# Patient Record
Sex: FEMALE | Race: BLACK OR AFRICAN AMERICAN | NOT HISPANIC OR LATINO | URBAN - METROPOLITAN AREA
[De-identification: names, ages, dates, MRNs, and addresses within clinical notes are randomized per-mention and may not be internally consistent; named-entity substitution may affect disease eponyms.]

---

## 2019-08-05 ENCOUNTER — OUTPATIENT (OUTPATIENT)
Dept: OUTPATIENT SERVICES | Facility: HOSPITAL | Age: 74
LOS: 1 days | End: 2019-08-05

## 2019-08-05 DIAGNOSIS — Z52.3 BONE MARROW DONOR: ICD-10-CM

## 2019-08-08 ENCOUNTER — APPOINTMENT (OUTPATIENT)
Dept: HEMATOLOGY ONCOLOGY | Facility: CLINIC | Age: 74
End: 2019-08-08
Payer: MEDICAID

## 2019-08-08 ENCOUNTER — LABORATORY RESULT (OUTPATIENT)
Age: 74
End: 2019-08-08

## 2019-08-08 ENCOUNTER — RESULT REVIEW (OUTPATIENT)
Age: 74
End: 2019-08-08

## 2019-08-08 ENCOUNTER — OUTPATIENT (OUTPATIENT)
Dept: OUTPATIENT SERVICES | Facility: HOSPITAL | Age: 74
LOS: 1 days | Discharge: HOME | End: 2019-08-08

## 2019-08-08 ENCOUNTER — APPOINTMENT (OUTPATIENT)
Dept: HEMATOLOGY ONCOLOGY | Facility: CLINIC | Age: 74
End: 2019-08-08

## 2019-08-08 DIAGNOSIS — Z52.9 DONOR OF UNSPECIFIED ORGAN OR TISSUE: ICD-10-CM

## 2019-08-08 DIAGNOSIS — Z52.001 UNSPECIFIED DONOR, STEM CELLS: ICD-10-CM

## 2019-08-08 LAB
BASOPHILS # BLD AUTO: 0.1 K/UL — SIGNIFICANT CHANGE UP (ref 0–0.2)
EOSINOPHIL # BLD AUTO: 0.2 K/UL — SIGNIFICANT CHANGE UP (ref 0–0.5)
EOSINOPHIL NFR BLD AUTO: 2 % — SIGNIFICANT CHANGE UP (ref 0–6)
HCT VFR BLD CALC: 43.9 % — SIGNIFICANT CHANGE UP (ref 34.5–45)
HGB BLD-MCNC: 14.7 G/DL — SIGNIFICANT CHANGE UP (ref 11.5–15.5)
LYMPHOCYTES # BLD AUTO: 2.6 K/UL — SIGNIFICANT CHANGE UP (ref 1–3.3)
LYMPHOCYTES # BLD AUTO: 52 % — HIGH (ref 13–44)
MCHC RBC-ENTMCNC: 31.8 PG — SIGNIFICANT CHANGE UP (ref 27–34)
MCHC RBC-ENTMCNC: 33.4 G/DL — SIGNIFICANT CHANGE UP (ref 32–36)
MCV RBC AUTO: 95.2 FL — SIGNIFICANT CHANGE UP (ref 80–100)
MONOCYTES # BLD AUTO: 0.5 K/UL — SIGNIFICANT CHANGE UP (ref 0–0.9)
MONOCYTES NFR BLD AUTO: 8 % — SIGNIFICANT CHANGE UP (ref 2–14)
NEUTROPHILS # BLD AUTO: 2.4 K/UL — SIGNIFICANT CHANGE UP (ref 1.8–7.4)
NEUTROPHILS NFR BLD AUTO: 38 % — LOW (ref 43–77)
PLAT MORPH BLD: NORMAL — SIGNIFICANT CHANGE UP
PLATELET # BLD AUTO: 256 K/UL — SIGNIFICANT CHANGE UP (ref 150–400)
RBC # BLD: 4.61 M/UL — SIGNIFICANT CHANGE UP (ref 3.8–5.2)
RBC # FLD: 12.9 % — SIGNIFICANT CHANGE UP (ref 10.3–14.5)
RBC BLD AUTO: NORMAL — SIGNIFICANT CHANGE UP
WBC # BLD: 5.8 K/UL — SIGNIFICANT CHANGE UP (ref 3.8–10.5)
WBC # FLD AUTO: 5.8 K/UL — SIGNIFICANT CHANGE UP (ref 3.8–10.5)

## 2019-08-08 PROCEDURE — 99499A: CUSTOM | Mod: NC

## 2019-08-15 ENCOUNTER — RESULT REVIEW (OUTPATIENT)
Age: 74
End: 2019-08-15

## 2019-08-15 ENCOUNTER — APPOINTMENT (OUTPATIENT)
Dept: HEMATOLOGY ONCOLOGY | Facility: CLINIC | Age: 74
End: 2019-08-15
Payer: MEDICAID

## 2019-08-15 VITALS
RESPIRATION RATE: 16 BRPM | HEART RATE: 81 BPM | SYSTOLIC BLOOD PRESSURE: 148 MMHG | TEMPERATURE: 98.3 F | OXYGEN SATURATION: 100 % | BODY MASS INDEX: 31.84 KG/M2 | WEIGHT: 168.65 LBS | DIASTOLIC BLOOD PRESSURE: 77 MMHG | HEIGHT: 61.22 IN

## 2019-08-15 DIAGNOSIS — E11.9 TYPE 2 DIABETES MELLITUS W/OUT COMPLICATIONS: ICD-10-CM

## 2019-08-15 DIAGNOSIS — Z78.9 OTHER SPECIFIED HEALTH STATUS: ICD-10-CM

## 2019-08-15 DIAGNOSIS — Z82.49 FAMILY HISTORY OF ISCHEMIC HEART DISEASE AND OTHER DISEASES OF THE CIRCULATORY SYSTEM: ICD-10-CM

## 2019-08-15 DIAGNOSIS — I10 ESSENTIAL (PRIMARY) HYPERTENSION: ICD-10-CM

## 2019-08-15 LAB
BASOPHILS # BLD AUTO: 0.1 K/UL — SIGNIFICANT CHANGE UP (ref 0–0.2)
BASOPHILS NFR BLD AUTO: 0.9 % — SIGNIFICANT CHANGE UP (ref 0–2)
EOSINOPHIL # BLD AUTO: 0.1 K/UL — SIGNIFICANT CHANGE UP (ref 0–0.5)
EOSINOPHIL NFR BLD AUTO: 2.2 % — SIGNIFICANT CHANGE UP (ref 0–6)
HCT VFR BLD CALC: 41.4 % — SIGNIFICANT CHANGE UP (ref 34.5–45)
HGB BLD-MCNC: 13.6 G/DL — SIGNIFICANT CHANGE UP (ref 11.5–15.5)
LYMPHOCYTES # BLD AUTO: 2.6 K/UL — SIGNIFICANT CHANGE UP (ref 1–3.3)
LYMPHOCYTES # BLD AUTO: 40.5 % — SIGNIFICANT CHANGE UP (ref 13–44)
MCHC RBC-ENTMCNC: 31.3 PG — SIGNIFICANT CHANGE UP (ref 27–34)
MCHC RBC-ENTMCNC: 32.9 G/DL — SIGNIFICANT CHANGE UP (ref 32–36)
MCV RBC AUTO: 95.2 FL — SIGNIFICANT CHANGE UP (ref 80–100)
MONOCYTES # BLD AUTO: 0.6 K/UL — SIGNIFICANT CHANGE UP (ref 0–0.9)
MONOCYTES NFR BLD AUTO: 9 % — SIGNIFICANT CHANGE UP (ref 2–14)
NEUTROPHILS # BLD AUTO: 3.1 K/UL — SIGNIFICANT CHANGE UP (ref 1.8–7.4)
NEUTROPHILS NFR BLD AUTO: 47.4 % — SIGNIFICANT CHANGE UP (ref 43–77)
PLATELET # BLD AUTO: 277 K/UL — SIGNIFICANT CHANGE UP (ref 150–400)
RBC # BLD: 4.35 M/UL — SIGNIFICANT CHANGE UP (ref 3.8–5.2)
RBC # FLD: 14 % — SIGNIFICANT CHANGE UP (ref 10.3–14.5)
WBC # BLD: 6.5 K/UL — SIGNIFICANT CHANGE UP (ref 3.8–10.5)
WBC # FLD AUTO: 6.5 K/UL — SIGNIFICANT CHANGE UP (ref 3.8–10.5)

## 2019-08-15 PROCEDURE — 99215 OFFICE O/P EST HI 40 MIN: CPT

## 2019-08-16 LAB — APTT BLD: 35.2 SEC

## 2019-08-20 PROBLEM — Z52.001 DONOR OF STEM CELL: Status: ACTIVE | Noted: 2019-08-14

## 2019-08-22 PROBLEM — Z78.9 SOCIAL ALCOHOL USE: Status: ACTIVE | Noted: 2019-08-22

## 2019-08-22 PROBLEM — E11.9 DIABETES MELLITUS: Status: ACTIVE | Noted: 2019-08-22

## 2019-08-22 PROBLEM — Z82.49 FAMILY HISTORY OF MYOCARDIAL INFARCTION: Status: ACTIVE | Noted: 2019-08-22

## 2019-08-22 LAB — RPR SER-TITR: NORMAL

## 2019-08-22 RX ORDER — LEVOTHYROXINE SODIUM 137 UG/1
TABLET ORAL
Refills: 0 | Status: ACTIVE | COMMUNITY

## 2019-08-22 RX ORDER — METFORMIN HYDROCHLORIDE 625 MG/1
TABLET ORAL
Refills: 0 | Status: ACTIVE | COMMUNITY

## 2019-08-22 RX ORDER — AMLODIPINE BESYLATE 5 MG/1
TABLET ORAL
Refills: 0 | Status: ACTIVE | COMMUNITY

## 2019-08-22 RX ORDER — MULTIVITAMIN
TABLET ORAL
Refills: 0 | Status: ACTIVE | COMMUNITY

## 2019-08-23 NOTE — RESULTS/DATA
[FreeTextEntry1] : WBC- 6.5, diff- 47%N, 40%L, 9%mono, 2%eos; Hgb- 13.6, Hct- 41.4, Platelets- 277K\par PT- 11.4 sec; APTT- 35.2 sec\par CMP- within normal range; LDH- 186 U/L\par Sickle Cell screen- Negative\par Urinalysis- Negative\par CMV IgG Antibody- Positive\par EBV VCA IgM- Negative; EBV VCA IgG- Positive\par Varicella IgG Antibody- Positive\par Herpes Simplex (1,2) IgM, serum- Negative\par \par Infectious Disease Marker Panel- CMV positive, STS reactive; otherwise remainder of panel is negative. \par RPR(8/15/19)- Negative.

## 2019-08-23 NOTE — PHYSICAL EXAM
[Normal] : affect appropriate [de-identified] : anicteric [de-identified] : RRR, normal S1S2 [de-identified] : warm, no edema [de-identified] : A & O x 4

## 2019-08-23 NOTE — ASSESSMENT
[FreeTextEntry1] : 73 year old female in overall good health with hypertension and diabetes controlled on medication. Transfusion history- Negative. Communicable disease assessment- Negative; Zika virus assessment- Negative. Review of systems- negative. Physical exam- normal. Lab studies- within acceptable range. Infectious Disease Marker Panel- CMV positive, STS reactive; otherwise remainder of panel is negative. RPR(8/15/19)- Negative.\par \par Therefore, the donor is suitable and medically cleared as  a haploidentical peripheral blood stem cell donor for her daughter.\par \par I had a lengthy discussion with Nell regarding information on Consent for the Acquisition of Hematopoietic Stem Cells for Related Allogeneic Transplantation by Apheresis of Leukocyte Growth Factor Stimulated Donors which includes purpose of program, background, procedures, risks and discomforts of leukocyte growth factor, risks associated with the Apheresis(Collection)procedure, alternative choices, confidentiality. She had adequate time to ask all questions that she wished and these were answered to her satisfaction prior to completion of consultation visit.
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
## 2019-08-23 NOTE — REVIEW OF SYSTEMS
[Fever] : no fever [Mucosal Pain] : no mucosal pain [Shortness Of Breath] : no shortness of breath [Lower Ext Edema] : no lower extremity edema [Cough] : no cough [Abdominal Pain] : no abdominal pain [Vomiting] : no vomiting [Diarrhea] : no diarrhea [Dysuria] : no dysuria [Skin Rash] : no skin rash [Fainting] : no fainting [Dizziness] : no dizziness [Easy Bleeding] : no tendency for easy bleeding [Easy Bruising] : no tendency for easy bruising [Swollen Glands] : no swollen glands [FreeTextEntry9] : no bone pain

## 2019-08-23 NOTE — REASON FOR VISIT
[Initial Consultation] : an initial consultation for [FreeTextEntry2] : determination of suitability as a haploidentical peripheral blood stem cell donor for her daughter

## 2019-08-26 ENCOUNTER — FORM ENCOUNTER (OUTPATIENT)
Age: 74
End: 2019-08-26

## 2019-08-27 ENCOUNTER — OUTPATIENT (OUTPATIENT)
Dept: OUTPATIENT SERVICES | Facility: HOSPITAL | Age: 74
LOS: 1 days | End: 2019-08-27
Payer: COMMERCIAL

## 2019-08-27 DIAGNOSIS — Z52.3 BONE MARROW DONOR: ICD-10-CM

## 2019-08-27 LAB
BASOPHILS # BLD AUTO: 0.1 K/UL — SIGNIFICANT CHANGE UP (ref 0–0.2)
BLASTS # FLD: 1 % — HIGH (ref 0–0)
CA-I BLD-SCNC: 1.31 MMOL/L — HIGH (ref 1.12–1.3)
CD3 CELLS # SPEC: 2887 /UL — SIGNIFICANT CHANGE UP
CD34 CELLS # FLD: 89 /UL — SIGNIFICANT CHANGE UP
EOSINOPHIL # BLD AUTO: 0.4 K/UL — SIGNIFICANT CHANGE UP (ref 0–0.5)
EOSINOPHIL NFR BLD AUTO: 2 — SIGNIFICANT CHANGE UP
HCT VFR BLD CALC: 44 % — SIGNIFICANT CHANGE UP (ref 34.5–45)
HGB BLD-MCNC: 13.8 G/DL — SIGNIFICANT CHANGE UP (ref 11.5–15.5)
LYMPHOCYTES # BLD AUTO: 5.7 K/UL — HIGH (ref 1–3.3)
LYMPHOCYTES # BLD AUTO: 6 % — LOW (ref 13–44)
MCHC RBC-ENTMCNC: 29.7 PG — SIGNIFICANT CHANGE UP (ref 27–34)
MCHC RBC-ENTMCNC: 31.5 GM/DL — LOW (ref 32–36)
MCV RBC AUTO: 94.5 FL — SIGNIFICANT CHANGE UP (ref 80–100)
METAMYELOCYTES # FLD: 2 % — HIGH (ref 0–0)
MONOCYTES # BLD AUTO: 3.5 K/UL — HIGH (ref 0–0.9)
MONOCYTES NFR BLD AUTO: 12 % — SIGNIFICANT CHANGE UP (ref 2–14)
MYELOCYTES NFR BLD: 2 % — HIGH (ref 0–0)
NEUTROPHILS # BLD AUTO: 49.3 K/UL — HIGH (ref 1.8–7.4)
NEUTROPHILS NFR BLD AUTO: 64 % — SIGNIFICANT CHANGE UP (ref 43–77)
NEUTS BAND # BLD: 11 % — HIGH (ref 0–8)
NRBC # BLD: 1 /100 — HIGH (ref 0–0)
PLAT MORPH BLD: NORMAL — SIGNIFICANT CHANGE UP
PLATELET # BLD AUTO: 224 K/UL — SIGNIFICANT CHANGE UP (ref 150–400)
RBC # BLD: 4.65 M/UL — SIGNIFICANT CHANGE UP (ref 3.8–5.2)
RBC # FLD: 13.7 % — SIGNIFICANT CHANGE UP (ref 10.3–14.5)
RBC BLD AUTO: SIGNIFICANT CHANGE UP
WBC # BLD: 59 K/UL — CRITICAL HIGH (ref 3.8–10.5)
WBC # FLD AUTO: 59 K/UL — CRITICAL HIGH (ref 3.8–10.5)

## 2019-08-27 PROCEDURE — 87205 SMEAR GRAM STAIN: CPT

## 2019-08-27 PROCEDURE — C1894: CPT

## 2019-08-27 PROCEDURE — 86367 STEM CELLS TOTAL COUNT: CPT

## 2019-08-27 PROCEDURE — 38205 HARVEST ALLOGENEIC STEM CELL: CPT

## 2019-08-27 PROCEDURE — 36556 INSERT NON-TUNNEL CV CATH: CPT

## 2019-08-27 PROCEDURE — 77001 FLUOROGUIDE FOR VEIN DEVICE: CPT | Mod: 26

## 2019-08-27 PROCEDURE — 99205 OFFICE O/P NEW HI 60 MIN: CPT | Mod: 25

## 2019-08-27 PROCEDURE — 82330 ASSAY OF CALCIUM: CPT

## 2019-08-27 PROCEDURE — 85027 COMPLETE CBC AUTOMATED: CPT

## 2019-08-27 PROCEDURE — 77001 FLUOROGUIDE FOR VEIN DEVICE: CPT

## 2019-08-27 PROCEDURE — C1769: CPT

## 2019-08-27 PROCEDURE — C1752: CPT

## 2019-08-28 LAB
GRAM STN FLD: SIGNIFICANT CHANGE UP
SPECIMEN SOURCE: SIGNIFICANT CHANGE UP

## 2019-09-04 DIAGNOSIS — Z52.001 UNSPECIFIED DONOR, STEM CELLS: ICD-10-CM

## 2019-09-10 DIAGNOSIS — Z49.01 ENCOUNTER FOR FITTING AND ADJUSTMENT OF EXTRACORPOREAL DIALYSIS CATHETER: ICD-10-CM

## 2019-09-10 LAB
CULTURE RESULTS: SIGNIFICANT CHANGE UP
SPECIMEN SOURCE: SIGNIFICANT CHANGE UP

## 2021-10-06 PROBLEM — I10 ESSENTIAL HYPERTENSION: Status: ACTIVE | Noted: 2019-08-22

## 2022-04-22 ENCOUNTER — APPOINTMENT (OUTPATIENT)
Dept: NEUROLOGY | Facility: CLINIC | Age: 77
End: 2022-04-22

## 2022-08-18 ENCOUNTER — APPOINTMENT (OUTPATIENT)
Dept: NEUROLOGY | Facility: CLINIC | Age: 77
End: 2022-08-18

## 2022-10-25 ENCOUNTER — APPOINTMENT (OUTPATIENT)
Dept: OTOLARYNGOLOGY | Facility: CLINIC | Age: 77
End: 2022-10-25

## 2022-10-25 VITALS
BODY MASS INDEX: 30.4 KG/M2 | SYSTOLIC BLOOD PRESSURE: 124 MMHG | DIASTOLIC BLOOD PRESSURE: 66 MMHG | WEIGHT: 161 LBS | HEART RATE: 80 BPM | HEIGHT: 61.22 IN

## 2022-10-25 PROCEDURE — 99204 OFFICE O/P NEW MOD 45 MIN: CPT | Mod: 25

## 2022-10-25 PROCEDURE — 31575 DIAGNOSTIC LARYNGOSCOPY: CPT

## 2022-10-25 NOTE — CONSULT LETTER
[Dear  ___] : Dear  [unfilled], [Consult Letter:] : I had the pleasure of evaluating your patient, [unfilled]. [Please see my note below.] : Please see my note below. [Consult Closing:] : Thank you very much for allowing me to participate in the care of this patient.  If you have any questions, please do not hesitate to contact me. [Sincerely,] : Sincerely, [FreeTextEntry2] : Dr Jaylen Joshua [FreeTextEntry3] : \par Colby Baltazar MD, FACS\par \par Otolaryngology-Head and Neck Surgery\par [employer] [facility]\par

## 2022-10-25 NOTE — HISTORY OF PRESENT ILLNESS
[de-identified] : This is a patient referred by Dr Joshua for thyroid nodule. This was found          months ago during physical exam/US.\par No dysphagia, dysphonia or dyspnea.\par Patient denies h/o radiation and has no family h/o thyroid cancer.\par Recent CT scan from [facility] showed bilateral thyroid nodules with mediastinal extension and narrowing of the larynx and the trachea to 1.3 cm.\par Needle biopsy was beniign according to the patient. \par

## 2022-10-31 DIAGNOSIS — Z01.818 ENCOUNTER FOR OTHER PREPROCEDURAL EXAMINATION: ICD-10-CM

## 2022-11-28 ENCOUNTER — OUTPATIENT (OUTPATIENT)
Dept: OUTPATIENT SERVICES | Facility: HOSPITAL | Age: 77
LOS: 1 days | End: 2022-11-28

## 2022-11-28 VITALS
TEMPERATURE: 97 F | SYSTOLIC BLOOD PRESSURE: 140 MMHG | OXYGEN SATURATION: 96 % | RESPIRATION RATE: 16 BRPM | DIASTOLIC BLOOD PRESSURE: 80 MMHG | WEIGHT: 162.04 LBS | HEART RATE: 55 BPM | HEIGHT: 61 IN

## 2022-11-28 DIAGNOSIS — E04.1 NONTOXIC SINGLE THYROID NODULE: ICD-10-CM

## 2022-11-28 DIAGNOSIS — E04.9 NONTOXIC GOITER, UNSPECIFIED: ICD-10-CM

## 2022-11-28 DIAGNOSIS — Z87.59 PERSONAL HISTORY OF OTHER COMPLICATIONS OF PREGNANCY, CHILDBIRTH AND THE PUERPERIUM: Chronic | ICD-10-CM

## 2022-11-28 DIAGNOSIS — I10 ESSENTIAL (PRIMARY) HYPERTENSION: ICD-10-CM

## 2022-11-28 DIAGNOSIS — Z90.710 ACQUIRED ABSENCE OF BOTH CERVIX AND UTERUS: Chronic | ICD-10-CM

## 2022-11-28 DIAGNOSIS — E11.9 TYPE 2 DIABETES MELLITUS WITHOUT COMPLICATIONS: ICD-10-CM

## 2022-11-28 LAB
ANION GAP SERPL CALC-SCNC: 12 MMOL/L — SIGNIFICANT CHANGE UP (ref 7–14)
BLD GP AB SCN SERPL QL: NEGATIVE — SIGNIFICANT CHANGE UP
BUN SERPL-MCNC: 17 MG/DL — SIGNIFICANT CHANGE UP (ref 7–23)
CALCIUM SERPL-MCNC: 10.4 MG/DL — SIGNIFICANT CHANGE UP (ref 8.4–10.5)
CHLORIDE SERPL-SCNC: 102 MMOL/L — SIGNIFICANT CHANGE UP (ref 98–107)
CO2 SERPL-SCNC: 28 MMOL/L — SIGNIFICANT CHANGE UP (ref 22–31)
CREAT SERPL-MCNC: 0.74 MG/DL — SIGNIFICANT CHANGE UP (ref 0.5–1.3)
EGFR: 82 ML/MIN/1.73M2 — SIGNIFICANT CHANGE UP
GLUCOSE SERPL-MCNC: 96 MG/DL — SIGNIFICANT CHANGE UP (ref 70–99)
HCT VFR BLD CALC: 44 % — SIGNIFICANT CHANGE UP (ref 34.5–45)
HGB BLD-MCNC: 14.3 G/DL — SIGNIFICANT CHANGE UP (ref 11.5–15.5)
MCHC RBC-ENTMCNC: 29.9 PG — SIGNIFICANT CHANGE UP (ref 27–34)
MCHC RBC-ENTMCNC: 32.5 GM/DL — SIGNIFICANT CHANGE UP (ref 32–36)
MCV RBC AUTO: 91.9 FL — SIGNIFICANT CHANGE UP (ref 80–100)
NRBC # BLD: 0 /100 WBCS — SIGNIFICANT CHANGE UP (ref 0–0)
NRBC # FLD: 0 K/UL — SIGNIFICANT CHANGE UP (ref 0–0)
PLATELET # BLD AUTO: 254 K/UL — SIGNIFICANT CHANGE UP (ref 150–400)
POTASSIUM SERPL-MCNC: 3.6 MMOL/L — SIGNIFICANT CHANGE UP (ref 3.5–5.3)
POTASSIUM SERPL-SCNC: 3.6 MMOL/L — SIGNIFICANT CHANGE UP (ref 3.5–5.3)
RBC # BLD: 4.79 M/UL — SIGNIFICANT CHANGE UP (ref 3.8–5.2)
RBC # FLD: 14.3 % — SIGNIFICANT CHANGE UP (ref 10.3–14.5)
RH IG SCN BLD-IMP: POSITIVE — SIGNIFICANT CHANGE UP
SODIUM SERPL-SCNC: 142 MMOL/L — SIGNIFICANT CHANGE UP (ref 135–145)
T3 SERPL-MCNC: 97 NG/DL — SIGNIFICANT CHANGE UP (ref 80–200)
TSH SERPL-MCNC: 1.38 UIU/ML — SIGNIFICANT CHANGE UP (ref 0.27–4.2)
WBC # BLD: 4.5 K/UL — SIGNIFICANT CHANGE UP (ref 3.8–10.5)
WBC # FLD AUTO: 4.5 K/UL — SIGNIFICANT CHANGE UP (ref 3.8–10.5)

## 2022-11-28 PROCEDURE — 93010 ELECTROCARDIOGRAM REPORT: CPT

## 2022-11-28 NOTE — H&P PST ADULT - PROBLEM SELECTOR PLAN 1
Pt. is scheduled for a thyroidectomy substernal thyroid cervical approach...12/5/22.  Pt. verbalized understanding of instructions and that Chlorhexidine is for external use.

## 2022-11-28 NOTE — H&P PST ADULT - NSANTHOSAYNRD_GEN_A_CORE
No. JAMAR screening performed.  STOP BANG Legend: 0-2 = LOW Risk; 3-4 = INTERMEDIATE Risk; 5-8 = HIGH Risk

## 2022-11-28 NOTE — H&P PST ADULT - ACTIVITY
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

## 2022-11-28 NOTE — H&P PST ADULT - NSICDXPASTMEDICALHX_GEN_ALL_CORE_FT
PAST MEDICAL HISTORY:  Glaucoma     HTN (hypertension)     Hyperthyroidism     Multiple thyroid nodules     Type II diabetes mellitus      PAST MEDICAL HISTORY:  Glaucoma     HTN (hypertension)     Hyperthyroidism     Multiple thyroid nodules     Obese     Type II diabetes mellitus

## 2022-12-02 NOTE — ASU PATIENT PROFILE, ADULT - FALL HARM RISK - UNIVERSAL INTERVENTIONS
Bed in lowest position, wheels locked, appropriate side rails in place/Call bell, personal items and telephone in reach/Instruct patient to call for assistance before getting out of bed or chair/Non-slip footwear when patient is out of bed/[place] to call system/Physically safe environment - no spills, clutter or unnecessary equipment/Purposeful Proactive Rounding/Room/bathroom lighting operational, light cord in reach

## 2022-12-02 NOTE — ASU PATIENT PROFILE, ADULT - NSICDXPASTMEDICALHX_GEN_ALL_CORE_FT
PAST MEDICAL HISTORY:  Glaucoma     HTN (hypertension)     Hyperthyroidism     Multiple thyroid nodules     Obese     Type II diabetes mellitus

## 2022-12-02 NOTE — ASU PATIENT PROFILE, ADULT - NSCAFFEAMTFREQ_GEN_ALL_CORE_SD
Moshe Pearl is a 30 y o  female who presents to the clinic 1 weeks status post primary  for maternal exhaustion  Eating a regular diet without difficulty  Denies any urinary complaints  Bowel movements are normal  Pain is controlled with current analgesics  Medications being used: acetaminophen and ibuprofen (OTC)  Denies any fevers, chills, malaise  She is breast feeding and supplementing formula due to milk supply issues  The following portions of the patient's history were reviewed and updated as appropriate: allergies, current medications, past family history, past medical history, past social history, past surgical history and problem list     Review of Systems  Pertinent items are noted in HPI  Objective     Ht 5' 6" (1 676 m)   Wt (!) 152 kg (334 lb)   LMP 10/23/2021   BMI 53 91 kg/m²   General:  alert and oriented, in no acute distress   Abdomen: soft, bowel sounds active, non-tender   Incision:   healing well, no drainage, no erythema, no hernia, no seroma, no swelling, no dehiscence, incision well approximated         Assessment      Doing well postoperatively  Mepilex dressing removed  Incision C/D/I, well approximated, no drainage present    Plan     1  Continue any current medications  2  Wound care discussed  Keep open to air  Do not place any creams, lotions, etc  Call office with any s/sx of infection  3  Activity restrictions: Continue pelvic rest  May drive when able to slam on brake without hesitation  4  Anticipated return to work: not applicable  5  Had long discussion with Jada Gomez about breast feeding  Highly encouraged Camelia to follow up with outpatient lactation consultant at [facility] to help with breast feeding and supply issues  6  Follow up: 2 weeks for postpartum appointment 
occasional use

## 2022-12-04 ENCOUNTER — TRANSCRIPTION ENCOUNTER (OUTPATIENT)
Age: 77
End: 2022-12-04

## 2022-12-05 ENCOUNTER — TRANSCRIPTION ENCOUNTER (OUTPATIENT)
Age: 77
End: 2022-12-05

## 2022-12-05 ENCOUNTER — RESULT REVIEW (OUTPATIENT)
Age: 77
End: 2022-12-05

## 2022-12-05 ENCOUNTER — APPOINTMENT (OUTPATIENT)
Dept: OTOLARYNGOLOGY | Facility: HOSPITAL | Age: 77
End: 2022-12-05

## 2022-12-05 ENCOUNTER — INPATIENT (INPATIENT)
Facility: HOSPITAL | Age: 77
LOS: 1 days | Discharge: HOME | End: 2022-12-07
Attending: OTOLARYNGOLOGY | Admitting: OTOLARYNGOLOGY
Payer: MEDICARE

## 2022-12-05 VITALS
HEIGHT: 61 IN | WEIGHT: 162.04 LBS | OXYGEN SATURATION: 98 % | RESPIRATION RATE: 16 BRPM | DIASTOLIC BLOOD PRESSURE: 71 MMHG | SYSTOLIC BLOOD PRESSURE: 143 MMHG | TEMPERATURE: 97 F | HEART RATE: 67 BPM

## 2022-12-05 DIAGNOSIS — Z87.59 PERSONAL HISTORY OF OTHER COMPLICATIONS OF PREGNANCY, CHILDBIRTH AND THE PUERPERIUM: Chronic | ICD-10-CM

## 2022-12-05 DIAGNOSIS — E04.9 NONTOXIC GOITER, UNSPECIFIED: ICD-10-CM

## 2022-12-05 DIAGNOSIS — Z90.710 ACQUIRED ABSENCE OF BOTH CERVIX AND UTERUS: Chronic | ICD-10-CM

## 2022-12-05 LAB
CALCIUM SERPL-MCNC: 9.2 MG/DL — SIGNIFICANT CHANGE UP (ref 8.4–10.5)
CALCIUM SERPL-MCNC: 9.2 MG/DL — SIGNIFICANT CHANGE UP (ref 8.4–10.5)
CALCIUM SERPL-MCNC: 9.6 MG/DL — SIGNIFICANT CHANGE UP (ref 8.4–10.5)
GLUCOSE BLDC GLUCOMTR-MCNC: 109 MG/DL — HIGH (ref 70–99)
GLUCOSE BLDC GLUCOMTR-MCNC: 134 MG/DL — HIGH (ref 70–99)
GLUCOSE BLDC GLUCOMTR-MCNC: 138 MG/DL — HIGH (ref 70–99)
PTH-INTACT FLD-MCNC: 21 PG/ML — SIGNIFICANT CHANGE UP (ref 15–65)
SARS-COV-2 N GENE NPH QL NAA+PROBE: NOT DETECTED

## 2022-12-05 PROCEDURE — 60271 REMOVAL OF THYROID: CPT

## 2022-12-05 PROCEDURE — 88307 TISSUE EXAM BY PATHOLOGIST: CPT | Mod: 26

## 2022-12-05 DEVICE — LIGATING CLIPS WECK HORIZON SMALL-WIDE (RED) 24: Type: IMPLANTABLE DEVICE | Status: FUNCTIONAL

## 2022-12-05 DEVICE — SURGICEL 2 X 14": Type: IMPLANTABLE DEVICE | Status: FUNCTIONAL

## 2022-12-05 DEVICE — LIGATING CLIPS WECK HORIZON MEDIUM (BLUE) 24: Type: IMPLANTABLE DEVICE | Status: FUNCTIONAL

## 2022-12-05 RX ORDER — AMLODIPINE BESYLATE 2.5 MG/1
1 TABLET ORAL
Qty: 0 | Refills: 0 | DISCHARGE

## 2022-12-05 RX ORDER — FENTANYL CITRATE 50 UG/ML
25 INJECTION INTRAVENOUS
Refills: 0 | Status: DISCONTINUED | OUTPATIENT
Start: 2022-12-05 | End: 2022-12-05

## 2022-12-05 RX ORDER — POLYETHYLENE GLYCOL 3350 17 G/17G
17 POWDER, FOR SOLUTION ORAL DAILY
Refills: 0 | Status: DISCONTINUED | OUTPATIENT
Start: 2022-12-05 | End: 2022-12-07

## 2022-12-05 RX ORDER — DORZOLAMIDE HYDROCHLORIDE TIMOLOL MALEATE 20; 5 MG/ML; MG/ML
1 SOLUTION/ DROPS OPHTHALMIC
Qty: 0 | Refills: 0 | DISCHARGE

## 2022-12-05 RX ORDER — LEVOTHYROXINE SODIUM 125 MCG
125 TABLET ORAL DAILY
Refills: 0 | Status: DISCONTINUED | OUTPATIENT
Start: 2022-12-05 | End: 2022-12-07

## 2022-12-05 RX ORDER — SODIUM CHLORIDE 9 MG/ML
1000 INJECTION, SOLUTION INTRAVENOUS
Refills: 0 | Status: DISCONTINUED | OUTPATIENT
Start: 2022-12-05 | End: 2022-12-05

## 2022-12-05 RX ORDER — INSULIN LISPRO 100/ML
VIAL (ML) SUBCUTANEOUS
Refills: 0 | Status: DISCONTINUED | OUTPATIENT
Start: 2022-12-05 | End: 2022-12-07

## 2022-12-05 RX ORDER — DEXTROSE 50 % IN WATER 50 %
15 SYRINGE (ML) INTRAVENOUS ONCE
Refills: 0 | Status: DISCONTINUED | OUTPATIENT
Start: 2022-12-05 | End: 2022-12-07

## 2022-12-05 RX ORDER — BRIMONIDINE TARTRATE 2 MG/MG
1 SOLUTION/ DROPS OPHTHALMIC
Qty: 0 | Refills: 0 | DISCHARGE

## 2022-12-05 RX ORDER — METFORMIN HYDROCHLORIDE 850 MG/1
1 TABLET ORAL
Qty: 0 | Refills: 0 | DISCHARGE

## 2022-12-05 RX ORDER — AMLODIPINE BESYLATE 2.5 MG/1
10 TABLET ORAL DAILY
Refills: 0 | Status: DISCONTINUED | OUTPATIENT
Start: 2022-12-05 | End: 2022-12-07

## 2022-12-05 RX ORDER — ONDANSETRON 8 MG/1
4 TABLET, FILM COATED ORAL ONCE
Refills: 0 | Status: DISCONTINUED | OUTPATIENT
Start: 2022-12-05 | End: 2022-12-05

## 2022-12-05 RX ORDER — OXYCODONE HYDROCHLORIDE 5 MG/1
5 TABLET ORAL EVERY 4 HOURS
Refills: 0 | Status: DISCONTINUED | OUTPATIENT
Start: 2022-12-05 | End: 2022-12-07

## 2022-12-05 RX ORDER — OXYCODONE HYDROCHLORIDE 5 MG/1
10 TABLET ORAL EVERY 4 HOURS
Refills: 0 | Status: DISCONTINUED | OUTPATIENT
Start: 2022-12-05 | End: 2022-12-07

## 2022-12-05 RX ORDER — ASCORBIC ACID 60 MG
1 TABLET,CHEWABLE ORAL
Qty: 0 | Refills: 0 | DISCHARGE

## 2022-12-05 RX ORDER — SODIUM CHLORIDE 9 MG/ML
1000 INJECTION, SOLUTION INTRAVENOUS
Refills: 0 | Status: DISCONTINUED | OUTPATIENT
Start: 2022-12-05 | End: 2022-12-07

## 2022-12-05 RX ORDER — SENNA PLUS 8.6 MG/1
1 TABLET ORAL AT BEDTIME
Refills: 0 | Status: DISCONTINUED | OUTPATIENT
Start: 2022-12-05 | End: 2022-12-07

## 2022-12-05 RX ORDER — LATANOPROST 0.05 MG/ML
1 SOLUTION/ DROPS OPHTHALMIC; TOPICAL AT BEDTIME
Refills: 0 | Status: DISCONTINUED | OUTPATIENT
Start: 2022-12-05 | End: 2022-12-07

## 2022-12-05 RX ORDER — DEXTROSE 50 % IN WATER 50 %
25 SYRINGE (ML) INTRAVENOUS ONCE
Refills: 0 | Status: DISCONTINUED | OUTPATIENT
Start: 2022-12-05 | End: 2022-12-07

## 2022-12-05 RX ORDER — HYDROMORPHONE HYDROCHLORIDE 2 MG/ML
0.5 INJECTION INTRAMUSCULAR; INTRAVENOUS; SUBCUTANEOUS
Refills: 0 | Status: DISCONTINUED | OUTPATIENT
Start: 2022-12-05 | End: 2022-12-05

## 2022-12-05 RX ORDER — DEXTROSE 50 % IN WATER 50 %
12.5 SYRINGE (ML) INTRAVENOUS ONCE
Refills: 0 | Status: DISCONTINUED | OUTPATIENT
Start: 2022-12-05 | End: 2022-12-07

## 2022-12-05 RX ORDER — BRIMONIDINE TARTRATE 2 MG/MG
1 SOLUTION/ DROPS OPHTHALMIC
Refills: 0 | Status: DISCONTINUED | OUTPATIENT
Start: 2022-12-05 | End: 2022-12-07

## 2022-12-05 RX ORDER — DORZOLAMIDE HYDROCHLORIDE TIMOLOL MALEATE 20; 5 MG/ML; MG/ML
1 SOLUTION/ DROPS OPHTHALMIC
Refills: 0 | Status: DISCONTINUED | OUTPATIENT
Start: 2022-12-05 | End: 2022-12-07

## 2022-12-05 RX ORDER — CHOLECALCIFEROL (VITAMIN D3) 125 MCG
1 CAPSULE ORAL
Qty: 0 | Refills: 0 | DISCHARGE

## 2022-12-05 RX ORDER — LATANOPROST 0.05 MG/ML
1 SOLUTION/ DROPS OPHTHALMIC; TOPICAL
Qty: 0 | Refills: 0 | DISCHARGE

## 2022-12-05 RX ORDER — GLUCAGON INJECTION, SOLUTION 0.5 MG/.1ML
1 INJECTION, SOLUTION SUBCUTANEOUS ONCE
Refills: 0 | Status: DISCONTINUED | OUTPATIENT
Start: 2022-12-05 | End: 2022-12-07

## 2022-12-05 RX ORDER — LANOLIN ALCOHOL/MO/W.PET/CERES
3 CREAM (GRAM) TOPICAL AT BEDTIME
Refills: 0 | Status: DISCONTINUED | OUTPATIENT
Start: 2022-12-05 | End: 2022-12-07

## 2022-12-05 RX ORDER — AMLODIPINE BESYLATE 2.5 MG/1
10 TABLET ORAL DAILY
Refills: 0 | Status: DISCONTINUED | OUTPATIENT
Start: 2022-12-05 | End: 2022-12-05

## 2022-12-05 RX ORDER — ACETAMINOPHEN 500 MG
650 TABLET ORAL EVERY 6 HOURS
Refills: 0 | Status: DISCONTINUED | OUTPATIENT
Start: 2022-12-05 | End: 2022-12-07

## 2022-12-05 RX ADMIN — POLYETHYLENE GLYCOL 3350 17 GRAM(S): 17 POWDER, FOR SOLUTION ORAL at 16:52

## 2022-12-05 RX ADMIN — Medication 650 MILLIGRAM(S): at 21:08

## 2022-12-05 RX ADMIN — BRIMONIDINE TARTRATE 1 DROP(S): 2 SOLUTION/ DROPS OPHTHALMIC at 18:34

## 2022-12-05 RX ADMIN — SODIUM CHLORIDE 75 MILLILITER(S): 9 INJECTION, SOLUTION INTRAVENOUS at 11:04

## 2022-12-05 RX ADMIN — SODIUM CHLORIDE 75 MILLILITER(S): 9 INJECTION, SOLUTION INTRAVENOUS at 16:33

## 2022-12-05 RX ADMIN — OXYCODONE HYDROCHLORIDE 5 MILLIGRAM(S): 5 TABLET ORAL at 14:30

## 2022-12-05 RX ADMIN — OXYCODONE HYDROCHLORIDE 5 MILLIGRAM(S): 5 TABLET ORAL at 13:51

## 2022-12-05 RX ADMIN — HYDROMORPHONE HYDROCHLORIDE 0.5 MILLIGRAM(S): 2 INJECTION INTRAMUSCULAR; INTRAVENOUS; SUBCUTANEOUS at 11:45

## 2022-12-05 RX ADMIN — Medication 650 MILLIGRAM(S): at 22:00

## 2022-12-05 RX ADMIN — LATANOPROST 1 DROP(S): 0.05 SOLUTION/ DROPS OPHTHALMIC; TOPICAL at 21:08

## 2022-12-05 RX ADMIN — FENTANYL CITRATE 25 MICROGRAM(S): 50 INJECTION INTRAVENOUS at 12:05

## 2022-12-05 RX ADMIN — HYDROMORPHONE HYDROCHLORIDE 0.5 MILLIGRAM(S): 2 INJECTION INTRAMUSCULAR; INTRAVENOUS; SUBCUTANEOUS at 11:04

## 2022-12-05 RX ADMIN — SENNA PLUS 1 TABLET(S): 8.6 TABLET ORAL at 21:08

## 2022-12-05 RX ADMIN — FENTANYL CITRATE 25 MICROGRAM(S): 50 INJECTION INTRAVENOUS at 11:48

## 2022-12-05 RX ADMIN — DORZOLAMIDE HYDROCHLORIDE TIMOLOL MALEATE 1 DROP(S): 20; 5 SOLUTION/ DROPS OPHTHALMIC at 18:34

## 2022-12-05 RX ADMIN — Medication 125 MICROGRAM(S): at 21:09

## 2022-12-05 RX ADMIN — AMLODIPINE BESYLATE 10 MILLIGRAM(S): 2.5 TABLET ORAL at 21:09

## 2022-12-05 NOTE — PATIENT PROFILE ADULT - LIVING ENVIRONMENT
Stopped CPR and Bagging DR. Artemio Valle pronounced patient at this time.   Time of death is actually 0044 am Self no

## 2022-12-05 NOTE — PATIENT PROFILE ADULT - FALL HARM RISK - UNIVERSAL INTERVENTIONS
Bed in lowest position, wheels locked, appropriate side rails in place/Call bell, personal items and telephone in reach/Instruct patient to call for assistance before getting out of bed or chair/Non-slip footwear when patient is out of bed/[place] to call system/Physically safe environment - no spills, clutter or unnecessary equipment/Purposeful Proactive Rounding/Room/bathroom lighting operational, light cord in reach

## 2022-12-05 NOTE — ASU PREOP CHECKLIST - IDENTIFICATION BAND VERIFIED
done Detail Level: Zone Plan: Increase Tretinoin use to 4 nights a week Render In Strict Bullet Format?: No Continue Regimen: Sodium sulfacetamide cleanser, Tretinoin

## 2022-12-05 NOTE — ASU PREOP CHECKLIST - LATEX ALLERGY
Patient: Henry Kennedy    Procedure: Procedure(s):  Excise back lipoma times 2       Diagnosis: Lipoma of skin and subcutaneous tissue [D17.30]  Diagnosis Additional Information: No value filed.    Anesthesia Type:   MAC     Note:    Oropharynx: oropharynx clear of all foreign objects and spontaneously breathing  Level of Consciousness: awake  Oxygen Supplementation: room air    Independent Airway: airway patency satisfactory and stable  Dentition: dentition unchanged  Vital Signs Stable: post-procedure vital signs reviewed and stable  Report to RN Given: handoff report given  Patient transferred to: PACU  Comments: At end of procedure, spontaneous respirations, patient alert to voice, able to follow commands. Patient breathing room air at room air to PACU. SpO2, NiBP, and EKG monitors and alarms on and functioning, Fareed Hugger warmer connected to patient gown, report on patient's clinical status given to PACU RN, RN questions answered.  Handoff Report: Identifed the Patient, Identified the Reponsible Provider, Reviewed the pertinent medical history, Discussed the surgical course, Reviewed Intra-OP anesthesia mangement and issues during anesthesia, Set expectations for post-procedure period and Allowed opportunity for questions and acknowledgement of understanding      Vitals:  Vitals Value Taken Time   /69 09/09/22 1135   Temp     Pulse 63 09/09/22 1140   Resp 9 09/09/22 1140   SpO2 95 % 09/09/22 1140   Vitals shown include unvalidated device data.    Electronically Signed By: Emily Bird  September 9, 2022  11:41 AM   no

## 2022-12-06 LAB
A1C WITH ESTIMATED AVERAGE GLUCOSE RESULT: 5.7 % — HIGH (ref 4–5.6)
ANION GAP SERPL CALC-SCNC: 14 MMOL/L — SIGNIFICANT CHANGE UP (ref 7–14)
BUN SERPL-MCNC: 10 MG/DL — SIGNIFICANT CHANGE UP (ref 7–23)
CALCIUM SERPL-MCNC: 9.3 MG/DL — SIGNIFICANT CHANGE UP (ref 8.4–10.5)
CHLORIDE SERPL-SCNC: 103 MMOL/L — SIGNIFICANT CHANGE UP (ref 98–107)
CO2 SERPL-SCNC: 23 MMOL/L — SIGNIFICANT CHANGE UP (ref 22–31)
CREAT SERPL-MCNC: 0.71 MG/DL — SIGNIFICANT CHANGE UP (ref 0.5–1.3)
EGFR: 86 ML/MIN/1.73M2 — SIGNIFICANT CHANGE UP
ESTIMATED AVERAGE GLUCOSE: 117 — SIGNIFICANT CHANGE UP
GLUCOSE BLDC GLUCOMTR-MCNC: 115 MG/DL — HIGH (ref 70–99)
GLUCOSE BLDC GLUCOMTR-MCNC: 121 MG/DL — HIGH (ref 70–99)
GLUCOSE BLDC GLUCOMTR-MCNC: 140 MG/DL — HIGH (ref 70–99)
GLUCOSE BLDC GLUCOMTR-MCNC: 162 MG/DL — HIGH (ref 70–99)
GLUCOSE SERPL-MCNC: 96 MG/DL — SIGNIFICANT CHANGE UP (ref 70–99)
POTASSIUM SERPL-MCNC: 4.1 MMOL/L — SIGNIFICANT CHANGE UP (ref 3.5–5.3)
POTASSIUM SERPL-SCNC: 4.1 MMOL/L — SIGNIFICANT CHANGE UP (ref 3.5–5.3)
SODIUM SERPL-SCNC: 140 MMOL/L — SIGNIFICANT CHANGE UP (ref 135–145)

## 2022-12-06 RX ORDER — OXYCODONE HYDROCHLORIDE 5 MG/1
1 TABLET ORAL
Qty: 8 | Refills: 0
Start: 2022-12-06 | End: 2022-12-07

## 2022-12-06 RX ORDER — LEVOTHYROXINE SODIUM 125 MCG
1 TABLET ORAL
Qty: 30 | Refills: 3
Start: 2022-12-06 | End: 2023-04-04

## 2022-12-06 RX ADMIN — BRIMONIDINE TARTRATE 1 DROP(S): 2 SOLUTION/ DROPS OPHTHALMIC at 05:20

## 2022-12-06 RX ADMIN — BRIMONIDINE TARTRATE 1 DROP(S): 2 SOLUTION/ DROPS OPHTHALMIC at 17:58

## 2022-12-06 RX ADMIN — OXYCODONE HYDROCHLORIDE 5 MILLIGRAM(S): 5 TABLET ORAL at 09:15

## 2022-12-06 RX ADMIN — Medication 125 MICROGRAM(S): at 21:17

## 2022-12-06 RX ADMIN — SENNA PLUS 1 TABLET(S): 8.6 TABLET ORAL at 21:17

## 2022-12-06 RX ADMIN — DORZOLAMIDE HYDROCHLORIDE TIMOLOL MALEATE 1 DROP(S): 20; 5 SOLUTION/ DROPS OPHTHALMIC at 17:59

## 2022-12-06 RX ADMIN — DORZOLAMIDE HYDROCHLORIDE TIMOLOL MALEATE 1 DROP(S): 20; 5 SOLUTION/ DROPS OPHTHALMIC at 05:20

## 2022-12-06 RX ADMIN — Medication 3 MILLIGRAM(S): at 21:17

## 2022-12-06 RX ADMIN — LATANOPROST 1 DROP(S): 0.05 SOLUTION/ DROPS OPHTHALMIC; TOPICAL at 21:17

## 2022-12-06 RX ADMIN — Medication 1: at 17:57

## 2022-12-06 RX ADMIN — AMLODIPINE BESYLATE 10 MILLIGRAM(S): 2.5 TABLET ORAL at 21:17

## 2022-12-06 RX ADMIN — OXYCODONE HYDROCHLORIDE 5 MILLIGRAM(S): 5 TABLET ORAL at 09:45

## 2022-12-06 NOTE — DISCHARGE NOTE PROVIDER - NSDCFUADDAPPT_GEN_ALL_CORE_FT
ENT   -Outpatient ENT appointment scheduled for 12/27/2022- please call [phone] to reschedule/change

## 2022-12-06 NOTE — DISCHARGE NOTE PROVIDER - NSDCFUADDINST_GEN_ALL_CORE_FT
Instructions:    - Please take synthroid (thyroid hormone replacement) every single day in the AM before taking any other food or medications (for at least 1 hour)    - Please monitor for signs of low calcium - numbness/tingling (around lips, finger tips/toes) muscle spasms. - if this occurs please report to the [facility] ED  and call the ENT team [phone]       Incision site:    -Steri strips (white band aids) - please keep these dry for 48 hours after surgery    - after 48 hours you may shower    - the steri strips may come off on their own - do not scrub let the water wash over incision site     General Discharge Instructions:   Please get plenty of rest, continue to ambulate several times per day, and drink adequate amounts of fluids. Avoid lifting weights greater than 5-10 lbs until you follow-up with your surgeon, who will instruct you further regarding activity restrictions.   Avoid driving or operating heavy machinery while taking pain medications.   Please follow-up with your surgeon and Primary Care Provider (PCP) as advised.

## 2022-12-06 NOTE — DISCHARGE NOTE PROVIDER - NSDCFUSCHEDAPPT_GEN_ALL_CORE_FT
"[facility] GROUP OUTPATIENT FOLLOW UP VISIT    REASON FOR FOLLOW-UP:    1.  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  Gleevec 400 mg daily initiated around 10/12/2017, stopped on 11/15/2017 due to intolerance (noah-orbital edema, nausea/vomiting, fatigue).  Resumed at 200 mg daily but, again, not tolerated.    3.  Nilotinib 150 mg twice daily started in late March, 2018.  Held due to intolerance and QTc prolongation.   4.  Hydroxyurea initiated in July 2018.  Subsequently discontinued.     5.  Bosutinib 400 mg daily started Feb 2019    HISTORY OF PRESENT ILLNESS:  Nicole Lofton is a 76 y.o. female with the above-mentioned history. She returns the office today for 3-month follow-up.  Overall, she is doing well.  She remains off bosutinib.  We will recheck her BCR-ABL PCR today.  She does complain of some urinary symptoms today including urinary frequency and incomplete emptying of her bladder.  She denies burning with urination.  We will obtain a UA today to evaluate.  Otherwise, she has no new concerns today.    ROS:  Otherwise per the HPI.    Vitals:    05/20/22 1235   BP: 116/71   Pulse: 65   Resp: 16   Temp: 96.6 °F (35.9 °C)   TempSrc: Temporal   SpO2: 97%   Weight: 48.5 kg (106 lb 14.4 oz)   Height: 165.1 cm (65\")   PainSc: 0-No pain       General:  No acute distress, awake, alert and oriented.  Chronically ill-appearing.  Sitting in a wheelchair today.  Skin:  Warm and dry, no visible rash  HEENT:  Normocephalic/atraumatic.  Wearing a facemask.  Chest:  Normal respiratory effort.  Lungs clear to auscultation bilaterally.  Heart: Regular rate.  Regular rhythm   Extremities:  No visible clubbing, cyanosis, or edema  Neuro/psych:  Grossly non-focal.  Normal mood and affect.    DIAGNOSTIC DATA:  CBC & Differential (05/20/2022 12:23)  Comprehensive Metabolic Panel (05/20/2022 12:23)    Physical Exam    IMAGING:  None reviewed    ASSESSMENT:  This is a 76 y.o. " female with:    *History of bilateral breast cancer in 1988 status post bilateral mastectomy.  She apparently completed 6 months of adjuvant therapy.  We have no details regarding this.    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    · Started on Hydrea 1000 mg daily.    · This was discontinued and she started Gleevec on approximately 10/12/2017.    · Gleevec discontinued due to intolerance on 11/15/2017.    · We started Gleevec at a lower dose of 200 mg bu  · t she was also intolerant of this dose.  Her side effects were as severe with a lower dose and she therefore stopped taking the drug.  Symptoms improved significantly.  Platelet count subsequently elevated again.  · She started nilotinib at 150mg twice daily at the end of March 2018.  This was held in mid-May due to QTc prolongation.  The QTc interval did subsequently normalized.  However, due to this and other adverse effects we are not able to resume the nilotinib nor does she desire to.  · In July we resumed hydroxyurea 1000 mg daily.  Her platelet count increased.  Her dose was increased to 1000 mg twice daily.  Blood counts improved nicely.  Platelet count normalized but her white blood cell count dropped to below normal.  Decreased hydroxyurea to 1000 mg daily.  · 11/20/18 platelets were up again.  We increased the hydroxyurea to 1000 mg in the mornings and 500 mg in the evenings.  · As of 1/15/2019 her platelet count was again elevated to 1.2 million.  Increased hydroxyurea to 1000 mg twice daily.    · Platelets improved but remained far from normal.  · Due to her history we referred her down to the [facility] to see Dr. Cifuentes for assistance and recommendations on further therapy.  · In mid-February 2019 she started bosutinib 400 mg daily which she takes along with olanzapine at night (she takes this as needed now).     · PCR on 8/25/2021 -.  · 2/22/2022: PCR negative  · 5/20/2022:  PCR pending today.  Obtain urinalysis for patient concerns of increased frequency.     *Hypothyroidism: She continues levothyroxine    *Tobacco use: She is unwilling to quit smoking.      *Deconditioning  · This was an issue for her before her right hip fracture.  She is now making a good recovery after her hip fracture but remains very weak.    *Hypertension: Blood pressure is adequate today 135/81    *Bradycardia: She was referred back to Dr. Duque for this previously.  Heart rate 65 today.    *Anxiety: She is already on a couple of medications for this.  Follow-up with primary care.    *Right leg pain following her right hip fracture: She will follow-up with primary care and orthopedics regarding this.  She is on tramadol without much improvement.  She takes acetaminophen as well without much improvement.  I suggested the possibility of NSAIDs but, again, she will follow-up with primary care and orthopedics.    *Hyponatremia  Sodium 127 today.  This appears to be an ongoing chronic issue since June 2020.  Patient being followed by nephrology.  Patient is to be following a 1 L fluid restriction and 2 g sodium diet.    PLAN:  1. Follow-up on BCR-ABL PCR from today  2. Urinalysis with culture today  3. If negative, she prefers to stay off the bosutinib at this time which is okay with me.  If the PCR is not at goal, I suggested that she go back on the bosutinib.  We will notify her and her daughter of the result.  4. Otherwise follow-up in 3 months with a CBC, CMP, BCR-ABL PCR, and FERNANDO [place]     Colby Baltazar  [facility]  OTOLARYNG [address] [facility]  Scheduled Appointment: 12/27/2022    Marisol Gr  [facility]  NEUROLOGY [address] [facility]  Scheduled Appointment: 01/05/2023

## 2022-12-06 NOTE — DISCHARGE NOTE PROVIDER - HOSPITAL COURSE
77y Female underwent total thyroidectomy on 12/5/2022 Procedure was without complication and patient was admitted for nontoxic goiter.  Pt was transferred from PACU to 8s. Pt calcium and PTH levels were checked post opt. PTH level was 21 and calcium was 9.2. Calcium levels were checked every  hours. Three stable calciums were checked (9.2,9.2 and 9.6) Pt has one BOAZ drain placed, the output was monitored and trended. Pt pain was controlled. Pt started on synthroid 12/6/2022 morning.        Patient is currently ambulating appropriately, voiding freely, tolerating diet and pain is well controlled. On day of discharge, patient deemed stable and ready to return home.  Prescriptions sent to pts pharmacy. 77y Female underwent total thyroidectomy on 12/5/2022 Procedure was without complication and patient was admitted for nontoxic goiter.  Pt was transferred from PACU to 8s. Pt calcium and PTH levels were checked post opt. PTH level was 21 and calcium was 9.2. Calcium levels were checked every  hours. Three stable calciums were checked (9.2,9.2 and 9.6) Pt has one BOAZ drain placed, the output was monitored and trended. Pt pain was controlled. Pt started on synthroid 12/6/2022 morning.   Pt remained in the hospital for post operative monitoring.       Patient is currently ambulating appropriately, voiding freely, tolerating diet and pain is well controlled. On day of discharge, patient deemed stable and ready to return home.  Prescriptions sent to pts pharmacy. 77y Female underwent total thyroidectomy on 12/5/2022 procedure was without complication and patient was admitted for nontoxic goiter.  Pt was transferred from PACU to 8s. Pt calcium and PTH levels were checked post opt. PTH level was 21 and calcium was 9.2. Calcium levels were checked every  hours. Three stable calciums were checked (9.2,9.2 and 9.6) Pt has one BOAZ drain placed, the output was monitored and trended. Pt pain was controlled. Pt started on synthroid 12/6/2022 morning.   Pt remained in the hospital for post operative monitoring.      Patient is currently ambulating appropriately, voiding freely, tolerating diet and pain is well controlled. BOAZ removed prior to discharge. On day of discharge, patient deemed stable and ready to return home.  Prescriptions sent to pts pharmacy.

## 2022-12-06 NOTE — DISCHARGE NOTE PROVIDER - NSDCMRMEDTOKEN_GEN_ALL_CORE_FT
Aleve 220 mg oral tablet: 1 tab(s) orally every 12 hours, As Needed  11/28/2022  amLODIPine 10 mg oral tablet: 1 tab(s) orally once a day AM  brimonidine 0.2% ophthalmic solution: 1 drop(s) to each affected eye 2 times a day  Cosopt 2.23%-0.68% ophthalmic solution: 1 drop(s) to each affected eye 2 times a day  latanoprost 0.005% ophthalmic solution: 1 drop(s) to each affected eye once a day (in the evening)  metFORMIN 500 mg oral tablet: 1 tab(s) orally once a day AM  methIMAzole 5 mg oral tablet: 2 tab(s) orally once a day AM  Multiple Vitamins oral tablet: 1 tab(s) orally once a day  11/28/2022  Vitamin C: 1 tab(s) orally once a day  Vitamin D3: 1 cap(s) orally once a day   Aleve 220 mg oral tablet: 1 tab(s) orally every 12 hours, As Needed  11/28/2022  amLODIPine 10 mg oral tablet: 1 tab(s) orally once a day AM  brimonidine 0.2% ophthalmic solution: 1 drop(s) to each affected eye 2 times a day  Cosopt 2.23%-0.68% ophthalmic solution: 1 drop(s) to each affected eye 2 times a day  latanoprost 0.005% ophthalmic solution: 1 drop(s) to each affected eye once a day (in the evening)  metFORMIN 500 mg oral tablet: 1 tab(s) orally once a day AM  methIMAzole 5 mg oral tablet: 2 tab(s) orally once a day AM  Multiple Vitamins oral tablet: 1 tab(s) orally once a day  11/28/2022  oxyCODONE 5 mg oral tablet: 1 tab(s) orally every 6 hours MDD:4  Synthroid 125 mcg (0.125 mg) oral tablet: 1 tab(s) orally once a day   Vitamin C: 1 tab(s) orally once a day  Vitamin D3: 1 cap(s) orally once a day

## 2022-12-06 NOTE — DISCHARGE NOTE PROVIDER - NSDCCPCAREPLAN_GEN_ALL_CORE_FT
PRINCIPAL DISCHARGE DIAGNOSIS  Diagnosis: Nontoxic goiter  Assessment and Plan of Treatment: follow up outpatient

## 2022-12-06 NOTE — DISCHARGE NOTE PROVIDER - CARE PROVIDER_API CALL
Colby Baltazar)  [facility]; Otolaryngology  [address] [place] [id number]  Phone: [phone]  Fax: [phone]  Follow Up Time: 1 week

## 2022-12-07 ENCOUNTER — TRANSCRIPTION ENCOUNTER (OUTPATIENT)
Age: 77
End: 2022-12-07

## 2022-12-07 VITALS
DIASTOLIC BLOOD PRESSURE: 73 MMHG | OXYGEN SATURATION: 100 % | HEART RATE: 84 BPM | RESPIRATION RATE: 18 BRPM | SYSTOLIC BLOOD PRESSURE: 114 MMHG | TEMPERATURE: 98 F

## 2022-12-07 LAB — GLUCOSE BLDC GLUCOMTR-MCNC: 114 MG/DL — HIGH (ref 70–99)

## 2022-12-07 PROCEDURE — ZZZZZ: CPT

## 2022-12-07 RX ORDER — METHIMAZOLE 10 MG/1
2 TABLET ORAL
Qty: 0 | Refills: 0 | DISCHARGE

## 2022-12-07 RX ADMIN — BRIMONIDINE TARTRATE 1 DROP(S): 2 SOLUTION/ DROPS OPHTHALMIC at 05:02

## 2022-12-07 RX ADMIN — DORZOLAMIDE HYDROCHLORIDE TIMOLOL MALEATE 1 DROP(S): 20; 5 SOLUTION/ DROPS OPHTHALMIC at 05:02

## 2022-12-07 NOTE — DISCHARGE NOTE NURSING/CASE MANAGEMENT/SOCIAL WORK - NSDCFUADDAPPT_GEN_ALL_CORE_FT
ENT   -Outpatient ENT appointment scheduled for 12/27/2022- please call [phone] to reschedule/change

## 2022-12-07 NOTE — CHART NOTE - NSCHARTNOTEFT_GEN_A_CORE
Patient's neck zelalem-cordon drain removed, no complications, patient tolerated well.  Zelalem-cordon drain stoma covered with Steri-strip.

## 2022-12-07 NOTE — DISCHARGE NOTE NURSING/CASE MANAGEMENT/SOCIAL WORK - NSDCPNINST_GEN_ALL_CORE
Watch for signs of infection; redness, swelling, fever, chills or heat, report such symptoms to the MD. No driving while taking pain medication, it causes drowsiness & constipation. Drink 6-8 glasses of fluids daily to promote hydration. No heavy lifting, pulling or pushing heavy objects. Follow up with the MD as per discharge orders. Empty the BOAZ twice a day or as necessary. Open the top, gently squeeze all the contents into a measured cup, and record how much came out. Make sure the BOAZ is squeezed shut before closing the top. Notify the MD if there is a sudden change in the amount or quality of the drainage from the BOAZ. Keep it pinned to your clothes so it does not pull or tug on anything.

## 2022-12-07 NOTE — DISCHARGE NOTE NURSING/CASE MANAGEMENT/SOCIAL WORK - PATIENT PORTAL LINK FT
You can access the FollowMyHealth Patient Portal offered by [facility] by registering at the following website: [email or web address]. By joining [employer]’s FollowMyHealth portal, you will also be able to view your health information using other applications (apps) compatible with our system.

## 2022-12-07 NOTE — PROGRESS NOTE ADULT - SUBJECTIVE AND OBJECTIVE BOX
ENT Progress Note    s/p TT on , recovering well no perioral, or extermity numbness or tingling     PAST MEDICAL & SURGICAL HISTORY:  Glaucoma  HTN (hypertension)  Type II diabetes mellitus  Multiple thyroid nodules  Hyperthyroidism  Obese  H/O: hysterectomy  &quot;a long time ago when I was in my 30&#x27;s&quot;  H/O     Allergies    No Known Allergies    Intolerances    MEDICATIONS  (STANDING):  amLODIPine   Tablet 10 milliGRAM(s) Oral daily  brimonidine 0.2% Ophthalmic Solution 1 Drop(s) Both EYES two times a day  dextrose 5%. 1000 milliLiter(s) (100 mL/Hr) IV Continuous <Continuous>  dextrose 5%. 1000 milliLiter(s) (50 mL/Hr) IV Continuous <Continuous>  dextrose 50% Injectable 25 Gram(s) IV Push once  dextrose 50% Injectable 12.5 Gram(s) IV Push once  dextrose 50% Injectable 25 Gram(s) IV Push once  dorzolamide 2%/timolol 0.5% Ophthalmic Solution 1 Drop(s) Both EYES two times a day  glucagon  Injectable 1 milliGRAM(s) IntraMuscular once  insulin lispro (ADMELOG) corrective regimen sliding scale   SubCutaneous three times a day before meals  latanoprost 0.005% Ophthalmic Solution 1 Drop(s) Both EYES at bedtime  levothyroxine 125 MICROGram(s) Oral daily  polyethylene glycol 3350 17 Gram(s) Oral daily  senna 1 Tablet(s) Oral at bedtime    MEDICATIONS  (PRN):  acetaminophen     Tablet .. 650 milliGRAM(s) Oral every 6 hours PRN Mild Pain (1 - 3)  dextrose Oral Gel 15 Gram(s) Oral once PRN Blood Glucose LESS THAN 70 milliGRAM(s)/deciliter  melatonin 3 milliGRAM(s) Oral at bedtime PRN Sleep  oxyCODONE    IR 5 milliGRAM(s) Oral every 4 hours PRN Moderate Pain (4 - 6)  oxyCODONE    IR 10 milliGRAM(s) Oral every 4 hours PRN Severe Pain (7 - 10)      Vital Signs Last 24 Hrs  T(C): 36.6 (06 Dec 2022 06:10), Max: 36.8 (05 Dec 2022 15:00)  T(F): 97.9 (06 Dec 2022 06:10), Max: 98.2 (05 Dec 2022 15:00)  HR: 82 (06 Dec 2022 06:10) (71 - 94)  BP: 135/82 (06 Dec 2022 06:10) (135/82 - 173/90)  BP(mean): 100 (05 Dec 2022 15:44) (97 - 112)  RR: 18 (06 Dec 2022 06:10) (11 - 18)  SpO2: 98% (06 Dec 2022 06:10) (95% - 100%)    Parameters below as of 06 Dec 2022 06:10  Patient On (Oxygen Delivery Method): room air      Physical Exam:  Alert, NAD  Neck: soft, flat steristrips over incision, BOAZ with SS output (~100)  Voice: strong   Nonlabored Respirations CARLOS URBINA      22 @ 07:01  -  22 @ 06:55  --------------------------------------------------------  IN: 1510 mL / OUT: 1003 mL / NET: 507 mL      Calcium: 9.2 9.2 9.6     A/P: 77yFemale s/p TT, recovering well.   - calcium wnl, f/u am Calcium  - monitor drain output  - regular diet  - d/w attending  
ENT Progress Note    s/p TT on , recovering well no perioral, or extremity numbness or tingling \    : BOAZ STILL output decreased to 12    PAST MEDICAL & SURGICAL HISTORY:  Glaucoma  HTN (hypertension)  Type II diabetes mellitus  Multiple thyroid nodules  Hyperthyroidism  Obese  H/O: hysterectomy  &quot;a long time ago when I was in my 30&#x27;s&quot;  H/O     Allergies    No Known Allergies    Intolerances    MEDICATIONS  (STANDING):  amLODIPine   Tablet 10 milliGRAM(s) Oral daily  brimonidine 0.2% Ophthalmic Solution 1 Drop(s) Both EYES two times a day  dextrose 5%. 1000 milliLiter(s) (100 mL/Hr) IV Continuous <Continuous>  dextrose 5%. 1000 milliLiter(s) (50 mL/Hr) IV Continuous <Continuous>  dextrose 50% Injectable 25 Gram(s) IV Push once  dextrose 50% Injectable 12.5 Gram(s) IV Push once  dextrose 50% Injectable 25 Gram(s) IV Push once  dorzolamide 2%/timolol 0.5% Ophthalmic Solution 1 Drop(s) Both EYES two times a day  glucagon  Injectable 1 milliGRAM(s) IntraMuscular once  insulin lispro (ADMELOG) corrective regimen sliding scale   SubCutaneous three times a day before meals  latanoprost 0.005% Ophthalmic Solution 1 Drop(s) Both EYES at bedtime  levothyroxine 125 MICROGram(s) Oral daily  polyethylene glycol 3350 17 Gram(s) Oral daily  senna 1 Tablet(s) Oral at bedtime    MEDICATIONS  (PRN):  acetaminophen     Tablet .. 650 milliGRAM(s) Oral every 6 hours PRN Mild Pain (1 - 3)  dextrose Oral Gel 15 Gram(s) Oral once PRN Blood Glucose LESS THAN 70 milliGRAM(s)/deciliter  melatonin 3 milliGRAM(s) Oral at bedtime PRN Sleep  oxyCODONE    IR 5 milliGRAM(s) Oral every 4 hours PRN Moderate Pain (4 - 6)  oxyCODONE    IR 10 milliGRAM(s) Oral every 4 hours PRN Severe Pain (7 - 10)    ICU Vital Signs Last 24 Hrs  T(C): 37.3 (07 Dec 2022 02:11), Max: 37.3 (07 Dec 2022 02:11)  T(F): 99.1 (07 Dec 2022 02:11), Max: 99.1 (07 Dec 2022 02:11)  HR: 71 (07 Dec 2022 02:11) (70 - 82)  BP: 132/67 (07 Dec 2022 02:11) (130/75 - 150/79)  BP(mean): --  ABP: --  ABP(mean): --  RR: 18 (07 Dec 2022 02:11) (17 - 18)  SpO2: 95% (07 Dec 2022 02:11) (95% - 100%)    O2 Parameters below as of 07 Dec 2022 02:11  Patient On (Oxygen Delivery Method): room air      Physical Exam:  Alert, NAD  Neck: soft, flat steristrips over incision, BOAZ with SS output (~100)  Voice: strong   Nonlabored Respirations CARLOS URBINA      22 @ 07:01  -  22 @ 06:55  --------------------------------------------------------  IN: 1510 mL / OUT: 1003 mL / NET: 507 mL      Calcium: 9.2 9.2 9.6     A/P: 77yFemale s/p TT, recovering well.   - calcium levels nl  - likely dc this am   - regular diet  - d/w attending

## 2022-12-08 LAB — SURGICAL PATHOLOGY STUDY: SIGNIFICANT CHANGE UP

## 2023-01-05 ENCOUNTER — APPOINTMENT (OUTPATIENT)
Dept: NEUROLOGY | Facility: CLINIC | Age: 78
End: 2023-01-05

## 2023-01-10 ENCOUNTER — APPOINTMENT (OUTPATIENT)
Dept: OTOLARYNGOLOGY | Facility: CLINIC | Age: 78
End: 2023-01-10
Payer: MEDICARE

## 2023-01-10 VITALS
SYSTOLIC BLOOD PRESSURE: 162 MMHG | WEIGHT: 162 LBS | HEIGHT: 61.22 IN | DIASTOLIC BLOOD PRESSURE: 91 MMHG | HEART RATE: 84 BPM | BODY MASS INDEX: 30.58 KG/M2 | TEMPERATURE: 98 F

## 2023-01-10 PROCEDURE — 99024 POSTOP FOLLOW-UP VISIT: CPT

## 2023-01-10 NOTE — CONSULT LETTER
[Dear  ___] : Dear  [unfilled], [Courtesy Letter:] : I had the pleasure of seeing your patient, [unfilled], in my office today. [Please see my note below.] : Please see my note below. [Consult Closing:] : Thank you very much for allowing me to participate in the care of this patient.  If you have any questions, please do not hesitate to contact me. [Sincerely,] : Sincerely, [FreeTextEntry2] : Dr Jaylen Joshua  [FreeTextEntry3] : \par Colby Baltazar MD, FACS\par \par Otolaryngology-Head and Neck Surgery\par [employer] [facility]\par

## 2023-01-10 NOTE — HISTORY OF PRESENT ILLNESS
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

## 2023-01-10 NOTE — PHYSICAL EXAM
[de-identified] : Incision C/D/I [Midline] : trachea located in midline position [Normal] : no rashes

## 2023-01-11 PROBLEM — E11.9 TYPE 2 DIABETES MELLITUS WITHOUT COMPLICATIONS: Chronic | Status: ACTIVE | Noted: 2022-11-28

## 2023-01-11 PROBLEM — E66.9 OBESITY, UNSPECIFIED: Chronic | Status: ACTIVE | Noted: 2022-11-28

## 2023-01-11 PROBLEM — I10 ESSENTIAL (PRIMARY) HYPERTENSION: Chronic | Status: ACTIVE | Noted: 2022-11-28

## 2023-01-11 PROBLEM — E05.90 THYROTOXICOSIS, UNSPECIFIED WITHOUT THYROTOXIC CRISIS OR STORM: Chronic | Status: ACTIVE | Noted: 2022-11-28

## 2023-01-11 PROBLEM — E04.2 NONTOXIC MULTINODULAR GOITER: Chronic | Status: ACTIVE | Noted: 2022-11-28

## 2023-01-11 PROBLEM — H40.9 UNSPECIFIED GLAUCOMA: Chronic | Status: ACTIVE | Noted: 2022-11-28

## 2023-01-19 ENCOUNTER — LABORATORY RESULT (OUTPATIENT)
Age: 78
End: 2023-01-19

## 2023-01-20 LAB
CALCIUM SERPL-MCNC: 10.4 MG/DL
CALCIUM SERPL-MCNC: 10.4 MG/DL
PARATHYROID HORMONE INTACT: 26 PG/ML
T3FREE SERPL-MCNC: 2.52 PG/ML
T4 FREE SERPL-MCNC: 1.8 NG/DL
TSH SERPL-ACNC: 0.16 UIU/ML

## 2023-01-25 NOTE — H&P PST ADULT - VENOUS THROMBOEMBOLISM CURRENT STATUS
(0) indicator not present Sarecycline Counseling: Patient advised regarding possible photosensitivity and discoloration of the teeth, skin, lips, tongue and gums.  Patient instructed to avoid sunlight, if possible.  When exposed to sunlight, patients should wear protective clothing, sunglasses, and sunscreen.  The patient was instructed to call the office immediately if the following severe adverse effects occur:  hearing changes, easy bruising/bleeding, severe headache, or vision changes.  The patient verbalized understanding of the proper use and possible adverse effects of sarecycline.  All of the patient's questions and concerns were addressed.

## 2023-07-11 ENCOUNTER — APPOINTMENT (OUTPATIENT)
Dept: OTOLARYNGOLOGY | Facility: CLINIC | Age: 78
End: 2023-07-11

## 2023-10-31 ENCOUNTER — APPOINTMENT (OUTPATIENT)
Dept: OTOLARYNGOLOGY | Facility: CLINIC | Age: 78
End: 2023-10-31
Payer: MEDICARE

## 2023-10-31 VITALS
WEIGHT: 153 LBS | HEART RATE: 68 BPM | BODY MASS INDEX: 28.89 KG/M2 | HEIGHT: 61 IN | SYSTOLIC BLOOD PRESSURE: 154 MMHG | DIASTOLIC BLOOD PRESSURE: 83 MMHG

## 2023-10-31 DIAGNOSIS — E04.9 NONTOXIC GOITER, UNSPECIFIED: ICD-10-CM

## 2023-10-31 PROCEDURE — 99213 OFFICE O/P EST LOW 20 MIN: CPT

## 2024-10-31 ENCOUNTER — INPATIENT (INPATIENT)
Facility: HOSPITAL | Age: 79
LOS: 4 days | Discharge: HOME | End: 2024-11-05
Attending: HOSPITALIST | Admitting: HOSPITALIST
Payer: COMMERCIAL

## 2024-10-31 VITALS
HEIGHT: 62 IN | HEART RATE: 92 BPM | SYSTOLIC BLOOD PRESSURE: 137 MMHG | OXYGEN SATURATION: 99 % | TEMPERATURE: 99 F | DIASTOLIC BLOOD PRESSURE: 74 MMHG | RESPIRATION RATE: 16 BRPM | WEIGHT: 151.9 LBS

## 2024-10-31 DIAGNOSIS — Z87.59 PERSONAL HISTORY OF OTHER COMPLICATIONS OF PREGNANCY, CHILDBIRTH AND THE PUERPERIUM: Chronic | ICD-10-CM

## 2024-10-31 DIAGNOSIS — Z90.710 ACQUIRED ABSENCE OF BOTH CERVIX AND UTERUS: Chronic | ICD-10-CM

## 2024-10-31 LAB
ALBUMIN SERPL ELPH-MCNC: 3.6 G/DL — SIGNIFICANT CHANGE UP (ref 3.3–5)
ALP SERPL-CCNC: 57 U/L — SIGNIFICANT CHANGE UP (ref 40–120)
ALT FLD-CCNC: 18 U/L — SIGNIFICANT CHANGE UP (ref 12–78)
ANION GAP SERPL CALC-SCNC: 7 MMOL/L — SIGNIFICANT CHANGE UP (ref 5–17)
APPEARANCE UR: CLEAR — SIGNIFICANT CHANGE UP
APTT BLD: 28.7 SEC — SIGNIFICANT CHANGE UP (ref 24.5–35.6)
AST SERPL-CCNC: 23 U/L — SIGNIFICANT CHANGE UP (ref 15–37)
BASOPHILS # BLD AUTO: 0.02 K/UL — SIGNIFICANT CHANGE UP (ref 0–0.2)
BASOPHILS NFR BLD AUTO: 0.3 % — SIGNIFICANT CHANGE UP (ref 0–2)
BILIRUB SERPL-MCNC: 0.1 MG/DL — LOW (ref 0.2–1.2)
BILIRUB UR-MCNC: NEGATIVE — SIGNIFICANT CHANGE UP
BUN SERPL-MCNC: 19 MG/DL — SIGNIFICANT CHANGE UP (ref 7–23)
CALCIUM SERPL-MCNC: 9.6 MG/DL — SIGNIFICANT CHANGE UP (ref 8.5–10.1)
CHLORIDE SERPL-SCNC: 109 MMOL/L — HIGH (ref 96–108)
CO2 SERPL-SCNC: 24 MMOL/L — SIGNIFICANT CHANGE UP (ref 22–31)
COLOR SPEC: YELLOW — SIGNIFICANT CHANGE UP
CREAT SERPL-MCNC: 0.77 MG/DL — SIGNIFICANT CHANGE UP (ref 0.5–1.3)
DIFF PNL FLD: NEGATIVE — SIGNIFICANT CHANGE UP
EGFR: 77 ML/MIN/1.73M2 — SIGNIFICANT CHANGE UP
EOSINOPHIL # BLD AUTO: 0.14 K/UL — SIGNIFICANT CHANGE UP (ref 0–0.5)
EOSINOPHIL NFR BLD AUTO: 2.1 % — SIGNIFICANT CHANGE UP (ref 0–6)
GLUCOSE SERPL-MCNC: 107 MG/DL — HIGH (ref 70–99)
GLUCOSE UR QL: NEGATIVE MG/DL — SIGNIFICANT CHANGE UP
HCT VFR BLD CALC: 28 % — LOW (ref 34.5–45)
HGB BLD-MCNC: 9 G/DL — LOW (ref 11.5–15.5)
IMM GRANULOCYTES NFR BLD AUTO: 0.4 % — SIGNIFICANT CHANGE UP (ref 0–0.9)
INR BLD: 0.97 RATIO — SIGNIFICANT CHANGE UP (ref 0.85–1.16)
KETONES UR-MCNC: NEGATIVE MG/DL — SIGNIFICANT CHANGE UP
LEUKOCYTE ESTERASE UR-ACNC: ABNORMAL
LIDOCAIN IGE QN: 41 U/L — SIGNIFICANT CHANGE UP (ref 13–75)
LYMPHOCYTES # BLD AUTO: 2.52 K/UL — SIGNIFICANT CHANGE UP (ref 1–3.3)
LYMPHOCYTES # BLD AUTO: 37.2 % — SIGNIFICANT CHANGE UP (ref 13–44)
MCHC RBC-ENTMCNC: 28 PG — SIGNIFICANT CHANGE UP (ref 27–34)
MCHC RBC-ENTMCNC: 32.1 G/DL — SIGNIFICANT CHANGE UP (ref 32–36)
MCV RBC AUTO: 87.2 FL — SIGNIFICANT CHANGE UP (ref 80–100)
MONOCYTES # BLD AUTO: 1.07 K/UL — HIGH (ref 0–0.9)
MONOCYTES NFR BLD AUTO: 15.8 % — HIGH (ref 2–14)
NEUTROPHILS # BLD AUTO: 3 K/UL — SIGNIFICANT CHANGE UP (ref 1.8–7.4)
NEUTROPHILS NFR BLD AUTO: 44.2 % — SIGNIFICANT CHANGE UP (ref 43–77)
NITRITE UR-MCNC: NEGATIVE — SIGNIFICANT CHANGE UP
NRBC # BLD: 0 /100 WBCS — SIGNIFICANT CHANGE UP (ref 0–0)
OB PNL STL: POSITIVE
PH UR: 6.5 — SIGNIFICANT CHANGE UP (ref 5–8)
PLATELET # BLD AUTO: 315 K/UL — SIGNIFICANT CHANGE UP (ref 150–400)
POTASSIUM SERPL-MCNC: 3.9 MMOL/L — SIGNIFICANT CHANGE UP (ref 3.5–5.3)
POTASSIUM SERPL-SCNC: 3.9 MMOL/L — SIGNIFICANT CHANGE UP (ref 3.5–5.3)
PROT SERPL-MCNC: 7.6 GM/DL — SIGNIFICANT CHANGE UP (ref 6–8.3)
PROT UR-MCNC: NEGATIVE MG/DL — SIGNIFICANT CHANGE UP
PROTHROM AB SERPL-ACNC: 11.3 SEC — SIGNIFICANT CHANGE UP (ref 9.9–13.4)
RBC # BLD: 3.21 M/UL — LOW (ref 3.8–5.2)
RBC # FLD: 17.1 % — HIGH (ref 10.3–14.5)
SODIUM SERPL-SCNC: 140 MMOL/L — SIGNIFICANT CHANGE UP (ref 135–145)
SP GR SPEC: 1.02 — SIGNIFICANT CHANGE UP (ref 1–1.03)
TROPONIN I, HIGH SENSITIVITY RESULT: 32.4 NG/L — SIGNIFICANT CHANGE UP
UROBILINOGEN FLD QL: 0.2 MG/DL — SIGNIFICANT CHANGE UP (ref 0.2–1)
WBC # BLD: 6.78 K/UL — SIGNIFICANT CHANGE UP (ref 3.8–10.5)
WBC # FLD AUTO: 6.78 K/UL — SIGNIFICANT CHANGE UP (ref 3.8–10.5)

## 2024-10-31 PROCEDURE — 99223 1ST HOSP IP/OBS HIGH 75: CPT

## 2024-10-31 PROCEDURE — 71046 X-RAY EXAM CHEST 2 VIEWS: CPT | Mod: 26

## 2024-10-31 PROCEDURE — 93010 ELECTROCARDIOGRAM REPORT: CPT

## 2024-10-31 PROCEDURE — 99285 EMERGENCY DEPT VISIT HI MDM: CPT

## 2024-10-31 RX ORDER — AMLODIPINE BESYLATE 10 MG
10 TABLET ORAL DAILY
Refills: 0 | Status: DISCONTINUED | OUTPATIENT
Start: 2024-10-31 | End: 2024-11-05

## 2024-10-31 RX ORDER — DORZOLAMIDE HYDROCHLORIDE AND TIMOLOL MALEATE PRESERVATIVE FREE 20; 5 MG/ML; MG/ML
1 SOLUTION/ DROPS OPHTHALMIC
Refills: 0 | Status: DISCONTINUED | OUTPATIENT
Start: 2024-10-31 | End: 2024-11-05

## 2024-10-31 RX ORDER — PANTOPRAZOLE SODIUM 40 MG/1
40 TABLET, DELAYED RELEASE ORAL DAILY
Refills: 0 | Status: DISCONTINUED | OUTPATIENT
Start: 2024-10-31 | End: 2024-11-01

## 2024-10-31 RX ORDER — ASCORBIC ACID 500 MG
500 TABLET ORAL DAILY
Refills: 0 | Status: DISCONTINUED | OUTPATIENT
Start: 2024-10-31 | End: 2024-11-05

## 2024-10-31 RX ORDER — SODIUM CHLORIDE 9 MG/ML
1000 INJECTION, SOLUTION INTRAMUSCULAR; INTRAVENOUS; SUBCUTANEOUS
Refills: 0 | Status: DISCONTINUED | OUTPATIENT
Start: 2024-10-31 | End: 2024-11-01

## 2024-10-31 RX ORDER — LATANOPROST 0.005 %
1 DROPS OPHTHALMIC (EYE) AT BEDTIME
Refills: 0 | Status: DISCONTINUED | OUTPATIENT
Start: 2024-10-31 | End: 2024-11-05

## 2024-10-31 RX ORDER — LEVOTHYROXINE SODIUM 88 MCG
125 TABLET ORAL DAILY
Refills: 0 | Status: DISCONTINUED | OUTPATIENT
Start: 2024-10-31 | End: 2024-11-05

## 2024-10-31 RX ORDER — B-COMPLEX WITH VITAMIN C
1 VIAL (ML) INJECTION DAILY
Refills: 0 | Status: DISCONTINUED | OUTPATIENT
Start: 2024-10-31 | End: 2024-11-05

## 2024-10-31 RX ORDER — BRIMONIDINE TARTRATE 0.2 %
1 DROPS OPHTHALMIC (EYE)
Refills: 0 | Status: DISCONTINUED | OUTPATIENT
Start: 2024-10-31 | End: 2024-11-05

## 2024-10-31 RX ADMIN — Medication 125 MICROGRAM(S): at 23:22

## 2024-10-31 RX ADMIN — Medication 1 TABLET(S): at 23:22

## 2024-10-31 RX ADMIN — Medication 500 MILLIGRAM(S): at 23:22

## 2024-10-31 NOTE — ED PROVIDER NOTE - PROGRESS NOTE DETAILS
GREER Rebolledo NP: Hemoglobin 9.0, per chart review baseline 13-14.  Occult feces positive, lab results otherwise unremarkable.  Vital signs stable, patient currently asymptomatic.  Will admit patient to medicine for GI bleed.  Patient updated on results and is agreeable to plan, questions answered and understanding verbalized.  Patient admitted to medicine.

## 2024-10-31 NOTE — H&P ADULT - NSHPLABSRESULTS_GEN_ALL_CORE
LABS:                        9.0    6.78  )-----------( 315      ( 31 Oct 2024 19:47 )             28.0     10    140  |  109[H]  |  19  ----------------------------<  107[H]  3.9   |  24  |  0.77    Ca    9.6      31 Oct 2024 19:47    TPro  7.6  /  Alb  3.6  /  TBili  0.1[L]  /  DBili  x   /  AST  23  /  ALT  18  /  AlkPhos  57  10    PT/INR - ( 31 Oct 2024 19:47 )   PT: 11.3 sec;   INR: 0.97 ratio         PTT - ( 31 Oct 2024 19:47 )  PTT:28.7 sec  Urinalysis Basic - ( 31 Oct 2024 20:30 )    Color: Yellow / Appearance: Clear / S.018 / pH: x  Gluc: x / Ketone: Negative mg/dL  / Bili: Negative / Urobili: 0.2 mg/dL   Blood: x / Protein: Negative mg/dL / Nitrite: Negative   Leuk Esterase: Trace / RBC: 2 /HPF / WBC 8 /HPF   Sq Epi: x / Non Sq Epi: x / Bacteria: Moderate /HPF          RADIOLOGY & ADDITIONAL TESTS:

## 2024-10-31 NOTE — H&P ADULT - NSHPPHYSICALEXAM_GEN_ALL_CORE
PHYSICAL EXAMINATION:  Vital Signs Last 24 Hrs  T(C): 37.1 (31 Oct 2024 14:38), Max: 37.1 (31 Oct 2024 14:38)  T(F): 98.8 (31 Oct 2024 14:38), Max: 98.8 (31 Oct 2024 14:38)  HR: 92 (31 Oct 2024 14:38) (92 - 92)  BP: 137/74 (31 Oct 2024 14:38) (137/74 - 137/74)  BP(mean): --  RR: 16 (31 Oct 2024 14:38) (16 - 16)  SpO2: 99% (31 Oct 2024 14:38) (99% - 99%)    Parameters below as of 31 Oct 2024 14:38  Patient On (Oxygen Delivery Method): room air      CAPILLARY BLOOD GLUCOSE      POCT Blood Glucose.: 139 mg/dL (31 Oct 2024 14:44)      GENERAL: NAD,   ENMT: mucous membranes moist  NECK: supple, No JVD  CHEST/LUNG: clear to auscultation bilaterally; no rales, rhonchi, or wheezing b/l  HEART: normal S1, S2  ABDOMEN: BS+, soft, ND, NT   EXTREMITIES:  pulses palpable; no clubbing, cyanosis, or edema b/l LEs  NEURO: awake, alert, interactive; moves all extremities PHYSICAL EXAMINATION:  Vital Signs Last 24 Hrs  T(C): 37.1 (31 Oct 2024 14:38), Max: 37.1 (31 Oct 2024 14:38)  T(F): 98.8 (31 Oct 2024 14:38), Max: 98.8 (31 Oct 2024 14:38)  HR: 92 (31 Oct 2024 14:38) (92 - 92)  BP: 137/74 (31 Oct 2024 14:38) (137/74 - 137/74)  BP(mean): --  RR: 16 (31 Oct 2024 14:38) (16 - 16)  SpO2: 99% (31 Oct 2024 14:38) (99% - 99%)    Parameters below as of 31 Oct 2024 14:38  Patient On (Oxygen Delivery Method): room air      CAPILLARY BLOOD GLUCOSE      POCT Blood Glucose.: 139 mg/dL (31 Oct 2024 14:44)      GENERAL: NAD, seen in ER, comfortable, alert, answers all questions well  ENMT: mucous membranes moist  NECK: supple, No JVD  CHEST/LUNG: clear to auscultation bilaterally; no rales, rhonchi, or wheezing b/l  HEART: normal S1, S2, no YAMILE  ABDOMEN: BS+, soft, ND, NT   EXTREMITIES:  pulses palpable; no clubbing, cyanosis, or edema b/l LEs  NEURO: awake, alert, interactive; moves all extremities

## 2024-10-31 NOTE — ED PROVIDER NOTE - PHYSICAL EXAMINATION
CONSTITUTIONAL: Appears well, in no apparent distress  HEAD: Normocephalic, no obvious signs of trauma  EENT: PERRL, EOMI w/o pain, no nystagmus, nares patent no drainage, no pharyngeal erythema, swelling, or exudates  NECK: Trachea midline, no goiter  RESP: L/S equal clr, bilat, apices and bases, no accessory muscle use, speaking full sentences  CARDIC: RRR, +S1/S2, no peripheral edema  GI: ABD soft, nondistended, nontender on palpation, no palpable masses. BRAYAN; Chaperone RN Lucero, External hemorrhoids, brown stool   : No CVA Tenderness  MSK: 5/5 strength extremities x 4, full ROM without pain, no midline spinal tenderness on palpation  SKIN: No rashes, normal color/condition   NEURO: A&OX4, CN intact, No focal motor deficits/weakness, no sensory deficits, no dysmetria, no slurred speech, no facial droop, normal gait

## 2024-10-31 NOTE — H&P ADULT - IN ACCORDANCE WITH [place] STATE LAW, WE OFFER EVERY PATIENT A HEPATITIS C TEST. WOULD YOU LIKE TO BE TESTED TODAY?
Attached patient from [facility] will be going to [facility] PICU 337. Accepting is Dr Coker.  will be 2300 with SLETS. Report is [id number]      Eunice Bingham RN  10/20/24 [id number]     Opt out

## 2024-10-31 NOTE — PATIENT PROFILE ADULT - FALL HARM RISK - HARM RISK INTERVENTIONS

## 2024-10-31 NOTE — ED PROVIDER NOTE - NS ED ATTENDING STATEMENT MOD
I have seen and examined this patient and fully participated in the care of this patient as the teaching attending.  The service was shared with the WISAM.  I reviewed and verified the documentation.

## 2024-10-31 NOTE — ED PROVIDER NOTE - ATTENDING CONTRIBUTION TO CARE
Patient evaluated and seen with NP Amaury agree with above history and physical - pt examined and seen by me personally - findings as seen: Pt with near syncope then noted to have GI bleeding with guaiac positive -will admit for further care and evaluation.

## 2024-10-31 NOTE — ED PROVIDER NOTE - CLINICAL SUMMARY MEDICAL DECISION MAKING FREE TEXT BOX
- 79-year-old female past medical history hypertension, DM2 presenting to ED complaining of syncopal episode 3 days ago, 1 episode of vomiting, 3-4 episodes of black,.  Patient states on [place] on the train she had a syncopal episode witnessed by bystanders, denies falling or hitting head with symptoms in chair.  Per bystanders only lasted a short amount of time, patient states woke up and vomited.  States since then has experienced episodes of loose black stool.  At time of interview patient denies lightheadedness/dizziness, headache, vision changes, weakness, shortness of breath, dyspnea on exertion, abdominal pain, nausea, urinary symptoms, fever/chills.   - - 79-year-old female past medical history hypertension, DM2 presenting to ED complaining of syncopal episode 3 days ago, 1 episode of vomiting, 3-4 episodes of black,.  Patient states on Monday was on the train she had a syncopal episode witnessed by bystanders, denies falling or hitting head with symptoms in chair.  Per bystanders only lasted a short amount of time, patient states woke up and vomited.  States since then has experienced episodes of loose black stool.  At time of interview patient denies lightheadedness/dizziness, headache, vision changes, weakness, shortness of breath, dyspnea on exertion, abdominal pain, nausea, urinary symptoms, fever/chills.   - Most likely upper GI bleed given reports of melena.  Will obtain CBC to assess anemia, will perform BRAYAN and send occult cc.  Will obtain coags and type and screen.  Given syncope, will obtain EKG/troponin to assess ACS/arrhythmia.  Abdominal exam benign, patient denies abdominal pain.  Low suspicion for appendicitis, pancreatitis, diverticulitis, cholecystitis.  Will obtain CMP to assess electrolyte abnormalities, UA to assess UTI.  Dispo pending results and reassessment.

## 2024-10-31 NOTE — ED ADULT NURSE NOTE - NSFALLUNIVINTERV_ED_ALL_ED
Bed/Stretcher in lowest position, wheels locked, appropriate side rails in place/Call bell, personal items and telephone in reach/Instruct patient to call for assistance before getting out of bed/chair/stretcher/Non-slip footwear applied when patient is off stretcher/[place] to call system/Physically safe environment - no spills, clutter or unnecessary equipment/Purposeful proactive rounding/Room/bathroom lighting operational, light cord in reach

## 2024-10-31 NOTE — ED PROVIDER NOTE - OBJECTIVE STATEMENT
79-year-old female past medical history hypertension, DM2 presenting to ED complaining of syncopal episode 3 days ago, 1 episode of vomiting, 3-4 episodes of black,.  Patient states on Monday was on the train she had a syncopal episode witnessed by bystanders, denies falling or hitting head with symptoms in chair.  Per bystanders only lasted a short amount of time, patient states woke up and vomited.  States since then has experienced episodes of loose black stool.  At time of interview patient denies lightheadedness/dizziness, headache, vision changes, weakness, shortness of breath, dyspnea on exertion, abdominal pain, nausea, urinary symptoms, fever/chills.

## 2024-10-31 NOTE — ED ADULT NURSE NOTE - OBJECTIVE STATEMENT
Pt AAOx4. 79 year old female presents to ED with complaint of syncopal episode 3 days ago, 1 episode of vomiting, 3-4 episodes of black,.  Patient states on Monday was on the train she had a syncopal episode witnessed by bystanders, denies falling or hitting head as she was sitting down at the time.  Per bystanders only lasted a short amount of time, patient states woke up and vomited.  States since then has experienced episodes of loose black stool.  At time of interview patient denies lightheadedness/dizziness, headache, vision changes, weakness, shortness of breath, dyspnea on exertion, abdominal pain, nausea, urinary symptoms, fever/chills. Respirations equal and unlabored. No acute distress noted at this time.

## 2024-10-31 NOTE — ED ADULT TRIAGE NOTE - CHIEF COMPLAINT QUOTE
patient has been having bloody stools (dark red blood) since monday and states she passed out on monday as well while she was inside the train, sitting. as per patient, a person next to her noticed her sweating and blacked out for about a minute, patient states she vomited after and felt better

## 2024-10-31 NOTE — H&P ADULT - HISTORY OF PRESENT ILLNESS
79-year-old female PMH of hypertension, DM(II), hypothyroidism presenting to ED complaining of syncopal episode 3 days ago, and 1 episode of vomiting, 3-4 episodes of black BM. Patient states on Monday was on the train she had a syncopal episode witnessed by bystanders, denies falling or hitting head with symptoms in chair.  Per bystanders only lasted a short amount of time, patient states woke up and vomited.  States since then has experienced episodes of loose black stool.  At time of interview patient denies lightheadedness/dizziness, headache, vision changes, weakness, shortness of breath, dyspnea on exertion, abdominal pain, nausea, urinary symptoms, fever/chills. 79-year-old female PMH of hypertension, DM(II), hypothyroidism presenting to ED complaining of syncopal episode 3 days ago, and 1 episode of vomiting, and 2-3 episodes of dark stool, no BRBPR. Patient states on Monday was on the train she had a syncopal episode witnessed by bystanders, denies falling or hitting head with symptoms in chair.  Per bystanders only lasted a short amount of time, patient states woke up and vomited.  States since then has experienced episodes of loose black stool.  At time of interview patient denies lightheadedness/dizziness, headache, vision changes, weakness, shortness of breath, dyspnea on exertion, abdominal pain, nausea, urinary symptoms, fever/chills. Not on ASA or blood thinners as outpatient.

## 2024-11-01 DIAGNOSIS — R55 SYNCOPE AND COLLAPSE: ICD-10-CM

## 2024-11-01 DIAGNOSIS — D62 ACUTE POSTHEMORRHAGIC ANEMIA: ICD-10-CM

## 2024-11-01 DIAGNOSIS — E05.90 THYROTOXICOSIS, UNSPECIFIED WITHOUT THYROTOXIC CRISIS OR STORM: ICD-10-CM

## 2024-11-01 DIAGNOSIS — Z29.9 ENCOUNTER FOR PROPHYLACTIC MEASURES, UNSPECIFIED: ICD-10-CM

## 2024-11-01 DIAGNOSIS — E87.6 HYPOKALEMIA: ICD-10-CM

## 2024-11-01 DIAGNOSIS — E11.9 TYPE 2 DIABETES MELLITUS WITHOUT COMPLICATIONS: ICD-10-CM

## 2024-11-01 DIAGNOSIS — I10 ESSENTIAL (PRIMARY) HYPERTENSION: ICD-10-CM

## 2024-11-01 DIAGNOSIS — E03.9 HYPOTHYROIDISM, UNSPECIFIED: ICD-10-CM

## 2024-11-01 LAB
A1C WITH ESTIMATED AVERAGE GLUCOSE RESULT: 5.6 % — SIGNIFICANT CHANGE UP (ref 4–5.6)
ALBUMIN SERPL ELPH-MCNC: 3.1 G/DL — LOW (ref 3.3–5)
ALP SERPL-CCNC: 47 U/L — SIGNIFICANT CHANGE UP (ref 40–120)
ALT FLD-CCNC: 13 U/L — SIGNIFICANT CHANGE UP (ref 12–78)
ANION GAP SERPL CALC-SCNC: 4 MMOL/L — LOW (ref 5–17)
AST SERPL-CCNC: 14 U/L — LOW (ref 15–37)
BILIRUB SERPL-MCNC: 0.3 MG/DL — SIGNIFICANT CHANGE UP (ref 0.2–1.2)
BUN SERPL-MCNC: 12 MG/DL — SIGNIFICANT CHANGE UP (ref 7–23)
CALCIUM SERPL-MCNC: 8.7 MG/DL — SIGNIFICANT CHANGE UP (ref 8.5–10.1)
CHLORIDE SERPL-SCNC: 112 MMOL/L — HIGH (ref 96–108)
CO2 SERPL-SCNC: 27 MMOL/L — SIGNIFICANT CHANGE UP (ref 22–31)
CREAT SERPL-MCNC: 0.69 MG/DL — SIGNIFICANT CHANGE UP (ref 0.5–1.3)
EGFR: 87 ML/MIN/1.73M2 — SIGNIFICANT CHANGE UP
ESTIMATED AVERAGE GLUCOSE: 114 MG/DL — SIGNIFICANT CHANGE UP (ref 68–114)
GLUCOSE BLDC GLUCOMTR-MCNC: 135 MG/DL — HIGH (ref 70–99)
GLUCOSE BLDC GLUCOMTR-MCNC: 140 MG/DL — HIGH (ref 70–99)
GLUCOSE BLDC GLUCOMTR-MCNC: 99 MG/DL — SIGNIFICANT CHANGE UP (ref 70–99)
GLUCOSE SERPL-MCNC: 99 MG/DL — SIGNIFICANT CHANGE UP (ref 70–99)
HCT VFR BLD CALC: 25.2 % — LOW (ref 34.5–45)
HGB BLD-MCNC: 8 G/DL — LOW (ref 11.5–15.5)
MCHC RBC-ENTMCNC: 28 PG — SIGNIFICANT CHANGE UP (ref 27–34)
MCHC RBC-ENTMCNC: 31.7 G/DL — LOW (ref 32–36)
MCV RBC AUTO: 88.1 FL — SIGNIFICANT CHANGE UP (ref 80–100)
NRBC # BLD: 0 /100 WBCS — SIGNIFICANT CHANGE UP (ref 0–0)
PLATELET # BLD AUTO: 303 K/UL — SIGNIFICANT CHANGE UP (ref 150–400)
POTASSIUM SERPL-MCNC: 3.4 MMOL/L — LOW (ref 3.5–5.3)
POTASSIUM SERPL-SCNC: 3.4 MMOL/L — LOW (ref 3.5–5.3)
PROT SERPL-MCNC: 6.3 GM/DL — SIGNIFICANT CHANGE UP (ref 6–8.3)
RBC # BLD: 2.86 M/UL — LOW (ref 3.8–5.2)
RBC # FLD: 17 % — HIGH (ref 10.3–14.5)
SODIUM SERPL-SCNC: 143 MMOL/L — SIGNIFICANT CHANGE UP (ref 135–145)
TSH SERPL-MCNC: 2.4 UIU/ML — SIGNIFICANT CHANGE UP (ref 0.36–3.74)
WBC # BLD: 4.26 K/UL — SIGNIFICANT CHANGE UP (ref 3.8–10.5)
WBC # FLD AUTO: 4.26 K/UL — SIGNIFICANT CHANGE UP (ref 3.8–10.5)

## 2024-11-01 PROCEDURE — 99233 SBSQ HOSP IP/OBS HIGH 50: CPT

## 2024-11-01 RX ORDER — POTASSIUM CHLORIDE 10 MEQ
40 TABLET, EXTENDED RELEASE ORAL ONCE
Refills: 0 | Status: COMPLETED | OUTPATIENT
Start: 2024-11-01 | End: 2024-11-01

## 2024-11-01 RX ORDER — PANTOPRAZOLE SODIUM 40 MG/1
40 TABLET, DELAYED RELEASE ORAL EVERY 12 HOURS
Refills: 0 | Status: DISCONTINUED | OUTPATIENT
Start: 2024-11-01 | End: 2024-11-05

## 2024-11-01 RX ADMIN — DORZOLAMIDE HYDROCHLORIDE AND TIMOLOL MALEATE PRESERVATIVE FREE 1 DROP(S): 20; 5 SOLUTION/ DROPS OPHTHALMIC at 05:32

## 2024-11-01 RX ADMIN — Medication 1 TABLET(S): at 14:46

## 2024-11-01 RX ADMIN — Medication 40 MILLIEQUIVALENT(S): at 21:48

## 2024-11-01 RX ADMIN — Medication 500 MILLIGRAM(S): at 14:46

## 2024-11-01 RX ADMIN — Medication 1 DROP(S): at 05:31

## 2024-11-01 RX ADMIN — DORZOLAMIDE HYDROCHLORIDE AND TIMOLOL MALEATE PRESERVATIVE FREE 1 DROP(S): 20; 5 SOLUTION/ DROPS OPHTHALMIC at 17:30

## 2024-11-01 RX ADMIN — Medication 10 MILLIGRAM(S): at 05:31

## 2024-11-01 RX ADMIN — Medication 1 DROP(S): at 17:29

## 2024-11-01 RX ADMIN — Medication 1 DROP(S): at 21:49

## 2024-11-01 RX ADMIN — PANTOPRAZOLE SODIUM 40 MILLIGRAM(S): 40 TABLET, DELAYED RELEASE ORAL at 17:29

## 2024-11-01 RX ADMIN — SODIUM CHLORIDE 100 MILLILITER(S): 9 INJECTION, SOLUTION INTRAMUSCULAR; INTRAVENOUS; SUBCUTANEOUS at 00:25

## 2024-11-01 NOTE — CONSULT NOTE ADULT - SUBJECTIVE AND OBJECTIVE BOX
Full consult dictated    Plan:  78 yo F with h/o HTN, DM(II), hypothyroidism admitted with several episodes of dark stool (melena);, no BRBPR. Pt reportedly had a syncopal episode 4 days ago - witnessed by bystanders, denies falling or hitting head. Pt with black stools since then. No n/v, no fever/chills.  Denies ASA or blood thinners. Pt with likely UGI bleed --> r/o ulcer vs gastritis.  Will start IV protonix; CBC in AM; clear liquids. For tentative EGD on Monday. If H/H stable would consider d/c prior to Monday and arrange procedure as outpatient next week.

## 2024-11-02 LAB
ANION GAP SERPL CALC-SCNC: 6 MMOL/L — SIGNIFICANT CHANGE UP (ref 5–17)
BUN SERPL-MCNC: 8 MG/DL — SIGNIFICANT CHANGE UP (ref 7–23)
CALCIUM SERPL-MCNC: 8.7 MG/DL — SIGNIFICANT CHANGE UP (ref 8.5–10.1)
CHLORIDE SERPL-SCNC: 110 MMOL/L — HIGH (ref 96–108)
CO2 SERPL-SCNC: 25 MMOL/L — SIGNIFICANT CHANGE UP (ref 22–31)
CREAT SERPL-MCNC: 0.8 MG/DL — SIGNIFICANT CHANGE UP (ref 0.5–1.3)
EGFR: 74 ML/MIN/1.73M2 — SIGNIFICANT CHANGE UP
GLUCOSE BLDC GLUCOMTR-MCNC: 113 MG/DL — HIGH (ref 70–99)
GLUCOSE BLDC GLUCOMTR-MCNC: 129 MG/DL — HIGH (ref 70–99)
GLUCOSE SERPL-MCNC: 103 MG/DL — HIGH (ref 70–99)
HCT VFR BLD CALC: 27.1 % — LOW (ref 34.5–45)
HGB BLD-MCNC: 8.6 G/DL — LOW (ref 11.5–15.5)
MAGNESIUM SERPL-MCNC: 1.7 MG/DL — SIGNIFICANT CHANGE UP (ref 1.6–2.6)
MCHC RBC-ENTMCNC: 27.7 PG — SIGNIFICANT CHANGE UP (ref 27–34)
MCHC RBC-ENTMCNC: 31.7 G/DL — LOW (ref 32–36)
MCV RBC AUTO: 87.4 FL — SIGNIFICANT CHANGE UP (ref 80–100)
NRBC # BLD: 0 /100 WBCS — SIGNIFICANT CHANGE UP (ref 0–0)
PHOSPHATE SERPL-MCNC: 3.3 MG/DL — SIGNIFICANT CHANGE UP (ref 2.5–4.5)
PLATELET # BLD AUTO: 350 K/UL — SIGNIFICANT CHANGE UP (ref 150–400)
POTASSIUM SERPL-MCNC: 3.9 MMOL/L — SIGNIFICANT CHANGE UP (ref 3.5–5.3)
POTASSIUM SERPL-SCNC: 3.9 MMOL/L — SIGNIFICANT CHANGE UP (ref 3.5–5.3)
RBC # BLD: 3.1 M/UL — LOW (ref 3.8–5.2)
RBC # FLD: 16.9 % — HIGH (ref 10.3–14.5)
SODIUM SERPL-SCNC: 141 MMOL/L — SIGNIFICANT CHANGE UP (ref 135–145)
WBC # BLD: 4.07 K/UL — SIGNIFICANT CHANGE UP (ref 3.8–10.5)
WBC # FLD AUTO: 4.07 K/UL — SIGNIFICANT CHANGE UP (ref 3.8–10.5)

## 2024-11-02 PROCEDURE — 99232 SBSQ HOSP IP/OBS MODERATE 35: CPT

## 2024-11-02 RX ADMIN — Medication 1 TABLET(S): at 16:38

## 2024-11-02 RX ADMIN — Medication 1 DROP(S): at 22:19

## 2024-11-02 RX ADMIN — PANTOPRAZOLE SODIUM 40 MILLIGRAM(S): 40 TABLET, DELAYED RELEASE ORAL at 19:51

## 2024-11-02 RX ADMIN — Medication 1 DROP(S): at 05:41

## 2024-11-02 RX ADMIN — DORZOLAMIDE HYDROCHLORIDE AND TIMOLOL MALEATE PRESERVATIVE FREE 1 DROP(S): 20; 5 SOLUTION/ DROPS OPHTHALMIC at 05:41

## 2024-11-02 RX ADMIN — PANTOPRAZOLE SODIUM 40 MILLIGRAM(S): 40 TABLET, DELAYED RELEASE ORAL at 05:41

## 2024-11-02 RX ADMIN — Medication 125 MICROGRAM(S): at 05:41

## 2024-11-02 RX ADMIN — Medication 10 MILLIGRAM(S): at 05:40

## 2024-11-02 RX ADMIN — Medication 500 MILLIGRAM(S): at 16:39

## 2024-11-02 RX ADMIN — DORZOLAMIDE HYDROCHLORIDE AND TIMOLOL MALEATE PRESERVATIVE FREE 1 DROP(S): 20; 5 SOLUTION/ DROPS OPHTHALMIC at 22:19

## 2024-11-02 RX ADMIN — Medication 1 DROP(S): at 22:18

## 2024-11-03 LAB
ANION GAP SERPL CALC-SCNC: 5 MMOL/L — SIGNIFICANT CHANGE UP (ref 5–17)
BUN SERPL-MCNC: 6 MG/DL — LOW (ref 7–23)
CALCIUM SERPL-MCNC: 8.7 MG/DL — SIGNIFICANT CHANGE UP (ref 8.5–10.1)
CHLORIDE SERPL-SCNC: 110 MMOL/L — HIGH (ref 96–108)
CO2 SERPL-SCNC: 26 MMOL/L — SIGNIFICANT CHANGE UP (ref 22–31)
CREAT SERPL-MCNC: 0.76 MG/DL — SIGNIFICANT CHANGE UP (ref 0.5–1.3)
EGFR: 79 ML/MIN/1.73M2 — SIGNIFICANT CHANGE UP
FERRITIN SERPL-MCNC: 18 NG/ML — SIGNIFICANT CHANGE UP (ref 13–330)
FOLATE SERPL-MCNC: >20 NG/ML — SIGNIFICANT CHANGE UP
GLUCOSE BLDC GLUCOMTR-MCNC: 105 MG/DL — HIGH (ref 70–99)
GLUCOSE BLDC GLUCOMTR-MCNC: 119 MG/DL — HIGH (ref 70–99)
GLUCOSE BLDC GLUCOMTR-MCNC: 121 MG/DL — HIGH (ref 70–99)
GLUCOSE BLDC GLUCOMTR-MCNC: 153 MG/DL — HIGH (ref 70–99)
GLUCOSE SERPL-MCNC: 100 MG/DL — HIGH (ref 70–99)
HCT VFR BLD CALC: 27.9 % — LOW (ref 34.5–45)
HGB BLD-MCNC: 8.8 G/DL — LOW (ref 11.5–15.5)
IRON SATN MFR SERPL: 16 UG/DL — LOW (ref 30–160)
IRON SATN MFR SERPL: 4 % — LOW (ref 14–50)
MCHC RBC-ENTMCNC: 27.6 PG — SIGNIFICANT CHANGE UP (ref 27–34)
MCHC RBC-ENTMCNC: 31.5 G/DL — LOW (ref 32–36)
MCV RBC AUTO: 87.5 FL — SIGNIFICANT CHANGE UP (ref 80–100)
NRBC # BLD: 0 /100 WBCS — SIGNIFICANT CHANGE UP (ref 0–0)
PLATELET # BLD AUTO: 346 K/UL — SIGNIFICANT CHANGE UP (ref 150–400)
POTASSIUM SERPL-MCNC: 4 MMOL/L — SIGNIFICANT CHANGE UP (ref 3.5–5.3)
POTASSIUM SERPL-SCNC: 4 MMOL/L — SIGNIFICANT CHANGE UP (ref 3.5–5.3)
RBC # BLD: 3.19 M/UL — LOW (ref 3.8–5.2)
RBC # FLD: 16.6 % — HIGH (ref 10.3–14.5)
SODIUM SERPL-SCNC: 141 MMOL/L — SIGNIFICANT CHANGE UP (ref 135–145)
TIBC SERPL-MCNC: 413 UG/DL — SIGNIFICANT CHANGE UP (ref 220–430)
UIBC SERPL-MCNC: 397 UG/DL — HIGH (ref 110–370)
VIT B12 SERPL-MCNC: >2000 PG/ML — HIGH (ref 232–1245)
WBC # BLD: 4.48 K/UL — SIGNIFICANT CHANGE UP (ref 3.8–10.5)
WBC # FLD AUTO: 4.48 K/UL — SIGNIFICANT CHANGE UP (ref 3.8–10.5)

## 2024-11-03 PROCEDURE — 99232 SBSQ HOSP IP/OBS MODERATE 35: CPT

## 2024-11-03 RX ORDER — FERROUS SULFATE 325(65) MG
325 TABLET ORAL
Refills: 0 | Status: DISCONTINUED | OUTPATIENT
Start: 2024-11-03 | End: 2024-11-04

## 2024-11-03 RX ADMIN — PANTOPRAZOLE SODIUM 40 MILLIGRAM(S): 40 TABLET, DELAYED RELEASE ORAL at 06:13

## 2024-11-03 RX ADMIN — Medication 500 MILLIGRAM(S): at 11:29

## 2024-11-03 RX ADMIN — PANTOPRAZOLE SODIUM 40 MILLIGRAM(S): 40 TABLET, DELAYED RELEASE ORAL at 17:21

## 2024-11-03 RX ADMIN — Medication 325 MILLIGRAM(S): at 14:01

## 2024-11-03 RX ADMIN — DORZOLAMIDE HYDROCHLORIDE AND TIMOLOL MALEATE PRESERVATIVE FREE 1 DROP(S): 20; 5 SOLUTION/ DROPS OPHTHALMIC at 06:13

## 2024-11-03 RX ADMIN — Medication 1 DROP(S): at 17:20

## 2024-11-03 RX ADMIN — Medication 1 DROP(S): at 06:13

## 2024-11-03 RX ADMIN — Medication 10 MILLIGRAM(S): at 06:13

## 2024-11-03 RX ADMIN — Medication 1 DROP(S): at 22:08

## 2024-11-03 RX ADMIN — DORZOLAMIDE HYDROCHLORIDE AND TIMOLOL MALEATE PRESERVATIVE FREE 1 DROP(S): 20; 5 SOLUTION/ DROPS OPHTHALMIC at 17:20

## 2024-11-03 RX ADMIN — Medication 125 MICROGRAM(S): at 06:13

## 2024-11-03 RX ADMIN — Medication 1 TABLET(S): at 11:28

## 2024-11-03 NOTE — PROGRESS NOTE ADULT - PROBLEM SELECTOR PROBLEM 6
Problem: Restraint Use - Nonviolent/Non-Self-Destructive Behavior:  Goal: Absence of restraint-related injury  Description: Absence of restraint-related injury  3/9/2020 1352 by Kristy German RN  Outcome: Met This Shift  3/9/2020 0014 by Jermain Olivo RN  Outcome: Met This Shift     Problem: Risk for Impaired Skin Integrity  Goal: Tissue integrity - skin and mucous membranes  Description: Structural intactness and normal physiological function of skin and  mucous membranes.   3/9/2020 1352 by Kristy German RN  Outcome: Met This Shift  3/9/2020 0014 by Jermain Olivo RN  Outcome: Ongoing     Problem: Falls - Risk of:  Goal: Will remain free from falls  Description: Will remain free from falls  3/9/2020 1352 by Kristy German RN  Outcome: Met This Shift  3/9/2020 0014 by Jermain Olivo RN  Outcome: Ongoing  Goal: Absence of physical injury  Description: Absence of physical injury  3/9/2020 1352 by Kristy German RN  Outcome: Met This Shift  3/9/2020 0014 by Jermain Olivo RN  Outcome: Ongoing Hypokalemia

## 2024-11-04 ENCOUNTER — RESULT REVIEW (OUTPATIENT)
Age: 79
End: 2024-11-04

## 2024-11-04 LAB
ANION GAP SERPL CALC-SCNC: 6 MMOL/L — SIGNIFICANT CHANGE UP (ref 5–17)
BLD GP AB SCN SERPL QL: SIGNIFICANT CHANGE UP
BUN SERPL-MCNC: 6 MG/DL — LOW (ref 7–23)
CALCIUM SERPL-MCNC: 8.4 MG/DL — LOW (ref 8.5–10.1)
CHLORIDE SERPL-SCNC: 111 MMOL/L — HIGH (ref 96–108)
CO2 SERPL-SCNC: 24 MMOL/L — SIGNIFICANT CHANGE UP (ref 22–31)
CREAT SERPL-MCNC: 0.95 MG/DL — SIGNIFICANT CHANGE UP (ref 0.5–1.3)
EGFR: 60 ML/MIN/1.73M2 — SIGNIFICANT CHANGE UP
GLUCOSE BLDC GLUCOMTR-MCNC: 109 MG/DL — HIGH (ref 70–99)
GLUCOSE BLDC GLUCOMTR-MCNC: 135 MG/DL — HIGH (ref 70–99)
GLUCOSE BLDC GLUCOMTR-MCNC: 150 MG/DL — HIGH (ref 70–99)
GLUCOSE BLDC GLUCOMTR-MCNC: 92 MG/DL — SIGNIFICANT CHANGE UP (ref 70–99)
GLUCOSE SERPL-MCNC: 103 MG/DL — HIGH (ref 70–99)
HCT VFR BLD CALC: 26.4 % — LOW (ref 34.5–45)
HGB BLD-MCNC: 8.3 G/DL — LOW (ref 11.5–15.5)
MAGNESIUM SERPL-MCNC: 2 MG/DL — SIGNIFICANT CHANGE UP (ref 1.6–2.6)
MCHC RBC-ENTMCNC: 27.8 PG — SIGNIFICANT CHANGE UP (ref 27–34)
MCHC RBC-ENTMCNC: 31.4 G/DL — LOW (ref 32–36)
MCV RBC AUTO: 88.3 FL — SIGNIFICANT CHANGE UP (ref 80–100)
NRBC # BLD: 0 /100 WBCS — SIGNIFICANT CHANGE UP (ref 0–0)
PLATELET # BLD AUTO: 344 K/UL — SIGNIFICANT CHANGE UP (ref 150–400)
POTASSIUM SERPL-MCNC: 3.8 MMOL/L — SIGNIFICANT CHANGE UP (ref 3.5–5.3)
POTASSIUM SERPL-SCNC: 3.8 MMOL/L — SIGNIFICANT CHANGE UP (ref 3.5–5.3)
RBC # BLD: 2.99 M/UL — LOW (ref 3.8–5.2)
RBC # FLD: 16.2 % — HIGH (ref 10.3–14.5)
SODIUM SERPL-SCNC: 141 MMOL/L — SIGNIFICANT CHANGE UP (ref 135–145)
WBC # BLD: 4 K/UL — SIGNIFICANT CHANGE UP (ref 3.8–10.5)
WBC # FLD AUTO: 4 K/UL — SIGNIFICANT CHANGE UP (ref 3.8–10.5)

## 2024-11-04 PROCEDURE — 99233 SBSQ HOSP IP/OBS HIGH 50: CPT

## 2024-11-04 PROCEDURE — 88312 SPECIAL STAINS GROUP 1: CPT | Mod: 26

## 2024-11-04 PROCEDURE — 88305 TISSUE EXAM BY PATHOLOGIST: CPT | Mod: 26

## 2024-11-04 RX ADMIN — Medication 1 DROP(S): at 06:31

## 2024-11-04 RX ADMIN — Medication 125 MICROGRAM(S): at 06:30

## 2024-11-04 RX ADMIN — PANTOPRAZOLE SODIUM 40 MILLIGRAM(S): 40 TABLET, DELAYED RELEASE ORAL at 06:33

## 2024-11-04 RX ADMIN — Medication 1 DROP(S): at 18:19

## 2024-11-04 RX ADMIN — PANTOPRAZOLE SODIUM 40 MILLIGRAM(S): 40 TABLET, DELAYED RELEASE ORAL at 18:18

## 2024-11-04 RX ADMIN — Medication 1 TABLET(S): at 12:16

## 2024-11-04 RX ADMIN — DORZOLAMIDE HYDROCHLORIDE AND TIMOLOL MALEATE PRESERVATIVE FREE 1 DROP(S): 20; 5 SOLUTION/ DROPS OPHTHALMIC at 18:19

## 2024-11-04 RX ADMIN — Medication 10 MILLIGRAM(S): at 06:31

## 2024-11-04 RX ADMIN — DORZOLAMIDE HYDROCHLORIDE AND TIMOLOL MALEATE PRESERVATIVE FREE 1 DROP(S): 20; 5 SOLUTION/ DROPS OPHTHALMIC at 06:31

## 2024-11-04 RX ADMIN — Medication 1 DROP(S): at 21:29

## 2024-11-04 RX ADMIN — Medication 500 MILLIGRAM(S): at 12:16

## 2024-11-04 NOTE — PROGRESS NOTE ADULT - PROBLEM/PLAN-5
DISPLAY PLAN FREE TEXT
Tazorac Counseling:  Patient advised that medication is irritating and drying.  Patient may need to apply sparingly and wash off after an hour before eventually leaving it on overnight.  The patient verbalized understanding of the proper use and possible adverse effects of tazorac.  All of the patient's questions and concerns were addressed.

## 2024-11-04 NOTE — PROGRESS NOTE ADULT - PROBLEM SELECTOR PLAN 1
Acute blood loss anemia likely d/t upper GI bleed   Hgb 9.0-->8.0-->8.6->8.8 Hgb was 13.5 in 2023   Continue Protonix 40mg IV q12h   Plan for EGD today   Monitor CBC, keep hgb above 7   Will start PO Iron on discharge   Appreciate GI input
Acute blood loss anemia likely d/t upper GI bleed   Hgb 9.0-->8.0, Hgb was 13.5 in 2023   Continue Protonix 40mg IV q12h   Monitor CBC, keep hgb above 7   CLD  Appreciate GI input, plan for EGD Monday
Acute blood loss anemia likely d/t upper GI bleed   Hgb 9.0-->8.0-->8.6, Hgb was 13.5 in 2023   Continue Protonix 40mg IV q12h   Monitor CBC, keep hgb above 7   CLD  Appreciate GI input, plan for EGD Monday vs home and outpatient endoscopy. Patient would like to think about it, leaning towards waiting for it to be done Monday inpatient
Acute blood loss anemia likely d/t upper GI bleed   CARMEN, start PO iron  Hgb 9.0-->8.0-->8.6->8.8 Hgb was 13.5 in 2023   Continue Protonix 40mg IV q12h   Monitor CBC, keep hgb above 7   CLD  Appreciate GI input, plan for EGD Monday vs home.  Patient would like to stay for EGD.

## 2024-11-04 NOTE — PROGRESS NOTE ADULT - PROBLEM SELECTOR PLAN 3
Continue Norvasc   Monitor BP

## 2024-11-04 NOTE — PROGRESS NOTE ADULT - PROBLEM SELECTOR PROBLEM 7
Addended by: Sathish Naqvi on: 10/15/2018 10:20 AM     Modules accepted: Orders
Need for prophylactic measure

## 2024-11-04 NOTE — PROGRESS NOTE ADULT - ASSESSMENT
79-year-old female PMH of hypertension, DM(II), hypothyroidism presenting to ED complaining of syncopal episode 3 days ago, and 1 episode of vomiting, and 2-3 episodes of dark stool, no BRBPR.                     

## 2024-11-04 NOTE — PROGRESS NOTE ADULT - PROBLEM SELECTOR PLAN 2
Syncope 3 days ago likely d/t hypovolemia in setting of GI bleed   s/p IV fluids   Trop 32  EKG w/ SR  Monitor CBC, transfuse as needed   Check orthostatics
Syncope 3 days ago likely d/t vasovagal (in setting of nausea and abd pain).   s/p IV fluids   EKG w/ SR  Monitor CBC, transfuse as needed
Syncope 3 days ago likely d/t hypovolemia in setting of GI bleed   s/p IV fluids   Trop 32  EKG w/ SR  Monitor CBC, transfuse as needed   Check orthostatics
Syncope 3 days PTA likely d/t vasovagal (in setting of nausea and abd pain).   s/p IV fluids   EKG w/ SR  Monitor CBC, transfuse as needed

## 2024-11-04 NOTE — PROGRESS NOTE ADULT - PROBLEM SELECTOR PLAN 5
TSH 2.4   Continue Synthroid

## 2024-11-04 NOTE — PROGRESS NOTE ADULT - PROBLEM SELECTOR PLAN 7
No pharmacologic ppx d/t GI bleed

## 2024-11-05 ENCOUNTER — TRANSCRIPTION ENCOUNTER (OUTPATIENT)
Age: 79
End: 2024-11-05

## 2024-11-05 VITALS
DIASTOLIC BLOOD PRESSURE: 76 MMHG | TEMPERATURE: 98 F | SYSTOLIC BLOOD PRESSURE: 113 MMHG | RESPIRATION RATE: 17 BRPM | OXYGEN SATURATION: 100 % | HEART RATE: 80 BPM

## 2024-11-05 LAB
ANION GAP SERPL CALC-SCNC: 7 MMOL/L — SIGNIFICANT CHANGE UP (ref 5–17)
BUN SERPL-MCNC: 12 MG/DL — SIGNIFICANT CHANGE UP (ref 7–23)
CALCIUM SERPL-MCNC: 8.8 MG/DL — SIGNIFICANT CHANGE UP (ref 8.5–10.1)
CHLORIDE SERPL-SCNC: 111 MMOL/L — HIGH (ref 96–108)
CO2 SERPL-SCNC: 24 MMOL/L — SIGNIFICANT CHANGE UP (ref 22–31)
CREAT SERPL-MCNC: 1.09 MG/DL — SIGNIFICANT CHANGE UP (ref 0.5–1.3)
EGFR: 51 ML/MIN/1.73M2 — LOW
GLUCOSE SERPL-MCNC: 94 MG/DL — SIGNIFICANT CHANGE UP (ref 70–99)
HCT VFR BLD CALC: 26.8 % — LOW (ref 34.5–45)
HGB BLD-MCNC: 8.4 G/DL — LOW (ref 11.5–15.5)
MCHC RBC-ENTMCNC: 27.5 PG — SIGNIFICANT CHANGE UP (ref 27–34)
MCHC RBC-ENTMCNC: 31.3 G/DL — LOW (ref 32–36)
MCV RBC AUTO: 87.6 FL — SIGNIFICANT CHANGE UP (ref 80–100)
NRBC # BLD: 0 /100 WBCS — SIGNIFICANT CHANGE UP (ref 0–0)
PLATELET # BLD AUTO: 332 K/UL — SIGNIFICANT CHANGE UP (ref 150–400)
POTASSIUM SERPL-MCNC: 3.8 MMOL/L — SIGNIFICANT CHANGE UP (ref 3.5–5.3)
POTASSIUM SERPL-SCNC: 3.8 MMOL/L — SIGNIFICANT CHANGE UP (ref 3.5–5.3)
RBC # BLD: 3.06 M/UL — LOW (ref 3.8–5.2)
RBC # FLD: 16.4 % — HIGH (ref 10.3–14.5)
SODIUM SERPL-SCNC: 142 MMOL/L — SIGNIFICANT CHANGE UP (ref 135–145)
WBC # BLD: 6.61 K/UL — SIGNIFICANT CHANGE UP (ref 3.8–10.5)
WBC # FLD AUTO: 6.61 K/UL — SIGNIFICANT CHANGE UP (ref 3.8–10.5)

## 2024-11-05 PROCEDURE — 99239 HOSP IP/OBS DSCHRG MGMT >30: CPT

## 2024-11-05 RX ORDER — PANTOPRAZOLE SODIUM 40 MG/1
1 TABLET, DELAYED RELEASE ORAL
Qty: 120 | Refills: 0
Start: 2024-11-05 | End: 2025-01-03

## 2024-11-05 RX ADMIN — Medication 1 DROP(S): at 06:50

## 2024-11-05 RX ADMIN — Medication 125 MICROGRAM(S): at 06:49

## 2024-11-05 RX ADMIN — PANTOPRAZOLE SODIUM 40 MILLIGRAM(S): 40 TABLET, DELAYED RELEASE ORAL at 06:49

## 2024-11-05 RX ADMIN — Medication 10 MILLIGRAM(S): at 06:49

## 2024-11-05 RX ADMIN — DORZOLAMIDE HYDROCHLORIDE AND TIMOLOL MALEATE PRESERVATIVE FREE 1 DROP(S): 20; 5 SOLUTION/ DROPS OPHTHALMIC at 06:50

## 2024-11-05 NOTE — DISCHARGE NOTE PROVIDER - NSDCCPCAREPLAN_GEN_ALL_CORE_FT
PRINCIPAL DISCHARGE DIAGNOSIS  Diagnosis: GI bleed  Assessment and Plan of Treatment: Your blodd counts stabilized. You underwent endoscopy sowing gastritis. Follow up results of biopsy as outpatient. Continue Protonix twice a day.      SECONDARY DISCHARGE DIAGNOSES  Diagnosis: Hypothyroid  Assessment and Plan of Treatment: Continue Synthroid.    Diagnosis: HTN (hypertension)  Assessment and Plan of Treatment: Continue Norvasc.    Diagnosis: Type II diabetes mellitus  Assessment and Plan of Treatment: Hgb A1c 5.6, you may stop taking Metformin at this time.

## 2024-11-05 NOTE — DISCHARGE NOTE NURSING/CASE MANAGEMENT/SOCIAL WORK - PATIENT PORTAL LINK FT
You can access the FollowMyHealth Patient Portal offered by [facility] by registering at the following website: [email or web address]. By joining [employer]’s FollowMyHealth portal, you will also be able to view your health information using other applications (apps) compatible with our system.

## 2024-11-05 NOTE — PROGRESS NOTE ADULT - PROVIDER SPECIALTY LIST ADULT
Gastroenterology
Gastroenterology
Hospitalist
Internal Medicine
Gastroenterology
Gastroenterology
Internal Medicine
Hospitalist

## 2024-11-05 NOTE — DISCHARGE NOTE NURSING/CASE MANAGEMENT/SOCIAL WORK - NSDCPEPTCAREGIVEDUMATLIST _GEN_ALL_CORE
Bed: RT1  Expected date:   Expected time:   Means of arrival:   Comments:  Shingles term clean   Diabetes

## 2024-11-05 NOTE — PROGRESS NOTE ADULT - REASON FOR ADMISSION
Syncope with melena.

## 2024-11-05 NOTE — DISCHARGE NOTE PROVIDER - ATTENDING DISCHARGE PHYSICAL EXAMINATION:
CONSTITUTIONAL: NAD, well-developed  RESPIRATORY: Normal respiratory effort; lungs are clear to auscultation bilaterally  CARDIOVASCULAR: Regular rate and rhythm, normal S1 and S2, no murmur/rub/gallop; No lower extremity edema; Peripheral pulses are 2+ bilaterally  ABDOMEN: Nontender to palpation, normoactive bowel sounds, no rebound/guarding; No hepatosplenomegaly  MUSCLOSKELETAL: no clubbing or cyanosis of digits; no joint swelling or tenderness to palpation  PSYCH: A+O to person, place, and time; affect appropriate

## 2024-11-05 NOTE — DISCHARGE NOTE PROVIDER - HOSPITAL COURSE
79-year-old female PMH of hypertension, DM(II), hypothyroidism presenting to ED complaining of syncopal episode 3 days PTA, and 1 episode of vomiting, and 2-3 episodes of dark stool, no BRBPR.     ·  Problem: Anemia due to acute blood loss.   ·  Plan: Acute blood loss anemia likely d/t upper GI bleed   Hgb 9.0-->8.0-->8.6->8.8 Hgb was 13.5 in 2023   Continue Protonix 40mg q12h   s/p EGD 11/4 showing gastritis, no obvious bleeding site   F/up path   Monitor CBC  Appreciate GI input.    ·  Problem: Syncope.   ·  Plan: Syncope 3 days PTA likely d/t vasovagal (in setting of nausea and abd pain).   s/p IV fluids   EKG w/ SR  Monitor CBC    ·  Problem: HTN (hypertension).   ·  Plan: Continue Norvasc   Monitor BP.    ·  Problem: Type II diabetes mellitus.   ·  Plan: Hgb A1c 5.6   Discontinue Metformin   No need for sliding scale at this time.    ·  Problem: Hypothyroid.   ·  Plan: TSH 2.4   Continue Synthroid.    ·  Problem: Hypokalemia.   ·  Plan: Supplement K prn.

## 2024-11-05 NOTE — DISCHARGE NOTE PROVIDER - NSDCMRMEDTOKEN_GEN_ALL_CORE_FT
amLODIPine 10 mg oral tablet: 1 tab(s) orally once a day AM  brimonidine 0.2% ophthalmic solution: 1 drop(s) to each affected eye 2 times a day  Cosopt 2.23%-0.68% ophthalmic solution: 1 drop(s) to each affected eye 2 times a day  latanoprost 0.005% ophthalmic solution: 1 drop(s) to each affected eye once a day (in the evening)  metFORMIN 500 mg oral tablet: 1 tab(s) orally once a day AM  Multiple Vitamins oral tablet: 1 tab(s) orally once a day LD 11/28/2022  oxyCODONE 5 mg oral tablet: 1 tab(s) orally every 6 hours MDD:4  Synthroid 125 mcg (0.125 mg) oral tablet: 1 tab(s) orally once a day   Vitamin C: 1 tab(s) orally once a day  Vitamin D3: 1 cap(s) orally once a day

## 2024-11-05 NOTE — PROGRESS NOTE ADULT - SUBJECTIVE AND OBJECTIVE BOX
Pt admitted with melena  Hgb improved to 8.6 today  on IV protonix      MEDICATIONS  (STANDING):  amLODIPine   Tablet 10 milliGRAM(s) Oral daily  ascorbic acid 500 milliGRAM(s) Oral daily  brimonidine 0.2% Ophthalmic Solution 1 Drop(s) Both EYES two times a day  dorzolamide 2%/timolol 0.5% Ophthalmic Solution 1 Drop(s) Both EYES two times a day  latanoprost 0.005% Ophthalmic Solution 1 Drop(s) Both EYES at bedtime  levothyroxine 125 MICROGram(s) Oral daily  multivitamin 1 Tablet(s) Oral daily  pantoprazole  Injectable 40 milliGRAM(s) IV Push every 12 hours    MEDICATIONS  (PRN):      Allergies    No Known Allergies    Intolerances        Vital Signs Last 24 Hrs  T(C): 36.4 (02 Nov 2024 10:58), Max: 36.8 (02 Nov 2024 05:04)  T(F): 97.5 (02 Nov 2024 10:58), Max: 98.2 (02 Nov 2024 05:04)  HR: 71 (02 Nov 2024 10:58) (63 - 74)  BP: 105/65 (02 Nov 2024 10:58) (105/65 - 145/82)  BP(mean): --  RR: 18 (02 Nov 2024 10:58) (14 - 20)  SpO2: 100% (02 Nov 2024 10:58) (98% - 100%)    Parameters below as of 02 Nov 2024 10:58  Patient On (Oxygen Delivery Method): room air        PHYSICAL EXAM:  General: NAD.  CVS: S1, S2  Chest: air entry bilaterally present  Abd: BS present, soft, non-tender      LABS:                        8.6    4.07  )-----------( 350      ( 02 Nov 2024 06:00 )             27.1     11-02    141  |  110[H]  |  8   ----------------------------<  103[H]  3.9   |  25  |  0.80    Ca    8.7      02 Nov 2024 06:00  Phos  3.3     11-02  Mg     1.7     11-02    TPro  6.3  /  Alb  3.1[L]  /  TBili  0.3  /  DBili  x   /  AST  14[L]  /  ALT  13  /  AlkPhos  47  11-01    PT/INR - ( 31 Oct 2024 19:47 )   PT: 11.3 sec;   INR: 0.97 ratio         PTT - ( 31 Oct 2024 19:47 )  PTT:28.7 sec    advance diet  continue protonix  CBC in AM  for tentative EGD on Monday --> If Hgb much improved tomorrow can consider d/c home with outpatient EGD this week.      
Pt stable   Hgb 8.8  Discussed with pt --> she wants to proceed with EGD tomorrow      MEDICATIONS  (STANDING):  amLODIPine   Tablet 10 milliGRAM(s) Oral daily  ascorbic acid 500 milliGRAM(s) Oral daily  brimonidine 0.2% Ophthalmic Solution 1 Drop(s) Both EYES two times a day  dorzolamide 2%/timolol 0.5% Ophthalmic Solution 1 Drop(s) Both EYES two times a day  latanoprost 0.005% Ophthalmic Solution 1 Drop(s) Both EYES at bedtime  levothyroxine 125 MICROGram(s) Oral daily  multivitamin 1 Tablet(s) Oral daily  pantoprazole  Injectable 40 milliGRAM(s) IV Push every 12 hours    MEDICATIONS  (PRN):      Allergies    No Known Allergies    Intolerances        Vital Signs Last 24 Hrs  T(C): 36.7 (03 Nov 2024 11:07), Max: 36.7 (02 Nov 2024 23:22)  T(F): 98 (03 Nov 2024 11:07), Max: 98.1 (02 Nov 2024 23:22)  HR: 71 (03 Nov 2024 11:07) (67 - 72)  BP: 111/68 (03 Nov 2024 11:07) (111/68 - 144/77)  BP(mean): --  RR: 17 (03 Nov 2024 11:07) (17 - 20)  SpO2: 100% (03 Nov 2024 11:07) (99% - 100%)    Parameters below as of 03 Nov 2024 11:07  Patient On (Oxygen Delivery Method): room air        PHYSICAL EXAM:  General: NAD.  CVS: S1, S2  Chest: air entry bilaterally present  Abd: BS present, soft, non-tender      LABS:                        8.8    4.48  )-----------( 346      ( 03 Nov 2024 06:17 )             27.9     11-03    141  |  110[H]  |  6[L]  ----------------------------<  100[H]  4.0   |  26  |  0.76    Ca    8.7      03 Nov 2024 06:17  Phos  3.3     11-02  Mg     1.7     11-02        continue protonix  NPO after 7AM for EGD tomorrow  CBC in AM          
Pt seen earlier today  Hemoglobin: 8.4 g/dL (11.05.24 @ 06:50)  Tolerated EGD well --> +gastritis    MEDICATIONS  (STANDING):  amLODIPine   Tablet 10 milliGRAM(s) Oral daily  ascorbic acid 500 milliGRAM(s) Oral daily  brimonidine 0.2% Ophthalmic Solution 1 Drop(s) Both EYES two times a day  dorzolamide 2%/timolol 0.5% Ophthalmic Solution 1 Drop(s) Both EYES two times a day  latanoprost 0.005% Ophthalmic Solution 1 Drop(s) Both EYES at bedtime  levothyroxine 125 MICROGram(s) Oral daily  multivitamin 1 Tablet(s) Oral daily  pantoprazole  Injectable 40 milliGRAM(s) IV Push every 12 hours    MEDICATIONS  (PRN):      Allergies    No Known Allergies    Intolerances        Vital Signs Last 24 Hrs  T(C): 36.8 (05 Nov 2024 11:32), Max: 37.1 (05 Nov 2024 06:00)  T(F): 98.2 (05 Nov 2024 11:32), Max: 98.7 (05 Nov 2024 06:00)  HR: 80 (05 Nov 2024 11:32) (73 - 85)  BP: 113/76 (05 Nov 2024 11:32) (113/76 - 116/74)  BP(mean): --  RR: 17 (05 Nov 2024 11:32) (17 - 17)  SpO2: 100% (05 Nov 2024 11:32) (97% - 100%)    Parameters below as of 05 Nov 2024 11:32  Patient On (Oxygen Delivery Method): room air        PHYSICAL EXAM:  General: NAD.  CVS: S1, S2  Chest: air entry bilaterally present  Abd: BS present, soft, non-tender      LABS:                        8.4    6.61  )-----------( 332      ( 05 Nov 2024 06:50 )             26.8     11-05    142  |  111[H]  |  12  ----------------------------<  94  3.8   |  24  |  1.09    Ca    8.8      05 Nov 2024 06:50  Mg     2.0     11-04        agree with d/c home on protonix  f/u CBC as outpatient      
PROGRESS NOTE:     Patient is a 79y old  Female who presents with a chief complaint of Syncope with melena. (03 Nov 2024 13:08)      SUBJECTIVE / OVERNIGHT EVENTS:No acute overnight events. still with dark stools. She denies chest pain , shortness of breath, light headedness, or dizziness        MEDICATIONS  (STANDING):  amLODIPine   Tablet 10 milliGRAM(s) Oral daily  ascorbic acid 500 milliGRAM(s) Oral daily  brimonidine 0.2% Ophthalmic Solution 1 Drop(s) Both EYES two times a day  dorzolamide 2%/timolol 0.5% Ophthalmic Solution 1 Drop(s) Both EYES two times a day  latanoprost 0.005% Ophthalmic Solution 1 Drop(s) Both EYES at bedtime  levothyroxine 125 MICROGram(s) Oral daily  multivitamin 1 Tablet(s) Oral daily  pantoprazole  Injectable 40 milliGRAM(s) IV Push every 12 hours    MEDICATIONS  (PRN):      CAPILLARY BLOOD GLUCOSE      POCT Blood Glucose.: 119 mg/dL (03 Nov 2024 11:33)  POCT Blood Glucose.: 121 mg/dL (03 Nov 2024 09:00)    I&O's Summary    02 Nov 2024 08:01  -  03 Nov 2024 07:00  --------------------------------------------------------  IN: 250 mL / OUT: 0 mL / NET: 250 mL        PHYSICAL EXAM:  Vital Signs Last 24 Hrs  T(C): 36.7 (03 Nov 2024 11:07), Max: 36.7 (02 Nov 2024 23:22)  T(F): 98 (03 Nov 2024 11:07), Max: 98.1 (02 Nov 2024 23:22)  HR: 71 (03 Nov 2024 11:07) (67 - 72)  BP: 111/68 (03 Nov 2024 11:07) (111/68 - 144/77)  BP(mean): --  RR: 17 (03 Nov 2024 11:07) (17 - 20)  SpO2: 100% (03 Nov 2024 11:07) (99% - 100%)    Parameters below as of 03 Nov 2024 11:07  Patient On (Oxygen Delivery Method): room air        CONSTITUTIONAL: NAD, well-developed  RESPIRATORY: Normal respiratory effort; lungs are clear to auscultation bilaterally  CARDIOVASCULAR: Regular rate and rhythm, normal S1 and S2, no murmur/rub/gallop; No lower extremity edema; Peripheral pulses are 2+ bilaterally  ABDOMEN: Nontender to palpation, normoactive bowel sounds, no rebound/guarding; No hepatosplenomegaly  MUSCLOSKELETAL: no clubbing or cyanosis of digits; no joint swelling or tenderness to palpation  PSYCH: A+O to person, place, and time; affect appropriate    LABS:                        8.8    4.48  )-----------( 346      ( 03 Nov 2024 06:17 )             27.9     11-03    141  |  110[H]  |  6[L]  ----------------------------<  100[H]  4.0   |  26  |  0.76    Ca    8.7      03 Nov 2024 06:17  Phos  3.3     11-02  Mg     1.7     11-02            Urinalysis Basic - ( 03 Nov 2024 06:17 )    Color: x / Appearance: x / SG: x / pH: x  Gluc: 100 mg/dL / Ketone: x  / Bili: x / Urobili: x   Blood: x / Protein: x / Nitrite: x   Leuk Esterase: x / RBC: x / WBC x   Sq Epi: x / Non Sq Epi: x / Bacteria: x        Urinalysis with Rflx Culture (collected 31 Oct 2024 20:30)        RADIOLOGY & ADDITIONAL TESTS:  Results Reviewed:   Imaging Personally Reviewed:  Electrocardiogram Personally Reviewed:    COORDINATION OF CARE:  Care Discussed with Consultants/Other Providers [Y/N]:  Prior or Outpatient Records Reviewed [Y/N]:  
PROGRESS NOTE:     Patient is a 79y old  Female who presents with a chief complaint of Syncope with melena. (03 Nov 2024 13:35)      SUBJECTIVE / OVERNIGHT EVENTS: No acute events.     ADDITIONAL REVIEW OF SYSTEMS:    MEDICATIONS  (STANDING):  amLODIPine   Tablet 10 milliGRAM(s) Oral daily  ascorbic acid 500 milliGRAM(s) Oral daily  brimonidine 0.2% Ophthalmic Solution 1 Drop(s) Both EYES two times a day  dorzolamide 2%/timolol 0.5% Ophthalmic Solution 1 Drop(s) Both EYES two times a day  ferrous    sulfate 325 milliGRAM(s) Oral <User Schedule>  latanoprost 0.005% Ophthalmic Solution 1 Drop(s) Both EYES at bedtime  levothyroxine 125 MICROGram(s) Oral daily  multivitamin 1 Tablet(s) Oral daily  pantoprazole  Injectable 40 milliGRAM(s) IV Push every 12 hours    MEDICATIONS  (PRN):      CAPILLARY BLOOD GLUCOSE      POCT Blood Glucose.: 92 mg/dL (04 Nov 2024 11:23)  POCT Blood Glucose.: 150 mg/dL (04 Nov 2024 07:45)  POCT Blood Glucose.: 153 mg/dL (03 Nov 2024 21:11)  POCT Blood Glucose.: 105 mg/dL (03 Nov 2024 16:22)    I&O's Summary    03 Nov 2024 07:01  -  04 Nov 2024 07:00  --------------------------------------------------------  IN: 450 mL / OUT: 0 mL / NET: 450 mL        PHYSICAL EXAM:  Vital Signs Last 24 Hrs  T(C): 36.7 (04 Nov 2024 11:28), Max: 37 (04 Nov 2024 04:57)  T(F): 98.1 (04 Nov 2024 11:28), Max: 98.6 (04 Nov 2024 04:57)  HR: 63 (04 Nov 2024 11:28) (63 - 76)  BP: 108/64 (04 Nov 2024 11:28) (108/64 - 125/71)  BP(mean): --  RR: 18 (04 Nov 2024 11:28) (17 - 18)  SpO2: 100% (04 Nov 2024 11:28) (99% - 100%)    Parameters below as of 04 Nov 2024 04:57  Patient On (Oxygen Delivery Method): room air        CONSTITUTIONAL: NAD, well-developed  RESPIRATORY: Normal respiratory effort; lungs are clear to auscultation bilaterally  CARDIOVASCULAR: Regular rate and rhythm, normal S1 and S2, no murmur/rub/gallop; No lower extremity edema; Peripheral pulses are 2+ bilaterally  ABDOMEN: Nontender to palpation, normoactive bowel sounds, no rebound/guarding; No hepatosplenomegaly  MUSCLOSKELETAL: no clubbing or cyanosis of digits; no joint swelling or tenderness to palpation  PSYCH: A+O to person, place, and time; affect appropriate    LABS:                        8.3    4.00  )-----------( 344      ( 04 Nov 2024 05:45 )             26.4     11-04    141  |  111[H]  |  6[L]  ----------------------------<  103[H]  3.8   |  24  |  0.95    Ca    8.4[L]      04 Nov 2024 05:45  Mg     2.0     11-04            Urinalysis Basic - ( 04 Nov 2024 05:45 )    Color: x / Appearance: x / SG: x / pH: x  Gluc: 103 mg/dL / Ketone: x  / Bili: x / Urobili: x   Blood: x / Protein: x / Nitrite: x   Leuk Esterase: x / RBC: x / WBC x   Sq Epi: x / Non Sq Epi: x / Bacteria: x          RADIOLOGY & ADDITIONAL TESTS:  Results Reviewed:   Imaging Personally Reviewed:  Electrocardiogram Personally Reviewed:    COORDINATION OF CARE:  Care Discussed with Consultants/Other Providers [Y/N]:  Prior or Outpatient Records Reviewed [Y/N]:  
see gastroscopy report    Imp: GI Bleed; Gastritis; Hiatal hERNIA    Plan: check path; no obvious bleeding site seen; continue protonix; CBC in AM; advance diet
PROGRESS NOTE:     Patient is a 79y old  Female who presents with a chief complaint of Syncope with melena. (01 Nov 2024 11:37)      SUBJECTIVE / OVERNIGHT EVENTS: no acute events.     ADDITIONAL REVIEW OF SYSTEMS:    MEDICATIONS  (STANDING):  amLODIPine   Tablet 10 milliGRAM(s) Oral daily  ascorbic acid 500 milliGRAM(s) Oral daily  brimonidine 0.2% Ophthalmic Solution 1 Drop(s) Both EYES two times a day  dorzolamide 2%/timolol 0.5% Ophthalmic Solution 1 Drop(s) Both EYES two times a day  latanoprost 0.005% Ophthalmic Solution 1 Drop(s) Both EYES at bedtime  levothyroxine 125 MICROGram(s) Oral daily  multivitamin 1 Tablet(s) Oral daily  pantoprazole  Injectable 40 milliGRAM(s) IV Push every 12 hours  sodium chloride 0.9%. 1000 milliLiter(s) (100 mL/Hr) IV Continuous <Continuous>    MEDICATIONS  (PRN):      CAPILLARY BLOOD GLUCOSE      POCT Blood Glucose.: 99 mg/dL (01 Nov 2024 11:21)  POCT Blood Glucose.: 139 mg/dL (31 Oct 2024 14:44)    I&O's Summary    31 Oct 2024 07:01  -  01 Nov 2024 07:00  --------------------------------------------------------  IN: 1200 mL / OUT: 0 mL / NET: 1200 mL        PHYSICAL EXAM:  Vital Signs Last 24 Hrs  T(C): 36.8 (01 Nov 2024 10:58), Max: 37.1 (31 Oct 2024 14:38)  T(F): 98.3 (01 Nov 2024 10:58), Max: 98.8 (31 Oct 2024 14:38)  HR: 71 (01 Nov 2024 10:58) (71 - 92)  BP: 135/70 (01 Nov 2024 10:58) (135/70 - 164/84)  BP(mean): --  RR: 16 (01 Nov 2024 10:58) (15 - 17)  SpO2: 99% (01 Nov 2024 10:58) (98% - 100%)    Parameters below as of 01 Nov 2024 10:58  Patient On (Oxygen Delivery Method): room air        CONSTITUTIONAL: NAD, well-developed  RESPIRATORY: Normal respiratory effort; lungs are clear to auscultation bilaterally  CARDIOVASCULAR: Regular rate and rhythm, normal S1 and S2, no murmur/rub/gallop; No lower extremity edema; Peripheral pulses are 2+ bilaterally  ABDOMEN: Nontender to palpation, normoactive bowel sounds, no rebound/guarding; No hepatosplenomegaly  MUSCLOSKELETAL: no clubbing or cyanosis of digits; no joint swelling or tenderness to palpation  PSYCH: A+O to person, place, and time; affect appropriate    LABS:                        8.0    4.26  )-----------( 303      ( 01 Nov 2024 07:00 )             25.2     11-01    143  |  112[H]  |  12  ----------------------------<  99  3.4[L]   |  27  |  0.69    Ca    8.7      01 Nov 2024 07:00    TPro  6.3  /  Alb  3.1[L]  /  TBili  0.3  /  DBili  x   /  AST  14[L]  /  ALT  13  /  AlkPhos  47  11-01    PT/INR - ( 31 Oct 2024 19:47 )   PT: 11.3 sec;   INR: 0.97 ratio         PTT - ( 31 Oct 2024 19:47 )  PTT:28.7 sec      Urinalysis Basic - ( 01 Nov 2024 07:00 )    Color: x / Appearance: x / SG: x / pH: x  Gluc: 99 mg/dL / Ketone: x  / Bili: x / Urobili: x   Blood: x / Protein: x / Nitrite: x   Leuk Esterase: x / RBC: x / WBC x   Sq Epi: x / Non Sq Epi: x / Bacteria: x        Urinalysis with Rflx Culture (collected 31 Oct 2024 20:30)        RADIOLOGY & ADDITIONAL TESTS:  Results Reviewed:   Imaging Personally Reviewed:  Electrocardiogram Personally Reviewed:    COORDINATION OF CARE:  Care Discussed with Consultants/Other Providers [Y/N]:  Prior or Outpatient Records Reviewed [Y/N]:  
PROGRESS NOTE:     Patient is a 79y old  Female who presents with a chief complaint of Syncope with melena. (01 Nov 2024 13:40)      SUBJECTIVE / OVERNIGHT EVENTS: No acute overnight events. patient denies any further bleeding. Denies chest pain, shortness of breath, light headedness, or dizziness.         MEDICATIONS  (STANDING):  amLODIPine   Tablet 10 milliGRAM(s) Oral daily  ascorbic acid 500 milliGRAM(s) Oral daily  brimonidine 0.2% Ophthalmic Solution 1 Drop(s) Both EYES two times a day  dorzolamide 2%/timolol 0.5% Ophthalmic Solution 1 Drop(s) Both EYES two times a day  latanoprost 0.005% Ophthalmic Solution 1 Drop(s) Both EYES at bedtime  levothyroxine 125 MICROGram(s) Oral daily  multivitamin 1 Tablet(s) Oral daily  pantoprazole  Injectable 40 milliGRAM(s) IV Push every 12 hours    MEDICATIONS  (PRN):      CAPILLARY BLOOD GLUCOSE      POCT Blood Glucose.: 129 mg/dL (02 Nov 2024 11:14)  POCT Blood Glucose.: 113 mg/dL (02 Nov 2024 07:50)  POCT Blood Glucose.: 140 mg/dL (01 Nov 2024 21:52)  POCT Blood Glucose.: 135 mg/dL (01 Nov 2024 17:04)    I&O's Summary      PHYSICAL EXAM:  Vital Signs Last 24 Hrs  T(C): 36.4 (02 Nov 2024 10:58), Max: 36.8 (02 Nov 2024 05:04)  T(F): 97.5 (02 Nov 2024 10:58), Max: 98.2 (02 Nov 2024 05:04)  HR: 71 (02 Nov 2024 10:58) (63 - 77)  BP: 105/65 (02 Nov 2024 10:58) (105/65 - 145/82)  BP(mean): --  RR: 18 (02 Nov 2024 10:58) (14 - 20)  SpO2: 100% (02 Nov 2024 10:58) (98% - 100%)    Parameters below as of 02 Nov 2024 10:58  Patient On (Oxygen Delivery Method): room air        CONSTITUTIONAL: NAD, well-developed  RESPIRATORY: Normal respiratory effort; lungs are clear to auscultation bilaterally  CARDIOVASCULAR: Regular rate and rhythm, normal S1 and S2, no murmur/rub/gallop; No lower extremity edema; Peripheral pulses are 2+ bilaterally  ABDOMEN: Nontender to palpation, normoactive bowel sounds, no rebound/guarding; No hepatosplenomegaly  MUSCLOSKELETAL: no clubbing or cyanosis of digits; no joint swelling or tenderness to palpation  PSYCH: A+O to person, place, and time; affect appropriate    LABS:                        8.6    4.07  )-----------( 350      ( 02 Nov 2024 06:00 )             27.1     11-02    141  |  110[H]  |  8   ----------------------------<  103[H]  3.9   |  25  |  0.80    Ca    8.7      02 Nov 2024 06:00  Phos  3.3     11-02  Mg     1.7     11-02    TPro  6.3  /  Alb  3.1[L]  /  TBili  0.3  /  DBili  x   /  AST  14[L]  /  ALT  13  /  AlkPhos  47  11-01    PT/INR - ( 31 Oct 2024 19:47 )   PT: 11.3 sec;   INR: 0.97 ratio         PTT - ( 31 Oct 2024 19:47 )  PTT:28.7 sec      Urinalysis Basic - ( 02 Nov 2024 06:00 )    Color: x / Appearance: x / SG: x / pH: x  Gluc: 103 mg/dL / Ketone: x  / Bili: x / Urobili: x   Blood: x / Protein: x / Nitrite: x   Leuk Esterase: x / RBC: x / WBC x   Sq Epi: x / Non Sq Epi: x / Bacteria: x        Urinalysis with Rflx Culture (collected 31 Oct 2024 20:30)        RADIOLOGY & ADDITIONAL TESTS:  Results Reviewed:   Imaging Personally Reviewed:  Electrocardiogram Personally Reviewed:    COORDINATION OF CARE:  Care Discussed with Consultants/Other Providers [Y/N]:  Prior or Outpatient Records Reviewed [Y/N]:  
Abdomen soft,non-distended, no rebound, no guarding and no masses. no hepatosplenomegaly. +Periumbilical and RLQ tenderness to deep palpation

## 2024-11-05 NOTE — DISCHARGE NOTE NURSING/CASE MANAGEMENT/SOCIAL WORK - FINANCIAL ASSISTANCE
[facility] provides services at a reduced cost to those who are determined to be eligible through [facility]’s financial assistance program. Information regarding [facility]’s financial assistance program can be found by going to [email or web address] or by calling [phone].

## 2024-11-06 LAB — SURGICAL PATHOLOGY STUDY: SIGNIFICANT CHANGE UP

## 2024-11-13 DIAGNOSIS — E87.6 HYPOKALEMIA: ICD-10-CM

## 2024-11-13 DIAGNOSIS — K92.1 MELENA: ICD-10-CM

## 2024-11-13 DIAGNOSIS — Z90.710 ACQUIRED ABSENCE OF BOTH CERVIX AND UTERUS: ICD-10-CM

## 2024-11-13 DIAGNOSIS — Z79.890 HORMONE REPLACEMENT THERAPY: ICD-10-CM

## 2024-11-13 DIAGNOSIS — Z79.84 LONG TERM (CURRENT) USE OF ORAL HYPOGLYCEMIC DRUGS: ICD-10-CM

## 2024-11-13 DIAGNOSIS — R55 SYNCOPE AND COLLAPSE: ICD-10-CM

## 2024-11-13 DIAGNOSIS — E03.9 HYPOTHYROIDISM, UNSPECIFIED: ICD-10-CM

## 2024-11-13 DIAGNOSIS — I10 ESSENTIAL (PRIMARY) HYPERTENSION: ICD-10-CM

## 2024-11-13 DIAGNOSIS — E86.1 HYPOVOLEMIA: ICD-10-CM

## 2024-11-13 DIAGNOSIS — H40.9 UNSPECIFIED GLAUCOMA: ICD-10-CM

## 2024-11-13 DIAGNOSIS — D62 ACUTE POSTHEMORRHAGIC ANEMIA: ICD-10-CM

## 2024-11-13 DIAGNOSIS — E11.9 TYPE 2 DIABETES MELLITUS WITHOUT COMPLICATIONS: ICD-10-CM

## 2024-11-13 DIAGNOSIS — K44.9 DIAPHRAGMATIC HERNIA WITHOUT OBSTRUCTION OR GANGRENE: ICD-10-CM

## 2024-11-13 DIAGNOSIS — K29.71 GASTRITIS, UNSPECIFIED, WITH BLEEDING: ICD-10-CM

## 2024-11-15 ENCOUNTER — TRANSCRIPTION ENCOUNTER (OUTPATIENT)
Age: 79
End: 2024-11-15

## 2025-02-02 ENCOUNTER — EMERGENCY (EMERGENCY)
Facility: HOSPITAL | Age: 80
LOS: 1 days | Discharge: HOME | End: 2025-02-02
Attending: EMERGENCY MEDICINE
Payer: COMMERCIAL

## 2025-02-02 VITALS
OXYGEN SATURATION: 100 % | WEIGHT: 147.05 LBS | SYSTOLIC BLOOD PRESSURE: 130 MMHG | TEMPERATURE: 98 F | HEIGHT: 61 IN | HEART RATE: 84 BPM | DIASTOLIC BLOOD PRESSURE: 72 MMHG | RESPIRATION RATE: 18 BRPM

## 2025-02-02 VITALS
DIASTOLIC BLOOD PRESSURE: 75 MMHG | RESPIRATION RATE: 18 BRPM | SYSTOLIC BLOOD PRESSURE: 125 MMHG | OXYGEN SATURATION: 100 % | HEART RATE: 82 BPM | TEMPERATURE: 98 F

## 2025-02-02 DIAGNOSIS — Z90.710 ACQUIRED ABSENCE OF BOTH CERVIX AND UTERUS: Chronic | ICD-10-CM

## 2025-02-02 DIAGNOSIS — Z87.59 PERSONAL HISTORY OF OTHER COMPLICATIONS OF PREGNANCY, CHILDBIRTH AND THE PUERPERIUM: Chronic | ICD-10-CM

## 2025-02-02 LAB
ALBUMIN SERPL ELPH-MCNC: 4.3 G/DL — SIGNIFICANT CHANGE UP (ref 3.3–5)
ALP SERPL-CCNC: 59 U/L — SIGNIFICANT CHANGE UP (ref 40–120)
ALT FLD-CCNC: 11 U/L — SIGNIFICANT CHANGE UP (ref 10–45)
ANION GAP SERPL CALC-SCNC: 14 MMOL/L — SIGNIFICANT CHANGE UP (ref 5–17)
APPEARANCE UR: CLEAR — SIGNIFICANT CHANGE UP
AST SERPL-CCNC: 24 U/L — SIGNIFICANT CHANGE UP (ref 10–40)
BACTERIA # UR AUTO: NEGATIVE /HPF — SIGNIFICANT CHANGE UP
BASOPHILS # BLD AUTO: 0.02 K/UL — SIGNIFICANT CHANGE UP (ref 0–0.2)
BASOPHILS NFR BLD AUTO: 0.3 % — SIGNIFICANT CHANGE UP (ref 0–2)
BILIRUB SERPL-MCNC: 0.3 MG/DL — SIGNIFICANT CHANGE UP (ref 0.2–1.2)
BILIRUB UR-MCNC: NEGATIVE — SIGNIFICANT CHANGE UP
BUN SERPL-MCNC: 13 MG/DL — SIGNIFICANT CHANGE UP (ref 7–23)
CALCIUM SERPL-MCNC: 9.4 MG/DL — SIGNIFICANT CHANGE UP (ref 8.4–10.5)
CAST: 4 /LPF — SIGNIFICANT CHANGE UP (ref 0–4)
CHLORIDE SERPL-SCNC: 103 MMOL/L — SIGNIFICANT CHANGE UP (ref 96–108)
CO2 SERPL-SCNC: 22 MMOL/L — SIGNIFICANT CHANGE UP (ref 22–31)
COLOR SPEC: YELLOW — SIGNIFICANT CHANGE UP
CREAT SERPL-MCNC: 0.92 MG/DL — SIGNIFICANT CHANGE UP (ref 0.5–1.3)
DIFF PNL FLD: NEGATIVE — SIGNIFICANT CHANGE UP
EGFR: 63 ML/MIN/1.73M2 — SIGNIFICANT CHANGE UP
EOSINOPHIL # BLD AUTO: 0.01 K/UL — SIGNIFICANT CHANGE UP (ref 0–0.5)
EOSINOPHIL NFR BLD AUTO: 0.1 % — SIGNIFICANT CHANGE UP (ref 0–6)
FLUAV AG NPH QL: SIGNIFICANT CHANGE UP
FLUBV AG NPH QL: SIGNIFICANT CHANGE UP
GAS PNL BLDV: SIGNIFICANT CHANGE UP
GLUCOSE SERPL-MCNC: 120 MG/DL — HIGH (ref 70–99)
GLUCOSE UR QL: NEGATIVE MG/DL — SIGNIFICANT CHANGE UP
HCT VFR BLD CALC: 29.9 % — LOW (ref 34.5–45)
HGB BLD-MCNC: 8.9 G/DL — LOW (ref 11.5–15.5)
IMM GRANULOCYTES NFR BLD AUTO: 0.3 % — SIGNIFICANT CHANGE UP (ref 0–0.9)
KETONES UR-MCNC: NEGATIVE MG/DL — SIGNIFICANT CHANGE UP
LEUKOCYTE ESTERASE UR-ACNC: NEGATIVE — SIGNIFICANT CHANGE UP
LIDOCAIN IGE QN: 23 U/L — SIGNIFICANT CHANGE UP (ref 7–60)
LYMPHOCYTES # BLD AUTO: 1.85 K/UL — SIGNIFICANT CHANGE UP (ref 1–3.3)
LYMPHOCYTES # BLD AUTO: 25.6 % — SIGNIFICANT CHANGE UP (ref 13–44)
MCHC RBC-ENTMCNC: 22.8 PG — LOW (ref 27–34)
MCHC RBC-ENTMCNC: 29.8 G/DL — LOW (ref 32–36)
MCV RBC AUTO: 76.5 FL — LOW (ref 80–100)
MONOCYTES # BLD AUTO: 0.75 K/UL — SIGNIFICANT CHANGE UP (ref 0–0.9)
MONOCYTES NFR BLD AUTO: 10.4 % — SIGNIFICANT CHANGE UP (ref 2–14)
NEUTROPHILS # BLD AUTO: 4.58 K/UL — SIGNIFICANT CHANGE UP (ref 1.8–7.4)
NEUTROPHILS NFR BLD AUTO: 63.3 % — SIGNIFICANT CHANGE UP (ref 43–77)
NITRITE UR-MCNC: NEGATIVE — SIGNIFICANT CHANGE UP
NRBC # BLD: 0 /100 WBCS — SIGNIFICANT CHANGE UP (ref 0–0)
NRBC BLD-RTO: 0 /100 WBCS — SIGNIFICANT CHANGE UP (ref 0–0)
PH UR: 7 — SIGNIFICANT CHANGE UP (ref 5–8)
PLATELET # BLD AUTO: 402 K/UL — HIGH (ref 150–400)
POTASSIUM SERPL-MCNC: 3.8 MMOL/L — SIGNIFICANT CHANGE UP (ref 3.5–5.3)
POTASSIUM SERPL-SCNC: 3.8 MMOL/L — SIGNIFICANT CHANGE UP (ref 3.5–5.3)
PROT SERPL-MCNC: 7.8 G/DL — SIGNIFICANT CHANGE UP (ref 6–8.3)
PROT UR-MCNC: NEGATIVE MG/DL — SIGNIFICANT CHANGE UP
RBC # BLD: 3.91 M/UL — SIGNIFICANT CHANGE UP (ref 3.8–5.2)
RBC # FLD: 18.7 % — HIGH (ref 10.3–14.5)
RBC CASTS # UR COMP ASSIST: 0 /HPF — SIGNIFICANT CHANGE UP (ref 0–4)
RSV RNA NPH QL NAA+NON-PROBE: SIGNIFICANT CHANGE UP
SARS-COV-2 RNA SPEC QL NAA+PROBE: SIGNIFICANT CHANGE UP
SODIUM SERPL-SCNC: 139 MMOL/L — SIGNIFICANT CHANGE UP (ref 135–145)
SP GR SPEC: 1.03 — SIGNIFICANT CHANGE UP (ref 1–1.03)
SQUAMOUS # UR AUTO: 2 /HPF — SIGNIFICANT CHANGE UP (ref 0–5)
UROBILINOGEN FLD QL: 0.2 MG/DL — SIGNIFICANT CHANGE UP (ref 0.2–1)
WBC # BLD: 7.23 K/UL — SIGNIFICANT CHANGE UP (ref 3.8–10.5)
WBC # FLD AUTO: 7.23 K/UL — SIGNIFICANT CHANGE UP (ref 3.8–10.5)
WBC UR QL: 2 /HPF — SIGNIFICANT CHANGE UP (ref 0–5)

## 2025-02-02 PROCEDURE — 82947 ASSAY GLUCOSE BLOOD QUANT: CPT

## 2025-02-02 PROCEDURE — 96374 THER/PROPH/DIAG INJ IV PUSH: CPT | Mod: XU

## 2025-02-02 PROCEDURE — 76830 TRANSVAGINAL US NON-OB: CPT

## 2025-02-02 PROCEDURE — 74177 CT ABD & PELVIS W/CONTRAST: CPT | Mod: 26

## 2025-02-02 PROCEDURE — 82330 ASSAY OF CALCIUM: CPT

## 2025-02-02 PROCEDURE — 84295 ASSAY OF SERUM SODIUM: CPT

## 2025-02-02 PROCEDURE — 74177 CT ABD & PELVIS W/CONTRAST: CPT | Mod: MC

## 2025-02-02 PROCEDURE — 99285 EMERGENCY DEPT VISIT HI MDM: CPT | Mod: 25

## 2025-02-02 PROCEDURE — 85014 HEMATOCRIT: CPT

## 2025-02-02 PROCEDURE — 83690 ASSAY OF LIPASE: CPT

## 2025-02-02 PROCEDURE — 86304 IMMUNOASSAY TUMOR CA 125: CPT

## 2025-02-02 PROCEDURE — 81001 URINALYSIS AUTO W/SCOPE: CPT

## 2025-02-02 PROCEDURE — 82435 ASSAY OF BLOOD CHLORIDE: CPT

## 2025-02-02 PROCEDURE — 86301 IMMUNOASSAY TUMOR CA 19-9: CPT

## 2025-02-02 PROCEDURE — 82378 CARCINOEMBRYONIC ANTIGEN: CPT

## 2025-02-02 PROCEDURE — 82803 BLOOD GASES ANY COMBINATION: CPT

## 2025-02-02 PROCEDURE — 99285 EMERGENCY DEPT VISIT HI MDM: CPT

## 2025-02-02 PROCEDURE — 76830 TRANSVAGINAL US NON-OB: CPT | Mod: 26

## 2025-02-02 PROCEDURE — 93005 ELECTROCARDIOGRAM TRACING: CPT

## 2025-02-02 PROCEDURE — 80053 COMPREHEN METABOLIC PANEL: CPT

## 2025-02-02 PROCEDURE — 96375 TX/PRO/DX INJ NEW DRUG ADDON: CPT

## 2025-02-02 PROCEDURE — 85025 COMPLETE CBC W/AUTO DIFF WBC: CPT

## 2025-02-02 PROCEDURE — 85018 HEMOGLOBIN: CPT

## 2025-02-02 PROCEDURE — 84132 ASSAY OF SERUM POTASSIUM: CPT

## 2025-02-02 PROCEDURE — 87086 URINE CULTURE/COLONY COUNT: CPT

## 2025-02-02 PROCEDURE — 87637 SARSCOV2&INF A&B&RSV AMP PRB: CPT

## 2025-02-02 PROCEDURE — 83605 ASSAY OF LACTIC ACID: CPT

## 2025-02-02 RX ORDER — BACTERIOSTATIC SODIUM CHLORIDE 0.9 %
1000 VIAL (ML) INJECTION ONCE
Refills: 0 | Status: COMPLETED | OUTPATIENT
Start: 2025-02-02 | End: 2025-02-02

## 2025-02-02 RX ORDER — FAMOTIDINE 10 MG/ML
20 INJECTION INTRAVENOUS ONCE
Refills: 0 | Status: COMPLETED | OUTPATIENT
Start: 2025-02-02 | End: 2025-02-02

## 2025-02-02 RX ORDER — ONDANSETRON 4 MG/1
4 TABLET, ORALLY DISINTEGRATING ORAL ONCE
Refills: 0 | Status: COMPLETED | OUTPATIENT
Start: 2025-02-02 | End: 2025-02-02

## 2025-02-02 RX ADMIN — Medication 1000 MILLILITER(S): at 10:42

## 2025-02-02 RX ADMIN — FAMOTIDINE 20 MILLIGRAM(S): 10 INJECTION INTRAVENOUS at 10:44

## 2025-02-02 RX ADMIN — ONDANSETRON 4 MILLIGRAM(S): 4 TABLET, ORALLY DISINTEGRATING ORAL at 10:43

## 2025-02-02 NOTE — ED PROVIDER NOTE - PHYSICAL EXAMINATION
CONSTITUTIONAL: Patient is awake, alert and oriented x 3. Patient is well appearing and in no acute distress.  HEAD: NCAT  EYES: PERRL bilaterally,   ENT: Airway patent, Nasal mucosa clear.  NECK: Supple,   LUNGS: CTA B/L,  HEART: RRR.+S1S2   ABDOMEN: Soft, mild ttp throughout;   MSK: FROM upper and lower ext b/l,   SKIN: No rash or lesions  NEURO: No focal deficits,

## 2025-02-02 NOTE — ED PROVIDER NOTE - CARE PROVIDER_API CALL
Mell Rodriguez  Obstetrics and Gynecology  [address] [place] [address], [facility] [address]  [place] [address]  Phone: [phone]  Fax: [phone]  Follow Up Time:

## 2025-02-02 NOTE — ED PROVIDER NOTE - NSFOLLOWUPINSTRUCTIONS_ED_ALL_ED_FT
1. Follow up with your PCP within 2-3 days.   2. Follow up with GYN within 2-3 days. Please discuss these findings: : " 6.0 cm left ovarian cystic lesion. Advise correlation with pelvic   ultrasound when clinically appropriate."    3. Take tylenol as needed for pain.   4. Rest. Hydrate.   5. Return to the emergency department if you have worsening pain, vomiting, fevers, inability to eat or drink or all other concerns.

## 2025-02-02 NOTE — ED PROVIDER NOTE - CARE PROVIDERS DIRECT ADDRESSES
[email or web address]

## 2025-02-02 NOTE — ED PROVIDER NOTE - CLINICAL SUMMARY MEDICAL DECISION MAKING FREE TEXT BOX
79 y old f with abdominal pain for 3 days no BM  for 3 days y old 79 y old f with abdominal pain for 3 days no BM  for 3 days y old has poor appetition  ,broad dif ds   r/o SBO ,antonia ,diverticulitis UTI ,will obtain blood work ,iv hydration CT SCAN reassess ZR

## 2025-02-02 NOTE — ED ADULT NURSE NOTE - OBJECTIVE STATEMENT
80 y/o female with PMH of Glaucoma, Hypertension, Hyperthyroidism, Multiple thyroid nodules, Type II diabetes mellitus  arrives to the ER complaining of vomiting. Pt reports having nausea and vomiting since Friday   Pt denies SOB, chest pain, dizziness, urinary symptoms, fevers, chills.  On assessment pt is well appearing, A&Ox4, speaking coherently, airway is patent, breathing spontaneously and unlabored. Skin is dry, warm. Abdomen is soft, no distended, no tender. Full ROM in all extremities.

## 2025-02-02 NOTE — ED PROVIDER NOTE - NPI NUMBER (FOR SYSADMIN USE ONLY) :
“Patient's name, , procedure and correct site were confirmed during the [place] Timeout.”
[[id number]]

## 2025-02-02 NOTE — ED PROVIDER NOTE - PROGRESS NOTE DETAILS
CT revealed: " 6.0 cm left ovarian cystic lesion. Advise correlation with pelvic   ultrasound when clinically appropriate." I discussed these results with pt. Answered all questions/concerns. Will obtain US to further evaluate. Pain has improved does not want any further medication at this time. Radha Gonzalez PA-C TVUS revealed benign left ovarian cyst. GYN consulted will see pt. Patient informed of results and is agreeable with plan. Radha Gonzalez PA-C GYN evaluated at bedside. Stated pt is cleared for dc home. Recc sending tumor markers prior to dc. Recc outpt f/u. Discussed all results/recomendations with pt. Stable for dc home. Radha Gonzalez PA-C

## 2025-02-02 NOTE — ED PROVIDER NOTE - OBJECTIVE STATEMENT
80 y/o female with pmhx of htn, dm, hypothyroidism presents to the ED with abdominal pain x 3 days. States that she has generalized abdominal discomfort as well as decreased appetite. She has had nausea and few episodes of vomiting. Denies any fevers or chills. Has not had a bowel movement in 3 days. Denies any chest pain, sob, cough, n/v/d. No hx of abdominal surgeries. Had GI bleeding in the fall and had a colonoscopy at that time. Did not require any intervention for the bleeding. She does not take any AC. She has no blood in stool or vomit today.

## 2025-02-02 NOTE — CONSULT NOTE ADULT - SUBJECTIVE AND OBJECTIVE BOX
BALDEMAR CAMEJO  79y  Female [id number]    HPI:  78yo postmenopausal female presents with abdominal pain and n/v, GYN consulted for incidental L ovarian cyst. Patient reports acute onset abdominal pain and inability to tolerate PO since Wednesday, denies abdominal pain/vomiting today. Reports tolerating fluids. Denies vaginal bleeding since hysterectomy. Denies abnormal vaginal discharge.       Name of GYN Physician: None    POB: NSVDx2  Pgyn: s/p SCARLETT for heavy menstrual bleeding. Denies fibroids, cysts, STI's, Abnormal pap smears   PMH: T2DM, HTN, hyperthyroid, obesity   PSH: SCARLETT   Home meds:  Amlodipine, protonix, lantoprost, synthroid, brimonidine eye drops, cosopt eyedrops   FH: Mother breast cancer (no genetic testing), denies family h/o GYN cancers       Vital Signs Last 24 Hrs  T(C): 36.6 (2025 16:47), Max: 36.7 (2025 09:58)  T(F): 97.9 (2025 16:47), Max: 98 (2025 09:58)  HR: 82 (2025 16:47) (76 - 84)  BP: 125/75 (2025 16:47) (125/75 - 131/71)  BP(mean): --  RR: 18 (2025 16:47) (16 - 18)  SpO2: 100% (2025 16:47) (100% - 100%)    Parameters below as of 2025 16:47  Patient On (Oxygen Delivery Method): room air        Physical Exam:   General: sitting comfortably in bed, NAD   Lungs: Breathing comfortably on room air   Abd: Soft, non-tender, non-distended.   :  No bleeding on pad. External labia wnl.  Bimanual exam with no cervical motion tenderness, uterus wnl, adnexa non palpable b/l.    Speculum Exam: No lesions noted, vaginal cuff wnl   Ext: non-tender b/l, no edema     LABS:                            8.9    7.23  )-----------( 402      ( 2025 11:04 )             29.9     02-    139  |  103  |  13  ----------------------------<  120[H]  3.8   |  22  |  0.92    Ca    9.4      2025 11:04    TPro  7.8  /  Alb  4.3  /  TBili  0.3  /  DBili  x   /  AST  24  /  ALT  11  /  AlkPhos  59      I&O's Detail      Urinalysis Basic - ( 2025 13:21 )    Color: Yellow / Appearance: Clear / S.028 / pH: x  Gluc: x / Ketone: Negative mg/dL  / Bili: Negative / Urobili: 0.2 mg/dL   Blood: x / Protein: Negative mg/dL / Nitrite: Negative   Leuk Esterase: Negative / RBC: 0 /HPF / WBC 2 /HPF   Sq Epi: x / Non Sq Epi: 2 /HPF / Bacteria: Negative /HPF        RADIOLOGY & ADDITIONAL STUDIES:      PROCEDURE DATE:  2025          INTERPRETATION:  CLINICAL INFORMATION: Rule out infectious process.   Generalized abdominal discomfort and decreased appetite. Episodes of   nausea and vomiting. No bowel movement x3 days.    COMPARISON: None.    CONTRAST/COMPLICATIONS:  IV Contrast: Omnipaque 350  90 cc administered   10 cc discarded  Oral Contrast: NONE  .    PROCEDURE:  CT of the Abdomen and Pelvis was performed.  Sagittal and coronal reformats were performed.    FINDINGS:  LOWER CHEST: Small hiatal hernia.    LIVER: Within normal limits.  BILE DUCTS: Normal caliber.  GALLBLADDER: Within normal limits.  SPLEEN: Within normal limits.  PANCREAS: Within normal limits.  ADRENALS: Within normal limits.  KIDNEYS/URETERS: Left renal cysts and few right-sided renal too small to   characterize low attenuating foci.    BLADDER: Within normal limits.  REPRODUCTIVE ORGANS: Hysterectomy. 6.0 cm unilocular left ovarian cyst   with possible mural thickening. Multiple coarse adnexal calcifications   appear    BOWEL: No bowel obstruction. Colonic diverticulosis. Appendix is normal.  PERITONEUM/RETROPERITONEUM: Within normal limits.  VESSELS: Atherosclerotic changes.  LYMPH NODES: No lymphadenopathy.  ABDOMINAL WALL: Tiny fat-containing umbilical hernia.  BONES: Within normal limits.    IMPRESSION:  1.  No acute findings or source of infection within the abdomen or pelvis.  2.  6.0 cm left ovarian cystic lesion. Advise correlation with pelvic   ultrasound when clinically appropriate.        --- End of Report ---      TVUS:     PROCEDURE DATE:  2025          INTERPRETATION:  CLINICAL INFORMATION: 79-year-old female with abdominal   discomfort and anorexia. Status post hysterectomy. Recent CT abdomen   pelvis showed a left ovarian cyst. Ultrasound ordered for further   characterization.    LMP: Not applicable    COMPARISON: CT abdomen pelvis 2025    TECHNIQUE:  Endovaginal pelvic sonogram as per order. Transabdominal pelvic sonogram   was also performed as part of our standard protocol.    FINDINGS:  Patient is status post hysterectomy.    Right ovary: 2.7 x 1.4 x 1.0 cm. Within normal limits.    Left ovary: Not visualized. In the left adnexa there is a cyst measuring   6.1 x 4.7 x 5.6cm with a single thin septation and echogenic mural   focus, likely calcification with overall benign appearance    Fluid: None.    IMPRESSION:  Benign-appearing left ovarian cyst. Recommend gynecologic consultation    --- End of Report ---

## 2025-02-02 NOTE — CONSULT NOTE ADULT - ASSESSMENT
A/P: 80yo postmenopausal female presents with abdominal pain and n/v. VSS, GYN consulted for incidental L ovarian cyst. No adnexal masses palpated, no vaginal bleeding / lesions seen on speculum exam. CTAP showed no acute abdominal findings, appendix wnl, colonic diverticulosis, and 6cm L ovarian cystic lesion. TVUS showed benign-appearing 6cm L ovarian cyst w/ single septation and echogenic mural focus. No acute GYN intervention. Patient needs outpatient f/u for adnexal mass.   - F/u tumor markers  - Information to f/u w/ Dr. Mell Rodriguez given to patient     D/w Dr. Gilliland,   Raza Yates, PGY2

## 2025-02-02 NOTE — ED PROVIDER NOTE - PATIENT PORTAL LINK FT
You can access the FollowMyHealth Patient Portal offered by [facility] by registering at the following website: [email or web address]. By joining [facility]’s FollowMyHealth portal, you will also be able to view your health information using other applications (apps) compatible with our system.

## 2025-02-03 LAB
CANCER AG125 SERPL-ACNC: 12 U/ML — SIGNIFICANT CHANGE UP
CANCER AG19-9 SERPL-ACNC: <2 U/ML — SIGNIFICANT CHANGE UP
CEA SERPL-MCNC: 1.2 NG/ML — SIGNIFICANT CHANGE UP (ref 0–3.8)
CULTURE RESULTS: SIGNIFICANT CHANGE UP
SPECIMEN SOURCE: SIGNIFICANT CHANGE UP

## 2025-02-13 ENCOUNTER — INPATIENT (INPATIENT)
Facility: HOSPITAL | Age: 80
LOS: 12 days | Discharge: HOME | DRG: 951 | End: 2025-02-26
Attending: INTERNAL MEDICINE | Admitting: INTERNAL MEDICINE
Payer: COMMERCIAL

## 2025-02-13 VITALS
OXYGEN SATURATION: 100 % | HEART RATE: 75 BPM | DIASTOLIC BLOOD PRESSURE: 74 MMHG | TEMPERATURE: 99 F | HEIGHT: 61 IN | SYSTOLIC BLOOD PRESSURE: 127 MMHG | WEIGHT: 132.06 LBS | RESPIRATION RATE: 20 BRPM

## 2025-02-13 DIAGNOSIS — Z87.59 PERSONAL HISTORY OF OTHER COMPLICATIONS OF PREGNANCY, CHILDBIRTH AND THE PUERPERIUM: Chronic | ICD-10-CM

## 2025-02-13 DIAGNOSIS — Z81.8 FAMILY HISTORY OF OTHER MENTAL AND BEHAVIORAL DISORDERS: ICD-10-CM

## 2025-02-13 DIAGNOSIS — Z90.710 ACQUIRED ABSENCE OF BOTH CERVIX AND UTERUS: Chronic | ICD-10-CM

## 2025-02-13 LAB
ALBUMIN SERPL ELPH-MCNC: 4.5 G/DL — SIGNIFICANT CHANGE UP (ref 3.3–5)
ALP SERPL-CCNC: 60 U/L — SIGNIFICANT CHANGE UP (ref 40–120)
ALT FLD-CCNC: 15 U/L — SIGNIFICANT CHANGE UP (ref 10–45)
ANION GAP SERPL CALC-SCNC: 15 MMOL/L — SIGNIFICANT CHANGE UP (ref 5–17)
APPEARANCE UR: CLEAR — SIGNIFICANT CHANGE UP
AST SERPL-CCNC: 20 U/L — SIGNIFICANT CHANGE UP (ref 10–40)
BACTERIA # UR AUTO: NEGATIVE /HPF — SIGNIFICANT CHANGE UP
BASOPHILS # BLD AUTO: 0.03 K/UL — SIGNIFICANT CHANGE UP (ref 0–0.2)
BASOPHILS NFR BLD AUTO: 0.6 % — SIGNIFICANT CHANGE UP (ref 0–2)
BILIRUB SERPL-MCNC: 0.3 MG/DL — SIGNIFICANT CHANGE UP (ref 0.2–1.2)
BILIRUB UR-MCNC: NEGATIVE — SIGNIFICANT CHANGE UP
BUN SERPL-MCNC: 18 MG/DL — SIGNIFICANT CHANGE UP (ref 7–23)
CALCIUM SERPL-MCNC: 10.3 MG/DL — SIGNIFICANT CHANGE UP (ref 8.4–10.5)
CAST: 3 /LPF — SIGNIFICANT CHANGE UP (ref 0–4)
CHLORIDE SERPL-SCNC: 99 MMOL/L — SIGNIFICANT CHANGE UP (ref 96–108)
CO2 SERPL-SCNC: 24 MMOL/L — SIGNIFICANT CHANGE UP (ref 22–31)
COLOR SPEC: YELLOW — SIGNIFICANT CHANGE UP
CREAT SERPL-MCNC: 1.34 MG/DL — HIGH (ref 0.5–1.3)
DIFF PNL FLD: NEGATIVE — SIGNIFICANT CHANGE UP
EGFR: 40 ML/MIN/1.73M2 — LOW
EOSINOPHIL # BLD AUTO: 0.08 K/UL — SIGNIFICANT CHANGE UP (ref 0–0.5)
EOSINOPHIL NFR BLD AUTO: 1.5 % — SIGNIFICANT CHANGE UP (ref 0–6)
GAS PNL BLDV: SIGNIFICANT CHANGE UP
GLUCOSE SERPL-MCNC: 123 MG/DL — HIGH (ref 70–99)
GLUCOSE UR QL: NEGATIVE MG/DL — SIGNIFICANT CHANGE UP
HCT VFR BLD CALC: 29.6 % — LOW (ref 34.5–45)
HGB BLD-MCNC: 9.1 G/DL — LOW (ref 11.5–15.5)
IMM GRANULOCYTES NFR BLD AUTO: 0.4 % — SIGNIFICANT CHANGE UP (ref 0–0.9)
KETONES UR-MCNC: NEGATIVE MG/DL — SIGNIFICANT CHANGE UP
LEUKOCYTE ESTERASE UR-ACNC: NEGATIVE — SIGNIFICANT CHANGE UP
LYMPHOCYTES # BLD AUTO: 1.98 K/UL — SIGNIFICANT CHANGE UP (ref 1–3.3)
LYMPHOCYTES # BLD AUTO: 37.4 % — SIGNIFICANT CHANGE UP (ref 13–44)
MAGNESIUM SERPL-MCNC: 2 MG/DL — SIGNIFICANT CHANGE UP (ref 1.6–2.6)
MCHC RBC-ENTMCNC: 23.5 PG — LOW (ref 27–34)
MCHC RBC-ENTMCNC: 30.7 G/DL — LOW (ref 32–36)
MCV RBC AUTO: 76.5 FL — LOW (ref 80–100)
MONOCYTES # BLD AUTO: 0.69 K/UL — SIGNIFICANT CHANGE UP (ref 0–0.9)
MONOCYTES NFR BLD AUTO: 13 % — SIGNIFICANT CHANGE UP (ref 2–14)
NEUTROPHILS # BLD AUTO: 2.49 K/UL — SIGNIFICANT CHANGE UP (ref 1.8–7.4)
NEUTROPHILS NFR BLD AUTO: 47.1 % — SIGNIFICANT CHANGE UP (ref 43–77)
NITRITE UR-MCNC: NEGATIVE — SIGNIFICANT CHANGE UP
NRBC BLD AUTO-RTO: 0 /100 WBCS — SIGNIFICANT CHANGE UP (ref 0–0)
PH UR: 5.5 — SIGNIFICANT CHANGE UP (ref 5–8)
PHOSPHATE SERPL-MCNC: 4.1 MG/DL — SIGNIFICANT CHANGE UP (ref 2.5–4.5)
PLATELET # BLD AUTO: 436 K/UL — HIGH (ref 150–400)
POTASSIUM SERPL-MCNC: 4.1 MMOL/L — SIGNIFICANT CHANGE UP (ref 3.5–5.3)
POTASSIUM SERPL-SCNC: 4.1 MMOL/L — SIGNIFICANT CHANGE UP (ref 3.5–5.3)
PROT SERPL-MCNC: 8.1 G/DL — SIGNIFICANT CHANGE UP (ref 6–8.3)
PROT UR-MCNC: NEGATIVE MG/DL — SIGNIFICANT CHANGE UP
RBC # BLD: 3.87 M/UL — SIGNIFICANT CHANGE UP (ref 3.8–5.2)
RBC # FLD: 18.8 % — HIGH (ref 10.3–14.5)
RBC CASTS # UR COMP ASSIST: 0 /HPF — SIGNIFICANT CHANGE UP (ref 0–4)
SODIUM SERPL-SCNC: 138 MMOL/L — SIGNIFICANT CHANGE UP (ref 135–145)
SP GR SPEC: 1.01 — SIGNIFICANT CHANGE UP (ref 1–1.03)
SQUAMOUS # UR AUTO: 4 /HPF — SIGNIFICANT CHANGE UP (ref 0–5)
TSH SERPL-MCNC: 2.42 UIU/ML — SIGNIFICANT CHANGE UP (ref 0.27–4.2)
UROBILINOGEN FLD QL: 0.2 MG/DL — SIGNIFICANT CHANGE UP (ref 0.2–1)
WBC # BLD: 5.29 K/UL — SIGNIFICANT CHANGE UP (ref 3.8–10.5)
WBC # FLD AUTO: 5.29 K/UL — SIGNIFICANT CHANGE UP (ref 3.8–10.5)
WBC UR QL: 3 /HPF — SIGNIFICANT CHANGE UP (ref 0–5)

## 2025-02-13 PROCEDURE — 99285 EMERGENCY DEPT VISIT HI MDM: CPT

## 2025-02-13 RX ORDER — LATANOPROST PF 0.05 MG/ML
1 SOLUTION/ DROPS OPHTHALMIC
Refills: 0 | DISCHARGE

## 2025-02-13 RX ORDER — LEVOTHYROXINE SODIUM 300 MCG
1 TABLET ORAL
Refills: 0 | DISCHARGE

## 2025-02-13 RX ORDER — ONDANSETRON HCL/PF 4 MG/2 ML
4 VIAL (ML) INJECTION EVERY 6 HOURS
Refills: 0 | Status: DISCONTINUED | OUTPATIENT
Start: 2025-02-13 | End: 2025-02-26

## 2025-02-13 RX ORDER — AMLODIPINE BESYLATE 10 MG/1
10 TABLET ORAL DAILY
Refills: 0 | Status: DISCONTINUED | OUTPATIENT
Start: 2025-02-13 | End: 2025-02-26

## 2025-02-13 RX ORDER — INFLUENZA A VIRUS A/IDAHO/07/2018 (H1N1) ANTIGEN (MDCK CELL DERIVED, PROPIOLACTONE INACTIVATED, INFLUENZA A VIRUS A/INDIANA/08/2018 (H3N2) ANTIGEN (MDCK CELL DERIVED, PROPIOLACTONE INACTIVATED), INFLUENZA B VIRUS B/SINGAPORE/INFTT-16-0610/2016 ANTIGEN (MDCK CELL DERIVED, PROPIOLACTONE INACTIVATED), INFLUENZA B VIRUS B/IOWA/06/2017 ANTIGEN (MDCK CELL DERIVED, PROPIOLACTONE INACTIVATED) 15; 15; 15; 15 UG/.5ML; UG/.5ML; UG/.5ML; UG/.5ML
0.5 INJECTION, SUSPENSION INTRAMUSCULAR ONCE
Refills: 0 | Status: DISCONTINUED | OUTPATIENT
Start: 2025-02-13 | End: 2025-02-26

## 2025-02-13 RX ORDER — HEPARIN SODIUM 1000 [USP'U]/ML
5000 INJECTION INTRAVENOUS; SUBCUTANEOUS EVERY 12 HOURS
Refills: 0 | Status: DISCONTINUED | OUTPATIENT
Start: 2025-02-13 | End: 2025-02-26

## 2025-02-13 RX ORDER — AMLODIPINE BESYLATE 10 MG/1
1 TABLET ORAL
Refills: 0 | DISCHARGE

## 2025-02-13 RX ORDER — DORZOLAMIDE HYDROCHLORIDE AND TIMOLOL MALEATE 20; 5 MG/ML; MG/ML
1 SOLUTION/ DROPS OPHTHALMIC
Refills: 0 | DISCHARGE

## 2025-02-13 RX ORDER — LATANOPROST PF 0.05 MG/ML
1 SOLUTION/ DROPS OPHTHALMIC AT BEDTIME
Refills: 0 | Status: DISCONTINUED | OUTPATIENT
Start: 2025-02-13 | End: 2025-02-26

## 2025-02-13 RX ORDER — METFORMIN HYDROCHLORIDE 850 MG/1
1 TABLET ORAL
Refills: 0 | DISCHARGE

## 2025-02-13 RX ORDER — ONDANSETRON HCL/PF 4 MG/2 ML
4 VIAL (ML) INJECTION ONCE
Refills: 0 | Status: COMPLETED | OUTPATIENT
Start: 2025-02-13 | End: 2025-02-13

## 2025-02-13 RX ORDER — BRIMONIDINE TARTRATE 1.5 MG/ML
1 SOLUTION/ DROPS OPHTHALMIC THREE TIMES A DAY
Refills: 0 | Status: DISCONTINUED | OUTPATIENT
Start: 2025-02-13 | End: 2025-02-26

## 2025-02-13 RX ORDER — LEVOTHYROXINE SODIUM 300 MCG
100 TABLET ORAL DAILY
Refills: 0 | Status: DISCONTINUED | OUTPATIENT
Start: 2025-02-13 | End: 2025-02-26

## 2025-02-13 RX ORDER — BRIMONIDINE TARTRATE 1.5 MG/ML
1 SOLUTION/ DROPS OPHTHALMIC
Refills: 0 | DISCHARGE

## 2025-02-13 RX ORDER — DORZOLAMIDE HYDROCHLORIDE AND TIMOLOL MALEATE 20; 5 MG/ML; MG/ML
1 SOLUTION/ DROPS OPHTHALMIC
Refills: 0 | Status: DISCONTINUED | OUTPATIENT
Start: 2025-02-13 | End: 2025-02-26

## 2025-02-13 RX ADMIN — DORZOLAMIDE HYDROCHLORIDE AND TIMOLOL MALEATE 1 DROP(S): 20; 5 SOLUTION/ DROPS OPHTHALMIC at 17:52

## 2025-02-13 RX ADMIN — BRIMONIDINE TARTRATE 1 DROP(S): 1.5 SOLUTION/ DROPS OPHTHALMIC at 21:09

## 2025-02-13 RX ADMIN — LATANOPROST PF 1 DROP(S): 0.05 SOLUTION/ DROPS OPHTHALMIC at 21:09

## 2025-02-13 RX ADMIN — Medication 1000 MILLILITER(S): at 10:30

## 2025-02-13 RX ADMIN — Medication 40 MILLIGRAM(S): at 17:52

## 2025-02-13 RX ADMIN — AMLODIPINE BESYLATE 10 MILLIGRAM(S): 10 TABLET ORAL at 17:52

## 2025-02-13 RX ADMIN — Medication 4 MILLIGRAM(S): at 10:28

## 2025-02-13 RX ADMIN — Medication 4 MILLIGRAM(S): at 21:15

## 2025-02-13 RX ADMIN — HEPARIN SODIUM 5000 UNIT(S): 1000 INJECTION INTRAVENOUS; SUBCUTANEOUS at 17:52

## 2025-02-13 NOTE — H&P ADULT - PSYCHIATRIC
negative Complex Repair Preamble Text (Leave Blank If You Do Not Want): Extensive wide undermining was performed. normal/normal affect/alert and oriented x3/normal behavior

## 2025-02-13 NOTE — H&P ADULT - HISTORY OF PRESENT ILLNESS
79-year-old female medical history of pretension, diabetes, hypothyroidism, presenting today for nausea nonbloody vomiting for about 3 weeks now with significant weight loss of 10 pounds that she was able to measure for the past week.  Patient had a recent ED visit about 11 days prior.  Patient was worked up with CT abdomen pelvis with IV contrast that showed no acute intra-abdominal pathology and there was a 6.0 cm left ovarian cystic lesion that was worked up with transvaginal ultrasound and was evaluated by GYN here.  Patient was then sent home after obtaining tumor markers which are reviewed in HIE that was within normal limits.  Patient states that she has not been able to take anything by mouth for the last 3 weeks with significant weight loss.  Patient can drink a little bit of water but this is the extent of how much she can have.  Patient after eating usually feels nauseous and then vomits.
Facial laceration, initial encounter

## 2025-02-13 NOTE — ED PROVIDER NOTE - NSICDXPASTMEDICALHX_GEN_ALL_CORE_FT
Electrophysiology PAST MEDICAL HISTORY:  Glaucoma     HTN (hypertension)     Hyperthyroidism     Multiple thyroid nodules     Obese     Type II diabetes mellitus

## 2025-02-13 NOTE — ED PROVIDER NOTE - CLINICAL SUMMARY MEDICAL DECISION MAKING FREE TEXT BOX
Enzo Song, PGY3 - This is a 79-year-old female medical history of pretension, diabetes, hypothyroidism, presenting today for nausea nonbloody vomiting for about 3 weeks now with significant weight loss of 10 pounds that she was able to measure for the past week.  Patient had a recent ED visit about 11 days prior.  Patient was worked up with CT abdomen pelvis with IV contrast that showed no acute intra-abdominal pathology and there was a 6.0 cm left ovarian cystic lesion that was worked up with transvaginal ultrasound and was evaluated by GYN here.  Patient was then sent home after obtaining tumor markers which are reviewed in HIE that was within normal limits.  Patient states that she has not been able to take anything by mouth for the last 3 weeks with significant weight loss.  Patient can drink a little bit of water but this is the extent of how much she can have.  Patient after eating usually feels nauseous and then vomits.  Patient's vital signs here are within normal limits.  Physical exam shows well-appearing female not in acute distress.  Alert and oriented x 4 and moving all extremities.  Patient following commands.  Not confused.  Not in acute respiratory distress.  No crackles or wheezing.  No murmurs.  Abdomen soft and nontender all quadrants.  There is skin tenting noted.  Slightly dry oral mucosa.  Concern at this time for dehydration and electrolyte abnormalities and failure to thrive with weight loss.  Patient was seen by GI yesterday and was referred outpatient for endoscopy and colonoscopy however patient states that the symptoms are significant enough to come to the ER.  Will obtain basic labs electrolytes urine test.  I do not feel strongly to obtain another CT abdomen pelvis imaging as patient has no tenderness to palpation in there is no abdominal pain.  This is just nausea and vomiting.  Patient may benefit from inpatient endoscopy and colonoscopy.  Disposition most likely admission for failure to thrive and nausea vomiting of unknown etiology.

## 2025-02-13 NOTE — H&P ADULT - NSHPLABSRESULTS_GEN_ALL_CORE
LABS:                        9.1    5.29  )-----------( 436      ( 2025 10:21 )             29.6     02-13    138  |  99  |  18  ----------------------------<  123[H]  4.1   |  24  |  1.34[H]    Ca    10.3      2025 10:21  Phos  4.1     -  Mg     2.0         TPro  8.1  /  Alb  4.5  /  TBili  0.3  /  DBili  x   /  AST  20  /  ALT  15  /  AlkPhos  60  02-      Urinalysis Basic - ( 2025 13:53 )    Color: Yellow / Appearance: Clear / S.010 / pH: x  Gluc: x / Ketone: Negative mg/dL  / Bili: Negative / Urobili: 0.2 mg/dL   Blood: x / Protein: Negative mg/dL / Nitrite: Negative   Leuk Esterase: Negative / RBC: 0 /HPF / WBC 3 /HPF   Sq Epi: x / Non Sq Epi: 4 /HPF / Bacteria: Negative /HPF            RADIOLOGY & ADDITIONAL TESTS:

## 2025-02-13 NOTE — H&P ADULT - ASSESSMENT
79-year-old female medical history of pretension, diabetes, hypothyroidism, presenting today for nausea nonbloody vomiting for about 3 weeks now with significant weight loss of 10 pounds that she was able to measure for the past week.  Patient had a recent ED visit about 11 days prior.  Patient was worked up with CT abdomen pelvis with IV contrast that showed no acute intra-abdominal pathology and there was a 6.0 cm left ovarian cystic lesion that was worked up with transvaginal ultrasound and was evaluated by GYN here.  Patient was then sent home after obtaining tumor markers which are reviewed in HIE that was within normal limits.  Patient states that she has not been able to take anything by mouth for the last 3 weeks with significant weight loss.  Patient can drink a little bit of water but this is the extent of how much she can have.  Patient after eating usually feels nauseous and then vomits.    nausea and vomiting  - PPI iv bid  - zofran prn  - GI consult called    HTN  - c/w amlodipine    hypothyroid  - c/w synthroid  - check TSH    glaucoma  - c/w eye drops    dvt px  - sq heparin

## 2025-02-13 NOTE — ED ADULT NURSE NOTE - OBJECTIVE STATEMENT
Pt is a 79y F A&O3 PMHx DM, HTN, hyperthyroidism presenting with nausea and vomiting for one month. Pt was seen in the ED 2/2/25 for similar symptoms and dc home. Pt reports worsening nausea and vomiting since dc and endorses 10 lb weight loss over 2 weeks. Pt reports decreased appetite due to vomiting. Pt saw GI yesterday and was prescribed pantaprazole. Pt reports "GI told me to go for some tests but I am still vomiting". Pt denies shortness of breath, chest pain, headache. Pt ambulates independently. No abdominal distention on assessment.

## 2025-02-13 NOTE — PATIENT PROFILE ADULT - FALL HARM RISK - RISK INTERVENTIONS

## 2025-02-13 NOTE — ED PROVIDER NOTE - CARE PLAN
1 Principal Discharge DX:	Adult failure to thrive  Secondary Diagnosis:	Dehydration  Secondary Diagnosis:	NAKUL (acute kidney injury)

## 2025-02-13 NOTE — ED PROVIDER NOTE - ATTENDING CONTRIBUTION TO CARE
attending Ava: 79yF h/o HTN, DM, hypothyroidism, p/w nausea, nonbloody vomiting x 3 weeks with weight loss of approx 10 pounds. Seen in ED 11 days ago for similar issue, had CT imaging at that time which was reviewed. Exam as above. Will obtain labs, will likely require admission as unable to tolerate PO

## 2025-02-14 LAB
ANION GAP SERPL CALC-SCNC: 15 MMOL/L — SIGNIFICANT CHANGE UP (ref 5–17)
BUN SERPL-MCNC: 12 MG/DL — SIGNIFICANT CHANGE UP (ref 7–23)
CALCIUM SERPL-MCNC: 9.6 MG/DL — SIGNIFICANT CHANGE UP (ref 8.4–10.5)
CHLORIDE SERPL-SCNC: 107 MMOL/L — SIGNIFICANT CHANGE UP (ref 96–108)
CO2 SERPL-SCNC: 21 MMOL/L — LOW (ref 22–31)
CREAT SERPL-MCNC: 1.28 MG/DL — SIGNIFICANT CHANGE UP (ref 0.5–1.3)
CULTURE RESULTS: SIGNIFICANT CHANGE UP
EGFR: 42 ML/MIN/1.73M2 — LOW
FERRITIN SERPL-MCNC: 11 NG/ML — LOW (ref 13–330)
FOLATE SERPL-MCNC: >20 NG/ML — SIGNIFICANT CHANGE UP
GLUCOSE BLDC GLUCOMTR-MCNC: 105 MG/DL — HIGH (ref 70–99)
GLUCOSE BLDC GLUCOMTR-MCNC: 118 MG/DL — HIGH (ref 70–99)
GLUCOSE BLDC GLUCOMTR-MCNC: 136 MG/DL — HIGH (ref 70–99)
GLUCOSE SERPL-MCNC: 100 MG/DL — HIGH (ref 70–99)
HCT VFR BLD CALC: 26.5 % — LOW (ref 34.5–45)
HGB BLD-MCNC: 8 G/DL — LOW (ref 11.5–15.5)
IRON SATN MFR SERPL: 21 UG/DL — LOW (ref 30–160)
IRON SATN MFR SERPL: 6 % — LOW (ref 14–50)
MAGNESIUM SERPL-MCNC: 2 MG/DL — SIGNIFICANT CHANGE UP (ref 1.6–2.6)
MCHC RBC-ENTMCNC: 22.9 PG — LOW (ref 27–34)
MCHC RBC-ENTMCNC: 30.2 G/DL — LOW (ref 32–36)
MCV RBC AUTO: 75.9 FL — LOW (ref 80–100)
NRBC BLD AUTO-RTO: 0 /100 WBCS — SIGNIFICANT CHANGE UP (ref 0–0)
PHOSPHATE SERPL-MCNC: 4.3 MG/DL — SIGNIFICANT CHANGE UP (ref 2.5–4.5)
PLATELET # BLD AUTO: 416 K/UL — HIGH (ref 150–400)
POTASSIUM SERPL-MCNC: 3.9 MMOL/L — SIGNIFICANT CHANGE UP (ref 3.5–5.3)
POTASSIUM SERPL-SCNC: 3.9 MMOL/L — SIGNIFICANT CHANGE UP (ref 3.5–5.3)
RBC # BLD: 3.49 M/UL — LOW (ref 3.8–5.2)
RBC # FLD: 18.7 % — HIGH (ref 10.3–14.5)
SODIUM SERPL-SCNC: 143 MMOL/L — SIGNIFICANT CHANGE UP (ref 135–145)
SPECIMEN SOURCE: SIGNIFICANT CHANGE UP
TIBC SERPL-MCNC: 386 UG/DL — SIGNIFICANT CHANGE UP (ref 220–430)
TSH SERPL-MCNC: 2.52 UIU/ML — SIGNIFICANT CHANGE UP (ref 0.27–4.2)
UIBC SERPL-MCNC: 364 UG/DL — SIGNIFICANT CHANGE UP (ref 110–370)
VIT B12 SERPL-MCNC: >2000 PG/ML — HIGH (ref 232–1245)
WBC # BLD: 4.38 K/UL — SIGNIFICANT CHANGE UP (ref 3.8–10.5)
WBC # FLD AUTO: 4.38 K/UL — SIGNIFICANT CHANGE UP (ref 3.8–10.5)

## 2025-02-14 RX ORDER — IRON SUCROSE 20 MG/ML
200 INJECTION, SOLUTION INTRAVENOUS EVERY 24 HOURS
Refills: 0 | Status: COMPLETED | OUTPATIENT
Start: 2025-02-14 | End: 2025-02-18

## 2025-02-14 RX ADMIN — HEPARIN SODIUM 5000 UNIT(S): 1000 INJECTION INTRAVENOUS; SUBCUTANEOUS at 05:52

## 2025-02-14 RX ADMIN — Medication 100 MICROGRAM(S): at 05:52

## 2025-02-14 RX ADMIN — DORZOLAMIDE HYDROCHLORIDE AND TIMOLOL MALEATE 1 DROP(S): 20; 5 SOLUTION/ DROPS OPHTHALMIC at 17:45

## 2025-02-14 RX ADMIN — BRIMONIDINE TARTRATE 1 DROP(S): 1.5 SOLUTION/ DROPS OPHTHALMIC at 05:53

## 2025-02-14 RX ADMIN — Medication 40 MILLIGRAM(S): at 05:52

## 2025-02-14 RX ADMIN — AMLODIPINE BESYLATE 10 MILLIGRAM(S): 10 TABLET ORAL at 05:52

## 2025-02-14 RX ADMIN — BRIMONIDINE TARTRATE 1 DROP(S): 1.5 SOLUTION/ DROPS OPHTHALMIC at 12:19

## 2025-02-14 RX ADMIN — LATANOPROST PF 1 DROP(S): 0.05 SOLUTION/ DROPS OPHTHALMIC at 21:24

## 2025-02-14 RX ADMIN — HEPARIN SODIUM 5000 UNIT(S): 1000 INJECTION INTRAVENOUS; SUBCUTANEOUS at 17:41

## 2025-02-14 RX ADMIN — BRIMONIDINE TARTRATE 1 DROP(S): 1.5 SOLUTION/ DROPS OPHTHALMIC at 21:24

## 2025-02-14 RX ADMIN — IRON SUCROSE 110 MILLIGRAM(S): 20 INJECTION, SOLUTION INTRAVENOUS at 21:23

## 2025-02-14 RX ADMIN — Medication 4 MILLIGRAM(S): at 12:21

## 2025-02-14 RX ADMIN — Medication 40 MILLIGRAM(S): at 17:41

## 2025-02-14 RX ADMIN — DORZOLAMIDE HYDROCHLORIDE AND TIMOLOL MALEATE 1 DROP(S): 20; 5 SOLUTION/ DROPS OPHTHALMIC at 05:53

## 2025-02-14 NOTE — PROGRESS NOTE ADULT - SUBJECTIVE AND OBJECTIVE BOX
DATE OF SERVICE: 02-14-25 @ 14:16    Patient is a 79y old  Female who presents with a chief complaint of     SUBJECTIVE / OVERNIGHT EVENTS:  No chest pain. No shortness of breath. No complaints. No events overnight. vomiting has improved    MEDICATIONS  (STANDING):  amLODIPine   Tablet 10 milliGRAM(s) Oral daily  brimonidine 0.2% Ophthalmic Solution 1 Drop(s) Both EYES three times a day  dorzolamide 2%/timolol 0.5% Ophthalmic Solution 1 Drop(s) Both EYES two times a day  heparin   Injectable 5000 Unit(s) SubCutaneous every 12 hours  influenza  Vaccine (HIGH DOSE) 0.5 milliLiter(s) IntraMuscular once  latanoprost 0.005% Ophthalmic Solution 1 Drop(s) Both EYES at bedtime  levothyroxine 100 MICROGram(s) Oral daily  pantoprazole  Injectable 40 milliGRAM(s) IV Push two times a day    MEDICATIONS  (PRN):  ondansetron Injectable 4 milliGRAM(s) IV Push every 6 hours PRN Nausea and/or Vomiting      Vital Signs Last 24 Hrs  T(C): 36.8 (14 Feb 2025 13:50), Max: 36.9 (13 Feb 2025 20:31)  T(F): 98.3 (14 Feb 2025 13:50), Max: 98.5 (13 Feb 2025 20:31)  HR: 68 (14 Feb 2025 13:50) (60 - 69)  BP: 115/71 (14 Feb 2025 13:50) (115/71 - 153/81)  BP(mean): --  RR: 18 (14 Feb 2025 13:50) (18 - 18)  SpO2: 100% (14 Feb 2025 13:50) (93% - 100%)    Parameters below as of 14 Feb 2025 13:50  Patient On (Oxygen Delivery Method): room air      CAPILLARY BLOOD GLUCOSE      POCT Blood Glucose.: 118 mg/dL (14 Feb 2025 12:06)  POCT Blood Glucose.: 105 mg/dL (14 Feb 2025 08:06)    I&O's Summary    14 Feb 2025 07:01  -  14 Feb 2025 14:16  --------------------------------------------------------  IN: 360 mL / OUT: 0 mL / NET: 360 mL        PHYSICAL EXAM:  GENERAL: NAD, well-developed  HEAD:  Atraumatic, Normocephalic  EYES: EOMI, PERRLA, conjunctiva and sclera clear  NECK: Supple, No JVD  CHEST/LUNG: Clear to auscultation bilaterally; No wheeze  HEART: Regular rate and rhythm; No murmurs, rubs, or gallops  ABDOMEN: Soft, Nontender, Nondistended; Bowel sounds present  EXTREMITIES:  2+ Peripheral Pulses, No clubbing, cyanosis, or edema  PSYCH: AAOx3  NEUROLOGY: non-focal  SKIN: No rashes or lesions    LABS:                        8.0    4.38  )-----------( 416      ( 14 Feb 2025 06:55 )             26.5     02-14    143  |  107  |  12  ----------------------------<  100[H]  3.9   |  21[L]  |  1.28    Ca    9.6      14 Feb 2025 06:51  Phos  4.3     02-14  Mg     2.0     02-14    TPro  8.1  /  Alb  4.5  /  TBili  0.3  /  DBili  x   /  AST  20  /  ALT  15  /  AlkPhos  60  02-13          Urinalysis Basic - ( 14 Feb 2025 06:51 )    Color: x / Appearance: x / SG: x / pH: x  Gluc: 100 mg/dL / Ketone: x  / Bili: x / Urobili: x   Blood: x / Protein: x / Nitrite: x   Leuk Esterase: x / RBC: x / WBC x   Sq Epi: x / Non Sq Epi: x / Bacteria: x        RADIOLOGY & ADDITIONAL TESTS:    Imaging Personally Reviewed:    Consultant(s) Notes Reviewed:      Care Discussed with Consultants/Other Providers:

## 2025-02-14 NOTE — DIETITIAN INITIAL EVALUATION ADULT - PERTINENT MEDS FT
MEDICATIONS  (STANDING):  amLODIPine   Tablet 10 milliGRAM(s) Oral daily  brimonidine 0.2% Ophthalmic Solution 1 Drop(s) Both EYES three times a day  dorzolamide 2%/timolol 0.5% Ophthalmic Solution 1 Drop(s) Both EYES two times a day  heparin   Injectable 5000 Unit(s) SubCutaneous every 12 hours  influenza  Vaccine (HIGH DOSE) 0.5 milliLiter(s) IntraMuscular once  iron sucrose IVPB 200 milliGRAM(s) IV Intermittent every 24 hours  latanoprost 0.005% Ophthalmic Solution 1 Drop(s) Both EYES at bedtime  levothyroxine 100 MICROGram(s) Oral daily  pantoprazole  Injectable 40 milliGRAM(s) IV Push two times a day    MEDICATIONS  (PRN):  ondansetron Injectable 4 milliGRAM(s) IV Push every 6 hours PRN Nausea and/or Vomiting

## 2025-02-14 NOTE — CONSULT NOTE ADULT - ASSESSMENT
vomiting  anemia, iron def    IV iron  reg diet  avoid opiods or reglan  check gastric emptying study  will need egd/colon for iron def anemia  if GES positive can consider botox to pylorus

## 2025-02-14 NOTE — DIETITIAN INITIAL EVALUATION ADULT - NSFNSGIIOFT_GEN_A_CORE
I&O's Detail    14 Feb 2025 07:01  -  14 Feb 2025 15:08  --------------------------------------------------------  IN:    Oral Fluid: 360 mL  Total IN: 360 mL    OUT:  Total OUT: 0 mL    Total NET: 360 mL           Xenograft Text: The defect edges were debeveled with a #15 scalpel blade.  Given the location of the defect, shape of the defect and the proximity to free margins a xenograft was deemed most appropriate.  The graft was then trimmed to fit the size of the defect.  The graft was then placed in the primary defect and oriented appropriately.

## 2025-02-14 NOTE — DIETITIAN INITIAL EVALUATION ADULT - FUNCTIONAL SCREEN CURRENT LEVEL: SWALLOWING (IF SCORE 2 OR MORE FOR ANY ITEM, CONSULT REHAB SERVICES), MLM)
Preparing for Your Surgery  Getting started  A nurse will call you to review your health history and instructions. They will give you an arrival time based on your scheduled surgery time. Please be ready to share:  Your doctor's clinic name and phone number  Your medical, surgical, and anesthesia history  A list of allergies and sensitivities  A list of medicines, including herbal treatments and over-the-counter drugs  Whether the patient has a legal guardian (ask how to send us the papers in advance)  Please tell us if you're pregnant--or if there's any chance you might be pregnant. Some surgeries may injure a fetus (unborn baby), so they require a pregnancy test. Surgeries that are safe for a fetus don't always need a test, and you can choose whether to have one.   If you have a child who's having surgery, please ask for a copy of Preparing for Your Child's Surgery.    Preparing for surgery  Within 10 to 30 days of surgery: Have a pre-op exam (sometimes called an H&P, or History and Physical). This can be done at a clinic or pre-operative center.  If you're having a , you may not need this exam. Talk to your care team.  At your pre-op exam, talk to your care team about all medicines you take. If you need to stop any medicines before surgery, ask when to start taking them again.  We do this for your safety. Many medicines can make you bleed too much during surgery. Some change how well surgery (anesthesia) drugs work.  Call your insurance company to let them know you're having surgery. (If you don't have insurance, call [phone].)  Call your clinic if there's any change in your health. This includes signs of a cold or flu (sore throat, runny nose, cough, rash, fever). It also includes a scrape or scratch near the surgery site.  If you have questions on the day of surgery, call your hospital or surgery center.  Eating and drinking guidelines  For your safety: Unless your surgeon tells you otherwise,  follow the guidelines below.  Eat and drink as usual until 8 hours before you arrive for surgery. After that, no food or milk.  Drink clear liquids until 2 hours before you arrive. These are liquids you can see through, like water, Gatorade, and Propel Water. They also include plain black coffee and tea (no cream or milk), candy, and breath mints. You can spit out gum when you arrive.  If you drink alcohol: Stop drinking it the night before surgery.  If your care team tells you to take medicine on the morning of surgery, it's okay to take it with a sip of water.  Preventing infection  Shower or bathe the night before and morning of your surgery. Follow the instructions your clinic gave you. (If no instructions, use regular soap.)  Don't shave or clip hair near your surgery site. We'll remove the hair if needed.  Don't smoke or vape the morning of surgery. You may chew nicotine gum up to 2 hours before surgery. A nicotine patch is okay.  Note: Some surgeries require you to completely quit smoking and nicotine. Check with your surgeon.  Your care team will make every effort to keep you safe from infection. We will:  Clean our hands often with soap and water (or an alcohol-based hand rub).  Clean the skin at your surgery site with a special soap that kills germs.  Give you a special gown to keep you warm. (Cold raises the risk of infection.)  Wear special hair covers, masks, gowns and gloves during surgery.  Give antibiotic medicine, if prescribed. Not all surgeries need antibiotics.  What to bring on the day of surgery  Photo ID and insurance card  Copy of your health care directive, if you have one  Glasses and hearing aids (bring cases)  You can't wear contacts during surgery  Inhaler and eye drops, if you use them (tell us about these when you arrive)  CPAP machine or breathing device, if you use them  A few personal items, if spending the night  If you have . . .  A pacemaker, ICD (cardiac defibrillator) or other  implant: Bring the ID card.  An implanted stimulator: Bring the remote control.  A legal guardian: Bring a copy of the certified (court-stamped) guardianship papers.  Please remove any jewelry, including body piercings. Leave jewelry and other valuables at home.  If you're going home the day of surgery  You must have a responsible adult drive you home. They should stay with you overnight as well.  If you don't have someone to stay with you, and you aren't safe to go home alone, we may keep you overnight. Insurance often won't pay for this.  After surgery  If it's hard to control your pain or you need more pain medicine, please call your surgeon's office.  Questions?   If you have any questions for your care team, list them here: _________________________________________________________________________________________________________________________________________________________________________ ____________________________________ ____________________________________ ____________________________________  For informational purposes only. Not to replace the advice of your health care provider. Copyright   2003, 2019 [facility]. All rights reserved. Clinically reviewed by Valeria Abad MD. [employer] [id number] - REV 12/22.    How to Take Your Medication Before Surgery  - Take all of your medications before surgery as usual  - HOLD (do not take) Aspirin for 7-10 days  March 11-13   0 = swallows foods/liquids without difficulty

## 2025-02-14 NOTE — DIETITIAN INITIAL EVALUATION ADULT - PERTINENT LABORATORY DATA
02-14    143  |  107  |  12  ----------------------------<  100[H]  3.9   |  21[L]  |  1.28    Ca    9.6      14 Feb 2025 06:51  Phos  4.3     02-14  Mg     2.0     02-14    TPro  8.1  /  Alb  4.5  /  TBili  0.3  /  DBili  x   /  AST  20  /  ALT  15  /  AlkPhos  60  02-13  POCT Blood Glucose.: 118 mg/dL (02-14-25 @ 12:06)  A1C with Estimated Average Glucose Result: 5.6 % (11-01-24 @ 07:00)

## 2025-02-14 NOTE — DIETITIAN INITIAL EVALUATION ADULT - REASON INDICATOR FOR ASSESSMENT
RD consult Indicated for MST Score 2 or >  Source: Pt, Team, Electronic Medical Record  Chart reviewed, events noted.

## 2025-02-14 NOTE — DIETITIAN INITIAL EVALUATION ADULT - OTHER INFO
GI:  - P/w nausea and emesis; ordered for Zofran. GI following - gastric emptying study  - Endorses constipation, having only 2 BMs over the past 2 weeks.   - Iron deficiency anemia    Endo:  - DM; POCTs x 24 hrs WNL  - PO synthroid ordered    Wt Hx:  - UBW of 68.2 kg; endorses unintentional wt loss of >/= 10 lbs/4.5 kg x 2 weeks due to poor PO and GI intolerances  - Per chart, dosing wt of 59.9 kg (2/13).   - Wt hx in kg ([facility]) as follows:  67.9 (10/31/24). Suspected wt loss of 12.2% x >/= 2 weeks (clinically significant, based on wts from stated UBW and 2/13 wt). RD will continue to monitor weight trends as available/able.   - IBW: 47.7 kg (based on ht of 61 in)

## 2025-02-14 NOTE — DIETITIAN INITIAL EVALUATION ADULT - ENERGY INTAKE
- Endorses improving appetite and PO tolerance today due to improving; was able to consume 50-75% of meals today.   - Amenable to trialing Ensure Max Protein Shake (Provides 30 g PRO, 150 kcal per serving) and Liquid Protein Supplementation (per serving provides 15 g PRO, 100 kcal) to aid in PO intake/tolerance

## 2025-02-14 NOTE — DIETITIAN INITIAL EVALUATION ADULT - ORAL INTAKE PTA/DIET HISTORY
Reports poor PO intake/appetite x last 2 weeks secondary to lack of tolerance w/ nausea and vomiting following meals; minimally able to tolerate liquids like water. At baseline, does not follow any therapeutic diets PTA. No micronutrient/protein supplementation noted. Confirms no known food allergies/intolerances. No prior chewing/swallowing difficulties reported.

## 2025-02-14 NOTE — DIETITIAN INITIAL EVALUATION ADULT - ORAL NUTRITION SUPPLEMENTS
Add Ensure Max Protein Shake (Provides 30 g PRO, 150 kcal per serving) 1x/day and Liquid Protein Supplementation (per serving provides 15 g PRO, 100 kcal) 1x/day to aid in PO intake

## 2025-02-14 NOTE — DIETITIAN INITIAL EVALUATION ADULT - ADD RECOMMEND
[X] Continue with Regular Diet; Defer texture/consistency to Speech Language Pathologist prn.   [X] Monitor GI tolerance; continue with anti-emetics as medically feasible  [X] Malnutrition sticker placed in chart   [X] RD will continue to monitor PO intake, GI tolerance, weight trends, skin integrity, BMs, labs/electrolytes prn.   RD remains available upon request.

## 2025-02-15 LAB
ANION GAP SERPL CALC-SCNC: 12 MMOL/L — SIGNIFICANT CHANGE UP (ref 5–17)
BUN SERPL-MCNC: 13 MG/DL — SIGNIFICANT CHANGE UP (ref 7–23)
CALCIUM SERPL-MCNC: 9.3 MG/DL — SIGNIFICANT CHANGE UP (ref 8.4–10.5)
CHLORIDE SERPL-SCNC: 106 MMOL/L — SIGNIFICANT CHANGE UP (ref 96–108)
CO2 SERPL-SCNC: 23 MMOL/L — SIGNIFICANT CHANGE UP (ref 22–31)
CREAT SERPL-MCNC: 1.35 MG/DL — HIGH (ref 0.5–1.3)
EGFR: 39 ML/MIN/1.73M2 — LOW
GLUCOSE SERPL-MCNC: 100 MG/DL — HIGH (ref 70–99)
HCT VFR BLD CALC: 25.1 % — LOW (ref 34.5–45)
HGB BLD-MCNC: 7.6 G/DL — LOW (ref 11.5–15.5)
MCHC RBC-ENTMCNC: 23.4 PG — LOW (ref 27–34)
MCHC RBC-ENTMCNC: 30.3 G/DL — LOW (ref 32–36)
MCV RBC AUTO: 77.2 FL — LOW (ref 80–100)
NRBC BLD AUTO-RTO: 0 /100 WBCS — SIGNIFICANT CHANGE UP (ref 0–0)
PLATELET # BLD AUTO: 398 K/UL — SIGNIFICANT CHANGE UP (ref 150–400)
POTASSIUM SERPL-MCNC: 4 MMOL/L — SIGNIFICANT CHANGE UP (ref 3.5–5.3)
POTASSIUM SERPL-SCNC: 4 MMOL/L — SIGNIFICANT CHANGE UP (ref 3.5–5.3)
RBC # BLD: 3.25 M/UL — LOW (ref 3.8–5.2)
RBC # FLD: 18.6 % — HIGH (ref 10.3–14.5)
SODIUM SERPL-SCNC: 141 MMOL/L — SIGNIFICANT CHANGE UP (ref 135–145)
WBC # BLD: 3.83 K/UL — SIGNIFICANT CHANGE UP (ref 3.8–10.5)
WBC # FLD AUTO: 3.83 K/UL — SIGNIFICANT CHANGE UP (ref 3.8–10.5)

## 2025-02-15 RX ORDER — POLYETHYLENE GLYCOL 3350 17 G/17G
17 POWDER, FOR SOLUTION ORAL ONCE
Refills: 0 | Status: COMPLETED | OUTPATIENT
Start: 2025-02-15 | End: 2025-02-15

## 2025-02-15 RX ORDER — SENNA 187 MG
2 TABLET ORAL AT BEDTIME
Refills: 0 | Status: COMPLETED | OUTPATIENT
Start: 2025-02-15 | End: 2025-02-15

## 2025-02-15 RX ADMIN — BRIMONIDINE TARTRATE 1 DROP(S): 1.5 SOLUTION/ DROPS OPHTHALMIC at 13:31

## 2025-02-15 RX ADMIN — DORZOLAMIDE HYDROCHLORIDE AND TIMOLOL MALEATE 1 DROP(S): 20; 5 SOLUTION/ DROPS OPHTHALMIC at 16:10

## 2025-02-15 RX ADMIN — AMLODIPINE BESYLATE 10 MILLIGRAM(S): 10 TABLET ORAL at 05:51

## 2025-02-15 RX ADMIN — BRIMONIDINE TARTRATE 1 DROP(S): 1.5 SOLUTION/ DROPS OPHTHALMIC at 05:51

## 2025-02-15 RX ADMIN — POLYETHYLENE GLYCOL 3350 17 GRAM(S): 17 POWDER, FOR SOLUTION ORAL at 22:52

## 2025-02-15 RX ADMIN — IRON SUCROSE 110 MILLIGRAM(S): 20 INJECTION, SOLUTION INTRAVENOUS at 22:51

## 2025-02-15 RX ADMIN — LATANOPROST PF 1 DROP(S): 0.05 SOLUTION/ DROPS OPHTHALMIC at 22:52

## 2025-02-15 RX ADMIN — Medication 4 MILLIGRAM(S): at 11:12

## 2025-02-15 RX ADMIN — HEPARIN SODIUM 5000 UNIT(S): 1000 INJECTION INTRAVENOUS; SUBCUTANEOUS at 05:51

## 2025-02-15 RX ADMIN — DORZOLAMIDE HYDROCHLORIDE AND TIMOLOL MALEATE 1 DROP(S): 20; 5 SOLUTION/ DROPS OPHTHALMIC at 05:52

## 2025-02-15 RX ADMIN — Medication 40 MILLIGRAM(S): at 05:51

## 2025-02-15 RX ADMIN — Medication 2 TABLET(S): at 22:52

## 2025-02-15 RX ADMIN — Medication 4 MILLIGRAM(S): at 22:56

## 2025-02-15 RX ADMIN — Medication 40 MILLIGRAM(S): at 16:09

## 2025-02-15 RX ADMIN — BRIMONIDINE TARTRATE 1 DROP(S): 1.5 SOLUTION/ DROPS OPHTHALMIC at 22:52

## 2025-02-15 RX ADMIN — Medication 100 MICROGRAM(S): at 05:50

## 2025-02-15 RX ADMIN — HEPARIN SODIUM 5000 UNIT(S): 1000 INJECTION INTRAVENOUS; SUBCUTANEOUS at 16:10

## 2025-02-15 NOTE — PROGRESS NOTE ADULT - SUBJECTIVE AND OBJECTIVE BOX
DATE OF SERVICE: 02-15-25 @ 10:45    Patient is a 79y old  Female who presents with a chief complaint of Family history of other mental and behavioural disorders     (14 Feb 2025 14:59)      SUBJECTIVE / OVERNIGHT EVENTS:  No chest pain. No shortness of breath. No complaints. No events overnight.     MEDICATIONS  (STANDING):  amLODIPine   Tablet 10 milliGRAM(s) Oral daily  brimonidine 0.2% Ophthalmic Solution 1 Drop(s) Both EYES three times a day  dorzolamide 2%/timolol 0.5% Ophthalmic Solution 1 Drop(s) Both EYES two times a day  heparin   Injectable 5000 Unit(s) SubCutaneous every 12 hours  influenza  Vaccine (HIGH DOSE) 0.5 milliLiter(s) IntraMuscular once  iron sucrose IVPB 200 milliGRAM(s) IV Intermittent every 24 hours  latanoprost 0.005% Ophthalmic Solution 1 Drop(s) Both EYES at bedtime  levothyroxine 100 MICROGram(s) Oral daily  pantoprazole  Injectable 40 milliGRAM(s) IV Push two times a day    MEDICATIONS  (PRN):  ondansetron Injectable 4 milliGRAM(s) IV Push every 6 hours PRN Nausea and/or Vomiting      Vital Signs Last 24 Hrs  T(C): 36.9 (15 Feb 2025 05:23), Max: 36.9 (15 Feb 2025 05:23)  T(F): 98.4 (15 Feb 2025 05:23), Max: 98.4 (15 Feb 2025 05:23)  HR: 62 (15 Feb 2025 05:23) (62 - 70)  BP: 110/58 (15 Feb 2025 05:23) (107/55 - 115/71)  BP(mean): --  RR: 18 (15 Feb 2025 05:23) (18 - 18)  SpO2: 99% (15 Feb 2025 05:23) (99% - 100%)    Parameters below as of 15 Feb 2025 05:23  Patient On (Oxygen Delivery Method): room air      CAPILLARY BLOOD GLUCOSE      POCT Blood Glucose.: 136 mg/dL (14 Feb 2025 21:29)  POCT Blood Glucose.: 118 mg/dL (14 Feb 2025 12:06)    I&O's Summary    14 Feb 2025 07:01  -  15 Feb 2025 07:00  --------------------------------------------------------  IN: 360 mL / OUT: 0 mL / NET: 360 mL        PHYSICAL EXAM:  GENERAL: NAD, well-developed  HEAD:  Atraumatic, Normocephalic  EYES: EOMI, PERRLA, conjunctiva and sclera clear  NECK: Supple, No JVD  CHEST/LUNG: Clear to auscultation bilaterally; No wheeze  HEART: Regular rate and rhythm; No murmurs, rubs, or gallops  ABDOMEN: Soft, Nontender, Nondistended; Bowel sounds present  EXTREMITIES:  2+ Peripheral Pulses, No clubbing, cyanosis, or edema  PSYCH: AAOx3  NEUROLOGY: non-focal  SKIN: No rashes or lesions    LABS:                        7.6    3.83  )-----------( 398      ( 15 Feb 2025 07:08 )             25.1     02-15    141  |  106  |  13  ----------------------------<  100[H]  4.0   |  23  |  1.35[H]    Ca    9.3      15 Feb 2025 07:06  Phos  4.3     02-14  Mg     2.0     02-14            Urinalysis Basic - ( 15 Feb 2025 07:06 )    Color: x / Appearance: x / SG: x / pH: x  Gluc: 100 mg/dL / Ketone: x  / Bili: x / Urobili: x   Blood: x / Protein: x / Nitrite: x   Leuk Esterase: x / RBC: x / WBC x   Sq Epi: x / Non Sq Epi: x / Bacteria: x        RADIOLOGY & ADDITIONAL TESTS:    Imaging Personally Reviewed:    Consultant(s) Notes Reviewed:      Care Discussed with Consultants/Other Providers:

## 2025-02-15 NOTE — PROGRESS NOTE ADULT - SUBJECTIVE AND OBJECTIVE BOX
INTERVAL HPI/OVERNIGHT EVENTS:  No new overnight event.  No N/V/D.  Tolerating diet.  feels good    Allergies    No Known Allergies    Intolerances      General:  No wt loss, fevers, chills, night sweats, fatigue,   Eyes:  Good vision, no reported pain  ENT:  No sore throat, pain, runny nose, dysphagia  CV:  No pain, palpitations, hypo/hypertension  Resp:  No dyspnea, cough, tachypnea, wheezing  GI:  No pain, No nausea, No vomiting, No diarrhea, No constipation, No weight loss, No fever, No pruritis, No rectal bleeding, No tarry stools, No dysphagia,  :  No pain, bleeding, incontinence, nocturia  Muscle:  No pain, weakness  Neuro:  No weakness, tingling, memory problems  Psych:  No fatigue, insomnia, mood problems, depression  Endocrine:  No polyuria, polydipsia, cold/heat intolerance  Heme:  No petechiae, ecchymosis, easy bruisability  Skin:  No rash, tattoos, scars, edema      PHYSICAL EXAM:   Vital Signs:  Vital Signs Last 24 Hrs  T(C): 36.9 (15 Feb 2025 05:23), Max: 36.9 (15 Feb 2025 05:23)  T(F): 98.4 (15 Feb 2025 05:23), Max: 98.4 (15 Feb 2025 05:23)  HR: 62 (15 Feb 2025 05:23) (62 - 70)  BP: 110/58 (15 Feb 2025 05:23) (107/55 - 115/71)  BP(mean): --  RR: 18 (15 Feb 2025 05:23) (18 - 18)  SpO2: 99% (15 Feb 2025 05:23) (99% - 100%)    Parameters below as of 15 Feb 2025 05:23  Patient On (Oxygen Delivery Method): room air      Daily     Daily I&O's Summary    14 Feb 2025 07:01  -  15 Feb 2025 07:00  --------------------------------------------------------  IN: 360 mL / OUT: 0 mL / NET: 360 mL        GENERAL:  Appears stated age, well-groomed, well-nourished, no distress  HEENT:  NC/AT,  conjunctivae clear and pink, no thyromegaly, nodules, adenopathy, no JVD, sclera -anicteric  CHEST:  Full & symmetric excursion, no increased effort, breath sounds clear  HEART:  Regular rhythm, S1, S2, no murmur/rub/S3/S4, no abdominal bruit, no edema  ABDOMEN:  Soft, non-tender, non-distended, normoactive bowel sounds,  no masses ,no hepato-splenomegaly, no signs of chronic liver disease  EXTEREMITIES:  no cyanosis,clubbing or edema  SKIN:  No rash/erythema/ecchymoses/petechiae/wounds/abscess/warm/dry  NEURO:  Alert, oriented, no asterixis, no tremor, no encephalopathy      LABS:                        7.6    3.83  )-----------( 398      ( 15 Feb 2025 07:08 )             25.1     02-15    141  |  106  |  13  ----------------------------<  100[H]  4.0   |  23  |  1.35[H]    Ca    9.3      15 Feb 2025 07:06  Phos  4.3     02-14  Mg     2.0     02-14        Urinalysis Basic - ( 15 Feb 2025 07:06 )    Color: x / Appearance: x / SG: x / pH: x  Gluc: 100 mg/dL / Ketone: x  / Bili: x / Urobili: x   Blood: x / Protein: x / Nitrite: x   Leuk Esterase: x / RBC: x / WBC x   Sq Epi: x / Non Sq Epi: x / Bacteria: x      amylase   lipase  RADIOLOGY & ADDITIONAL TESTS:

## 2025-02-16 LAB
ANION GAP SERPL CALC-SCNC: 15 MMOL/L — SIGNIFICANT CHANGE UP (ref 5–17)
BLD GP AB SCN SERPL QL: NEGATIVE — SIGNIFICANT CHANGE UP
BUN SERPL-MCNC: 12 MG/DL — SIGNIFICANT CHANGE UP (ref 7–23)
CALCIUM SERPL-MCNC: 9.7 MG/DL — SIGNIFICANT CHANGE UP (ref 8.4–10.5)
CHLORIDE SERPL-SCNC: 105 MMOL/L — SIGNIFICANT CHANGE UP (ref 96–108)
CO2 SERPL-SCNC: 21 MMOL/L — LOW (ref 22–31)
CREAT SERPL-MCNC: 1.25 MG/DL — SIGNIFICANT CHANGE UP (ref 0.5–1.3)
EGFR: 43 ML/MIN/1.73M2 — LOW
GLUCOSE BLDC GLUCOMTR-MCNC: 108 MG/DL — HIGH (ref 70–99)
GLUCOSE BLDC GLUCOMTR-MCNC: 95 MG/DL — SIGNIFICANT CHANGE UP (ref 70–99)
GLUCOSE SERPL-MCNC: 98 MG/DL — SIGNIFICANT CHANGE UP (ref 70–99)
HCT VFR BLD CALC: 28.4 % — LOW (ref 34.5–45)
HGB BLD-MCNC: 8.5 G/DL — LOW (ref 11.5–15.5)
MCHC RBC-ENTMCNC: 23 PG — LOW (ref 27–34)
MCHC RBC-ENTMCNC: 29.9 G/DL — LOW (ref 32–36)
MCV RBC AUTO: 77 FL — LOW (ref 80–100)
NRBC BLD AUTO-RTO: 0 /100 WBCS — SIGNIFICANT CHANGE UP (ref 0–0)
PLATELET # BLD AUTO: 420 K/UL — HIGH (ref 150–400)
POTASSIUM SERPL-MCNC: 4 MMOL/L — SIGNIFICANT CHANGE UP (ref 3.5–5.3)
POTASSIUM SERPL-SCNC: 4 MMOL/L — SIGNIFICANT CHANGE UP (ref 3.5–5.3)
RBC # BLD: 3.69 M/UL — LOW (ref 3.8–5.2)
RBC # FLD: 19 % — HIGH (ref 10.3–14.5)
RH IG SCN BLD-IMP: POSITIVE — SIGNIFICANT CHANGE UP
SODIUM SERPL-SCNC: 141 MMOL/L — SIGNIFICANT CHANGE UP (ref 135–145)
WBC # BLD: 5.32 K/UL — SIGNIFICANT CHANGE UP (ref 3.8–10.5)
WBC # FLD AUTO: 5.32 K/UL — SIGNIFICANT CHANGE UP (ref 3.8–10.5)

## 2025-02-16 RX ADMIN — Medication 4 MILLIGRAM(S): at 15:58

## 2025-02-16 RX ADMIN — LATANOPROST PF 1 DROP(S): 0.05 SOLUTION/ DROPS OPHTHALMIC at 22:20

## 2025-02-16 RX ADMIN — DORZOLAMIDE HYDROCHLORIDE AND TIMOLOL MALEATE 1 DROP(S): 20; 5 SOLUTION/ DROPS OPHTHALMIC at 05:20

## 2025-02-16 RX ADMIN — DORZOLAMIDE HYDROCHLORIDE AND TIMOLOL MALEATE 1 DROP(S): 20; 5 SOLUTION/ DROPS OPHTHALMIC at 17:52

## 2025-02-16 RX ADMIN — Medication 100 MICROGRAM(S): at 05:17

## 2025-02-16 RX ADMIN — HEPARIN SODIUM 5000 UNIT(S): 1000 INJECTION INTRAVENOUS; SUBCUTANEOUS at 17:52

## 2025-02-16 RX ADMIN — IRON SUCROSE 110 MILLIGRAM(S): 20 INJECTION, SOLUTION INTRAVENOUS at 22:19

## 2025-02-16 RX ADMIN — BRIMONIDINE TARTRATE 1 DROP(S): 1.5 SOLUTION/ DROPS OPHTHALMIC at 15:58

## 2025-02-16 RX ADMIN — Medication 40 MILLIGRAM(S): at 05:11

## 2025-02-16 RX ADMIN — AMLODIPINE BESYLATE 10 MILLIGRAM(S): 10 TABLET ORAL at 05:17

## 2025-02-16 RX ADMIN — BRIMONIDINE TARTRATE 1 DROP(S): 1.5 SOLUTION/ DROPS OPHTHALMIC at 22:20

## 2025-02-16 RX ADMIN — HEPARIN SODIUM 5000 UNIT(S): 1000 INJECTION INTRAVENOUS; SUBCUTANEOUS at 05:11

## 2025-02-16 RX ADMIN — Medication 4 MILLIGRAM(S): at 05:10

## 2025-02-16 RX ADMIN — Medication 40 MILLIGRAM(S): at 17:51

## 2025-02-16 RX ADMIN — BRIMONIDINE TARTRATE 1 DROP(S): 1.5 SOLUTION/ DROPS OPHTHALMIC at 05:20

## 2025-02-16 NOTE — PROGRESS NOTE ADULT - SUBJECTIVE AND OBJECTIVE BOX
DATE OF SERVICE: 02-16-25 @ 11:12    Patient is a 79y old  Female who presents with a chief complaint of Family history of other mental and behavioural disorders     (14 Feb 2025 14:59)      SUBJECTIVE / OVERNIGHT EVENTS:  vomited today    MEDICATIONS  (STANDING):  amLODIPine   Tablet 10 milliGRAM(s) Oral daily  brimonidine 0.2% Ophthalmic Solution 1 Drop(s) Both EYES three times a day  dorzolamide 2%/timolol 0.5% Ophthalmic Solution 1 Drop(s) Both EYES two times a day  heparin   Injectable 5000 Unit(s) SubCutaneous every 12 hours  influenza  Vaccine (HIGH DOSE) 0.5 milliLiter(s) IntraMuscular once  iron sucrose IVPB 200 milliGRAM(s) IV Intermittent every 24 hours  latanoprost 0.005% Ophthalmic Solution 1 Drop(s) Both EYES at bedtime  levothyroxine 100 MICROGram(s) Oral daily  pantoprazole  Injectable 40 milliGRAM(s) IV Push two times a day    MEDICATIONS  (PRN):  ondansetron Injectable 4 milliGRAM(s) IV Push every 6 hours PRN Nausea and/or Vomiting      Vital Signs Last 24 Hrs  T(C): 36.8 (16 Feb 2025 04:45), Max: 36.9 (15 Feb 2025 21:17)  T(F): 98.3 (16 Feb 2025 04:45), Max: 98.5 (15 Feb 2025 21:17)  HR: 71 (16 Feb 2025 04:45) (71 - 73)  BP: 164/75 (16 Feb 2025 04:45) (144/79 - 164/75)  BP(mean): --  RR: 18 (16 Feb 2025 04:45) (18 - 18)  SpO2: 99% (16 Feb 2025 04:45) (99% - 100%)    Parameters below as of 16 Feb 2025 04:45  Patient On (Oxygen Delivery Method): room air      CAPILLARY BLOOD GLUCOSE      POCT Blood Glucose.: 95 mg/dL (16 Feb 2025 08:32)    I&O's Summary    16 Feb 2025 07:01  -  16 Feb 2025 11:12  --------------------------------------------------------  IN: 240 mL / OUT: 0 mL / NET: 240 mL        PHYSICAL EXAM:  GENERAL: NAD, well-developed  HEAD:  Atraumatic, Normocephalic  EYES: EOMI, PERRLA, conjunctiva and sclera clear  NECK: Supple, No JVD  CHEST/LUNG: Clear to auscultation bilaterally; No wheeze  HEART: Regular rate and rhythm; No murmurs, rubs, or gallops  ABDOMEN: Soft, Nontender, Nondistended; Bowel sounds present  EXTREMITIES:  2+ Peripheral Pulses, No clubbing, cyanosis, or edema  PSYCH: AAOx3  NEUROLOGY: non-focal  SKIN: No rashes or lesions    LABS:                        8.5    5.32  )-----------( 420      ( 16 Feb 2025 07:10 )             28.4     02-16    141  |  105  |  12  ----------------------------<  98  4.0   |  21[L]  |  1.25    Ca    9.7      16 Feb 2025 07:09            Urinalysis Basic - ( 16 Feb 2025 07:09 )    Color: x / Appearance: x / SG: x / pH: x  Gluc: 98 mg/dL / Ketone: x  / Bili: x / Urobili: x   Blood: x / Protein: x / Nitrite: x   Leuk Esterase: x / RBC: x / WBC x   Sq Epi: x / Non Sq Epi: x / Bacteria: x        RADIOLOGY & ADDITIONAL TESTS:    Imaging Personally Reviewed:    Consultant(s) Notes Reviewed:      Care Discussed with Consultants/Other Providers:

## 2025-02-16 NOTE — PROGRESS NOTE ADULT - SUBJECTIVE AND OBJECTIVE BOX
INTERVAL HPI/OVERNIGHT EVENTS:  No new overnight event.  No Diarrhea.  Tolerating diet yesterday threw up this am old food    Allergies    No Known Allergies    Intolerances          General:  No wt loss, fevers, chills, night sweats, fatigue,   Eyes:  Good vision, no reported pain  ENT:  No sore throat, pain, runny nose, dysphagia  CV:  No pain, palpitations, hypo/hypertension  Resp:  No dyspnea, cough, tachypnea, wheezing  GI:  No pain, No nausea, No vomiting, No diarrhea, No constipation, No weight loss, No fever, No pruritis, No rectal bleeding, No tarry stools, No dysphagia,  :  No pain, bleeding, incontinence, nocturia  Muscle:  No pain, weakness  Neuro:  No weakness, tingling, memory problems  Psych:  No fatigue, insomnia, mood problems, depression  Endocrine:  No polyuria, polydipsia, cold/heat intolerance  Heme:  No petechiae, ecchymosis, easy bruisability  Skin:  No rash, tattoos, scars, edema      PHYSICAL EXAM:   Vital Signs:  Vital Signs Last 24 Hrs  T(C): 36.8 (16 Feb 2025 11:50), Max: 36.9 (15 Feb 2025 21:17)  T(F): 98.3 (16 Feb 2025 11:50), Max: 98.5 (15 Feb 2025 21:17)  HR: 77 (16 Feb 2025 11:50) (71 - 77)  BP: 134/77 (16 Feb 2025 11:50) (134/77 - 164/75)  BP(mean): --  RR: 18 (16 Feb 2025 11:50) (18 - 18)  SpO2: 97% (16 Feb 2025 11:50) (97% - 100%)    Parameters below as of 16 Feb 2025 11:50  Patient On (Oxygen Delivery Method): room air      Daily     Daily I&O's Summary    16 Feb 2025 07:01  -  16 Feb 2025 12:42  --------------------------------------------------------  IN: 240 mL / OUT: 0 mL / NET: 240 mL        GENERAL:  Appears stated age, well-groomed, well-nourished, no distress  HEENT:  NC/AT,  conjunctivae clear and pink, no thyromegaly, nodules, adenopathy, no JVD, sclera -anicteric  CHEST:  Full & symmetric excursion, no increased effort, breath sounds clear  HEART:  Regular rhythm, S1, S2, no murmur/rub/S3/S4, no abdominal bruit, no edema  ABDOMEN:  Soft, non-tender, non-distended, normoactive bowel sounds,  no masses ,no hepato-splenomegaly, no signs of chronic liver disease  EXTEREMITIES:  no cyanosis,clubbing or edema  SKIN:  No rash/erythema/ecchymoses/petechiae/wounds/abscess/warm/dry  NEURO:  Alert, oriented, no asterixis, no tremor, no encephalopathy      LABS:                        8.5    5.32  )-----------( 420      ( 16 Feb 2025 07:10 )             28.4     02-16    141  |  105  |  12  ----------------------------<  98  4.0   |  21[L]  |  1.25    Ca    9.7      16 Feb 2025 07:09        Urinalysis Basic - ( 16 Feb 2025 07:09 )    Color: x / Appearance: x / SG: x / pH: x  Gluc: 98 mg/dL / Ketone: x  / Bili: x / Urobili: x   Blood: x / Protein: x / Nitrite: x   Leuk Esterase: x / RBC: x / WBC x   Sq Epi: x / Non Sq Epi: x / Bacteria: x      amylase   lipase  RADIOLOGY & ADDITIONAL TESTS:

## 2025-02-17 LAB
ANION GAP SERPL CALC-SCNC: 14 MMOL/L — SIGNIFICANT CHANGE UP (ref 5–17)
BUN SERPL-MCNC: 18 MG/DL — SIGNIFICANT CHANGE UP (ref 7–23)
CALCIUM SERPL-MCNC: 9.5 MG/DL — SIGNIFICANT CHANGE UP (ref 8.4–10.5)
CHLORIDE SERPL-SCNC: 106 MMOL/L — SIGNIFICANT CHANGE UP (ref 96–108)
CO2 SERPL-SCNC: 18 MMOL/L — LOW (ref 22–31)
CREAT SERPL-MCNC: 1.26 MG/DL — SIGNIFICANT CHANGE UP (ref 0.5–1.3)
EGFR: 43 ML/MIN/1.73M2 — LOW
GLUCOSE BLDC GLUCOMTR-MCNC: 100 MG/DL — HIGH (ref 70–99)
GLUCOSE BLDC GLUCOMTR-MCNC: 94 MG/DL — SIGNIFICANT CHANGE UP (ref 70–99)
GLUCOSE SERPL-MCNC: 113 MG/DL — HIGH (ref 70–99)
HCT VFR BLD CALC: 29.6 % — LOW (ref 34.5–45)
HGB BLD-MCNC: 9 G/DL — LOW (ref 11.5–15.5)
MCHC RBC-ENTMCNC: 23.4 PG — LOW (ref 27–34)
MCHC RBC-ENTMCNC: 30.4 G/DL — LOW (ref 32–36)
MCV RBC AUTO: 76.9 FL — LOW (ref 80–100)
NRBC BLD AUTO-RTO: 0 /100 WBCS — SIGNIFICANT CHANGE UP (ref 0–0)
PLATELET # BLD AUTO: 415 K/UL — HIGH (ref 150–400)
POTASSIUM SERPL-MCNC: 4 MMOL/L — SIGNIFICANT CHANGE UP (ref 3.5–5.3)
POTASSIUM SERPL-SCNC: 4 MMOL/L — SIGNIFICANT CHANGE UP (ref 3.5–5.3)
RBC # BLD: 3.85 M/UL — SIGNIFICANT CHANGE UP (ref 3.8–5.2)
RBC # FLD: 19.3 % — HIGH (ref 10.3–14.5)
SODIUM SERPL-SCNC: 138 MMOL/L — SIGNIFICANT CHANGE UP (ref 135–145)
WBC # BLD: 10.12 K/UL — SIGNIFICANT CHANGE UP (ref 3.8–10.5)
WBC # FLD AUTO: 10.12 K/UL — SIGNIFICANT CHANGE UP (ref 3.8–10.5)

## 2025-02-17 RX ADMIN — HEPARIN SODIUM 5000 UNIT(S): 1000 INJECTION INTRAVENOUS; SUBCUTANEOUS at 05:29

## 2025-02-17 RX ADMIN — IRON SUCROSE 110 MILLIGRAM(S): 20 INJECTION, SOLUTION INTRAVENOUS at 21:18

## 2025-02-17 RX ADMIN — AMLODIPINE BESYLATE 10 MILLIGRAM(S): 10 TABLET ORAL at 05:29

## 2025-02-17 RX ADMIN — Medication 40 MILLIGRAM(S): at 17:01

## 2025-02-17 RX ADMIN — BRIMONIDINE TARTRATE 1 DROP(S): 1.5 SOLUTION/ DROPS OPHTHALMIC at 21:17

## 2025-02-17 RX ADMIN — LATANOPROST PF 1 DROP(S): 0.05 SOLUTION/ DROPS OPHTHALMIC at 21:18

## 2025-02-17 RX ADMIN — Medication 100 MICROGRAM(S): at 05:29

## 2025-02-17 RX ADMIN — HEPARIN SODIUM 5000 UNIT(S): 1000 INJECTION INTRAVENOUS; SUBCUTANEOUS at 17:01

## 2025-02-17 RX ADMIN — DORZOLAMIDE HYDROCHLORIDE AND TIMOLOL MALEATE 1 DROP(S): 20; 5 SOLUTION/ DROPS OPHTHALMIC at 05:34

## 2025-02-17 RX ADMIN — Medication 4 MILLIGRAM(S): at 16:01

## 2025-02-17 RX ADMIN — Medication 40 MILLIGRAM(S): at 05:30

## 2025-02-17 RX ADMIN — BRIMONIDINE TARTRATE 1 DROP(S): 1.5 SOLUTION/ DROPS OPHTHALMIC at 12:58

## 2025-02-17 RX ADMIN — Medication 125 MILLILITER(S): at 01:28

## 2025-02-17 RX ADMIN — DORZOLAMIDE HYDROCHLORIDE AND TIMOLOL MALEATE 1 DROP(S): 20; 5 SOLUTION/ DROPS OPHTHALMIC at 17:01

## 2025-02-17 RX ADMIN — BRIMONIDINE TARTRATE 1 DROP(S): 1.5 SOLUTION/ DROPS OPHTHALMIC at 05:30

## 2025-02-17 RX ADMIN — Medication 4 MILLIGRAM(S): at 00:35

## 2025-02-17 NOTE — PROGRESS NOTE ADULT - SUBJECTIVE AND OBJECTIVE BOX
INTERVAL HPI/OVERNIGHT EVENTS:  No new overnight event.      Allergies    No Known Allergies    Intolerances          General:  No wt loss, fevers, chills, night sweats, fatigue,   Eyes:  Good vision, no reported pain  ENT:  No sore throat, pain, runny nose, dysphagia  CV:  No pain, palpitations, hypo/hypertension  Resp:  No dyspnea, cough, tachypnea, wheezing  GI:  No pain, No nausea, No vomiting, No diarrhea, No constipation, No weight loss, No fever, No pruritis, No rectal bleeding, No tarry stools, No dysphagia,  :  No pain, bleeding, incontinence, nocturia  Muscle:  No pain, weakness  Neuro:  No weakness, tingling, memory problems  Psych:  No fatigue, insomnia, mood problems, depression  Endocrine:  No polyuria, polydipsia, cold/heat intolerance  Heme:  No petechiae, ecchymosis, easy bruisability  Skin:  No rash, tattoos, scars, edema      PHYSICAL EXAM:   Vital Signs:  Vital Signs Last 24 Hrs  T(C): 36.8 (16 Feb 2025 11:50), Max: 36.9 (15 Feb 2025 21:17)  T(F): 98.3 (16 Feb 2025 11:50), Max: 98.5 (15 Feb 2025 21:17)  HR: 77 (16 Feb 2025 11:50) (71 - 77)  BP: 134/77 (16 Feb 2025 11:50) (134/77 - 164/75)  BP(mean): --  RR: 18 (16 Feb 2025 11:50) (18 - 18)  SpO2: 97% (16 Feb 2025 11:50) (97% - 100%)    Parameters below as of 16 Feb 2025 11:50  Patient On (Oxygen Delivery Method): room air      Daily     Daily I&O's Summary    16 Feb 2025 07:01  -  16 Feb 2025 12:42  --------------------------------------------------------  IN: 240 mL / OUT: 0 mL / NET: 240 mL        GENERAL:  Appears stated age, well-groomed, well-nourished, no distress  HEENT:  NC/AT,  conjunctivae clear and pink, no thyromegaly, nodules, adenopathy, no JVD, sclera -anicteric  CHEST:  Full & symmetric excursion, no increased effort, breath sounds clear  HEART:  Regular rhythm, S1, S2, no murmur/rub/S3/S4, no abdominal bruit, no edema  ABDOMEN:  Soft, non-tender, non-distended, normoactive bowel sounds,  no masses ,no hepato-splenomegaly, no signs of chronic liver disease  EXTEREMITIES:  no cyanosis,clubbing or edema  SKIN:  No rash/erythema/ecchymoses/petechiae/wounds/abscess/warm/dry  NEURO:  Alert, oriented, no asterixis, no tremor, no encephalopathy      LABS:                        8.5    5.32  )-----------( 420      ( 16 Feb 2025 07:10 )             28.4     02-16    141  |  105  |  12  ----------------------------<  98  4.0   |  21[L]  |  1.25    Ca    9.7      16 Feb 2025 07:09        Urinalysis Basic - ( 16 Feb 2025 07:09 )    Color: x / Appearance: x / SG: x / pH: x  Gluc: 98 mg/dL / Ketone: x  / Bili: x / Urobili: x   Blood: x / Protein: x / Nitrite: x   Leuk Esterase: x / RBC: x / WBC x   Sq Epi: x / Non Sq Epi: x / Bacteria: x      amylase   lipase  RADIOLOGY & ADDITIONAL TESTS:

## 2025-02-17 NOTE — PROGRESS NOTE ADULT - SUBJECTIVE AND OBJECTIVE BOX
Patient is a 79y old  Female who presents with a chief complaint of Family history of other mental and behavioural disorders     (14 Feb 2025 14:59)      SUBJECTIVE / OVERNIGHT EVENTS: ptn is sitting in a chair, tolerating meals.     MEDICATIONS  (STANDING):  amLODIPine   Tablet 10 milliGRAM(s) Oral daily  brimonidine 0.2% Ophthalmic Solution 1 Drop(s) Both EYES three times a day  dorzolamide 2%/timolol 0.5% Ophthalmic Solution 1 Drop(s) Both EYES two times a day  heparin   Injectable 5000 Unit(s) SubCutaneous every 12 hours  influenza  Vaccine (HIGH DOSE) 0.5 milliLiter(s) IntraMuscular once  iron sucrose IVPB 200 milliGRAM(s) IV Intermittent every 24 hours  latanoprost 0.005% Ophthalmic Solution 1 Drop(s) Both EYES at bedtime  levothyroxine 100 MICROGram(s) Oral daily  pantoprazole  Injectable 40 milliGRAM(s) IV Push two times a day  sodium chloride 0.9%. 500 milliLiter(s) (125 mL/Hr) IV Continuous <Continuous>    MEDICATIONS  (PRN):  ondansetron Injectable 4 milliGRAM(s) IV Push every 6 hours PRN Nausea and/or Vomiting      Vital Signs Last 24 Hrs  T(F): 97.7 (02-17-25 @ 11:57), Max: 99.1 (02-16-25 @ 21:20)  HR: 66 (02-17-25 @ 11:57) (66 - 81)  BP: 129/82 (02-17-25 @ 11:57) (118/74 - 143/88)  RR: 18 (02-17-25 @ 11:57) (18 - 18)  SpO2: 100% (02-17-25 @ 11:57) (96% - 100%)  Telemetry:   CAPILLARY BLOOD GLUCOSE      POCT Blood Glucose.: 100 mg/dL (17 Feb 2025 17:34)  POCT Blood Glucose.: 94 mg/dL (17 Feb 2025 12:26)    I&O's Summary    16 Feb 2025 07:01  -  17 Feb 2025 07:00  --------------------------------------------------------  IN: 480 mL / OUT: 0 mL / NET: 480 mL    17 Feb 2025 07:01  -  17 Feb 2025 18:19  --------------------------------------------------------  IN: 480 mL / OUT: 0 mL / NET: 480 mL        PHYSICAL EXAM:  GENERAL: NAD, well-developed  HEAD:  Atraumatic, Normocephalic  EYES: EOMI, PERRLA, conjunctiva and sclera clear  NECK: Supple, No JVD  CHEST/LUNG: Clear to auscultation bilaterally; No wheeze  HEART: Regular rate and rhythm; No murmurs, rubs, or gallops  ABDOMEN: Soft, Nontender, Nondistended; Bowel sounds present  EXTREMITIES:  2+ Peripheral Pulses, No clubbing, cyanosis, or edema  PSYCH: AAOx3  NEUROLOGY: non-focal  SKIN: No rashes or lesions    LABS:                        9.0    10.12 )-----------( 415      ( 17 Feb 2025 07:23 )             29.6     02-17    138  |  106  |  18  ----------------------------<  113[H]  4.0   |  18[L]  |  1.26    Ca    9.5      17 Feb 2025 07:23            Urinalysis Basic - ( 17 Feb 2025 07:23 )    Color: x / Appearance: x / SG: x / pH: x  Gluc: 113 mg/dL / Ketone: x  / Bili: x / Urobili: x   Blood: x / Protein: x / Nitrite: x   Leuk Esterase: x / RBC: x / WBC x   Sq Epi: x / Non Sq Epi: x / Bacteria: x        RADIOLOGY & ADDITIONAL TESTS:    Imaging Personally Reviewed:    Consultant(s) Notes Reviewed:      Care Discussed with Consultants/Other Providers:

## 2025-02-18 LAB
ANION GAP SERPL CALC-SCNC: 11 MMOL/L — SIGNIFICANT CHANGE UP (ref 5–17)
BUN SERPL-MCNC: 17 MG/DL — SIGNIFICANT CHANGE UP (ref 7–23)
CALCIUM SERPL-MCNC: 9.5 MG/DL — SIGNIFICANT CHANGE UP (ref 8.4–10.5)
CHLORIDE SERPL-SCNC: 107 MMOL/L — SIGNIFICANT CHANGE UP (ref 96–108)
CO2 SERPL-SCNC: 19 MMOL/L — LOW (ref 22–31)
CREAT SERPL-MCNC: 1.27 MG/DL — SIGNIFICANT CHANGE UP (ref 0.5–1.3)
EGFR: 42 ML/MIN/1.73M2 — LOW
GLUCOSE BLDC GLUCOMTR-MCNC: 108 MG/DL — HIGH (ref 70–99)
GLUCOSE BLDC GLUCOMTR-MCNC: 114 MG/DL — HIGH (ref 70–99)
GLUCOSE BLDC GLUCOMTR-MCNC: 96 MG/DL — SIGNIFICANT CHANGE UP (ref 70–99)
GLUCOSE BLDC GLUCOMTR-MCNC: 96 MG/DL — SIGNIFICANT CHANGE UP (ref 70–99)
GLUCOSE BLDC GLUCOMTR-MCNC: 98 MG/DL — SIGNIFICANT CHANGE UP (ref 70–99)
GLUCOSE SERPL-MCNC: 87 MG/DL — SIGNIFICANT CHANGE UP (ref 70–99)
HCT VFR BLD CALC: 27.5 % — LOW (ref 34.5–45)
HGB BLD-MCNC: 8.3 G/DL — LOW (ref 11.5–15.5)
MCHC RBC-ENTMCNC: 23.4 PG — LOW (ref 27–34)
MCHC RBC-ENTMCNC: 30.2 G/DL — LOW (ref 32–36)
MCV RBC AUTO: 77.7 FL — LOW (ref 80–100)
NRBC BLD AUTO-RTO: 0 /100 WBCS — SIGNIFICANT CHANGE UP (ref 0–0)
PLATELET # BLD AUTO: 400 K/UL — SIGNIFICANT CHANGE UP (ref 150–400)
POTASSIUM SERPL-MCNC: 3.9 MMOL/L — SIGNIFICANT CHANGE UP (ref 3.5–5.3)
POTASSIUM SERPL-SCNC: 3.9 MMOL/L — SIGNIFICANT CHANGE UP (ref 3.5–5.3)
RBC # BLD: 3.54 M/UL — LOW (ref 3.8–5.2)
RBC # FLD: 19.2 % — HIGH (ref 10.3–14.5)
SODIUM SERPL-SCNC: 137 MMOL/L — SIGNIFICANT CHANGE UP (ref 135–145)
WBC # BLD: 5.76 K/UL — SIGNIFICANT CHANGE UP (ref 3.8–10.5)
WBC # FLD AUTO: 5.76 K/UL — SIGNIFICANT CHANGE UP (ref 3.8–10.5)

## 2025-02-18 PROCEDURE — 78264 GASTRIC EMPTYING IMG STUDY: CPT | Mod: 26

## 2025-02-18 RX ORDER — POLYETHYLENE GLYCOL-3350 AND ELECTROLYTES 236; 6.74; 5.86; 2.97; 22.74 G/274.31G; G/274.31G; G/274.31G; G/274.31G; G/274.31G
1 POWDER, FOR SOLUTION ORAL EVERY 4 HOURS
Refills: 0 | Status: COMPLETED | OUTPATIENT
Start: 2025-02-18 | End: 2025-02-18

## 2025-02-18 RX ORDER — LACTULOSE 10 G/15ML
30 SOLUTION ORAL EVERY 4 HOURS
Refills: 0 | Status: COMPLETED | OUTPATIENT
Start: 2025-02-18 | End: 2025-02-18

## 2025-02-18 RX ADMIN — IRON SUCROSE 110 MILLIGRAM(S): 20 INJECTION, SOLUTION INTRAVENOUS at 21:26

## 2025-02-18 RX ADMIN — DORZOLAMIDE HYDROCHLORIDE AND TIMOLOL MALEATE 1 DROP(S): 20; 5 SOLUTION/ DROPS OPHTHALMIC at 17:00

## 2025-02-18 RX ADMIN — HEPARIN SODIUM 5000 UNIT(S): 1000 INJECTION INTRAVENOUS; SUBCUTANEOUS at 06:28

## 2025-02-18 RX ADMIN — LACTULOSE 30 GRAM(S): 10 SOLUTION ORAL at 17:00

## 2025-02-18 RX ADMIN — POLYETHYLENE GLYCOL-3350 AND ELECTROLYTES 1 LITER(S): 236; 6.74; 5.86; 2.97; 22.74 POWDER, FOR SOLUTION ORAL at 22:52

## 2025-02-18 RX ADMIN — BRIMONIDINE TARTRATE 1 DROP(S): 1.5 SOLUTION/ DROPS OPHTHALMIC at 21:23

## 2025-02-18 RX ADMIN — BRIMONIDINE TARTRATE 1 DROP(S): 1.5 SOLUTION/ DROPS OPHTHALMIC at 12:58

## 2025-02-18 RX ADMIN — AMLODIPINE BESYLATE 10 MILLIGRAM(S): 10 TABLET ORAL at 06:27

## 2025-02-18 RX ADMIN — HEPARIN SODIUM 5000 UNIT(S): 1000 INJECTION INTRAVENOUS; SUBCUTANEOUS at 17:00

## 2025-02-18 RX ADMIN — Medication 100 MICROGRAM(S): at 06:27

## 2025-02-18 RX ADMIN — Medication 40 MILLIGRAM(S): at 17:00

## 2025-02-18 RX ADMIN — BRIMONIDINE TARTRATE 1 DROP(S): 1.5 SOLUTION/ DROPS OPHTHALMIC at 06:27

## 2025-02-18 RX ADMIN — DORZOLAMIDE HYDROCHLORIDE AND TIMOLOL MALEATE 1 DROP(S): 20; 5 SOLUTION/ DROPS OPHTHALMIC at 06:27

## 2025-02-18 RX ADMIN — POLYETHYLENE GLYCOL-3350 AND ELECTROLYTES 1 LITER(S): 236; 6.74; 5.86; 2.97; 22.74 POWDER, FOR SOLUTION ORAL at 17:00

## 2025-02-18 RX ADMIN — LATANOPROST PF 1 DROP(S): 0.05 SOLUTION/ DROPS OPHTHALMIC at 21:23

## 2025-02-18 RX ADMIN — Medication 40 MILLIGRAM(S): at 06:26

## 2025-02-18 RX ADMIN — LACTULOSE 30 GRAM(S): 10 SOLUTION ORAL at 19:18

## 2025-02-18 NOTE — PROGRESS NOTE ADULT - SUBJECTIVE AND OBJECTIVE BOX
INTERVAL HPI/OVERNIGHT EVENTS:    GES noted     Allergies    No Known Allergies    Intolerances          General:  No wt loss, fevers, chills, night sweats, fatigue,   Eyes:  Good vision, no reported pain  ENT:  No sore throat, pain, runny nose, dysphagia  CV:  No pain, palpitations, hypo/hypertension  Resp:  No dyspnea, cough, tachypnea, wheezing  GI:  No pain, No nausea, No vomiting, No diarrhea, No constipation, No weight loss, No fever, No pruritis, No rectal bleeding, No tarry stools, No dysphagia,  :  No pain, bleeding, incontinence, nocturia  Muscle:  No pain, weakness  Neuro:  No weakness, tingling, memory problems  Psych:  No fatigue, insomnia, mood problems, depression  Endocrine:  No polyuria, polydipsia, cold/heat intolerance  Heme:  No petechiae, ecchymosis, easy bruisability  Skin:  No rash, tattoos, scars, edema      PHYSICAL EXAM:   Vital Signs:  Vital Signs Last 24 Hrs  T(C): 36.8 (16 Feb 2025 11:50), Max: 36.9 (15 Feb 2025 21:17)  T(F): 98.3 (16 Feb 2025 11:50), Max: 98.5 (15 Feb 2025 21:17)  HR: 77 (16 Feb 2025 11:50) (71 - 77)  BP: 134/77 (16 Feb 2025 11:50) (134/77 - 164/75)  BP(mean): --  RR: 18 (16 Feb 2025 11:50) (18 - 18)  SpO2: 97% (16 Feb 2025 11:50) (97% - 100%)    Parameters below as of 16 Feb 2025 11:50  Patient On (Oxygen Delivery Method): room air      Daily     Daily I&O's Summary    16 Feb 2025 07:01  -  16 Feb 2025 12:42  --------------------------------------------------------  IN: 240 mL / OUT: 0 mL / NET: 240 mL        GENERAL:  Appears stated age, well-groomed, well-nourished, no distress  HEENT:  NC/AT,  conjunctivae clear and pink, no thyromegaly, nodules, adenopathy, no JVD, sclera -anicteric  CHEST:  Full & symmetric excursion, no increased effort, breath sounds clear  HEART:  Regular rhythm, S1, S2, no murmur/rub/S3/S4, no abdominal bruit, no edema  ABDOMEN:  Soft, non-tender, non-distended, normoactive bowel sounds,  no masses ,no hepato-splenomegaly, no signs of chronic liver disease  EXTEREMITIES:  no cyanosis,clubbing or edema  SKIN:  No rash/erythema/ecchymoses/petechiae/wounds/abscess/warm/dry  NEURO:  Alert, oriented, no asterixis, no tremor, no encephalopathy      LABS:                        8.5    5.32  )-----------( 420      ( 16 Feb 2025 07:10 )             28.4     02-16    141  |  105  |  12  ----------------------------<  98  4.0   |  21[L]  |  1.25    Ca    9.7      16 Feb 2025 07:09        Urinalysis Basic - ( 16 Feb 2025 07:09 )    Color: x / Appearance: x / SG: x / pH: x  Gluc: 98 mg/dL / Ketone: x  / Bili: x / Urobili: x   Blood: x / Protein: x / Nitrite: x   Leuk Esterase: x / RBC: x / WBC x   Sq Epi: x / Non Sq Epi: x / Bacteria: x      amylase   lipase  RADIOLOGY & ADDITIONAL TESTS:

## 2025-02-18 NOTE — PROGRESS NOTE ADULT - SUBJECTIVE AND OBJECTIVE BOX
Patient is a 79y old  Female who presents with a chief complaint of Family history of other mental and behavioural disorders     (14 Feb 2025 14:59)      SUBJECTIVE / OVERNIGHT EVENTS: NM c/w gastroparesis.     MEDICATIONS  (STANDING):  amLODIPine   Tablet 10 milliGRAM(s) Oral daily  brimonidine 0.2% Ophthalmic Solution 1 Drop(s) Both EYES three times a day  dorzolamide 2%/timolol 0.5% Ophthalmic Solution 1 Drop(s) Both EYES two times a day  heparin   Injectable 5000 Unit(s) SubCutaneous every 12 hours  influenza  Vaccine (HIGH DOSE) 0.5 milliLiter(s) IntraMuscular once  iron sucrose IVPB 200 milliGRAM(s) IV Intermittent every 24 hours  lactulose Syrup 30 Gram(s) Oral every 4 hours  latanoprost 0.005% Ophthalmic Solution 1 Drop(s) Both EYES at bedtime  levothyroxine 100 MICROGram(s) Oral daily  pantoprazole  Injectable 40 milliGRAM(s) IV Push two times a day  polyethylene glycol/electrolyte Solution 1 Liter(s) Oral every 4 hours  sodium chloride 0.9%. 500 milliLiter(s) (125 mL/Hr) IV Continuous <Continuous>    MEDICATIONS  (PRN):  ondansetron Injectable 4 milliGRAM(s) IV Push every 6 hours PRN Nausea and/or Vomiting      Vital Signs Last 24 Hrs  T(F): 97.6 (02-18-25 @ 12:34), Max: 98.1 (02-17-25 @ 21:34)  HR: 77 (02-18-25 @ 12:34) (68 - 77)  BP: 113/71 (02-18-25 @ 12:34) (113/71 - 130/75)  RR: 18 (02-18-25 @ 12:34) (18 - 18)  SpO2: 99% (02-18-25 @ 12:34) (98% - 99%)  Telemetry:   CAPILLARY BLOOD GLUCOSE      POCT Blood Glucose.: 98 mg/dL (18 Feb 2025 12:27)  POCT Blood Glucose.: 96 mg/dL (18 Feb 2025 08:58)  POCT Blood Glucose.: 96 mg/dL (18 Feb 2025 06:27)  POCT Blood Glucose.: 100 mg/dL (17 Feb 2025 17:34)    I&O's Summary    17 Feb 2025 07:01  -  18 Feb 2025 07:00  --------------------------------------------------------  IN: 480 mL / OUT: 0 mL / NET: 480 mL        PHYSICAL EXAM:  GENERAL: NAD, well-developed  HEAD:  Atraumatic, Normocephalic  EYES: EOMI, PERRLA, conjunctiva and sclera clear  NECK: Supple, No JVD  CHEST/LUNG: Clear to auscultation bilaterally; No wheeze  HEART: Regular rate and rhythm; No murmurs, rubs, or gallops  ABDOMEN: Soft, Nontender, Nondistended; Bowel sounds present  EXTREMITIES:  2+ Peripheral Pulses, No clubbing, cyanosis, or edema  PSYCH: AAOx3  NEUROLOGY: non-focal  SKIN: No rashes or lesions    LABS:                        8.3    5.76  )-----------( 400      ( 18 Feb 2025 06:55 )             27.5     02-18    137  |  107  |  17  ----------------------------<  87  3.9   |  19[L]  |  1.27    Ca    9.5      18 Feb 2025 06:56            Urinalysis Basic - ( 18 Feb 2025 06:56 )    Color: x / Appearance: x / SG: x / pH: x  Gluc: 87 mg/dL / Ketone: x  / Bili: x / Urobili: x   Blood: x / Protein: x / Nitrite: x   Leuk Esterase: x / RBC: x / WBC x   Sq Epi: x / Non Sq Epi: x / Bacteria: x        RADIOLOGY & ADDITIONAL TESTS:    Imaging Personally Reviewed:    Consultant(s) Notes Reviewed:      Care Discussed with Consultants/Other Providers:

## 2025-02-19 LAB
ACANTHOCYTES BLD QL SMEAR: SLIGHT — SIGNIFICANT CHANGE UP
ALBUMIN SERPL ELPH-MCNC: 2.7 G/DL — LOW (ref 3.3–5)
ALP SERPL-CCNC: 44 U/L — SIGNIFICANT CHANGE UP (ref 40–120)
ALT FLD-CCNC: 10 U/L — SIGNIFICANT CHANGE UP (ref 10–45)
ANION GAP SERPL CALC-SCNC: 17 MMOL/L — SIGNIFICANT CHANGE UP (ref 5–17)
ANION GAP SERPL CALC-SCNC: 19 MMOL/L — HIGH (ref 5–17)
ANION GAP SERPL CALC-SCNC: 24 MMOL/L — HIGH (ref 5–17)
ANISOCYTOSIS BLD QL: SLIGHT — SIGNIFICANT CHANGE UP
APTT BLD: 119.2 SEC — HIGH (ref 24.5–35.6)
APTT BLD: 38 SEC — HIGH (ref 24.5–35.6)
AST SERPL-CCNC: 15 U/L — SIGNIFICANT CHANGE UP (ref 10–40)
BASOPHILS # BLD AUTO: 0.02 K/UL — SIGNIFICANT CHANGE UP (ref 0–0.2)
BASOPHILS NFR BLD AUTO: 0.9 % — SIGNIFICANT CHANGE UP (ref 0–2)
BILIRUB SERPL-MCNC: 0.3 MG/DL — SIGNIFICANT CHANGE UP (ref 0.2–1.2)
BLD GP AB SCN SERPL QL: NEGATIVE — SIGNIFICANT CHANGE UP
BUN SERPL-MCNC: 13 MG/DL — SIGNIFICANT CHANGE UP (ref 7–23)
BUN SERPL-MCNC: 13 MG/DL — SIGNIFICANT CHANGE UP (ref 7–23)
BUN SERPL-MCNC: 17 MG/DL — SIGNIFICANT CHANGE UP (ref 7–23)
CALCIUM SERPL-MCNC: 10.2 MG/DL — SIGNIFICANT CHANGE UP (ref 8.4–10.5)
CALCIUM SERPL-MCNC: 6.9 MG/DL — LOW (ref 8.4–10.5)
CALCIUM SERPL-MCNC: 8.7 MG/DL — SIGNIFICANT CHANGE UP (ref 8.4–10.5)
CHLORIDE SERPL-SCNC: 106 MMOL/L — SIGNIFICANT CHANGE UP (ref 96–108)
CHLORIDE SERPL-SCNC: 110 MMOL/L — HIGH (ref 96–108)
CHLORIDE SERPL-SCNC: 116 MMOL/L — HIGH (ref 96–108)
CO2 SERPL-SCNC: 12 MMOL/L — LOW (ref 22–31)
CO2 SERPL-SCNC: 14 MMOL/L — LOW (ref 22–31)
CO2 SERPL-SCNC: 19 MMOL/L — LOW (ref 22–31)
CREAT SERPL-MCNC: 0.8 MG/DL — SIGNIFICANT CHANGE UP (ref 0.5–1.3)
CREAT SERPL-MCNC: 0.93 MG/DL — SIGNIFICANT CHANGE UP (ref 0.5–1.3)
CREAT SERPL-MCNC: 1.22 MG/DL — SIGNIFICANT CHANGE UP (ref 0.5–1.3)
DACRYOCYTES BLD QL SMEAR: SLIGHT — SIGNIFICANT CHANGE UP
EGFR: 45 ML/MIN/1.73M2 — LOW
EGFR: 62 ML/MIN/1.73M2 — SIGNIFICANT CHANGE UP
EGFR: 74 ML/MIN/1.73M2 — SIGNIFICANT CHANGE UP
ELLIPTOCYTES BLD QL SMEAR: SLIGHT — SIGNIFICANT CHANGE UP
EOSINOPHIL # BLD AUTO: 0 K/UL — SIGNIFICANT CHANGE UP (ref 0–0.5)
EOSINOPHIL NFR BLD AUTO: 0 % — SIGNIFICANT CHANGE UP (ref 0–6)
GAS PNL BLDA: SIGNIFICANT CHANGE UP
GAS PNL BLDA: SIGNIFICANT CHANGE UP
GIANT PLATELETS BLD QL SMEAR: PRESENT — SIGNIFICANT CHANGE UP
GLUCOSE BLDC GLUCOMTR-MCNC: 122 MG/DL — HIGH (ref 70–99)
GLUCOSE BLDC GLUCOMTR-MCNC: 190 MG/DL — HIGH (ref 70–99)
GLUCOSE SERPL-MCNC: 110 MG/DL — HIGH (ref 70–99)
GLUCOSE SERPL-MCNC: 159 MG/DL — HIGH (ref 70–99)
GLUCOSE SERPL-MCNC: 197 MG/DL — HIGH (ref 70–99)
HCT VFR BLD CALC: 28.8 % — LOW (ref 34.5–45)
HCT VFR BLD CALC: 30.6 % — LOW (ref 34.5–45)
HCT VFR BLD CALC: 30.9 % — LOW (ref 34.5–45)
HGB BLD-MCNC: 8.8 G/DL — LOW (ref 11.5–15.5)
HGB BLD-MCNC: 9.5 G/DL — LOW (ref 11.5–15.5)
HGB BLD-MCNC: 9.6 G/DL — LOW (ref 11.5–15.5)
HYPOCHROMIA BLD QL: SLIGHT — SIGNIFICANT CHANGE UP
INR BLD: 1.04 RATIO — SIGNIFICANT CHANGE UP (ref 0.85–1.16)
INR BLD: 1.29 RATIO — HIGH (ref 0.85–1.16)
LACTATE SERPL-SCNC: 4.2 MMOL/L — CRITICAL HIGH (ref 0.5–2)
LYMPHOCYTES # BLD AUTO: 0.4 K/UL — LOW (ref 1–3.3)
LYMPHOCYTES # BLD AUTO: 23.7 % — SIGNIFICANT CHANGE UP (ref 13–44)
MACROCYTES BLD QL: SLIGHT — SIGNIFICANT CHANGE UP
MAGNESIUM SERPL-MCNC: 1.1 MG/DL — LOW (ref 1.6–2.6)
MAGNESIUM SERPL-MCNC: 1.4 MG/DL — LOW (ref 1.6–2.6)
MAGNESIUM SERPL-MCNC: 1.9 MG/DL — SIGNIFICANT CHANGE UP (ref 1.6–2.6)
MANUAL SMEAR VERIFICATION: SIGNIFICANT CHANGE UP
MCHC RBC-ENTMCNC: 23.2 PG — LOW (ref 27–34)
MCHC RBC-ENTMCNC: 23.6 PG — LOW (ref 27–34)
MCHC RBC-ENTMCNC: 24 PG — LOW (ref 27–34)
MCHC RBC-ENTMCNC: 30.6 G/DL — LOW (ref 32–36)
MCHC RBC-ENTMCNC: 31 G/DL — LOW (ref 32–36)
MCHC RBC-ENTMCNC: 31.1 G/DL — LOW (ref 32–36)
MCV RBC AUTO: 75.8 FL — LOW (ref 80–100)
MCV RBC AUTO: 75.9 FL — LOW (ref 80–100)
MCV RBC AUTO: 77.3 FL — LOW (ref 80–100)
METAMYELOCYTES # FLD: 2.6 % — HIGH (ref 0–0)
METAMYELOCYTES NFR BLD: 2.6 % — HIGH (ref 0–0)
MICROCYTES BLD QL: SLIGHT — SIGNIFICANT CHANGE UP
MONOCYTES # BLD AUTO: 0.02 K/UL — SIGNIFICANT CHANGE UP (ref 0–0.9)
MONOCYTES NFR BLD AUTO: 0.9 % — LOW (ref 2–14)
NEUTROPHILS # BLD AUTO: 1.2 K/UL — LOW (ref 1.8–7.4)
NEUTROPHILS NFR BLD AUTO: 48.2 % — SIGNIFICANT CHANGE UP (ref 43–77)
NEUTS BAND # BLD: 23.7 % — HIGH (ref 0–8)
NEUTS BAND NFR BLD: 23.7 % — HIGH (ref 0–8)
NRBC BLD AUTO-RTO: 0 /100 WBCS — SIGNIFICANT CHANGE UP (ref 0–0)
NRBC BLD AUTO-RTO: 0 /100 WBCS — SIGNIFICANT CHANGE UP (ref 0–0)
OVALOCYTES BLD QL SMEAR: SLIGHT — SIGNIFICANT CHANGE UP
PHOSPHATE SERPL-MCNC: 14.1 MG/DL — HIGH (ref 2.5–4.5)
PHOSPHATE SERPL-MCNC: 2 MG/DL — LOW (ref 2.5–4.5)
PHOSPHATE SERPL-MCNC: 2.9 MG/DL — SIGNIFICANT CHANGE UP (ref 2.5–4.5)
PLAT MORPH BLD: ABNORMAL
PLATELET # BLD AUTO: 323 K/UL — SIGNIFICANT CHANGE UP (ref 150–400)
PLATELET # BLD AUTO: 360 K/UL — SIGNIFICANT CHANGE UP (ref 150–400)
PLATELET # BLD AUTO: 408 K/UL — HIGH (ref 150–400)
POIKILOCYTOSIS BLD QL AUTO: SLIGHT — SIGNIFICANT CHANGE UP
POTASSIUM SERPL-MCNC: 3.1 MMOL/L — LOW (ref 3.5–5.3)
POTASSIUM SERPL-MCNC: 3.3 MMOL/L — LOW (ref 3.5–5.3)
POTASSIUM SERPL-MCNC: 3.6 MMOL/L — SIGNIFICANT CHANGE UP (ref 3.5–5.3)
POTASSIUM SERPL-SCNC: 3.1 MMOL/L — LOW (ref 3.5–5.3)
POTASSIUM SERPL-SCNC: 3.3 MMOL/L — LOW (ref 3.5–5.3)
POTASSIUM SERPL-SCNC: 3.6 MMOL/L — SIGNIFICANT CHANGE UP (ref 3.5–5.3)
PROT SERPL-MCNC: 5.4 G/DL — LOW (ref 6–8.3)
PROTHROM AB SERPL-ACNC: 11.9 SEC — SIGNIFICANT CHANGE UP (ref 9.9–13.4)
PROTHROM AB SERPL-ACNC: 14.8 SEC — HIGH (ref 9.9–13.4)
RBC # BLD: 3.8 M/UL — SIGNIFICANT CHANGE UP (ref 3.8–5.2)
RBC # BLD: 3.96 M/UL — SIGNIFICANT CHANGE UP (ref 3.8–5.2)
RBC # BLD: 4.07 M/UL — SIGNIFICANT CHANGE UP (ref 3.8–5.2)
RBC # FLD: 19.5 % — HIGH (ref 10.3–14.5)
RBC # FLD: 19.6 % — HIGH (ref 10.3–14.5)
RBC # FLD: 20 % — HIGH (ref 10.3–14.5)
RBC BLD AUTO: ABNORMAL
RH IG SCN BLD-IMP: POSITIVE — SIGNIFICANT CHANGE UP
SODIUM SERPL-SCNC: 141 MMOL/L — SIGNIFICANT CHANGE UP (ref 135–145)
SODIUM SERPL-SCNC: 144 MMOL/L — SIGNIFICANT CHANGE UP (ref 135–145)
SODIUM SERPL-SCNC: 152 MMOL/L — HIGH (ref 135–145)
TARGETS BLD QL SMEAR: SLIGHT — SIGNIFICANT CHANGE UP
WBC # BLD: 1.67 K/UL — LOW (ref 3.8–10.5)
WBC # BLD: 2.54 K/UL — LOW (ref 3.8–10.5)
WBC # BLD: 6.2 K/UL — SIGNIFICANT CHANGE UP (ref 3.8–10.5)
WBC # FLD AUTO: 1.67 K/UL — LOW (ref 3.8–10.5)
WBC # FLD AUTO: 2.54 K/UL — LOW (ref 3.8–10.5)
WBC # FLD AUTO: 6.2 K/UL — SIGNIFICANT CHANGE UP (ref 3.8–10.5)

## 2025-02-19 PROCEDURE — 71045 X-RAY EXAM CHEST 1 VIEW: CPT | Mod: 26

## 2025-02-19 PROCEDURE — 99223 1ST HOSP IP/OBS HIGH 75: CPT | Mod: GC

## 2025-02-19 DEVICE — NET RETRV ROT ROTH 2.5MMX230CM: Type: IMPLANTABLE DEVICE | Status: FUNCTIONAL

## 2025-02-19 RX ORDER — ACETAMINOPHEN 500 MG/5ML
1000 LIQUID (ML) ORAL ONCE
Refills: 0 | Status: DISCONTINUED | OUTPATIENT
Start: 2025-02-19 | End: 2025-02-26

## 2025-02-19 RX ORDER — IPRATROPIUM BROMIDE AND ALBUTEROL SULFATE .5; 2.5 MG/3ML; MG/3ML
3 SOLUTION RESPIRATORY (INHALATION) ONCE
Refills: 0 | Status: COMPLETED | OUTPATIENT
Start: 2025-02-19 | End: 2025-02-19

## 2025-02-19 RX ORDER — POTASSIUM PHOSPHATE, MONOBASIC POTASSIUM PHOSPHATE, DIBASIC INJECTION, 236; 224 MG/ML; MG/ML
30 SOLUTION, CONCENTRATE INTRAVENOUS ONCE
Refills: 0 | Status: COMPLETED | OUTPATIENT
Start: 2025-02-19 | End: 2025-02-19

## 2025-02-19 RX ORDER — VANCOMYCIN HCL IN 5 % DEXTROSE 1.5G/250ML
1000 PLASTIC BAG, INJECTION (ML) INTRAVENOUS ONCE
Refills: 0 | Status: COMPLETED | OUTPATIENT
Start: 2025-02-19 | End: 2025-02-19

## 2025-02-19 RX ORDER — PIPERACILLIN-TAZO-DEXTROSE,ISO 3.375G/5
3.38 IV SOLUTION, PIGGYBACK PREMIX FROZEN(ML) INTRAVENOUS ONCE
Refills: 0 | Status: COMPLETED | OUTPATIENT
Start: 2025-02-19 | End: 2025-02-19

## 2025-02-19 RX ORDER — VANCOMYCIN HCL IN 5 % DEXTROSE 1.5G/250ML
PLASTIC BAG, INJECTION (ML) INTRAVENOUS
Refills: 0 | Status: DISCONTINUED | OUTPATIENT
Start: 2025-02-19 | End: 2025-02-25

## 2025-02-19 RX ORDER — MAGNESIUM SULFATE 500 MG/ML
1 SYRINGE (ML) INJECTION ONCE
Refills: 0 | Status: COMPLETED | OUTPATIENT
Start: 2025-02-19 | End: 2025-02-19

## 2025-02-19 RX ORDER — VANCOMYCIN HCL IN 5 % DEXTROSE 1.5G/250ML
1000 PLASTIC BAG, INJECTION (ML) INTRAVENOUS EVERY 24 HOURS
Refills: 0 | Status: DISCONTINUED | OUTPATIENT
Start: 2025-02-20 | End: 2025-02-25

## 2025-02-19 RX ORDER — PIPERACILLIN-TAZO-DEXTROSE,ISO 3.375G/5
3.38 IV SOLUTION, PIGGYBACK PREMIX FROZEN(ML) INTRAVENOUS EVERY 8 HOURS
Refills: 0 | Status: COMPLETED | OUTPATIENT
Start: 2025-02-19 | End: 2025-02-26

## 2025-02-19 RX ADMIN — Medication 25 GRAM(S): at 18:22

## 2025-02-19 RX ADMIN — IPRATROPIUM BROMIDE AND ALBUTEROL SULFATE 3 MILLILITER(S): .5; 2.5 SOLUTION RESPIRATORY (INHALATION) at 12:40

## 2025-02-19 RX ADMIN — Medication 250 MILLILITER(S): at 16:18

## 2025-02-19 RX ADMIN — Medication 4 MILLIGRAM(S): at 00:33

## 2025-02-19 RX ADMIN — Medication 40 MILLIGRAM(S): at 05:41

## 2025-02-19 RX ADMIN — Medication 40 MILLIGRAM(S): at 18:22

## 2025-02-19 RX ADMIN — Medication 125 MILLILITER(S): at 23:16

## 2025-02-19 RX ADMIN — BRIMONIDINE TARTRATE 1 DROP(S): 1.5 SOLUTION/ DROPS OPHTHALMIC at 05:41

## 2025-02-19 RX ADMIN — HEPARIN SODIUM 5000 UNIT(S): 1000 INJECTION INTRAVENOUS; SUBCUTANEOUS at 18:22

## 2025-02-19 RX ADMIN — Medication 1000 MILLIGRAM(S): at 14:43

## 2025-02-19 RX ADMIN — BRIMONIDINE TARTRATE 1 DROP(S): 1.5 SOLUTION/ DROPS OPHTHALMIC at 22:12

## 2025-02-19 RX ADMIN — HEPARIN SODIUM 5000 UNIT(S): 1000 INJECTION INTRAVENOUS; SUBCUTANEOUS at 05:42

## 2025-02-19 RX ADMIN — Medication 200 GRAM(S): at 13:00

## 2025-02-19 RX ADMIN — POTASSIUM PHOSPHATE, MONOBASIC POTASSIUM PHOSPHATE, DIBASIC INJECTION, 83.33 MILLIMOLE(S): 236; 224 SOLUTION, CONCENTRATE INTRAVENOUS at 17:19

## 2025-02-19 RX ADMIN — LATANOPROST PF 1 DROP(S): 0.05 SOLUTION/ DROPS OPHTHALMIC at 22:12

## 2025-02-19 RX ADMIN — Medication 4 MILLIGRAM(S): at 06:02

## 2025-02-19 RX ADMIN — DORZOLAMIDE HYDROCHLORIDE AND TIMOLOL MALEATE 1 DROP(S): 20; 5 SOLUTION/ DROPS OPHTHALMIC at 05:41

## 2025-02-19 RX ADMIN — DORZOLAMIDE HYDROCHLORIDE AND TIMOLOL MALEATE 1 DROP(S): 20; 5 SOLUTION/ DROPS OPHTHALMIC at 20:11

## 2025-02-19 RX ADMIN — Medication 100 GRAM(S): at 16:39

## 2025-02-19 NOTE — CHART NOTE - NSCHARTNOTEFT_GEN_A_CORE
Informed by GI Dr. Cifuentes RRT called in endoscopy suite for hypoxia. Please refer to RRT note for further details. Attending Dr. De La Cruz was notified recommending MICU consult (see note for recs.) Patient's daughter was updated with event via telephone, all questions answered to best of my ability. Patient requires  monitoring while on HFNC - awaiting bed on 6T. Sign out given to 6T ACP for continuation of care. Informed by GI Dr. Cifuentes RRT called in endoscopy suite for hypoxia. Please refer to RRT note for further details. Attending Dr. De La Cruz was notified recommending MICU consult (see note for recs.) Patient's daughter was updated with event via telephone, all questions answered to best of my ability. Patient requires  monitoring while on HFNC - thus transferred to 6T. Sign out given to 6T ACP for continuation of care.

## 2025-02-19 NOTE — PRE PROCEDURE NOTE - PRE PROCEDURE EVALUATION
Attending Physician:                            Procedure: egd/colon    Indication for Procedure: anemia/gastroparesis  ________________________________________________________  PAST MEDICAL & SURGICAL HISTORY:  Glaucoma      HTN (hypertension)      Type II diabetes mellitus      Multiple thyroid nodules      Hyperthyroidism      Obese      H/O: hysterectomy  "a long time ago when I was in my 30's"      H/O         ALLERGIES:  No Known Allergies    HOME MEDICATIONS:  amLODIPine 10 mg oral tablet: 1 tab(s) orally once a day (in the morning)  brimonidine 0.2% ophthalmic solution: 1 drop(s) in each affected eye 3 times a day  dorzolamide-timolol 2.23%-0.68% (2%-0.5% base) ophthalmic solution: 1 drop(s) in each eye 2 times a day  latanoprost 0.005% ophthalmic solution: 1 drop(s) in each eye once a day (in the evening)  levothyroxine 100 mcg (0.1 mg) oral tablet: 1 tab(s) orally once a day (in the morning)  metFORMIN 500 mg oral tablet: 1 tab(s) orally once a day with breakfast  pantoprazole 40 mg oral delayed release tablet: 1 tab(s) orally once a day (in the morning)  Vitamin/Supplement: Vitamin B12 tablet, Centrum Silver tablet, Calcium tablet: 1 tablet orally once a day    AICD/PPM: [ ] yes   [ ] no    PERTINENT LAB DATA:                        9.5    6.20  )-----------( 408      ( 2025 07:28 )             30.6     02-19    144  |  106  |  13  ----------------------------<  110[H]  3.6   |  19[L]  |  0.93    Ca    10.2      2025 07:28  Phos  2.9     02-19  Mg     1.9     02-19      PT/INR - ( 2025 07:28 )   PT: 11.9 sec;   INR: 1.04 ratio         PTT - ( 2025 07:28 )  PTT:38.0 sec            PHYSICAL EXAMINATION:    T(C): 36.9  HR: 95  BP: 132/69  RR: 16  SpO2: 99%    Constitutional: NAD  HEENT: PERRLA, EOMI,    Neck:  No JVD  Respiratory: CTAB/L  Cardiovascular: S1 and S2  Gastrointestinal: BS+, soft, NT/ND  Extremities: No peripheral edema  Neurological: A/O x 3, no focal deficits  Psychiatric: Normal mood, normal affect  Skin: No rashes    ASA Class: I [ ]  II [ ]  III [ x]  IV [ ]    COMMENTS:    The patient is a suitable candidate for the planned procedure unless box checked [ ]  No, explain:      Risks/benefits/Alternatives including, but not limited to, Bleeding, infection, perforation requiring surgery, and anesthesia risk (see anesthesia consent) all discussed with individual consenting on patients behalf, all questions answered regard procedure to the best of my ability

## 2025-02-19 NOTE — CONSULT NOTE ADULT - ASSESSMENT
HPI: 79F PMHx of diabetes, hypothyroidism, presenting w/ abdominal pain, getting evaluated for gastroparesis. Patient was brought for endoscopy, found to have significant amount of bowel prep in stomach, witnessed aspiration event with patient becoming hypoxic, tachypneic. MICU consulted for further evaluation.     ASSESSMENT:  79F with witnessed aspiration event, now saturating well on RA, comfortable.    PLAN:  - Patient comfortable, no increased WOB, no SOB, RR 16-18 while being evaluated  - Repeat ABG after 1hr on HFNC 40/40, pH, Arterial: 7.38  pH, Blood: x     /  pCO2: 26    /  pO2: 91    / HCO3: 15    / Base Excess: -8.5  /  SaO2: 99.4    - c/w supplemental O2, wean as tolerated  - Place on   - No MICU needs at this time           HPI: 79F PMHx of diabetes, hypothyroidism, presenting w/ abdominal pain, getting evaluated for gastroparesis. Patient was brought for endoscopy, found to have significant amount of bowel prep in stomach, witnessed aspiration event with patient becoming hypoxic, tachypneic. MICU consulted for further evaluation.     ASSESSMENT:  79F with witnessed aspiration event, now saturating well on HFNC, comfortable.    PLAN:  - Patient comfortable, no increased WOB, no SOB, RR 16-18 while being evaluated  - Repeat ABG after 1hr on HFNC 40/40, pH, Arterial: 7.38  pH, Blood: x     /  pCO2: 26    /  pO2: 91    / HCO3: 15    / Base Excess: -8.5  /  SaO2: 99.4    - c/w supplemental O2, wean as tolerated  - Place on   - No MICU needs at this time

## 2025-02-19 NOTE — PROGRESS NOTE ADULT - SUBJECTIVE AND OBJECTIVE BOX
spoke to Dr augustina Goel with regards to possible aspirations and need for antibiotics. Dr Goel made aware and will start zosyn and vanco and review the chest xray that we ordered

## 2025-02-19 NOTE — PROVIDER CONTACT NOTE (CRITICAL VALUE NOTIFICATION) - ASSESSMENT
Patient resting comfortably on HFNC.  Vitals stable as charted.  Received total 1L intraop and postop.  Rectal temp 101.5 s/p 1 gram IV tylenol, see eMAR. Patient resting comfortably on HFNC.  Vitals stable as charted.  Received total 1L NS intraop and postop.  Remains febrile, rectal temp 101.5 s/p 1 gram IV tylenol, see eMAR.

## 2025-02-19 NOTE — PROGRESS NOTE ADULT - SUBJECTIVE AND OBJECTIVE BOX
DATE OF SERVICE: 02-19-25 @ 14:13    Patient is a 79y old  Female who presents with a chief complaint of possible aspiration (19 Feb 2025 12:28)      SUBJECTIVE / OVERNIGHT EVENTS:  s/p EGD, large amount of fluid is stomach.  pt aspirated     MEDICATIONS  (STANDING):  acetaminophen   IVPB .. 1000 milliGRAM(s) IV Intermittent once  amLODIPine   Tablet 10 milliGRAM(s) Oral daily  brimonidine 0.2% Ophthalmic Solution 1 Drop(s) Both EYES three times a day  dorzolamide 2%/timolol 0.5% Ophthalmic Solution 1 Drop(s) Both EYES two times a day  heparin   Injectable 5000 Unit(s) SubCutaneous every 12 hours  influenza  Vaccine (HIGH DOSE) 0.5 milliLiter(s) IntraMuscular once  latanoprost 0.005% Ophthalmic Solution 1 Drop(s) Both EYES at bedtime  levothyroxine 100 MICROGram(s) Oral daily  pantoprazole  Injectable 40 milliGRAM(s) IV Push two times a day  piperacillin/tazobactam IVPB.- 3.375 Gram(s) IV Intermittent once  piperacillin/tazobactam IVPB.. 3.375 Gram(s) IV Intermittent every 8 hours  sodium chloride 0.9%. 500 milliLiter(s) (125 mL/Hr) IV Continuous <Continuous>  vancomycin  IVPB      vancomycin  IVPB 1000 milliGRAM(s) IV Intermittent once    MEDICATIONS  (PRN):  ondansetron Injectable 4 milliGRAM(s) IV Push every 6 hours PRN Nausea and/or Vomiting      Vital Signs Last 24 Hrs  T(C): 36.9 (19 Feb 2025 10:48), Max: 37.2 (19 Feb 2025 04:44)  T(F): 98.4 (19 Feb 2025 09:10), Max: 98.9 (19 Feb 2025 04:44)  HR: 123 (19 Feb 2025 13:20) (69 - 123)  BP: 140/76 (19 Feb 2025 13:20) (95/59 - 170/63)  BP(mean): --  RR: 26 (19 Feb 2025 13:20) (16 - 30)  SpO2: 92% (19 Feb 2025 13:20) (91% - 100%)    Parameters below as of 19 Feb 2025 13:20  Patient On (Oxygen Delivery Method): mask, nonrebreather  O2 Flow (L/min): 15    CAPILLARY BLOOD GLUCOSE      POCT Blood Glucose.: 190 mg/dL (19 Feb 2025 13:30)  POCT Blood Glucose.: 122 mg/dL (19 Feb 2025 05:41)  POCT Blood Glucose.: 114 mg/dL (18 Feb 2025 21:27)  POCT Blood Glucose.: 108 mg/dL (18 Feb 2025 17:07)    I&O's Summary      PHYSICAL EXAM:  GENERAL: NAD, well-developed  HEAD:  Atraumatic, Normocephalic  EYES: EOMI, PERRLA, conjunctiva and sclera clear  NECK: Supple, No JVD  CHEST/LUNG: Clear to auscultation bilaterally; No wheeze  HEART: Regular rate and rhythm; No murmurs, rubs, or gallops  ABDOMEN: Soft, Nontender, Nondistended; Bowel sounds present  EXTREMITIES:  2+ Peripheral Pulses, No clubbing, cyanosis, or edema  PSYCH: AAOx3  NEUROLOGY: non-focal  SKIN: No rashes or lesions    LABS:                        9.6    1.67  )-----------( 360      ( 19 Feb 2025 13:46 )             30.9     02-19    144  |  106  |  13  ----------------------------<  110[H]  3.6   |  19[L]  |  0.93    Ca    10.2      19 Feb 2025 07:28  Phos  2.9     02-19  Mg     1.9     02-19      PT/INR - ( 19 Feb 2025 07:28 )   PT: 11.9 sec;   INR: 1.04 ratio         PTT - ( 19 Feb 2025 07:28 )  PTT:38.0 sec      Urinalysis Basic - ( 19 Feb 2025 07:28 )    Color: x / Appearance: x / SG: x / pH: x  Gluc: 110 mg/dL / Ketone: x  / Bili: x / Urobili: x   Blood: x / Protein: x / Nitrite: x   Leuk Esterase: x / RBC: x / WBC x   Sq Epi: x / Non Sq Epi: x / Bacteria: x    < from: NM Gastric Emptying Liquid (02.18.25 @ 09:59) >      INTERPRETATION:  RADIOPHARMACEUTICAL : 210 microcuries sulfur colloid in   300 cc water.    CLINICAL INFORMATION: 79 year-old female with vomiting; referred  to  be    evaluated  for  gastroparesis.    MEDICATIONS: Within  24  hours  before  the  test,  the  patient  was    not  taking  any medications,  which  could  affect  the  test  results.    TECHNIQUE: Immediately before the beginning of the study, the patient's   fasting blood sugar was measured and was 96 mg/dL. Dynamic imaging of the   abdomen was performed in the [place] projection for 30 minutes following the   oral ingestion of 300 water mixed with Tc-99m sulfur colloid. There was   no postprandial vomiting.    COMPARISON: No previous gastric emptying studies were available for   comparison.    FINDINGS:    The T-1/2 emptying of gastric activity was 30 minutes (normal: 20 minutes   or less).    Visual inspection of the images adequate.    IMPRESSION:    Abnormal liquid gastric emptying scintigraphy.    Findings suggestive of delayed liquid gastric emptying.    --- End of Report ---    < end of copied text >  < from: Upper Endoscopy (02.19.25 @ 07:38) >  Findings:       The examined esophagus was normal.       Excessive fluid was found in the gastric fundus. Fluid aspiration was performed through the        scope suction channel. The amount of fluid collected was 1650.       Mild inflammation was found in the gastric antrum. Area was successfully injected with 200        units botulinum toxin.       The examined duodenum was normal.                                                                                              Impression:          - Normal esophagus.                       - Excessive gastric fluid. Fluid aspiration performed.                       - Gastritis. Injected with botulinum toxin.                       - Normal examined duodenum.  Recommendation:      - Return patient to hospital tran for ongoing care.                       - Advance diet as tolerated                       - If symptoms do not improve then may need referal for Enterra Device                       - Outpatient colonoscopy once able to tolerate bowel prep for workup of anemia                                                                                                          < end of copied text >      RADIOLOGY & ADDITIONAL TESTS:    Imaging Personally Reviewed:    Consultant(s) Notes Reviewed:      Care Discussed with Consultants/Other Providers:

## 2025-02-19 NOTE — CONSULT NOTE ADULT - ATTENDING COMMENTS
Agree with above. Seen with resident. The patient is comfortable and oxygen saturation is 100% on 40% high flow. All vital signs stable. Not a micu candidate at this time. Please reconsult as needed.

## 2025-02-19 NOTE — PROGRESS NOTE ADULT - SUBJECTIVE AND OBJECTIVE BOX
Post op chest xray appears to suggest a possible aspiration of her colon prep. pt vital signs stable on 2LNC. will contact the primary team to possibly start antibiotics

## 2025-02-19 NOTE — ASU PREOP CHECKLIST - ANTIBIOTIC
"Requested Prescriptions   Pending Prescriptions Disp Refills     escitalopram (LEXAPRO) 5 MG tablet [Pharmacy Med Name: ESCITALOPRAM 5 MG TABLET] 90 tablet 3     Sig: TAKE 1 TABLET (5 MG) BY MOUTH DAILY    SSRIs Protocol Passed    8/22/2018  1:48 AM       Passed - Recent (12 mo) or future (30 days) visit within the authorizing provider's specialty    Patient had office visit in the last 12 months or has a visit in the next 30 days with authorizing provider or within the authorizing provider's specialty.  See \"Patient Info\" tab in inbasket, or \"Choose Columns\" in Meds & Orders section of the refill encounter.           Passed - Patient is age 18 or older       Passed - No active pregnancy on record       Passed - No positive pregnancy test in last 12 months        Last Written Prescription Date:  8/22/18  Last Fill Quantity: 90,  # refills: 3   Last office visit: 8/28/2017 with prescribing provider:  Evan   Future Office Visit:   Next 5 appointments (look out 90 days)     Aug 23, 2018  2:00 PM CDT   [facility] with DARIANA Feliciano CNP   [facility]    [address] [id number]   [phone]                   " n/a

## 2025-02-19 NOTE — RAPID RESPONSE TEAM SUMMARY - NSSITUATIONBACKGROUNDRRT_GEN_ALL_CORE
79-year-old female medical history of pretension, diabetes, hypothyroidism, presenting today for nausea nonbloody vomiting for about 3 weeks now with significant weight loss of 10 pounds that she was able to measure for the past week.  Patient had a recent ED visit about 11 days prior.  Patient was worked up with CT abdomen pelvis with IV contrast that showed no acute intra-abdominal pathology and there was a 6.0 cm left ovarian cystic lesion that was worked up with transvaginal ultrasound and was evaluated by GYN here.  Patient was then sent home after obtaining tumor markers which are reviewed in HIE that was within normal limits.  Patient states that she has not been able to take anything by mouth for the last 3 weeks with significant weight loss.  Patient can drink a little bit of water but this is the extent of how much she can have.  Patient after eating usually feels nauseous and then vomits.  RRT called for hypoxia. Per anesthesia at bedside, pt had 2L fluid in her stomach during upper endoscopy which was suctioned, however pt likely aspirated some. She became hypoxic and nonrebreather placed. ABG drawn confirming hypoxia and A-line placed.

## 2025-02-20 LAB
ALBUMIN SERPL ELPH-MCNC: 3.3 G/DL — SIGNIFICANT CHANGE UP (ref 3.3–5)
ALP SERPL-CCNC: 62 U/L — SIGNIFICANT CHANGE UP (ref 40–120)
ALT FLD-CCNC: 9 U/L — LOW (ref 10–45)
ANION GAP SERPL CALC-SCNC: 11 MMOL/L — SIGNIFICANT CHANGE UP (ref 5–17)
ANION GAP SERPL CALC-SCNC: 14 MMOL/L — SIGNIFICANT CHANGE UP (ref 5–17)
AST SERPL-CCNC: 12 U/L — SIGNIFICANT CHANGE UP (ref 10–40)
BILIRUB SERPL-MCNC: 0.5 MG/DL — SIGNIFICANT CHANGE UP (ref 0.2–1.2)
BUN SERPL-MCNC: 20 MG/DL — SIGNIFICANT CHANGE UP (ref 7–23)
BUN SERPL-MCNC: 21 MG/DL — SIGNIFICANT CHANGE UP (ref 7–23)
CALCIUM SERPL-MCNC: 8.4 MG/DL — SIGNIFICANT CHANGE UP (ref 8.4–10.5)
CALCIUM SERPL-MCNC: 8.7 MG/DL — SIGNIFICANT CHANGE UP (ref 8.4–10.5)
CHLORIDE SERPL-SCNC: 109 MMOL/L — HIGH (ref 96–108)
CHLORIDE SERPL-SCNC: 112 MMOL/L — HIGH (ref 96–108)
CO2 SERPL-SCNC: 17 MMOL/L — LOW (ref 22–31)
CO2 SERPL-SCNC: 20 MMOL/L — LOW (ref 22–31)
CREAT SERPL-MCNC: 1.09 MG/DL — SIGNIFICANT CHANGE UP (ref 0.5–1.3)
CREAT SERPL-MCNC: 1.31 MG/DL — HIGH (ref 0.5–1.3)
EGFR: 41 ML/MIN/1.73M2 — LOW
EGFR: 51 ML/MIN/1.73M2 — LOW
GLUCOSE BLDC GLUCOMTR-MCNC: 126 MG/DL — HIGH (ref 70–99)
GLUCOSE BLDC GLUCOMTR-MCNC: 175 MG/DL — HIGH (ref 70–99)
GLUCOSE SERPL-MCNC: 120 MG/DL — HIGH (ref 70–99)
GLUCOSE SERPL-MCNC: 165 MG/DL — HIGH (ref 70–99)
HCT VFR BLD CALC: 27.9 % — LOW (ref 34.5–45)
HGB BLD-MCNC: 8.2 G/DL — LOW (ref 11.5–15.5)
LACTATE SERPL-SCNC: 2.7 MMOL/L — HIGH (ref 0.5–2)
LACTATE SERPL-SCNC: 3.6 MMOL/L — HIGH (ref 0.5–2)
MAGNESIUM SERPL-MCNC: 1.8 MG/DL — SIGNIFICANT CHANGE UP (ref 1.6–2.6)
MCHC RBC-ENTMCNC: 23.2 PG — LOW (ref 27–34)
MCHC RBC-ENTMCNC: 29.4 G/DL — LOW (ref 32–36)
MCV RBC AUTO: 79 FL — LOW (ref 80–100)
NRBC BLD AUTO-RTO: 0 /100 WBCS — SIGNIFICANT CHANGE UP (ref 0–0)
PHOSPHATE SERPL-MCNC: 4.1 MG/DL — SIGNIFICANT CHANGE UP (ref 2.5–4.5)
PLATELET # BLD AUTO: 262 K/UL — SIGNIFICANT CHANGE UP (ref 150–400)
POTASSIUM SERPL-MCNC: 3.9 MMOL/L — SIGNIFICANT CHANGE UP (ref 3.5–5.3)
POTASSIUM SERPL-MCNC: 4.1 MMOL/L — SIGNIFICANT CHANGE UP (ref 3.5–5.3)
POTASSIUM SERPL-SCNC: 3.9 MMOL/L — SIGNIFICANT CHANGE UP (ref 3.5–5.3)
POTASSIUM SERPL-SCNC: 4.1 MMOL/L — SIGNIFICANT CHANGE UP (ref 3.5–5.3)
PROT SERPL-MCNC: 6.5 G/DL — SIGNIFICANT CHANGE UP (ref 6–8.3)
RBC # BLD: 3.53 M/UL — LOW (ref 3.8–5.2)
RBC # FLD: 20.7 % — HIGH (ref 10.3–14.5)
SODIUM SERPL-SCNC: 140 MMOL/L — SIGNIFICANT CHANGE UP (ref 135–145)
SODIUM SERPL-SCNC: 143 MMOL/L — SIGNIFICANT CHANGE UP (ref 135–145)
WBC # BLD: 13.14 K/UL — HIGH (ref 3.8–10.5)
WBC # FLD AUTO: 13.14 K/UL — HIGH (ref 3.8–10.5)

## 2025-02-20 RX ADMIN — Medication 40 MILLIGRAM(S): at 17:16

## 2025-02-20 RX ADMIN — DORZOLAMIDE HYDROCHLORIDE AND TIMOLOL MALEATE 1 DROP(S): 20; 5 SOLUTION/ DROPS OPHTHALMIC at 06:01

## 2025-02-20 RX ADMIN — Medication 100 MICROGRAM(S): at 06:00

## 2025-02-20 RX ADMIN — LATANOPROST PF 1 DROP(S): 0.05 SOLUTION/ DROPS OPHTHALMIC at 21:23

## 2025-02-20 RX ADMIN — HEPARIN SODIUM 5000 UNIT(S): 1000 INJECTION INTRAVENOUS; SUBCUTANEOUS at 17:15

## 2025-02-20 RX ADMIN — AMLODIPINE BESYLATE 10 MILLIGRAM(S): 10 TABLET ORAL at 06:00

## 2025-02-20 RX ADMIN — BRIMONIDINE TARTRATE 1 DROP(S): 1.5 SOLUTION/ DROPS OPHTHALMIC at 21:23

## 2025-02-20 RX ADMIN — Medication 25 GRAM(S): at 01:47

## 2025-02-20 RX ADMIN — BRIMONIDINE TARTRATE 1 DROP(S): 1.5 SOLUTION/ DROPS OPHTHALMIC at 13:18

## 2025-02-20 RX ADMIN — Medication 40 MILLIGRAM(S): at 06:00

## 2025-02-20 RX ADMIN — DORZOLAMIDE HYDROCHLORIDE AND TIMOLOL MALEATE 1 DROP(S): 20; 5 SOLUTION/ DROPS OPHTHALMIC at 17:16

## 2025-02-20 RX ADMIN — BRIMONIDINE TARTRATE 1 DROP(S): 1.5 SOLUTION/ DROPS OPHTHALMIC at 06:00

## 2025-02-20 RX ADMIN — Medication 25 GRAM(S): at 14:58

## 2025-02-20 RX ADMIN — Medication 25 GRAM(S): at 08:21

## 2025-02-20 RX ADMIN — Medication 25 GRAM(S): at 21:23

## 2025-02-20 RX ADMIN — Medication 75 MILLILITER(S): at 15:04

## 2025-02-20 RX ADMIN — HEPARIN SODIUM 5000 UNIT(S): 1000 INJECTION INTRAVENOUS; SUBCUTANEOUS at 06:00

## 2025-02-20 RX ADMIN — Medication 250 MILLIGRAM(S): at 13:20

## 2025-02-20 NOTE — PROGRESS NOTE ADULT - SUBJECTIVE AND OBJECTIVE BOX
DATE OF SERVICE: 02-20-25 @ 13:02    Patient is a 79y old  Female who presents with a chief complaint of possible aspiration (19 Feb 2025 12:28)      SUBJECTIVE / OVERNIGHT EVENTS:  No chest pain. No shortness of breath. No complaints. No events overnight. on high flow O2    MEDICATIONS  (STANDING):  acetaminophen   IVPB .. 1000 milliGRAM(s) IV Intermittent once  amLODIPine   Tablet 10 milliGRAM(s) Oral daily  brimonidine 0.2% Ophthalmic Solution 1 Drop(s) Both EYES three times a day  dorzolamide 2%/timolol 0.5% Ophthalmic Solution 1 Drop(s) Both EYES two times a day  heparin   Injectable 5000 Unit(s) SubCutaneous every 12 hours  influenza  Vaccine (HIGH DOSE) 0.5 milliLiter(s) IntraMuscular once  latanoprost 0.005% Ophthalmic Solution 1 Drop(s) Both EYES at bedtime  levothyroxine 100 MICROGram(s) Oral daily  pantoprazole  Injectable 40 milliGRAM(s) IV Push two times a day  piperacillin/tazobactam IVPB.. 3.375 Gram(s) IV Intermittent every 8 hours  sodium chloride 0.9%. 1000 milliLiter(s) (75 mL/Hr) IV Continuous <Continuous>  vancomycin  IVPB      vancomycin  IVPB 1000 milliGRAM(s) IV Intermittent every 24 hours    MEDICATIONS  (PRN):  ondansetron Injectable 4 milliGRAM(s) IV Push every 6 hours PRN Nausea and/or Vomiting      Vital Signs Last 24 Hrs  T(C): 36.8 (20 Feb 2025 11:24), Max: 38.6 (19 Feb 2025 15:30)  T(F): 98.2 (20 Feb 2025 11:24), Max: 101.5 (19 Feb 2025 15:30)  HR: 96 (20 Feb 2025 11:24) (89 - 123)  BP: 104/60 (20 Feb 2025 11:24) (97/56 - 170/63)  BP(mean): --  RR: 20 (20 Feb 2025 11:24) (16 - 30)  SpO2: 95% (20 Feb 2025 11:24) (87% - 100%)    Parameters below as of 20 Feb 2025 11:24  Patient On (Oxygen Delivery Method): nasal cannula, high flow  O2 Flow (L/min): 40  O2 Concentration (%): 40  CAPILLARY BLOOD GLUCOSE      POCT Blood Glucose.: 190 mg/dL (19 Feb 2025 13:30)    I&O's Summary      PHYSICAL EXAM:  GENERAL: NAD, well-developed  HEAD:  Atraumatic, Normocephalic  EYES: EOMI, PERRLA, conjunctiva and sclera clear  NECK: Supple, No JVD  CHEST/LUNG: Clear to auscultation bilaterally; No wheeze  HEART: Regular rate and rhythm; No murmurs, rubs, or gallops  ABDOMEN: Soft, Nontender, Nondistended; Bowel sounds present  EXTREMITIES:  2+ Peripheral Pulses, No clubbing, cyanosis, or edema  PSYCH: AAOx3  NEUROLOGY: non-focal  SKIN: No rashes or lesions    LABS:                        8.2    13.14 )-----------( 262      ( 20 Feb 2025 04:31 )             27.9     02-20    143  |  112[H]  |  21  ----------------------------<  120[H]  4.1   |  17[L]  |  1.31[H]    Ca    8.4      20 Feb 2025 04:31  Phos  4.1     02-20  Mg     1.8     02-20    TPro  5.4[L]  /  Alb  2.7[L]  /  TBili  0.3  /  DBili  x   /  AST  15  /  ALT  10  /  AlkPhos  44  02-19    PT/INR - ( 19 Feb 2025 13:46 )   PT: 14.8 sec;   INR: 1.29 ratio         PTT - ( 19 Feb 2025 13:46 )  PTT:119.2 sec      Urinalysis Basic - ( 20 Feb 2025 04:31 )    Color: x / Appearance: x / SG: x / pH: x  Gluc: 120 mg/dL / Ketone: x  / Bili: x / Urobili: x   Blood: x / Protein: x / Nitrite: x   Leuk Esterase: x / RBC: x / WBC x   Sq Epi: x / Non Sq Epi: x / Bacteria: x    cx< from: Xray Chest 1 View- PORTABLE-Urgent (Xray Chest 1 View- PORTABLE-Urgent .) (02.19.25 @ 12:27) >    FINDINGS:  Scattered patchy opacities throughout the left lung.  The heart is normal in size.  No focal consolidations  There is no pneumothorax or pleural effusion.  No acute bony abnormality.    IMPRESSION:  Nonspecific scattered patchy opacities throughout the left lung likely   reflecting pneumonia    < end of copied text >      RADIOLOGY & ADDITIONAL TESTS:    Imaging Personally Reviewed:    Consultant(s) Notes Reviewed:      Care Discussed with Consultants/Other Providers:

## 2025-02-20 NOTE — PROGRESS NOTE ADULT - SUBJECTIVE AND OBJECTIVE BOX
INTERVAL HPI/OVERNIGHT EVENTS:    pt s/e  remains on hiflow    Allergies    No Known Allergies    Intolerances          General:  No wt loss, fevers, chills, night sweats, fatigue,   Eyes:  Good vision, no reported pain  ENT:  No sore throat, pain, runny nose, dysphagia  CV:  No pain, palpitations, hypo/hypertension  Resp:  No dyspnea, cough, tachypnea, wheezing  GI:  No pain, No nausea, No vomiting, No diarrhea, No constipation, No weight loss, No fever, No pruritis, No rectal bleeding, No tarry stools, No dysphagia,  :  No pain, bleeding, incontinence, nocturia  Muscle:  No pain, weakness  Neuro:  No weakness, tingling, memory problems  Psych:  No fatigue, insomnia, mood problems, depression  Endocrine:  No polyuria, polydipsia, cold/heat intolerance  Heme:  No petechiae, ecchymosis, easy bruisability  Skin:  No rash, tattoos, scars, edema      PHYSICAL EXAM:   Vital Signs:  Vital Signs Last 24 Hrs  T(C): 36.8 (16 Feb 2025 11:50), Max: 36.9 (15 Feb 2025 21:17)  T(F): 98.3 (16 Feb 2025 11:50), Max: 98.5 (15 Feb 2025 21:17)  HR: 77 (16 Feb 2025 11:50) (71 - 77)  BP: 134/77 (16 Feb 2025 11:50) (134/77 - 164/75)  BP(mean): --  RR: 18 (16 Feb 2025 11:50) (18 - 18)  SpO2: 97% (16 Feb 2025 11:50) (97% - 100%)    Parameters below as of 16 Feb 2025 11:50  Patient On (Oxygen Delivery Method): room air      Daily     Daily I&O's Summary    16 Feb 2025 07:01  -  16 Feb 2025 12:42  --------------------------------------------------------  IN: 240 mL / OUT: 0 mL / NET: 240 mL        GENERAL:  Appears stated age, well-groomed, well-nourished, no distress  HEENT:  NC/AT,  conjunctivae clear and pink, no thyromegaly, nodules, adenopathy, no JVD, sclera -anicteric  CHEST:  Full & symmetric excursion, no increased effort, breath sounds clear  HEART:  Regular rhythm, S1, S2, no murmur/rub/S3/S4, no abdominal bruit, no edema  ABDOMEN:  Soft, non-tender, non-distended, normoactive bowel sounds,  no masses ,no hepato-splenomegaly, no signs of chronic liver disease  EXTEREMITIES:  no cyanosis,clubbing or edema  SKIN:  No rash/erythema/ecchymoses/petechiae/wounds/abscess/warm/dry  NEURO:  Alert, oriented, no asterixis, no tremor, no encephalopathy      LABS:                        8.5    5.32  )-----------( 420      ( 16 Feb 2025 07:10 )             28.4     02-16    141  |  105  |  12  ----------------------------<  98  4.0   |  21[L]  |  1.25    Ca    9.7      16 Feb 2025 07:09        Urinalysis Basic - ( 16 Feb 2025 07:09 )    Color: x / Appearance: x / SG: x / pH: x  Gluc: 98 mg/dL / Ketone: x  / Bili: x / Urobili: x   Blood: x / Protein: x / Nitrite: x   Leuk Esterase: x / RBC: x / WBC x   Sq Epi: x / Non Sq Epi: x / Bacteria: x      amylase   lipase  RADIOLOGY & ADDITIONAL TESTS:

## 2025-02-21 LAB
ALBUMIN SERPL ELPH-MCNC: 2.8 G/DL — LOW (ref 3.3–5)
ALP SERPL-CCNC: 68 U/L — SIGNIFICANT CHANGE UP (ref 40–120)
ALT FLD-CCNC: 8 U/L — LOW (ref 10–45)
ANION GAP SERPL CALC-SCNC: 13 MMOL/L — SIGNIFICANT CHANGE UP (ref 5–17)
AST SERPL-CCNC: 12 U/L — SIGNIFICANT CHANGE UP (ref 10–40)
BILIRUB SERPL-MCNC: 0.4 MG/DL — SIGNIFICANT CHANGE UP (ref 0.2–1.2)
BUN SERPL-MCNC: 19 MG/DL — SIGNIFICANT CHANGE UP (ref 7–23)
CALCIUM SERPL-MCNC: 8.4 MG/DL — SIGNIFICANT CHANGE UP (ref 8.4–10.5)
CHLORIDE SERPL-SCNC: 109 MMOL/L — HIGH (ref 96–108)
CO2 SERPL-SCNC: 17 MMOL/L — LOW (ref 22–31)
CREAT SERPL-MCNC: 1.23 MG/DL — SIGNIFICANT CHANGE UP (ref 0.5–1.3)
EGFR: 44 ML/MIN/1.73M2 — LOW
GLUCOSE BLDC GLUCOMTR-MCNC: 141 MG/DL — HIGH (ref 70–99)
GLUCOSE BLDC GLUCOMTR-MCNC: 142 MG/DL — HIGH (ref 70–99)
GLUCOSE BLDC GLUCOMTR-MCNC: 152 MG/DL — HIGH (ref 70–99)
GLUCOSE BLDC GLUCOMTR-MCNC: 181 MG/DL — HIGH (ref 70–99)
GLUCOSE SERPL-MCNC: 92 MG/DL — SIGNIFICANT CHANGE UP (ref 70–99)
HCT VFR BLD CALC: 24.3 % — LOW (ref 34.5–45)
HGB BLD-MCNC: 7.5 G/DL — LOW (ref 11.5–15.5)
LACTATE SERPL-SCNC: 1.6 MMOL/L — SIGNIFICANT CHANGE UP (ref 0.5–2)
MAGNESIUM SERPL-MCNC: 1.8 MG/DL — SIGNIFICANT CHANGE UP (ref 1.6–2.6)
MCHC RBC-ENTMCNC: 24 PG — LOW (ref 27–34)
MCHC RBC-ENTMCNC: 30.9 G/DL — LOW (ref 32–36)
MCV RBC AUTO: 77.6 FL — LOW (ref 80–100)
NRBC BLD AUTO-RTO: 0 /100 WBCS — SIGNIFICANT CHANGE UP (ref 0–0)
PLATELET # BLD AUTO: 264 K/UL — SIGNIFICANT CHANGE UP (ref 150–400)
POTASSIUM SERPL-MCNC: 3.7 MMOL/L — SIGNIFICANT CHANGE UP (ref 3.5–5.3)
POTASSIUM SERPL-SCNC: 3.7 MMOL/L — SIGNIFICANT CHANGE UP (ref 3.5–5.3)
PROT SERPL-MCNC: 5.8 G/DL — LOW (ref 6–8.3)
RBC # BLD: 3.13 M/UL — LOW (ref 3.8–5.2)
RBC # FLD: 20.6 % — HIGH (ref 10.3–14.5)
SODIUM SERPL-SCNC: 139 MMOL/L — SIGNIFICANT CHANGE UP (ref 135–145)
VANCOMYCIN TROUGH SERPL-MCNC: 6.3 UG/ML — LOW (ref 10–20)
WBC # BLD: 18.58 K/UL — HIGH (ref 3.8–10.5)
WBC # FLD AUTO: 18.58 K/UL — HIGH (ref 3.8–10.5)

## 2025-02-21 RX ADMIN — Medication 40 MILLIGRAM(S): at 17:32

## 2025-02-21 RX ADMIN — Medication 250 MILLIGRAM(S): at 14:41

## 2025-02-21 RX ADMIN — Medication 25 GRAM(S): at 14:41

## 2025-02-21 RX ADMIN — DORZOLAMIDE HYDROCHLORIDE AND TIMOLOL MALEATE 1 DROP(S): 20; 5 SOLUTION/ DROPS OPHTHALMIC at 17:33

## 2025-02-21 RX ADMIN — BRIMONIDINE TARTRATE 1 DROP(S): 1.5 SOLUTION/ DROPS OPHTHALMIC at 05:05

## 2025-02-21 RX ADMIN — HEPARIN SODIUM 5000 UNIT(S): 1000 INJECTION INTRAVENOUS; SUBCUTANEOUS at 17:32

## 2025-02-21 RX ADMIN — BRIMONIDINE TARTRATE 1 DROP(S): 1.5 SOLUTION/ DROPS OPHTHALMIC at 21:45

## 2025-02-21 RX ADMIN — Medication 100 MICROGRAM(S): at 05:04

## 2025-02-21 RX ADMIN — Medication 25 GRAM(S): at 21:45

## 2025-02-21 RX ADMIN — AMLODIPINE BESYLATE 10 MILLIGRAM(S): 10 TABLET ORAL at 05:04

## 2025-02-21 RX ADMIN — DORZOLAMIDE HYDROCHLORIDE AND TIMOLOL MALEATE 1 DROP(S): 20; 5 SOLUTION/ DROPS OPHTHALMIC at 05:04

## 2025-02-21 RX ADMIN — BRIMONIDINE TARTRATE 1 DROP(S): 1.5 SOLUTION/ DROPS OPHTHALMIC at 14:41

## 2025-02-21 RX ADMIN — HEPARIN SODIUM 5000 UNIT(S): 1000 INJECTION INTRAVENOUS; SUBCUTANEOUS at 05:04

## 2025-02-21 RX ADMIN — Medication 75 MILLILITER(S): at 04:06

## 2025-02-21 RX ADMIN — LATANOPROST PF 1 DROP(S): 0.05 SOLUTION/ DROPS OPHTHALMIC at 21:45

## 2025-02-21 RX ADMIN — Medication 25 GRAM(S): at 05:07

## 2025-02-21 RX ADMIN — Medication 40 MILLIGRAM(S): at 05:05

## 2025-02-21 NOTE — PROGRESS NOTE ADULT - SUBJECTIVE AND OBJECTIVE BOX
INTERVAL HPI/OVERNIGHT EVENTS:    pt s/e  dyspnea improved  on 3L o2  no vomiting  karolyn clears  dtr bedside     Allergies    No Known Allergies    Intolerances          General:  No wt loss, fevers, chills, night sweats, fatigue,   Eyes:  Good vision, no reported pain  ENT:  No sore throat, pain, runny nose, dysphagia  CV:  No pain, palpitations, hypo/hypertension  Resp:  No dyspnea, cough, tachypnea, wheezing  GI:  No pain, No nausea, No vomiting, No diarrhea, No constipation, No weight loss, No fever, No pruritis, No rectal bleeding, No tarry stools, No dysphagia,  :  No pain, bleeding, incontinence, nocturia  Muscle:  No pain, weakness  Neuro:  No weakness, tingling, memory problems  Psych:  No fatigue, insomnia, mood problems, depression  Endocrine:  No polyuria, polydipsia, cold/heat intolerance  Heme:  No petechiae, ecchymosis, easy bruisability  Skin:  No rash, tattoos, scars, edema      PHYSICAL EXAM:   Vital Signs:  Vital Signs Last 24 Hrs  T(C): 36.8 (16 Feb 2025 11:50), Max: 36.9 (15 Feb 2025 21:17)  T(F): 98.3 (16 Feb 2025 11:50), Max: 98.5 (15 Feb 2025 21:17)  HR: 77 (16 Feb 2025 11:50) (71 - 77)  BP: 134/77 (16 Feb 2025 11:50) (134/77 - 164/75)  BP(mean): --  RR: 18 (16 Feb 2025 11:50) (18 - 18)  SpO2: 97% (16 Feb 2025 11:50) (97% - 100%)    Parameters below as of 16 Feb 2025 11:50  Patient On (Oxygen Delivery Method): room air      Daily     Daily I&O's Summary    16 Feb 2025 07:01  -  16 Feb 2025 12:42  --------------------------------------------------------  IN: 240 mL / OUT: 0 mL / NET: 240 mL        GENERAL:  Appears stated age, well-groomed, well-nourished, no distress  HEENT:  NC/AT,  conjunctivae clear and pink, no thyromegaly, nodules, adenopathy, no JVD, sclera -anicteric  CHEST:  Full & symmetric excursion, no increased effort, breath sounds clear  HEART:  Regular rhythm, S1, S2, no murmur/rub/S3/S4, no abdominal bruit, no edema  ABDOMEN:  Soft, non-tender, non-distended, normoactive bowel sounds,  no masses ,no hepato-splenomegaly, no signs of chronic liver disease  EXTEREMITIES:  no cyanosis,clubbing or edema  SKIN:  No rash/erythema/ecchymoses/petechiae/wounds/abscess/warm/dry  NEURO:  Alert, oriented, no asterixis, no tremor, no encephalopathy      LABS:                        8.5    5.32  )-----------( 420      ( 16 Feb 2025 07:10 )             28.4     02-16    141  |  105  |  12  ----------------------------<  98  4.0   |  21[L]  |  1.25    Ca    9.7      16 Feb 2025 07:09        Urinalysis Basic - ( 16 Feb 2025 07:09 )    Color: x / Appearance: x / SG: x / pH: x  Gluc: 98 mg/dL / Ketone: x  / Bili: x / Urobili: x   Blood: x / Protein: x / Nitrite: x   Leuk Esterase: x / RBC: x / WBC x   Sq Epi: x / Non Sq Epi: x / Bacteria: x      amylase   lipase  RADIOLOGY & ADDITIONAL TESTS:

## 2025-02-21 NOTE — PROGRESS NOTE ADULT - SUBJECTIVE AND OBJECTIVE BOX
DATE OF SERVICE: 02-21-25 @ 12:47    Patient is a 79y old  Female who presents with a chief complaint of possible aspiration (19 Feb 2025 12:28)      SUBJECTIVE / OVERNIGHT EVENTS:  No chest pain. No shortness of breath. No complaints. No events overnight. on 3 liters NC    MEDICATIONS  (STANDING):  acetaminophen   IVPB .. 1000 milliGRAM(s) IV Intermittent once  amLODIPine   Tablet 10 milliGRAM(s) Oral daily  brimonidine 0.2% Ophthalmic Solution 1 Drop(s) Both EYES three times a day  dorzolamide 2%/timolol 0.5% Ophthalmic Solution 1 Drop(s) Both EYES two times a day  heparin   Injectable 5000 Unit(s) SubCutaneous every 12 hours  influenza  Vaccine (HIGH DOSE) 0.5 milliLiter(s) IntraMuscular once  latanoprost 0.005% Ophthalmic Solution 1 Drop(s) Both EYES at bedtime  levothyroxine 100 MICROGram(s) Oral daily  pantoprazole  Injectable 40 milliGRAM(s) IV Push two times a day  piperacillin/tazobactam IVPB.. 3.375 Gram(s) IV Intermittent every 8 hours  sodium chloride 0.9%. 1000 milliLiter(s) (75 mL/Hr) IV Continuous <Continuous>  vancomycin  IVPB      vancomycin  IVPB 1000 milliGRAM(s) IV Intermittent every 24 hours    MEDICATIONS  (PRN):  ondansetron Injectable 4 milliGRAM(s) IV Push every 6 hours PRN Nausea and/or Vomiting      Vital Signs Last 24 Hrs  T(C): 37.6 (21 Feb 2025 11:42), Max: 37.6 (21 Feb 2025 11:42)  T(F): 99.6 (21 Feb 2025 11:42), Max: 99.6 (21 Feb 2025 11:42)  HR: 108 (21 Feb 2025 11:42) (86 - 108)  BP: 150/71 (21 Feb 2025 11:42) (111/71 - 150/71)  BP(mean): --  RR: 20 (21 Feb 2025 11:42) (18 - 20)  SpO2: 96% (21 Feb 2025 11:42) (95% - 99%)    Parameters below as of 21 Feb 2025 11:42  Patient On (Oxygen Delivery Method): nasal cannula  O2 Flow (L/min): 3    CAPILLARY BLOOD GLUCOSE      POCT Blood Glucose.: 152 mg/dL (21 Feb 2025 11:47)  POCT Blood Glucose.: 142 mg/dL (21 Feb 2025 08:03)  POCT Blood Glucose.: 175 mg/dL (20 Feb 2025 21:16)  POCT Blood Glucose.: 126 mg/dL (20 Feb 2025 17:48)    I&O's Summary    20 Feb 2025 07:01  -  21 Feb 2025 07:00  --------------------------------------------------------  IN: 525 mL / OUT: 0 mL / NET: 525 mL        PHYSICAL EXAM:  GENERAL: NAD, well-developed  HEAD:  Atraumatic, Normocephalic  EYES: EOMI, PERRLA, conjunctiva and sclera clear  NECK: Supple, No JVD  CHEST/LUNG: Clear to auscultation bilaterally; No wheeze  HEART: Regular rate and rhythm; No murmurs, rubs, or gallops  ABDOMEN: Soft, Nontender, Nondistended; Bowel sounds present  EXTREMITIES:  2+ Peripheral Pulses, No clubbing, cyanosis, or edema  PSYCH: AAOx3  NEUROLOGY: non-focal  SKIN: No rashes or lesions    LABS:                        7.5    18.58 )-----------( 264      ( 21 Feb 2025 06:30 )             24.3     02-21    139  |  109[H]  |  19  ----------------------------<  92  3.7   |  17[L]  |  1.23    Ca    8.4      21 Feb 2025 06:30  Phos  4.1     02-20  Mg     1.8     02-21    TPro  5.8[L]  /  Alb  2.8[L]  /  TBili  0.4  /  DBili  x   /  AST  12  /  ALT  8[L]  /  AlkPhos  68  02-21    PT/INR - ( 19 Feb 2025 13:46 )   PT: 14.8 sec;   INR: 1.29 ratio         PTT - ( 19 Feb 2025 13:46 )  PTT:119.2 sec      Urinalysis Basic - ( 21 Feb 2025 06:30 )    Color: x / Appearance: x / SG: x / pH: x  Gluc: 92 mg/dL / Ketone: x  / Bili: x / Urobili: x   Blood: x / Protein: x / Nitrite: x   Leuk Esterase: x / RBC: x / WBC x   Sq Epi: x / Non Sq Epi: x / Bacteria: x        RADIOLOGY & ADDITIONAL TESTS:    Imaging Personally Reviewed:    Consultant(s) Notes Reviewed:      Care Discussed with Consultants/Other Providers:

## 2025-02-22 LAB
ANION GAP SERPL CALC-SCNC: 11 MMOL/L — SIGNIFICANT CHANGE UP (ref 5–17)
BUN SERPL-MCNC: 13 MG/DL — SIGNIFICANT CHANGE UP (ref 7–23)
CALCIUM SERPL-MCNC: 8.7 MG/DL — SIGNIFICANT CHANGE UP (ref 8.4–10.5)
CHLORIDE SERPL-SCNC: 110 MMOL/L — HIGH (ref 96–108)
CO2 SERPL-SCNC: 19 MMOL/L — LOW (ref 22–31)
CREAT SERPL-MCNC: 1.01 MG/DL — SIGNIFICANT CHANGE UP (ref 0.5–1.3)
EGFR: 56 ML/MIN/1.73M2 — LOW
GLUCOSE BLDC GLUCOMTR-MCNC: 116 MG/DL — HIGH (ref 70–99)
GLUCOSE BLDC GLUCOMTR-MCNC: 123 MG/DL — HIGH (ref 70–99)
GLUCOSE BLDC GLUCOMTR-MCNC: 138 MG/DL — HIGH (ref 70–99)
GLUCOSE BLDC GLUCOMTR-MCNC: 201 MG/DL — HIGH (ref 70–99)
GLUCOSE SERPL-MCNC: 103 MG/DL — HIGH (ref 70–99)
HCT VFR BLD CALC: 28.7 % — LOW (ref 34.5–45)
HGB BLD-MCNC: 8.4 G/DL — LOW (ref 11.5–15.5)
LACTATE SERPL-SCNC: 1.9 MMOL/L — SIGNIFICANT CHANGE UP (ref 0.5–2)
MCHC RBC-ENTMCNC: 23.3 PG — LOW (ref 27–34)
MCHC RBC-ENTMCNC: 29.3 G/DL — LOW (ref 32–36)
MCV RBC AUTO: 79.7 FL — LOW (ref 80–100)
NRBC BLD AUTO-RTO: 0 /100 WBCS — SIGNIFICANT CHANGE UP (ref 0–0)
PLATELET # BLD AUTO: 265 K/UL — SIGNIFICANT CHANGE UP (ref 150–400)
POTASSIUM SERPL-MCNC: 3.7 MMOL/L — SIGNIFICANT CHANGE UP (ref 3.5–5.3)
POTASSIUM SERPL-SCNC: 3.7 MMOL/L — SIGNIFICANT CHANGE UP (ref 3.5–5.3)
RBC # BLD: 3.6 M/UL — LOW (ref 3.8–5.2)
RBC # FLD: 21.3 % — HIGH (ref 10.3–14.5)
SODIUM SERPL-SCNC: 140 MMOL/L — SIGNIFICANT CHANGE UP (ref 135–145)
VANCOMYCIN TROUGH SERPL-MCNC: 8.6 UG/ML — LOW (ref 10–20)
WBC # BLD: 17.3 K/UL — HIGH (ref 3.8–10.5)
WBC # FLD AUTO: 17.3 K/UL — HIGH (ref 3.8–10.5)

## 2025-02-22 RX ORDER — DEXTROMETHORPHAN HBR, GUAIFENESIN 200 MG/10ML
200 LIQUID ORAL EVERY 6 HOURS
Refills: 0 | Status: DISCONTINUED | OUTPATIENT
Start: 2025-02-22 | End: 2025-02-26

## 2025-02-22 RX ADMIN — BRIMONIDINE TARTRATE 1 DROP(S): 1.5 SOLUTION/ DROPS OPHTHALMIC at 05:57

## 2025-02-22 RX ADMIN — DORZOLAMIDE HYDROCHLORIDE AND TIMOLOL MALEATE 1 DROP(S): 20; 5 SOLUTION/ DROPS OPHTHALMIC at 05:56

## 2025-02-22 RX ADMIN — DEXTROMETHORPHAN HBR, GUAIFENESIN 200 MILLIGRAM(S): 200 LIQUID ORAL at 13:03

## 2025-02-22 RX ADMIN — DORZOLAMIDE HYDROCHLORIDE AND TIMOLOL MALEATE 1 DROP(S): 20; 5 SOLUTION/ DROPS OPHTHALMIC at 17:22

## 2025-02-22 RX ADMIN — Medication 250 MILLIGRAM(S): at 13:29

## 2025-02-22 RX ADMIN — Medication 25 GRAM(S): at 05:57

## 2025-02-22 RX ADMIN — HEPARIN SODIUM 5000 UNIT(S): 1000 INJECTION INTRAVENOUS; SUBCUTANEOUS at 05:56

## 2025-02-22 RX ADMIN — LATANOPROST PF 1 DROP(S): 0.05 SOLUTION/ DROPS OPHTHALMIC at 21:10

## 2025-02-22 RX ADMIN — Medication 100 MICROGRAM(S): at 05:56

## 2025-02-22 RX ADMIN — Medication 40 MILLIGRAM(S): at 17:22

## 2025-02-22 RX ADMIN — Medication 25 GRAM(S): at 21:10

## 2025-02-22 RX ADMIN — DEXTROMETHORPHAN HBR, GUAIFENESIN 200 MILLIGRAM(S): 200 LIQUID ORAL at 06:46

## 2025-02-22 RX ADMIN — BRIMONIDINE TARTRATE 1 DROP(S): 1.5 SOLUTION/ DROPS OPHTHALMIC at 13:29

## 2025-02-22 RX ADMIN — HEPARIN SODIUM 5000 UNIT(S): 1000 INJECTION INTRAVENOUS; SUBCUTANEOUS at 17:21

## 2025-02-22 RX ADMIN — BRIMONIDINE TARTRATE 1 DROP(S): 1.5 SOLUTION/ DROPS OPHTHALMIC at 21:10

## 2025-02-22 RX ADMIN — Medication 40 MILLIGRAM(S): at 05:57

## 2025-02-22 RX ADMIN — Medication 25 GRAM(S): at 15:00

## 2025-02-22 RX ADMIN — DEXTROMETHORPHAN HBR, GUAIFENESIN 200 MILLIGRAM(S): 200 LIQUID ORAL at 21:10

## 2025-02-22 RX ADMIN — AMLODIPINE BESYLATE 10 MILLIGRAM(S): 10 TABLET ORAL at 05:56

## 2025-02-22 NOTE — PROGRESS NOTE ADULT - SUBJECTIVE AND OBJECTIVE BOX
DATE OF SERVICE: 02-22-25 @ 13:10    Patient is a 79y old  Female who presents with a chief complaint of possible aspiration (19 Feb 2025 12:28)      SUBJECTIVE / OVERNIGHT EVENTS:  No chest pain. No shortness of breath. No complaints. No events overnight.     MEDICATIONS  (STANDING):  acetaminophen   IVPB .. 1000 milliGRAM(s) IV Intermittent once  amLODIPine   Tablet 10 milliGRAM(s) Oral daily  brimonidine 0.2% Ophthalmic Solution 1 Drop(s) Both EYES three times a day  dorzolamide 2%/timolol 0.5% Ophthalmic Solution 1 Drop(s) Both EYES two times a day  heparin   Injectable 5000 Unit(s) SubCutaneous every 12 hours  influenza  Vaccine (HIGH DOSE) 0.5 milliLiter(s) IntraMuscular once  latanoprost 0.005% Ophthalmic Solution 1 Drop(s) Both EYES at bedtime  levothyroxine 100 MICROGram(s) Oral daily  pantoprazole  Injectable 40 milliGRAM(s) IV Push two times a day  piperacillin/tazobactam IVPB.. 3.375 Gram(s) IV Intermittent every 8 hours  sodium chloride 0.9%. 1000 milliLiter(s) (75 mL/Hr) IV Continuous <Continuous>  vancomycin  IVPB      vancomycin  IVPB 1000 milliGRAM(s) IV Intermittent every 24 hours    MEDICATIONS  (PRN):  guaiFENesin Oral Liquid (Sugar-Free) 200 milliGRAM(s) Oral every 6 hours PRN Cough  ondansetron Injectable 4 milliGRAM(s) IV Push every 6 hours PRN Nausea and/or Vomiting      Vital Signs Last 24 Hrs  T(C): 36.9 (22 Feb 2025 11:38), Max: 37.4 (21 Feb 2025 20:54)  T(F): 98.4 (22 Feb 2025 11:38), Max: 99.4 (21 Feb 2025 20:54)  HR: 95 (22 Feb 2025 11:38) (91 - 95)  BP: 133/72 (22 Feb 2025 11:38) (118/73 - 144/89)  BP(mean): --  RR: 19 (22 Feb 2025 11:38) (19 - 19)  SpO2: 96% (22 Feb 2025 11:38) (96% - 99%)    Parameters below as of 22 Feb 2025 11:38  Patient On (Oxygen Delivery Method): nasal cannula  O2 Flow (L/min): 3    CAPILLARY BLOOD GLUCOSE      POCT Blood Glucose.: 123 mg/dL (22 Feb 2025 11:55)  POCT Blood Glucose.: 116 mg/dL (22 Feb 2025 07:45)  POCT Blood Glucose.: 141 mg/dL (21 Feb 2025 21:25)  POCT Blood Glucose.: 181 mg/dL (21 Feb 2025 16:51)    I&O's Summary    21 Feb 2025 07:01  -  22 Feb 2025 07:00  --------------------------------------------------------  IN: 1460 mL / OUT: 800 mL / NET: 660 mL        PHYSICAL EXAM:  GENERAL: NAD, well-developed  HEAD:  Atraumatic, Normocephalic  EYES: EOMI, PERRLA, conjunctiva and sclera clear  NECK: Supple, No JVD  CHEST/LUNG: Clear to auscultation bilaterally; No wheeze  HEART: Regular rate and rhythm; No murmurs, rubs, or gallops  ABDOMEN: Soft, Nontender, Nondistended; Bowel sounds present  EXTREMITIES:  2+ Peripheral Pulses, No clubbing, cyanosis, or edema  PSYCH: AAOx3  NEUROLOGY: non-focal  SKIN: No rashes or lesions    LABS:                        8.4    17.30 )-----------( 265      ( 22 Feb 2025 11:12 )             28.7     02-22    140  |  110[H]  |  13  ----------------------------<  103[H]  3.7   |  19[L]  |  1.01    Ca    8.7      22 Feb 2025 06:46  Mg     1.8     02-21    TPro  5.8[L]  /  Alb  2.8[L]  /  TBili  0.4  /  DBili  x   /  AST  12  /  ALT  8[L]  /  AlkPhos  68  02-21          Urinalysis Basic - ( 22 Feb 2025 06:46 )    Color: x / Appearance: x / SG: x / pH: x  Gluc: 103 mg/dL / Ketone: x  / Bili: x / Urobili: x   Blood: x / Protein: x / Nitrite: x   Leuk Esterase: x / RBC: x / WBC x   Sq Epi: x / Non Sq Epi: x / Bacteria: x        RADIOLOGY & ADDITIONAL TESTS:    Imaging Personally Reviewed:    Consultant(s) Notes Reviewed:      Care Discussed with Consultants/Other Providers:

## 2025-02-22 NOTE — PROGRESS NOTE ADULT - SUBJECTIVE AND OBJECTIVE BOX
INTERVAL HPI/OVERNIGHT EVENTS:    pt s/e  dyspnea improved  on 3L o2  no vomiting  karolyn clears      Allergies    No Known Allergies    Intolerances          General:  No wt loss, fevers, chills, night sweats, fatigue,   Eyes:  Good vision, no reported pain  ENT:  No sore throat, pain, runny nose, dysphagia  CV:  No pain, palpitations, hypo/hypertension  Resp:  No dyspnea, cough, tachypnea, wheezing  GI:  No pain, No nausea, No vomiting, No diarrhea, No constipation, No weight loss, No fever, No pruritis, No rectal bleeding, No tarry stools, No dysphagia,  :  No pain, bleeding, incontinence, nocturia  Muscle:  No pain, weakness  Neuro:  No weakness, tingling, memory problems  Psych:  No fatigue, insomnia, mood problems, depression  Endocrine:  No polyuria, polydipsia, cold/heat intolerance  Heme:  No petechiae, ecchymosis, easy bruisability  Skin:  No rash, tattoos, scars, edema      PHYSICAL EXAM:   Vital Signs:  Vital Signs Last 24 Hrs  T(C): 36.8 (16 Feb 2025 11:50), Max: 36.9 (15 Feb 2025 21:17)  T(F): 98.3 (16 Feb 2025 11:50), Max: 98.5 (15 Feb 2025 21:17)  HR: 77 (16 Feb 2025 11:50) (71 - 77)  BP: 134/77 (16 Feb 2025 11:50) (134/77 - 164/75)  BP(mean): --  RR: 18 (16 Feb 2025 11:50) (18 - 18)  SpO2: 97% (16 Feb 2025 11:50) (97% - 100%)    Parameters below as of 16 Feb 2025 11:50  Patient On (Oxygen Delivery Method): room air      Daily     Daily I&O's Summary    16 Feb 2025 07:01  -  16 Feb 2025 12:42  --------------------------------------------------------  IN: 240 mL / OUT: 0 mL / NET: 240 mL        GENERAL:  Appears stated age, well-groomed, well-nourished, no distress  HEENT:  NC/AT,  conjunctivae clear and pink, no thyromegaly, nodules, adenopathy, no JVD, sclera -anicteric  CHEST:  Full & symmetric excursion, no increased effort, breath sounds clear  HEART:  Regular rhythm, S1, S2, no murmur/rub/S3/S4, no abdominal bruit, no edema  ABDOMEN:  Soft, non-tender, non-distended, normoactive bowel sounds,  no masses ,no hepato-splenomegaly, no signs of chronic liver disease  EXTEREMITIES:  no cyanosis,clubbing or edema  SKIN:  No rash/erythema/ecchymoses/petechiae/wounds/abscess/warm/dry  NEURO:  Alert, oriented, no asterixis, no tremor, no encephalopathy      LABS:                        8.5    5.32  )-----------( 420      ( 16 Feb 2025 07:10 )             28.4     02-16    141  |  105  |  12  ----------------------------<  98  4.0   |  21[L]  |  1.25    Ca    9.7      16 Feb 2025 07:09        Urinalysis Basic - ( 16 Feb 2025 07:09 )    Color: x / Appearance: x / SG: x / pH: x  Gluc: 98 mg/dL / Ketone: x  / Bili: x / Urobili: x   Blood: x / Protein: x / Nitrite: x   Leuk Esterase: x / RBC: x / WBC x   Sq Epi: x / Non Sq Epi: x / Bacteria: x      amylase   lipase  RADIOLOGY & ADDITIONAL TESTS:

## 2025-02-23 LAB
GLUCOSE BLDC GLUCOMTR-MCNC: 100 MG/DL — HIGH (ref 70–99)
GLUCOSE BLDC GLUCOMTR-MCNC: 135 MG/DL — HIGH (ref 70–99)
GLUCOSE BLDC GLUCOMTR-MCNC: 145 MG/DL — HIGH (ref 70–99)

## 2025-02-23 RX ADMIN — Medication 100 MICROGRAM(S): at 06:36

## 2025-02-23 RX ADMIN — DEXTROMETHORPHAN HBR, GUAIFENESIN 200 MILLIGRAM(S): 200 LIQUID ORAL at 21:58

## 2025-02-23 RX ADMIN — BRIMONIDINE TARTRATE 1 DROP(S): 1.5 SOLUTION/ DROPS OPHTHALMIC at 06:36

## 2025-02-23 RX ADMIN — Medication 25 GRAM(S): at 06:37

## 2025-02-23 RX ADMIN — Medication 250 MILLIGRAM(S): at 14:59

## 2025-02-23 RX ADMIN — AMLODIPINE BESYLATE 10 MILLIGRAM(S): 10 TABLET ORAL at 06:36

## 2025-02-23 RX ADMIN — DORZOLAMIDE HYDROCHLORIDE AND TIMOLOL MALEATE 1 DROP(S): 20; 5 SOLUTION/ DROPS OPHTHALMIC at 06:36

## 2025-02-23 RX ADMIN — Medication 40 MILLIGRAM(S): at 17:30

## 2025-02-23 RX ADMIN — HEPARIN SODIUM 5000 UNIT(S): 1000 INJECTION INTRAVENOUS; SUBCUTANEOUS at 17:30

## 2025-02-23 RX ADMIN — HEPARIN SODIUM 5000 UNIT(S): 1000 INJECTION INTRAVENOUS; SUBCUTANEOUS at 06:36

## 2025-02-23 RX ADMIN — BRIMONIDINE TARTRATE 1 DROP(S): 1.5 SOLUTION/ DROPS OPHTHALMIC at 16:34

## 2025-02-23 RX ADMIN — LATANOPROST PF 1 DROP(S): 0.05 SOLUTION/ DROPS OPHTHALMIC at 21:59

## 2025-02-23 RX ADMIN — BRIMONIDINE TARTRATE 1 DROP(S): 1.5 SOLUTION/ DROPS OPHTHALMIC at 21:59

## 2025-02-23 RX ADMIN — Medication 40 MILLIGRAM(S): at 06:37

## 2025-02-23 RX ADMIN — Medication 25 GRAM(S): at 16:34

## 2025-02-23 RX ADMIN — Medication 25 GRAM(S): at 21:59

## 2025-02-23 RX ADMIN — DEXTROMETHORPHAN HBR, GUAIFENESIN 200 MILLIGRAM(S): 200 LIQUID ORAL at 06:36

## 2025-02-23 RX ADMIN — DORZOLAMIDE HYDROCHLORIDE AND TIMOLOL MALEATE 1 DROP(S): 20; 5 SOLUTION/ DROPS OPHTHALMIC at 17:30

## 2025-02-23 NOTE — PROVIDER CONTACT NOTE (OTHER) - ASSESSMENT
Pt was stuck twice but could not get blood return. pt expressed that she has been stuck multiple times throughout the day with unsuccessful attempts and wishes not to be stuck again.
Pt had PAT 2.3 sec . Pt A/Ox4 and asymptomatic and VSS

## 2025-02-23 NOTE — PROGRESS NOTE ADULT - SUBJECTIVE AND OBJECTIVE BOX
DATE OF SERVICE: 02-23-25 @ 10:28    Patient is a 79y old  Female who presents with a chief complaint of possible aspiration (19 Feb 2025 12:28)      SUBJECTIVE / OVERNIGHT EVENTS:  No chest pain. No shortness of breath. No complaints. No events overnight.     MEDICATIONS  (STANDING):  acetaminophen   IVPB .. 1000 milliGRAM(s) IV Intermittent once  amLODIPine   Tablet 10 milliGRAM(s) Oral daily  brimonidine 0.2% Ophthalmic Solution 1 Drop(s) Both EYES three times a day  dorzolamide 2%/timolol 0.5% Ophthalmic Solution 1 Drop(s) Both EYES two times a day  heparin   Injectable 5000 Unit(s) SubCutaneous every 12 hours  influenza  Vaccine (HIGH DOSE) 0.5 milliLiter(s) IntraMuscular once  latanoprost 0.005% Ophthalmic Solution 1 Drop(s) Both EYES at bedtime  levothyroxine 100 MICROGram(s) Oral daily  pantoprazole  Injectable 40 milliGRAM(s) IV Push two times a day  piperacillin/tazobactam IVPB.. 3.375 Gram(s) IV Intermittent every 8 hours  vancomycin  IVPB      vancomycin  IVPB 1000 milliGRAM(s) IV Intermittent every 24 hours    MEDICATIONS  (PRN):  guaiFENesin Oral Liquid (Sugar-Free) 200 milliGRAM(s) Oral every 6 hours PRN Cough  ondansetron Injectable 4 milliGRAM(s) IV Push every 6 hours PRN Nausea and/or Vomiting      Vital Signs Last 24 Hrs  T(C): 37.7 (23 Feb 2025 04:00), Max: 37.7 (23 Feb 2025 04:00)  T(F): 99.8 (23 Feb 2025 04:00), Max: 99.8 (23 Feb 2025 04:00)  HR: 91 (23 Feb 2025 04:00) (81 - 112)  BP: 122/71 (23 Feb 2025 04:00) (116/62 - 133/72)  BP(mean): --  RR: 18 (23 Feb 2025 04:00) (18 - 19)  SpO2: 97% (23 Feb 2025 04:00) (95% - 97%)    Parameters below as of 23 Feb 2025 04:00  Patient On (Oxygen Delivery Method): nasal cannula  O2 Flow (L/min): 2    CAPILLARY BLOOD GLUCOSE      POCT Blood Glucose.: 145 mg/dL (23 Feb 2025 07:15)  POCT Blood Glucose.: 138 mg/dL (22 Feb 2025 21:07)  POCT Blood Glucose.: 201 mg/dL (22 Feb 2025 17:10)  POCT Blood Glucose.: 123 mg/dL (22 Feb 2025 11:55)    I&O's Summary    22 Feb 2025 07:01  -  23 Feb 2025 07:00  --------------------------------------------------------  IN: 200 mL / OUT: 600 mL / NET: -400 mL        PHYSICAL EXAM:  GENERAL: NAD, well-developed  HEAD:  Atraumatic, Normocephalic  EYES: EOMI, PERRLA, conjunctiva and sclera clear  NECK: Supple, No JVD  CHEST/LUNG: Clear to auscultation bilaterally; No wheeze  HEART: Regular rate and rhythm; No murmurs, rubs, or gallops  ABDOMEN: Soft, Nontender, Nondistended; Bowel sounds present  EXTREMITIES:  2+ Peripheral Pulses, No clubbing, cyanosis, or edema  PSYCH: AAOx3  NEUROLOGY: non-focal  SKIN: No rashes or lesions    LABS:                        8.4    17.30 )-----------( 265      ( 22 Feb 2025 11:12 )             28.7     02-22    140  |  110[H]  |  13  ----------------------------<  103[H]  3.7   |  19[L]  |  1.01    Ca    8.7      22 Feb 2025 06:46            Urinalysis Basic - ( 22 Feb 2025 06:46 )    Color: x / Appearance: x / SG: x / pH: x  Gluc: 103 mg/dL / Ketone: x  / Bili: x / Urobili: x   Blood: x / Protein: x / Nitrite: x   Leuk Esterase: x / RBC: x / WBC x   Sq Epi: x / Non Sq Epi: x / Bacteria: x        RADIOLOGY & ADDITIONAL TESTS:    Imaging Personally Reviewed:    Consultant(s) Notes Reviewed:      Care Discussed with Consultants/Other Providers:

## 2025-02-23 NOTE — PROVIDER CONTACT NOTE (OTHER) - BACKGROUND
Pt dx Intractable vomiting, NAKUL, weight loss
79-year-old female medical history of pretension, diabetes, hypothyroidism, presenting today for nausea nonbloody vomiting for about 3 weeks now with significant weight loss of 10 pounds

## 2025-02-23 NOTE — PROVIDER CONTACT NOTE (OTHER) - ACTION/TREATMENT ORDERED:
IRASEMA Dillon notified and aware, to draw lactate with morning labs.
As per Etta Fall NP, plan of care ongoing

## 2025-02-23 NOTE — PROGRESS NOTE ADULT - SUBJECTIVE AND OBJECTIVE BOX
INTERVAL HPI/OVERNIGHT EVENTS:    pt s/e  off supplemental 02  tolerating fulls  no vomiting     Allergies    No Known Allergies    Intolerances          General:  No wt loss, fevers, chills, night sweats, fatigue,   Eyes:  Good vision, no reported pain  ENT:  No sore throat, pain, runny nose, dysphagia  CV:  No pain, palpitations, hypo/hypertension  Resp:  No dyspnea, cough, tachypnea, wheezing  GI:  No pain, No nausea, No vomiting, No diarrhea, No constipation, No weight loss, No fever, No pruritis, No rectal bleeding, No tarry stools, No dysphagia,  :  No pain, bleeding, incontinence, nocturia  Muscle:  No pain, weakness  Neuro:  No weakness, tingling, memory problems  Psych:  No fatigue, insomnia, mood problems, depression  Endocrine:  No polyuria, polydipsia, cold/heat intolerance  Heme:  No petechiae, ecchymosis, easy bruisability  Skin:  No rash, tattoos, scars, edema      PHYSICAL EXAM:   Vital Signs:  Vital Signs Last 24 Hrs  T(C): 36.8 (16 Feb 2025 11:50), Max: 36.9 (15 Feb 2025 21:17)  T(F): 98.3 (16 Feb 2025 11:50), Max: 98.5 (15 Feb 2025 21:17)  HR: 77 (16 Feb 2025 11:50) (71 - 77)  BP: 134/77 (16 Feb 2025 11:50) (134/77 - 164/75)  BP(mean): --  RR: 18 (16 Feb 2025 11:50) (18 - 18)  SpO2: 97% (16 Feb 2025 11:50) (97% - 100%)    Parameters below as of 16 Feb 2025 11:50  Patient On (Oxygen Delivery Method): room air      Daily     Daily I&O's Summary    16 Feb 2025 07:01  -  16 Feb 2025 12:42  --------------------------------------------------------  IN: 240 mL / OUT: 0 mL / NET: 240 mL        GENERAL:  Appears stated age, well-groomed, well-nourished, no distress  HEENT:  NC/AT,  conjunctivae clear and pink, no thyromegaly, nodules, adenopathy, no JVD, sclera -anicteric  CHEST:  Full & symmetric excursion, no increased effort, breath sounds clear  HEART:  Regular rhythm, S1, S2, no murmur/rub/S3/S4, no abdominal bruit, no edema  ABDOMEN:  Soft, non-tender, non-distended, normoactive bowel sounds,  no masses ,no hepato-splenomegaly, no signs of chronic liver disease  EXTEREMITIES:  no cyanosis,clubbing or edema  SKIN:  No rash/erythema/ecchymoses/petechiae/wounds/abscess/warm/dry  NEURO:  Alert, oriented, no asterixis, no tremor, no encephalopathy      LABS:                        8.5    5.32  )-----------( 420      ( 16 Feb 2025 07:10 )             28.4     02-16    141  |  105  |  12  ----------------------------<  98  4.0   |  21[L]  |  1.25    Ca    9.7      16 Feb 2025 07:09        Urinalysis Basic - ( 16 Feb 2025 07:09 )    Color: x / Appearance: x / SG: x / pH: x  Gluc: 98 mg/dL / Ketone: x  / Bili: x / Urobili: x   Blood: x / Protein: x / Nitrite: x   Leuk Esterase: x / RBC: x / WBC x   Sq Epi: x / Non Sq Epi: x / Bacteria: x      amylase   lipase  RADIOLOGY & ADDITIONAL TESTS:

## 2025-02-24 DIAGNOSIS — R11.0 NAUSEA: ICD-10-CM

## 2025-02-24 DIAGNOSIS — J69.0 PNEUMONITIS DUE TO INHALATION OF FOOD AND VOMIT: ICD-10-CM

## 2025-02-24 LAB
ANION GAP SERPL CALC-SCNC: 12 MMOL/L — SIGNIFICANT CHANGE UP (ref 5–17)
BUN SERPL-MCNC: 6 MG/DL — LOW (ref 7–23)
CALCIUM SERPL-MCNC: 8.9 MG/DL — SIGNIFICANT CHANGE UP (ref 8.4–10.5)
CHLORIDE SERPL-SCNC: 104 MMOL/L — SIGNIFICANT CHANGE UP (ref 96–108)
CO2 SERPL-SCNC: 24 MMOL/L — SIGNIFICANT CHANGE UP (ref 22–31)
CREAT SERPL-MCNC: 1.1 MG/DL — SIGNIFICANT CHANGE UP (ref 0.5–1.3)
CULTURE RESULTS: SIGNIFICANT CHANGE UP
CULTURE RESULTS: SIGNIFICANT CHANGE UP
EGFR: 50 ML/MIN/1.73M2 — LOW
GLUCOSE BLDC GLUCOMTR-MCNC: 109 MG/DL — HIGH (ref 70–99)
GLUCOSE BLDC GLUCOMTR-MCNC: 114 MG/DL — HIGH (ref 70–99)
GLUCOSE BLDC GLUCOMTR-MCNC: 118 MG/DL — HIGH (ref 70–99)
GLUCOSE BLDC GLUCOMTR-MCNC: 98 MG/DL — SIGNIFICANT CHANGE UP (ref 70–99)
GLUCOSE SERPL-MCNC: 104 MG/DL — HIGH (ref 70–99)
HCT VFR BLD CALC: 22.8 % — LOW (ref 34.5–45)
HGB BLD-MCNC: 7.3 G/DL — LOW (ref 11.5–15.5)
MAGNESIUM SERPL-MCNC: 1.8 MG/DL — SIGNIFICANT CHANGE UP (ref 1.6–2.6)
MCHC RBC-ENTMCNC: 24.4 PG — LOW (ref 27–34)
MCHC RBC-ENTMCNC: 32 G/DL — SIGNIFICANT CHANGE UP (ref 32–36)
MCV RBC AUTO: 76.3 FL — LOW (ref 80–100)
MRSA PCR RESULT.: SIGNIFICANT CHANGE UP
NRBC BLD AUTO-RTO: 0 /100 WBCS — SIGNIFICANT CHANGE UP (ref 0–0)
PHOSPHATE SERPL-MCNC: 2.1 MG/DL — LOW (ref 2.5–4.5)
PLATELET # BLD AUTO: 319 K/UL — SIGNIFICANT CHANGE UP (ref 150–400)
POTASSIUM SERPL-MCNC: 3.2 MMOL/L — LOW (ref 3.5–5.3)
POTASSIUM SERPL-SCNC: 3.2 MMOL/L — LOW (ref 3.5–5.3)
RBC # BLD: 2.99 M/UL — LOW (ref 3.8–5.2)
RBC # FLD: 21 % — HIGH (ref 10.3–14.5)
S AUREUS DNA NOSE QL NAA+PROBE: SIGNIFICANT CHANGE UP
SODIUM SERPL-SCNC: 140 MMOL/L — SIGNIFICANT CHANGE UP (ref 135–145)
SPECIMEN SOURCE: SIGNIFICANT CHANGE UP
SPECIMEN SOURCE: SIGNIFICANT CHANGE UP
VANCOMYCIN TROUGH SERPL-MCNC: 7.2 UG/ML — LOW (ref 10–20)
WBC # BLD: 7.91 K/UL — SIGNIFICANT CHANGE UP (ref 3.8–10.5)
WBC # FLD AUTO: 7.91 K/UL — SIGNIFICANT CHANGE UP (ref 3.8–10.5)

## 2025-02-24 PROCEDURE — 71045 X-RAY EXAM CHEST 1 VIEW: CPT | Mod: 26

## 2025-02-24 RX ORDER — POTASSIUM PHOSPHATE, MONOBASIC POTASSIUM PHOSPHATE, DIBASIC INJECTION, 236; 224 MG/ML; MG/ML
15 SOLUTION, CONCENTRATE INTRAVENOUS ONCE
Refills: 0 | Status: COMPLETED | OUTPATIENT
Start: 2025-02-24 | End: 2025-02-24

## 2025-02-24 RX ORDER — IPRATROPIUM BROMIDE AND ALBUTEROL SULFATE .5; 2.5 MG/3ML; MG/3ML
3 SOLUTION RESPIRATORY (INHALATION) EVERY 6 HOURS
Refills: 0 | Status: DISCONTINUED | OUTPATIENT
Start: 2025-02-24 | End: 2025-02-26

## 2025-02-24 RX ADMIN — DORZOLAMIDE HYDROCHLORIDE AND TIMOLOL MALEATE 1 DROP(S): 20; 5 SOLUTION/ DROPS OPHTHALMIC at 06:00

## 2025-02-24 RX ADMIN — Medication 40 MILLIEQUIVALENT(S): at 11:46

## 2025-02-24 RX ADMIN — BRIMONIDINE TARTRATE 1 DROP(S): 1.5 SOLUTION/ DROPS OPHTHALMIC at 21:04

## 2025-02-24 RX ADMIN — POTASSIUM PHOSPHATE, MONOBASIC POTASSIUM PHOSPHATE, DIBASIC INJECTION, 62.5 MILLIMOLE(S): 236; 224 SOLUTION, CONCENTRATE INTRAVENOUS at 09:18

## 2025-02-24 RX ADMIN — DEXTROMETHORPHAN HBR, GUAIFENESIN 200 MILLIGRAM(S): 200 LIQUID ORAL at 17:40

## 2025-02-24 RX ADMIN — DORZOLAMIDE HYDROCHLORIDE AND TIMOLOL MALEATE 1 DROP(S): 20; 5 SOLUTION/ DROPS OPHTHALMIC at 17:40

## 2025-02-24 RX ADMIN — IPRATROPIUM BROMIDE AND ALBUTEROL SULFATE 3 MILLILITER(S): .5; 2.5 SOLUTION RESPIRATORY (INHALATION) at 17:39

## 2025-02-24 RX ADMIN — Medication 40 MILLIGRAM(S): at 17:40

## 2025-02-24 RX ADMIN — Medication 100 MICROGRAM(S): at 05:38

## 2025-02-24 RX ADMIN — Medication 25 GRAM(S): at 15:39

## 2025-02-24 RX ADMIN — HEPARIN SODIUM 5000 UNIT(S): 1000 INJECTION INTRAVENOUS; SUBCUTANEOUS at 05:38

## 2025-02-24 RX ADMIN — HEPARIN SODIUM 5000 UNIT(S): 1000 INJECTION INTRAVENOUS; SUBCUTANEOUS at 17:39

## 2025-02-24 RX ADMIN — BRIMONIDINE TARTRATE 1 DROP(S): 1.5 SOLUTION/ DROPS OPHTHALMIC at 05:38

## 2025-02-24 RX ADMIN — Medication 25 GRAM(S): at 21:03

## 2025-02-24 RX ADMIN — Medication 250 MILLIGRAM(S): at 14:27

## 2025-02-24 RX ADMIN — Medication 20 MILLIEQUIVALENT(S): at 07:55

## 2025-02-24 RX ADMIN — BRIMONIDINE TARTRATE 1 DROP(S): 1.5 SOLUTION/ DROPS OPHTHALMIC at 14:28

## 2025-02-24 RX ADMIN — LATANOPROST PF 1 DROP(S): 0.05 SOLUTION/ DROPS OPHTHALMIC at 21:04

## 2025-02-24 RX ADMIN — Medication 25 GRAM(S): at 05:39

## 2025-02-24 RX ADMIN — Medication 40 MILLIGRAM(S): at 05:38

## 2025-02-24 RX ADMIN — DEXTROMETHORPHAN HBR, GUAIFENESIN 200 MILLIGRAM(S): 200 LIQUID ORAL at 05:47

## 2025-02-24 RX ADMIN — AMLODIPINE BESYLATE 10 MILLIGRAM(S): 10 TABLET ORAL at 05:38

## 2025-02-24 NOTE — CONSULT NOTE ADULT - NS ATTEND AMEND GEN_ALL_CORE FT
Continue Zosyn  Check MRSA nasal PCR, if negative suggest d/c vanco  Repeat CXR   Start Duoneb q6h  Keep sat>90%

## 2025-02-24 NOTE — CONSULT NOTE ADULT - PROBLEM SELECTOR RECOMMENDATION 9
-Witnessed aspiration event in endoscopy   -Still with intermittent o2 use requiring 2LNC, likely recovering from PNA. Keep sats >90% with o2 PRN.   -Continue Zosyn  -Check MRSA nasal PCR, if negative suggest d/c vanco  -Check CXR   -Start Duoneb q6h

## 2025-02-24 NOTE — CONSULT NOTE ADULT - ASSESSMENT
80 y/o F with PMH of DM, hypothyroidism. Present with nausea, weight loss. Patient had a recent ED visit about 11 days prior to admission.  atient was worked up with CT abdomen pelvis with IV contrast that showed no acute intra-abdominal pathology and there was a 6.0 cm left ovarian cystic lesion that was worked up with transvaginal ultrasound and was evaluated by GYN here.  Patient was then sent home after obtaining tumor markers which are reviewed in HIE that was within normal limits. Patient reportedly brought for endoscopy and found to have significant amount of bowel prep in stomach, witnessed aspiration event with patient becoming hypoxic, tachypneic.

## 2025-02-24 NOTE — CONSULT NOTE ADULT - SUBJECTIVE AND OBJECTIVE BOX
PULMONARY CONSULT    HPI: 78 y/o F with PMH of DM, hypothyroidism. Present with nausea, weight loss. Patient had a recent ED visit about 11 days prior to admission.  atient was worked up with CT abdomen pelvis with IV contrast that showed no acute intra-abdominal pathology and there was a 6.0 cm left ovarian cystic lesion that was worked up with transvaginal ultrasound and was evaluated by GYN here.  Patient was then sent home after obtaining tumor markers which are reviewed in HIE that was within normal limits. Patient reportedly brought for endoscopy and found to have significant amount of bowel prep in stomach, witnessed aspiration event with patient becoming hypoxic, tachypneic. Denies SOB, CP.         PAST MEDICAL & SURGICAL HISTORY:  Glaucoma  HTN (hypertension)  Type II diabetes mellitus  Multiple thyroid nodules  Hyperthyroidism  Obese  H/O: hysterectomy  "a long time ago when I was in my 30's"  H/O       Allergies  No Known Allergies        FAMILY HISTORY:  FH: dementia (Mother)      Social history: non smoker     Review of Systems:  CONSTITUTIONAL: No fever, chills, or fatigue  EYES: No eye pain, visual disturbances, or discharge  ENMT:  No difficulty hearing, tinnitus, vertigo; No sinus or throat pain  NECK: No pain or stiffness  RESPIRATORY: Per above  CARDIOVASCULAR: No chest pain, palpitations, dizziness, or leg swelling  GASTROINTESTINAL: Per above   GENITOURINARY: No dysuria, frequency, hematuria, or incontinence  NEUROLOGICAL: No headaches, memory loss, loss of strength, numbness, or tremors  SKIN: No itching, burning, rashes, or lesions   MUSCULOSKELETAL: No joint pain or swelling; No muscle, back, or extremity pain  PSYCHIATRIC: No depression, anxiety, mood swings, or difficulty sleeping      Medications:  MEDICATIONS  (STANDING):  acetaminophen   IVPB .. 1000 milliGRAM(s) IV Intermittent once  amLODIPine   Tablet 10 milliGRAM(s) Oral daily  brimonidine 0.2% Ophthalmic Solution 1 Drop(s) Both EYES three times a day  dorzolamide 2%/timolol 0.5% Ophthalmic Solution 1 Drop(s) Both EYES two times a day  heparin   Injectable 5000 Unit(s) SubCutaneous every 12 hours  influenza  Vaccine (HIGH DOSE) 0.5 milliLiter(s) IntraMuscular once  latanoprost 0.005% Ophthalmic Solution 1 Drop(s) Both EYES at bedtime  levothyroxine 100 MICROGram(s) Oral daily  pantoprazole  Injectable 40 milliGRAM(s) IV Push two times a day  piperacillin/tazobactam IVPB.. 3.375 Gram(s) IV Intermittent every 8 hours  vancomycin  IVPB      vancomycin  IVPB 1000 milliGRAM(s) IV Intermittent every 24 hours    MEDICATIONS  (PRN):  guaiFENesin Oral Liquid (Sugar-Free) 200 milliGRAM(s) Oral every 6 hours PRN Cough  ondansetron Injectable 4 milliGRAM(s) IV Push every 6 hours PRN Nausea and/or Vomiting            Vital Signs Last 24 Hrs  T(C): 36.3 (2025 11:50), Max: 37.7 (2025 20:09)  T(F): 97.4 (2025 11:50), Max: 99.8 (2025 20:09)  HR: 93 (2025 11:50) (75 - 103)  BP: 118/69 (2025 11:50) (118/69 - 137/69)  BP(mean): --  RR: 18 (2025 11:50) (18 - 18)  SpO2: 92% (2025 11:50) (88% - 95%)    Parameters below as of 2025 11:50  Patient On (Oxygen Delivery Method): nasal cannula  O2 Flow (L/min): 1               @ 07:01  -  02-24 @ 07:00  --------------------------------------------------------  IN: 700 mL / OUT: 1 mL / NET: 699 mL          LABS:                        7.3    7.91  )-----------( 319      ( 2025 06:29 )             22.8     02    140  |  104  |  6[L]  ----------------------------<  104[H]  3.2[L]   |  24  |  1.10    Ca    8.9      2025 06:29  Phos  2.1     -  Mg     1.8                 CAPILLARY BLOOD GLUCOSE      POCT Blood Glucose.: 98 mg/dL (2025 11:03)      Urinalysis Basic - ( 2025 06:29 )    Color: x / Appearance: x / SG: x / pH: x  Gluc: 104 mg/dL / Ketone: x  / Bili: x / Urobili: x   Blood: x / Protein: x / Nitrite: x   Leuk Esterase: x / RBC: x / WBC x   Sq Epi: x / Non Sq Epi: x / Bacteria: x                    CULTURES:     (collected 25 @ 14:03)  Source: .Blood Blood-Peripheral  Preliminary Report (25 @ 18:01):    No growth at 4 days     (collected 25 @ 13:48)  Source: .Blood Blood-Peripheral  Preliminary Report (25 @ 18:01):    No growth at 4 days                      Physical Examination:    General: No acute distress.      HEENT: Pupils equal, reactive to light.  Symmetric.    PULM: minimal rhonchi L     CVS: S1, S2    ABD: Soft, nondistended, nontender, normoactive bowel sounds, no masses    EXT: No edema, nontender    SKIN: Warm and well perfused, no rashes noted.    NEURO: Alert, oriented, interactive, nonfocal    RADIOLOGY REVIEWED  CXR : L sided opacities   
[facility]    Audi Jerome Holland NP    [address] [place] [address]  [phone]      Chief Complaint:  Patient is a 79y old  Female who presents with a chief complaint of     HPI:    Allergies:  No Known Allergies      Medications:  amLODIPine   Tablet 10 milliGRAM(s) Oral daily  brimonidine 0.2% Ophthalmic Solution 1 Drop(s) Both EYES three times a day  dorzolamide 2%/timolol 0.5% Ophthalmic Solution 1 Drop(s) Both EYES two times a day  heparin   Injectable 5000 Unit(s) SubCutaneous every 12 hours  influenza  Vaccine (HIGH DOSE) 0.5 milliLiter(s) IntraMuscular once  iron sucrose IVPB 200 milliGRAM(s) IV Intermittent every 24 hours  latanoprost 0.005% Ophthalmic Solution 1 Drop(s) Both EYES at bedtime  levothyroxine 100 MICROGram(s) Oral daily  ondansetron Injectable 4 milliGRAM(s) IV Push every 6 hours PRN  pantoprazole  Injectable 40 milliGRAM(s) IV Push two times a day      PMHX/PSHX:  Glaucoma    HTN (hypertension)    Type II diabetes mellitus    Multiple thyroid nodules    Hyperthyroidism    Obese    H/O: hysterectomy    H/O         Family history:  FH: dementia (Mother)        Social History:     ROS:     General:  No wt loss, fevers, chills, night sweats, fatigue,   Eyes:  Good vision, no reported pain  ENT:  No sore throat, pain, runny nose, dysphagia  CV:  No pain, palpitations, hypo/hypertension  Resp:  No dyspnea, cough, tachypnea, wheezing  GI:  No pain, No nausea, No vomiting, No diarrhea, No constipation, No weight loss, No fever, No pruritis, No rectal bleeding, No tarry stools, No dysphagia,  :  No pain, bleeding, incontinence, nocturia  Muscle:  No pain, weakness  Neuro:  No weakness, tingling, memory problems  Psych:  No fatigue, insomnia, mood problems, depression  Endocrine:  No polyuria, polydipsia, cold/heat intolerance  Heme:  No petechiae, ecchymosis, easy bruisability  Skin:  No rash, tattoos, scars, edema      PHYSICAL EXAM:   Vital Signs:  Vital Signs Last 24 Hrs  T(C): 36.8 (2025 13:50), Max: 36.9 (2025 20:31)  T(F): 98.3 (2025 13:50), Max: 98.5 (2025 20:31)  HR: 68 (2025 13:50) (60 - 69)  BP: 115/71 (2025 13:50) (115/71 - 153/81)  BP(mean): --  RR: 18 (2025 13:50) (18 - 18)  SpO2: 100% (2025 13:50) (93% - 100%)    Parameters below as of 2025 13:50  Patient On (Oxygen Delivery Method): room air      Daily     Daily     GENERAL:  Appears stated age, well-groomed, well-nourished, no distress  HEENT:  NC/AT,  conjunctivae clear and pink, no thyromegaly, nodules, adenopathy, no JVD, sclera -anicteric  CHEST:  Full & symmetric excursion, no increased effort, breath sounds clear  HEART:  Regular rhythm, S1, S2, no murmur/rub/S3/S4, no abdominal bruit, no edema  ABDOMEN:  Soft, non-tender, non-distended, normoactive bowel sounds,  no masses ,no hepato-splenomegaly, no signs of chronic liver disease  EXTEREMITIES:  no cyanosis,clubbing or edema  SKIN:  No rash/erythema/ecchymoses/petechiae/wounds/abscess/warm/dry  NEURO:  Alert, oriented, no asterixis, no tremor, no encephalopathy    LABS:                        8.0    4.38  )-----------( 416      ( 2025 06:55 )             26.5     02-14    143  |  107  |  12  ----------------------------<  100[H]  3.9   |  21[L]  |  1.28    Ca    9.6      2025 06:51  Phos  4.3     02-14  Mg     2.0     02-14    TPro  8.1  /  Alb  4.5  /  TBili  0.3  /  DBili  x   /  AST  20  /  ALT  15  /  AlkPhos  60  02-13    LIVER FUNCTIONS - ( 2025 10:21 )  Alb: 4.5 g/dL / Pro: 8.1 g/dL / ALK PHOS: 60 U/L / ALT: 15 U/L / AST: 20 U/L / GGT: x             Urinalysis Basic - ( 2025 06:51 )    Color: x / Appearance: x / SG: x / pH: x  Gluc: 100 mg/dL / Ketone: x  / Bili: x / Urobili: x   Blood: x / Protein: x / Nitrite: x   Leuk Esterase: x / RBC: x / WBC x   Sq Epi: x / Non Sq Epi: x / Bacteria: x          Imaging:              
Patient is a 79y old  Female who presents with a chief complaint of possible aspiration (19 Feb 2025 12:28)        MEDICATIONS  (STANDING):  acetaminophen   IVPB .. 1000 milliGRAM(s) IV Intermittent once  amLODIPine   Tablet 10 milliGRAM(s) Oral daily  brimonidine 0.2% Ophthalmic Solution 1 Drop(s) Both EYES three times a day  dorzolamide 2%/timolol 0.5% Ophthalmic Solution 1 Drop(s) Both EYES two times a day  heparin   Injectable 5000 Unit(s) SubCutaneous every 12 hours  influenza  Vaccine (HIGH DOSE) 0.5 milliLiter(s) IntraMuscular once  latanoprost 0.005% Ophthalmic Solution 1 Drop(s) Both EYES at bedtime  levothyroxine 100 MICROGram(s) Oral daily  magnesium sulfate  IVPB 1 Gram(s) IV Intermittent once  pantoprazole  Injectable 40 milliGRAM(s) IV Push two times a day  piperacillin/tazobactam IVPB.- 3.375 Gram(s) IV Intermittent once  piperacillin/tazobactam IVPB.. 3.375 Gram(s) IV Intermittent every 8 hours  potassium phosphate IVPB 30 milliMole(s) IV Intermittent once  sodium chloride 0.9%. 500 milliLiter(s) (125 mL/Hr) IV Continuous <Continuous>  vancomycin  IVPB        MEDICATIONS  (PRN):  ondansetron Injectable 4 milliGRAM(s) IV Push every 6 hours PRN Nausea and/or Vomiting      CAPILLARY BLOOD GLUCOSE      POCT Blood Glucose.: 190 mg/dL (19 Feb 2025 13:30)  POCT Blood Glucose.: 122 mg/dL (19 Feb 2025 05:41)  POCT Blood Glucose.: 114 mg/dL (18 Feb 2025 21:27)  POCT Blood Glucose.: 108 mg/dL (18 Feb 2025 17:07)    I&O's Summary      PHYSICAL EXAM:  Vital Signs Last 24 Hrs  T(C): 38.6 (19 Feb 2025 15:30), Max: 38.6 (19 Feb 2025 15:30)  T(F): 101.5 (19 Feb 2025 15:30), Max: 101.5 (19 Feb 2025 15:30)  HR: 94 (19 Feb 2025 16:10) (69 - 123)  BP: 104/57 (19 Feb 2025 16:10) (95/59 - 170/63)  BP(mean): --  RR: 24 (19 Feb 2025 16:10) (16 - 30)  SpO2: 100% (19 Feb 2025 16:10) (91% - 100%)    Parameters below as of 19 Feb 2025 16:10  Patient On (Oxygen Delivery Method): nasal cannula, high flow      LABS:                        8.8    2.54  )-----------( 323      ( 19 Feb 2025 15:25 )             28.8     02-19    141  |  110[H]  |  17  ----------------------------<  159[H]  3.3[L]   |  14[L]  |  1.22    Ca    8.7      19 Feb 2025 15:25  Phos  2.0     02-19  Mg     1.4     02-19    TPro  5.4[L]  /  Alb  2.7[L]  /  TBili  0.3  /  DBili  x   /  AST  15  /  ALT  10  /  AlkPhos  44  02-19    PT/INR - ( 19 Feb 2025 13:46 )   PT: 14.8 sec;   INR: 1.29 ratio         PTT - ( 19 Feb 2025 13:46 )  PTT:119.2 sec      Urinalysis Basic - ( 19 Feb 2025 15:25 )    Color: x / Appearance: x / SG: x / pH: x  Gluc: 159 mg/dL / Ketone: x  / Bili: x / Urobili: x   Blood: x / Protein: x / Nitrite: x   Leuk Esterase: x / RBC: x / WBC x   Sq Epi: x / Non Sq Epi: x / Bacteria: x

## 2025-02-24 NOTE — CHART NOTE - NSCHARTNOTEFT_GEN_A_CORE
NUTRITION FOLLOW UP NOTE    PATIENT SEEN FOR: malnutrition follow-up     SOURCE: [x] Patient  [x] Current Medical Record  [x] RN  [] Family/support person at bedside  [] Patient unavailable/inappropriate  [] Other:    CHART REVIEWED/EVENTS NOTED.  [] No changes to nutrition care plan to note  [x] Nutrition Status:  - Per chart: "gastric emptying study C/W gastroparesis: s/p egd and botox injection"  - DM     DIET ORDER:   Diet, Full Liquid:   Consistent Carbohydrate {No Snacks} (CSTCHO) (02-22-25)      CURRENT DIET ORDER IS:  [] Appropriate:  [] Inadequate:  [x] Other: see below for recommendation     NUTRITION INTAKE/PROVISION:  [x] PO: pt reports appetite not so great, had some oatmeal this morning. Prefers to get protein supplements when offered (previously was ordered for protein supplements). Pt made aware of menu ordering procedure in house with menu provided, encourage pt to order preferred foods upon diet advancement to optimize PO intake in house.   - was on Clear Liquid diet from 2/18-2/22, advanced to full liquid diet since 2/22  [] Enteral Nutrition:  [] Parenteral Nutrition:    ANTHROPOMETRICS:  Drug Dosing Weight  Height (cm): 154.9 (19 Feb 2025 10:48)  Weight (kg): 59.9 (19 Feb 2025 10:48)  BMI (kg/m2): 25 (19 Feb 2025 10:48)  Weights:   Daily: none documented thus far  Current weight: unable to assess as pt out of bed to recliner upon visit     MEDICATIONS:  MEDICATIONS  (STANDING):  acetaminophen   IVPB .. 1000 milliGRAM(s) IV Intermittent once  albuterol/ipratropium for Nebulization 3 milliLiter(s) Nebulizer every 6 hours  amLODIPine   Tablet 10 milliGRAM(s) Oral daily  brimonidine 0.2% Ophthalmic Solution 1 Drop(s) Both EYES three times a day  dorzolamide 2%/timolol 0.5% Ophthalmic Solution 1 Drop(s) Both EYES two times a day  heparin   Injectable 5000 Unit(s) SubCutaneous every 12 hours  influenza  Vaccine (HIGH DOSE) 0.5 milliLiter(s) IntraMuscular once  latanoprost 0.005% Ophthalmic Solution 1 Drop(s) Both EYES at bedtime  levothyroxine 100 MICROGram(s) Oral daily  pantoprazole  Injectable 40 milliGRAM(s) IV Push two times a day  piperacillin/tazobactam IVPB.. 3.375 Gram(s) IV Intermittent every 8 hours  vancomycin  IVPB      vancomycin  IVPB 1000 milliGRAM(s) IV Intermittent every 24 hours    MEDICATIONS  (PRN):  guaiFENesin Oral Liquid (Sugar-Free) 200 milliGRAM(s) Oral every 6 hours PRN Cough  ondansetron Injectable 4 milliGRAM(s) IV Push every 6 hours PRN Nausea and/or Vomiting      NUTRITIONALLY PERTINENT LABS:  02-24 Na140 mmol/L Glu 104 mg/dL[H] K+ 3.2 mmol/L[L] Cr  1.10 mg/dL BUN 6 mg/dL[L] 02-24 Phos 2.1 mg/dL[L] 02-21 Alb 2.8 g/dL[L]02-21 ALT 8 U/L[L] AST 12 U/L Alkaline Phosphatase 68 U/L    A1C with Estimated Average Glucose Result: 5.6 % (11-01-24 @ 07:00)    Finger Sticks:  POCT Blood Glucose.: 98 mg/dL (02-24 @ 11:03)  POCT Blood Glucose.: 114 mg/dL (02-24 @ 07:51)  POCT Blood Glucose.: 135 mg/dL (02-23 @ 17:20)      NUTRITIONALLY PERTINENT MEDICATIONS/LABS:  [x] Reviewed  [x] Relevant notes on medications/labs:  - DM: ordered for ISS to regulate blood glucose in house  - hypokalemia and hypophosphatemia ordered for repletions today  - on Synthroid     EDEMA:  [x] Reviewed  [] Relevant notes:    GI/ I&O:  [x] Reviewed  [x] Relevant notes: no nausea and vomiting for the past few days, last emesis on 2/19 per flowsheet, ordered for Zofran  [x] Other: also on Protonix, last BM on 2/24 per pt, not on any bowel regimen     SKIN:   [x] No pressure injuries documented, per nursing flowsheet  [] Pressure injury previously noted  [] Change in pressure injury documentation:  [] Other:    ESTIMATED NEEDS:  [] No change:  [] Updated:  Energy:  7531-7253 kcal/day (28-33 kcal/kg)  Protein:  72-84 g/day (1.2-1.4 g/kg)  Fluid:  4460-6146 ml/day or [] defer to team  Based on: dosing weight of 59.9kg:     NUTRITION DIAGNOSIS:  [x] Prior Dx: acute severe malnutrition   [x] New Dx: Food and nutrition related knowledge deficit related to lack of exposure to diet education as evidenced by pt asking for diet-related question and receptive to diet education on gastroparesis medical nutrition therapy.   Goal: Pt will be able to provide 1-3 teach-back points of nutrition education provided.     EDUCATION:  [x] Yes: discussed nutrition recommendation/food choices may help to manage gastroparesis. Handout provided: Gastroparesis Nutrition Therapy  -  Recommended small frequent meals by ordering nutrient-dense snacks and leaving non-perishable food away from tray for later consumption during the day or between meals; to start with protein, and sips of supplement throughout the day; reviewed foods with protein and menu order procedures in hospital.   [] Not appropriate/warranted    NUTRITION CARE PLAN:  1. Diet: Ordered for Consistent Carbohydrate full liquid diet, diet advancement per team. RD remains available to adjust diet as needed.   2. Supplements: Recommend Liquid Protein Supplement 1x daily (100 kcal, 15 gm protein) and Glucerna 1x daily (285 calories, 14g protein) to provide additional calories and nutrients to encourage PO intake while in house.  3. Multivitamin/mineral supplementation: Recommend MVI, pending no medical contraindication, for micronutrient support     [] Achieved - Continue current nutrition intervention(s)  [] Current medical condition precludes nutrition intervention at this time.    MONITORING AND EVALUATION:   RD remains available upon request and will follow up per protocol:   Laurie Rowley, MS, RDN, CDN   Available on [facility] TEAMS

## 2025-02-24 NOTE — PROGRESS NOTE ADULT - SUBJECTIVE AND OBJECTIVE BOX
DATE OF SERVICE: 02-24-25 @ 13:54    Patient is a 79y old  Female who presents with a chief complaint of possible aspiration (19 Feb 2025 12:28)      SUBJECTIVE / OVERNIGHT EVENTS:  No chest pain. No shortness of breath. No complaints. No events overnight.     MEDICATIONS  (STANDING):  acetaminophen   IVPB .. 1000 milliGRAM(s) IV Intermittent once  albuterol/ipratropium for Nebulization 3 milliLiter(s) Nebulizer every 6 hours  amLODIPine   Tablet 10 milliGRAM(s) Oral daily  brimonidine 0.2% Ophthalmic Solution 1 Drop(s) Both EYES three times a day  dorzolamide 2%/timolol 0.5% Ophthalmic Solution 1 Drop(s) Both EYES two times a day  heparin   Injectable 5000 Unit(s) SubCutaneous every 12 hours  influenza  Vaccine (HIGH DOSE) 0.5 milliLiter(s) IntraMuscular once  latanoprost 0.005% Ophthalmic Solution 1 Drop(s) Both EYES at bedtime  levothyroxine 100 MICROGram(s) Oral daily  pantoprazole  Injectable 40 milliGRAM(s) IV Push two times a day  piperacillin/tazobactam IVPB.. 3.375 Gram(s) IV Intermittent every 8 hours  vancomycin  IVPB      vancomycin  IVPB 1000 milliGRAM(s) IV Intermittent every 24 hours    MEDICATIONS  (PRN):  guaiFENesin Oral Liquid (Sugar-Free) 200 milliGRAM(s) Oral every 6 hours PRN Cough  ondansetron Injectable 4 milliGRAM(s) IV Push every 6 hours PRN Nausea and/or Vomiting      Vital Signs Last 24 Hrs  T(C): 36.3 (24 Feb 2025 11:50), Max: 37.7 (23 Feb 2025 20:09)  T(F): 97.4 (24 Feb 2025 11:50), Max: 99.8 (23 Feb 2025 20:09)  HR: 93 (24 Feb 2025 11:50) (75 - 103)  BP: 118/69 (24 Feb 2025 11:50) (118/69 - 137/69)  BP(mean): --  RR: 18 (24 Feb 2025 12:16) (18 - 18)  SpO2: 92% (24 Feb 2025 12:16) (88% - 95%)    Parameters below as of 24 Feb 2025 12:16  Patient On (Oxygen Delivery Method): room air      CAPILLARY BLOOD GLUCOSE      POCT Blood Glucose.: 98 mg/dL (24 Feb 2025 11:03)  POCT Blood Glucose.: 114 mg/dL (24 Feb 2025 07:51)  POCT Blood Glucose.: 135 mg/dL (23 Feb 2025 17:20)    I&O's Summary    23 Feb 2025 07:01  -  24 Feb 2025 07:00  --------------------------------------------------------  IN: 700 mL / OUT: 1 mL / NET: 699 mL    24 Feb 2025 07:01  -  24 Feb 2025 13:54  --------------------------------------------------------  IN: 500 mL / OUT: 0 mL / NET: 500 mL        PHYSICAL EXAM:  GENERAL: NAD, well-developed  HEAD:  Atraumatic, Normocephalic  EYES: EOMI, PERRLA, conjunctiva and sclera clear  NECK: Supple, No JVD  CHEST/LUNG: Clear to auscultation bilaterally; No wheeze  HEART: Regular rate and rhythm; No murmurs, rubs, or gallops  ABDOMEN: Soft, Nontender, Nondistended; Bowel sounds present  EXTREMITIES:  2+ Peripheral Pulses, No clubbing, cyanosis, or edema  PSYCH: AAOx3  NEUROLOGY: non-focal  SKIN: No rashes or lesions    LABS:                        7.3    7.91  )-----------( 319      ( 24 Feb 2025 06:29 )             22.8     02-24    140  |  104  |  6[L]  ----------------------------<  104[H]  3.2[L]   |  24  |  1.10    Ca    8.9      24 Feb 2025 06:29  Phos  2.1     02-24  Mg     1.8     02-24            Urinalysis Basic - ( 24 Feb 2025 06:29 )    Color: x / Appearance: x / SG: x / pH: x  Gluc: 104 mg/dL / Ketone: x  / Bili: x / Urobili: x   Blood: x / Protein: x / Nitrite: x   Leuk Esterase: x / RBC: x / WBC x   Sq Epi: x / Non Sq Epi: x / Bacteria: x        RADIOLOGY & ADDITIONAL TESTS:    Imaging Personally Reviewed:    Consultant(s) Notes Reviewed:      Care Discussed with Consultants/Other Providers:

## 2025-02-24 NOTE — PROGRESS NOTE ADULT - SUBJECTIVE AND OBJECTIVE BOX
DATE OF SERVICE: 02-24-25 @ 15:28    Patient is a 79y old  Female who presents with a chief complaint of possible aspiration (19 Feb 2025 12:28)      SUBJECTIVE / OVERNIGHT EVENTS:  No chest pain. No shortness of breath. No complaints. No events overnight.     MEDICATIONS  (STANDING):  acetaminophen   IVPB .. 1000 milliGRAM(s) IV Intermittent once  albuterol/ipratropium for Nebulization 3 milliLiter(s) Nebulizer every 6 hours  amLODIPine   Tablet 10 milliGRAM(s) Oral daily  brimonidine 0.2% Ophthalmic Solution 1 Drop(s) Both EYES three times a day  dorzolamide 2%/timolol 0.5% Ophthalmic Solution 1 Drop(s) Both EYES two times a day  heparin   Injectable 5000 Unit(s) SubCutaneous every 12 hours  influenza  Vaccine (HIGH DOSE) 0.5 milliLiter(s) IntraMuscular once  latanoprost 0.005% Ophthalmic Solution 1 Drop(s) Both EYES at bedtime  levothyroxine 100 MICROGram(s) Oral daily  pantoprazole  Injectable 40 milliGRAM(s) IV Push two times a day  piperacillin/tazobactam IVPB.. 3.375 Gram(s) IV Intermittent every 8 hours  vancomycin  IVPB      vancomycin  IVPB 1000 milliGRAM(s) IV Intermittent every 24 hours    MEDICATIONS  (PRN):  guaiFENesin Oral Liquid (Sugar-Free) 200 milliGRAM(s) Oral every 6 hours PRN Cough  ondansetron Injectable 4 milliGRAM(s) IV Push every 6 hours PRN Nausea and/or Vomiting      Vital Signs Last 24 Hrs  T(C): 36.3 (24 Feb 2025 11:50), Max: 37.7 (23 Feb 2025 20:09)  T(F): 97.4 (24 Feb 2025 11:50), Max: 99.8 (23 Feb 2025 20:09)  HR: 93 (24 Feb 2025 11:50) (75 - 103)  BP: 118/69 (24 Feb 2025 11:50) (118/69 - 137/69)  BP(mean): --  RR: 18 (24 Feb 2025 12:16) (18 - 18)  SpO2: 92% (24 Feb 2025 12:16) (88% - 95%)    Parameters below as of 24 Feb 2025 12:16  Patient On (Oxygen Delivery Method): room air      CAPILLARY BLOOD GLUCOSE      POCT Blood Glucose.: 98 mg/dL (24 Feb 2025 11:03)  POCT Blood Glucose.: 114 mg/dL (24 Feb 2025 07:51)  POCT Blood Glucose.: 135 mg/dL (23 Feb 2025 17:20)    I&O's Summary    23 Feb 2025 07:01  -  24 Feb 2025 07:00  --------------------------------------------------------  IN: 700 mL / OUT: 1 mL / NET: 699 mL    24 Feb 2025 07:01  -  24 Feb 2025 15:28  --------------------------------------------------------  IN: 500 mL / OUT: 0 mL / NET: 500 mL        PHYSICAL EXAM:  GENERAL: NAD, well-developed  HEAD:  Atraumatic, Normocephalic  EYES: EOMI, PERRLA, conjunctiva and sclera clear  NECK: Supple, No JVD  CHEST/LUNG: Clear to auscultation bilaterally; No wheeze  HEART: Regular rate and rhythm; No murmurs, rubs, or gallops  ABDOMEN: Soft, Nontender, Nondistended; Bowel sounds present  EXTREMITIES:  2+ Peripheral Pulses, No clubbing, cyanosis, or edema  PSYCH: AAOx3  NEUROLOGY: non-focal  SKIN: No rashes or lesions    LABS:                        7.3    7.91  )-----------( 319      ( 24 Feb 2025 06:29 )             22.8     02-24    140  |  104  |  6[L]  ----------------------------<  104[H]  3.2[L]   |  24  |  1.10    Ca    8.9      24 Feb 2025 06:29  Phos  2.1     02-24  Mg     1.8     02-24            Urinalysis Basic - ( 24 Feb 2025 06:29 )    Color: x / Appearance: x / SG: x / pH: x  Gluc: 104 mg/dL / Ketone: x  / Bili: x / Urobili: x   Blood: x / Protein: x / Nitrite: x   Leuk Esterase: x / RBC: x / WBC x   Sq Epi: x / Non Sq Epi: x / Bacteria: x        RADIOLOGY & ADDITIONAL TESTS:    Imaging Personally Reviewed:    Consultant(s) Notes Reviewed:      Care Discussed with Consultants/Other Providers:

## 2025-02-25 LAB
ANION GAP SERPL CALC-SCNC: 13 MMOL/L — SIGNIFICANT CHANGE UP (ref 5–17)
BLD GP AB SCN SERPL QL: NEGATIVE — SIGNIFICANT CHANGE UP
BUN SERPL-MCNC: 5 MG/DL — LOW (ref 7–23)
CALCIUM SERPL-MCNC: 8.7 MG/DL — SIGNIFICANT CHANGE UP (ref 8.4–10.5)
CHLORIDE SERPL-SCNC: 104 MMOL/L — SIGNIFICANT CHANGE UP (ref 96–108)
CO2 SERPL-SCNC: 22 MMOL/L — SIGNIFICANT CHANGE UP (ref 22–31)
CREAT SERPL-MCNC: 1.07 MG/DL — SIGNIFICANT CHANGE UP (ref 0.5–1.3)
EGFR: 52 ML/MIN/1.73M2 — LOW
GLUCOSE BLDC GLUCOMTR-MCNC: 104 MG/DL — HIGH (ref 70–99)
GLUCOSE BLDC GLUCOMTR-MCNC: 114 MG/DL — HIGH (ref 70–99)
GLUCOSE BLDC GLUCOMTR-MCNC: 93 MG/DL — SIGNIFICANT CHANGE UP (ref 70–99)
GLUCOSE BLDC GLUCOMTR-MCNC: 99 MG/DL — SIGNIFICANT CHANGE UP (ref 70–99)
GLUCOSE SERPL-MCNC: 110 MG/DL — HIGH (ref 70–99)
HCT VFR BLD CALC: 21.3 % — LOW (ref 34.5–45)
HCT VFR BLD CALC: 23.4 % — LOW (ref 34.5–45)
HCT VFR BLD CALC: 27.2 % — LOW (ref 34.5–45)
HGB BLD-MCNC: 6.7 G/DL — CRITICAL LOW (ref 11.5–15.5)
HGB BLD-MCNC: 7.1 G/DL — LOW (ref 11.5–15.5)
HGB BLD-MCNC: 8.8 G/DL — LOW (ref 11.5–15.5)
MAGNESIUM SERPL-MCNC: 1.7 MG/DL — SIGNIFICANT CHANGE UP (ref 1.6–2.6)
MCHC RBC-ENTMCNC: 23.3 PG — LOW (ref 27–34)
MCHC RBC-ENTMCNC: 23.8 PG — LOW (ref 27–34)
MCHC RBC-ENTMCNC: 25.2 PG — LOW (ref 27–34)
MCHC RBC-ENTMCNC: 30.3 G/DL — LOW (ref 32–36)
MCHC RBC-ENTMCNC: 31.5 G/DL — LOW (ref 32–36)
MCHC RBC-ENTMCNC: 32.4 G/DL — SIGNIFICANT CHANGE UP (ref 32–36)
MCV RBC AUTO: 75.5 FL — LOW (ref 80–100)
MCV RBC AUTO: 76.7 FL — LOW (ref 80–100)
MCV RBC AUTO: 77.9 FL — LOW (ref 80–100)
NRBC BLD AUTO-RTO: 0 /100 WBCS — SIGNIFICANT CHANGE UP (ref 0–0)
PHOSPHATE SERPL-MCNC: 2.5 MG/DL — SIGNIFICANT CHANGE UP (ref 2.5–4.5)
PLATELET # BLD AUTO: 324 K/UL — SIGNIFICANT CHANGE UP (ref 150–400)
PLATELET # BLD AUTO: 331 K/UL — SIGNIFICANT CHANGE UP (ref 150–400)
PLATELET # BLD AUTO: 339 K/UL — SIGNIFICANT CHANGE UP (ref 150–400)
POTASSIUM SERPL-MCNC: 3.3 MMOL/L — LOW (ref 3.5–5.3)
POTASSIUM SERPL-SCNC: 3.3 MMOL/L — LOW (ref 3.5–5.3)
RBC # BLD: 2.82 M/UL — LOW (ref 3.8–5.2)
RBC # BLD: 3.05 M/UL — LOW (ref 3.8–5.2)
RBC # BLD: 3.49 M/UL — LOW (ref 3.8–5.2)
RBC # FLD: 20.2 % — HIGH (ref 10.3–14.5)
RBC # FLD: 21.1 % — HIGH (ref 10.3–14.5)
RBC # FLD: 21.2 % — HIGH (ref 10.3–14.5)
RH IG SCN BLD-IMP: POSITIVE — SIGNIFICANT CHANGE UP
SODIUM SERPL-SCNC: 139 MMOL/L — SIGNIFICANT CHANGE UP (ref 135–145)
WBC # BLD: 6.84 K/UL — SIGNIFICANT CHANGE UP (ref 3.8–10.5)
WBC # BLD: 7.62 K/UL — SIGNIFICANT CHANGE UP (ref 3.8–10.5)
WBC # BLD: 7.67 K/UL — SIGNIFICANT CHANGE UP (ref 3.8–10.5)
WBC # FLD AUTO: 6.84 K/UL — SIGNIFICANT CHANGE UP (ref 3.8–10.5)
WBC # FLD AUTO: 7.62 K/UL — SIGNIFICANT CHANGE UP (ref 3.8–10.5)
WBC # FLD AUTO: 7.67 K/UL — SIGNIFICANT CHANGE UP (ref 3.8–10.5)

## 2025-02-25 RX ORDER — FERROUS SULFATE 137(45) MG
325 TABLET, EXTENDED RELEASE ORAL DAILY
Refills: 0 | Status: DISCONTINUED | OUTPATIENT
Start: 2025-02-25 | End: 2025-02-26

## 2025-02-25 RX ADMIN — Medication 40 MILLIGRAM(S): at 05:38

## 2025-02-25 RX ADMIN — Medication 25 GRAM(S): at 05:36

## 2025-02-25 RX ADMIN — Medication 25 GRAM(S): at 21:19

## 2025-02-25 RX ADMIN — BRIMONIDINE TARTRATE 1 DROP(S): 1.5 SOLUTION/ DROPS OPHTHALMIC at 05:39

## 2025-02-25 RX ADMIN — DORZOLAMIDE HYDROCHLORIDE AND TIMOLOL MALEATE 1 DROP(S): 20; 5 SOLUTION/ DROPS OPHTHALMIC at 17:50

## 2025-02-25 RX ADMIN — IPRATROPIUM BROMIDE AND ALBUTEROL SULFATE 3 MILLILITER(S): .5; 2.5 SOLUTION RESPIRATORY (INHALATION) at 17:49

## 2025-02-25 RX ADMIN — BRIMONIDINE TARTRATE 1 DROP(S): 1.5 SOLUTION/ DROPS OPHTHALMIC at 21:19

## 2025-02-25 RX ADMIN — Medication 40 MILLIGRAM(S): at 17:49

## 2025-02-25 RX ADMIN — DORZOLAMIDE HYDROCHLORIDE AND TIMOLOL MALEATE 1 DROP(S): 20; 5 SOLUTION/ DROPS OPHTHALMIC at 05:39

## 2025-02-25 RX ADMIN — Medication 100 MICROGRAM(S): at 05:38

## 2025-02-25 RX ADMIN — HEPARIN SODIUM 5000 UNIT(S): 1000 INJECTION INTRAVENOUS; SUBCUTANEOUS at 05:38

## 2025-02-25 RX ADMIN — Medication 325 MILLIGRAM(S): at 13:45

## 2025-02-25 RX ADMIN — DEXTROMETHORPHAN HBR, GUAIFENESIN 200 MILLIGRAM(S): 200 LIQUID ORAL at 13:45

## 2025-02-25 RX ADMIN — IPRATROPIUM BROMIDE AND ALBUTEROL SULFATE 3 MILLILITER(S): .5; 2.5 SOLUTION RESPIRATORY (INHALATION) at 05:39

## 2025-02-25 RX ADMIN — LATANOPROST PF 1 DROP(S): 0.05 SOLUTION/ DROPS OPHTHALMIC at 21:19

## 2025-02-25 RX ADMIN — AMLODIPINE BESYLATE 10 MILLIGRAM(S): 10 TABLET ORAL at 05:38

## 2025-02-25 RX ADMIN — Medication 40 MILLIEQUIVALENT(S): at 17:50

## 2025-02-25 RX ADMIN — IPRATROPIUM BROMIDE AND ALBUTEROL SULFATE 3 MILLILITER(S): .5; 2.5 SOLUTION RESPIRATORY (INHALATION) at 00:47

## 2025-02-25 RX ADMIN — BRIMONIDINE TARTRATE 1 DROP(S): 1.5 SOLUTION/ DROPS OPHTHALMIC at 13:45

## 2025-02-25 RX ADMIN — IPRATROPIUM BROMIDE AND ALBUTEROL SULFATE 3 MILLILITER(S): .5; 2.5 SOLUTION RESPIRATORY (INHALATION) at 11:35

## 2025-02-25 RX ADMIN — HEPARIN SODIUM 5000 UNIT(S): 1000 INJECTION INTRAVENOUS; SUBCUTANEOUS at 17:49

## 2025-02-25 RX ADMIN — DEXTROMETHORPHAN HBR, GUAIFENESIN 200 MILLIGRAM(S): 200 LIQUID ORAL at 05:37

## 2025-02-25 RX ADMIN — Medication 25 GRAM(S): at 13:45

## 2025-02-25 NOTE — PROGRESS NOTE ADULT - SUBJECTIVE AND OBJECTIVE BOX
INTERVAL HPI/OVERNIGHT EVENTS:    pt s/e  off supplemental 02  tolerating reg diet  getting prbc  no vomiting     Allergies    No Known Allergies    Intolerances          General:  No wt loss, fevers, chills, night sweats, fatigue,   Eyes:  Good vision, no reported pain  ENT:  No sore throat, pain, runny nose, dysphagia  CV:  No pain, palpitations, hypo/hypertension  Resp:  No dyspnea, cough, tachypnea, wheezing  GI:  No pain, No nausea, No vomiting, No diarrhea, No constipation, No weight loss, No fever, No pruritis, No rectal bleeding, No tarry stools, No dysphagia,  :  No pain, bleeding, incontinence, nocturia  Muscle:  No pain, weakness  Neuro:  No weakness, tingling, memory problems  Psych:  No fatigue, insomnia, mood problems, depression  Endocrine:  No polyuria, polydipsia, cold/heat intolerance  Heme:  No petechiae, ecchymosis, easy bruisability  Skin:  No rash, tattoos, scars, edema      PHYSICAL EXAM:   Vital Signs:  Vital Signs Last 24 Hrs  T(C): 36.8 (16 Feb 2025 11:50), Max: 36.9 (15 Feb 2025 21:17)  T(F): 98.3 (16 Feb 2025 11:50), Max: 98.5 (15 Feb 2025 21:17)  HR: 77 (16 Feb 2025 11:50) (71 - 77)  BP: 134/77 (16 Feb 2025 11:50) (134/77 - 164/75)  BP(mean): --  RR: 18 (16 Feb 2025 11:50) (18 - 18)  SpO2: 97% (16 Feb 2025 11:50) (97% - 100%)    Parameters below as of 16 Feb 2025 11:50  Patient On (Oxygen Delivery Method): room air      Daily     Daily I&O's Summary    16 Feb 2025 07:01  -  16 Feb 2025 12:42  --------------------------------------------------------  IN: 240 mL / OUT: 0 mL / NET: 240 mL        GENERAL:  Appears stated age, well-groomed, well-nourished, no distress  HEENT:  NC/AT,  conjunctivae clear and pink, no thyromegaly, nodules, adenopathy, no JVD, sclera -anicteric  CHEST:  Full & symmetric excursion, no increased effort, breath sounds clear  HEART:  Regular rhythm, S1, S2, no murmur/rub/S3/S4, no abdominal bruit, no edema  ABDOMEN:  Soft, non-tender, non-distended, normoactive bowel sounds,  no masses ,no hepato-splenomegaly, no signs of chronic liver disease  EXTEREMITIES:  no cyanosis,clubbing or edema  SKIN:  No rash/erythema/ecchymoses/petechiae/wounds/abscess/warm/dry  NEURO:  Alert, oriented, no asterixis, no tremor, no encephalopathy      LABS:                        8.5    5.32  )-----------( 420      ( 16 Feb 2025 07:10 )             28.4     02-16    141  |  105  |  12  ----------------------------<  98  4.0   |  21[L]  |  1.25    Ca    9.7      16 Feb 2025 07:09        Urinalysis Basic - ( 16 Feb 2025 07:09 )    Color: x / Appearance: x / SG: x / pH: x  Gluc: 98 mg/dL / Ketone: x  / Bili: x / Urobili: x   Blood: x / Protein: x / Nitrite: x   Leuk Esterase: x / RBC: x / WBC x   Sq Epi: x / Non Sq Epi: x / Bacteria: x      amylase   lipase  RADIOLOGY & ADDITIONAL TESTS:

## 2025-02-25 NOTE — PROGRESS NOTE ADULT - SUBJECTIVE AND OBJECTIVE BOX
DATE OF SERVICE: 02-25-25 @ 17:10    Patient is a 79y old  Female who presents with a chief complaint of possible aspiration (19 Feb 2025 12:28)      SUBJECTIVE / OVERNIGHT EVENTS:  No chest pain. No shortness of breath. No complaints. No events overnight.     MEDICATIONS  (STANDING):  acetaminophen   IVPB .. 1000 milliGRAM(s) IV Intermittent once  albuterol/ipratropium for Nebulization 3 milliLiter(s) Nebulizer every 6 hours  amLODIPine   Tablet 10 milliGRAM(s) Oral daily  brimonidine 0.2% Ophthalmic Solution 1 Drop(s) Both EYES three times a day  dorzolamide 2%/timolol 0.5% Ophthalmic Solution 1 Drop(s) Both EYES two times a day  ferrous    sulfate 325 milliGRAM(s) Oral daily  heparin   Injectable 5000 Unit(s) SubCutaneous every 12 hours  influenza  Vaccine (HIGH DOSE) 0.5 milliLiter(s) IntraMuscular once  latanoprost 0.005% Ophthalmic Solution 1 Drop(s) Both EYES at bedtime  levothyroxine 100 MICROGram(s) Oral daily  pantoprazole  Injectable 40 milliGRAM(s) IV Push two times a day  piperacillin/tazobactam IVPB.. 3.375 Gram(s) IV Intermittent every 8 hours  potassium chloride   Powder 40 milliEquivalent(s) Oral once    MEDICATIONS  (PRN):  guaiFENesin Oral Liquid (Sugar-Free) 200 milliGRAM(s) Oral every 6 hours PRN Cough  ondansetron Injectable 4 milliGRAM(s) IV Push every 6 hours PRN Nausea and/or Vomiting      Vital Signs Last 24 Hrs  T(C): 36.6 (25 Feb 2025 16:15), Max: 37.1 (24 Feb 2025 20:24)  T(F): 97.9 (25 Feb 2025 16:15), Max: 98.7 (24 Feb 2025 20:24)  HR: 77 (25 Feb 2025 16:15) (77 - 90)  BP: 116/62 (25 Feb 2025 16:15) (109/73 - 142/71)  BP(mean): --  RR: 18 (25 Feb 2025 16:15) (18 - 18)  SpO2: 100% (25 Feb 2025 16:15) (92% - 100%)    Parameters below as of 25 Feb 2025 16:15  Patient On (Oxygen Delivery Method): room air      CAPILLARY BLOOD GLUCOSE      POCT Blood Glucose.: 99 mg/dL (25 Feb 2025 11:47)  POCT Blood Glucose.: 114 mg/dL (25 Feb 2025 07:44)  POCT Blood Glucose.: 118 mg/dL (24 Feb 2025 21:08)  POCT Blood Glucose.: 109 mg/dL (24 Feb 2025 17:41)    I&O's Summary    24 Feb 2025 07:01  -  25 Feb 2025 07:00  --------------------------------------------------------  IN: 1220 mL / OUT: 0 mL / NET: 1220 mL    25 Feb 2025 07:01  -  25 Feb 2025 17:10  --------------------------------------------------------  IN: 750 mL / OUT: 0 mL / NET: 750 mL        PHYSICAL EXAM:  GENERAL: NAD, well-developed  HEAD:  Atraumatic, Normocephalic  EYES: EOMI, PERRLA, conjunctiva and sclera clear  NECK: Supple, No JVD  CHEST/LUNG: Clear to auscultation bilaterally; No wheeze  HEART: Regular rate and rhythm; No murmurs, rubs, or gallops  ABDOMEN: Soft, Nontender, Nondistended; Bowel sounds present  EXTREMITIES:  2+ Peripheral Pulses, No clubbing, cyanosis, or edema  PSYCH: AAOx3  NEUROLOGY: non-focal  SKIN: No rashes or lesions    LABS:                        7.1    7.62  )-----------( 339      ( 25 Feb 2025 08:57 )             23.4     02-25    139  |  104  |  5[L]  ----------------------------<  110[H]  3.3[L]   |  22  |  1.07    Ca    8.7      25 Feb 2025 06:47  Phos  2.5     02-25  Mg     1.7     02-25            Urinalysis Basic - ( 25 Feb 2025 06:47 )    Color: x / Appearance: x / SG: x / pH: x  Gluc: 110 mg/dL / Ketone: x  / Bili: x / Urobili: x   Blood: x / Protein: x / Nitrite: x   Leuk Esterase: x / RBC: x / WBC x   Sq Epi: x / Non Sq Epi: x / Bacteria: x        RADIOLOGY & ADDITIONAL TESTS:    Imaging Personally Reviewed:    Consultant(s) Notes Reviewed:      Care Discussed with Consultants/Other Providers:

## 2025-02-25 NOTE — PROVIDER CONTACT NOTE (CRITICAL VALUE NOTIFICATION) - BACKGROUND
pt admitted for N/V
Pt dx Intractable vomiting, NAKUL, weight loss
Patient s/p EGD for gastroparesis.
79-year-old female medical history of pretension, diabetes, hypothyroidism, presenting today for nausea nonbloody vomiting for about 3 weeks now with significant weight loss of 10 pounds that she was able to measure for the past week

## 2025-02-25 NOTE — PROGRESS NOTE ADULT - SUBJECTIVE AND OBJECTIVE BOX
Follow-up Pulm Progress Note    cough improving  denies SOB/CP     Medications:  MEDICATIONS  (STANDING):  acetaminophen   IVPB .. 1000 milliGRAM(s) IV Intermittent once  albuterol/ipratropium for Nebulization 3 milliLiter(s) Nebulizer every 6 hours  amLODIPine   Tablet 10 milliGRAM(s) Oral daily  brimonidine 0.2% Ophthalmic Solution 1 Drop(s) Both EYES three times a day  dorzolamide 2%/timolol 0.5% Ophthalmic Solution 1 Drop(s) Both EYES two times a day  ferrous    sulfate 325 milliGRAM(s) Oral daily  heparin   Injectable 5000 Unit(s) SubCutaneous every 12 hours  influenza  Vaccine (HIGH DOSE) 0.5 milliLiter(s) IntraMuscular once  latanoprost 0.005% Ophthalmic Solution 1 Drop(s) Both EYES at bedtime  levothyroxine 100 MICROGram(s) Oral daily  pantoprazole  Injectable 40 milliGRAM(s) IV Push two times a day  piperacillin/tazobactam IVPB.. 3.375 Gram(s) IV Intermittent every 8 hours    MEDICATIONS  (PRN):  guaiFENesin Oral Liquid (Sugar-Free) 200 milliGRAM(s) Oral every 6 hours PRN Cough  ondansetron Injectable 4 milliGRAM(s) IV Push every 6 hours PRN Nausea and/or Vomiting          Vital Signs Last 24 Hrs  T(C): 36.7 (25 Feb 2025 11:35), Max: 37.1 (24 Feb 2025 20:24)  T(F): 98 (25 Feb 2025 11:35), Max: 98.7 (24 Feb 2025 20:24)  HR: 84 (25 Feb 2025 11:35) (78 - 88)  BP: 118/73 (25 Feb 2025 11:35) (109/73 - 142/71)  BP(mean): --  RR: 18 (25 Feb 2025 11:35) (18 - 18)  SpO2: 94% (25 Feb 2025 11:35) (92% - 96%)    Parameters below as of 25 Feb 2025 11:35  Patient On (Oxygen Delivery Method): room air              02-24 @ 07:01  -  02-25 @ 07:00  --------------------------------------------------------  IN: 1220 mL / OUT: 0 mL / NET: 1220 mL          LABS:                        7.1    7.62  )-----------( 339      ( 25 Feb 2025 08:57 )             23.4     02-25    139  |  104  |  5[L]  ----------------------------<  110[H]  3.3[L]   |  22  |  1.07    Ca    8.7      25 Feb 2025 06:47  Phos  2.5     02-25  Mg     1.7     02-25            CAPILLARY BLOOD GLUCOSE      POCT Blood Glucose.: 99 mg/dL (25 Feb 2025 11:47)      Urinalysis Basic - ( 25 Feb 2025 06:47 )    Color: x / Appearance: x / SG: x / pH: x  Gluc: 110 mg/dL / Ketone: x  / Bili: x / Urobili: x   Blood: x / Protein: x / Nitrite: x   Leuk Esterase: x / RBC: x / WBC x   Sq Epi: x / Non Sq Epi: x / Bacteria: x                CULTURES:     Culture - Blood (collected 02-19-25 @ 14:03)  Source: .Blood Blood-Peripheral  Final Report (02-24-25 @ 18:00):    No growth at 5 days    Culture - Blood (collected 02-19-25 @ 13:48)  Source: .Blood Blood-Peripheral  Final Report (02-24-25 @ 18:00):    No growth at 5 days                Physical Examination:  PULM: few crackles L   CVS: S1, S2 heard    RADIOLOGY REVIEWED  CXR 2/24 with improving L sided opacities

## 2025-02-25 NOTE — PROVIDER CONTACT NOTE (CRITICAL VALUE NOTIFICATION) - ACTION/TREATMENT ORDERED:
Provider notified and aware, to order fluids-  500 mL sodium chloride. Care ongoing
DELIO Guthrie notified. Repeat CBC ordered.
PA notified, states to give 250mL NS bolus and repeat lactate.  Will continue to monitor.
PA aware, in rapid response with medical team

## 2025-02-26 ENCOUNTER — TRANSCRIPTION ENCOUNTER (OUTPATIENT)
Age: 80
End: 2025-02-26

## 2025-02-26 VITALS
RESPIRATION RATE: 18 BRPM | HEART RATE: 75 BPM | SYSTOLIC BLOOD PRESSURE: 133 MMHG | OXYGEN SATURATION: 92 % | TEMPERATURE: 98 F | DIASTOLIC BLOOD PRESSURE: 65 MMHG

## 2025-02-26 LAB
ANION GAP SERPL CALC-SCNC: 11 MMOL/L — SIGNIFICANT CHANGE UP (ref 5–17)
BUN SERPL-MCNC: 4 MG/DL — LOW (ref 7–23)
CALCIUM SERPL-MCNC: 9.2 MG/DL — SIGNIFICANT CHANGE UP (ref 8.4–10.5)
CHLORIDE SERPL-SCNC: 106 MMOL/L — SIGNIFICANT CHANGE UP (ref 96–108)
CO2 SERPL-SCNC: 22 MMOL/L — SIGNIFICANT CHANGE UP (ref 22–31)
CREAT SERPL-MCNC: 0.99 MG/DL — SIGNIFICANT CHANGE UP (ref 0.5–1.3)
EGFR: 57 ML/MIN/1.73M2 — LOW
GLUCOSE SERPL-MCNC: 97 MG/DL — SIGNIFICANT CHANGE UP (ref 70–99)
HCT VFR BLD CALC: 26.3 % — LOW (ref 34.5–45)
HGB BLD-MCNC: 8.4 G/DL — LOW (ref 11.5–15.5)
MCHC RBC-ENTMCNC: 24.5 PG — LOW (ref 27–34)
MCHC RBC-ENTMCNC: 31.9 G/DL — LOW (ref 32–36)
MCV RBC AUTO: 76.7 FL — LOW (ref 80–100)
NRBC BLD AUTO-RTO: 0 /100 WBCS — SIGNIFICANT CHANGE UP (ref 0–0)
PLATELET # BLD AUTO: 395 K/UL — SIGNIFICANT CHANGE UP (ref 150–400)
POTASSIUM SERPL-MCNC: 3.4 MMOL/L — LOW (ref 3.5–5.3)
POTASSIUM SERPL-SCNC: 3.4 MMOL/L — LOW (ref 3.5–5.3)
RBC # BLD: 3.43 M/UL — LOW (ref 3.8–5.2)
RBC # FLD: 20 % — HIGH (ref 10.3–14.5)
SODIUM SERPL-SCNC: 139 MMOL/L — SIGNIFICANT CHANGE UP (ref 135–145)
WBC # BLD: 7.17 K/UL — SIGNIFICANT CHANGE UP (ref 3.8–10.5)
WBC # FLD AUTO: 7.17 K/UL — SIGNIFICANT CHANGE UP (ref 3.8–10.5)

## 2025-02-26 PROCEDURE — 78264 GASTRIC EMPTYING IMG STUDY: CPT | Mod: MC

## 2025-02-26 PROCEDURE — 94640 AIRWAY INHALATION TREATMENT: CPT

## 2025-02-26 PROCEDURE — 86850 RBC ANTIBODY SCREEN: CPT

## 2025-02-26 PROCEDURE — 86923 COMPATIBILITY TEST ELECTRIC: CPT

## 2025-02-26 PROCEDURE — 85730 THROMBOPLASTIN TIME PARTIAL: CPT

## 2025-02-26 PROCEDURE — 80202 ASSAY OF VANCOMYCIN: CPT

## 2025-02-26 PROCEDURE — C9399: CPT

## 2025-02-26 PROCEDURE — 84132 ASSAY OF SERUM POTASSIUM: CPT

## 2025-02-26 PROCEDURE — 86900 BLOOD TYPING SEROLOGIC ABO: CPT

## 2025-02-26 PROCEDURE — 82607 VITAMIN B-12: CPT

## 2025-02-26 PROCEDURE — 36415 COLL VENOUS BLD VENIPUNCTURE: CPT

## 2025-02-26 PROCEDURE — 87040 BLOOD CULTURE FOR BACTERIA: CPT

## 2025-02-26 PROCEDURE — 80053 COMPREHEN METABOLIC PANEL: CPT

## 2025-02-26 PROCEDURE — 71045 X-RAY EXAM CHEST 1 VIEW: CPT

## 2025-02-26 PROCEDURE — P9016: CPT

## 2025-02-26 PROCEDURE — 84100 ASSAY OF PHOSPHORUS: CPT

## 2025-02-26 PROCEDURE — A9541: CPT

## 2025-02-26 PROCEDURE — 83540 ASSAY OF IRON: CPT

## 2025-02-26 PROCEDURE — 82330 ASSAY OF CALCIUM: CPT

## 2025-02-26 PROCEDURE — 85610 PROTHROMBIN TIME: CPT

## 2025-02-26 PROCEDURE — 85014 HEMATOCRIT: CPT

## 2025-02-26 PROCEDURE — 84443 ASSAY THYROID STIM HORMONE: CPT

## 2025-02-26 PROCEDURE — 82947 ASSAY GLUCOSE BLOOD QUANT: CPT

## 2025-02-26 PROCEDURE — 82803 BLOOD GASES ANY COMBINATION: CPT

## 2025-02-26 PROCEDURE — 84295 ASSAY OF SERUM SODIUM: CPT

## 2025-02-26 PROCEDURE — 82962 GLUCOSE BLOOD TEST: CPT

## 2025-02-26 PROCEDURE — 80048 BASIC METABOLIC PNL TOTAL CA: CPT

## 2025-02-26 PROCEDURE — 86901 BLOOD TYPING SEROLOGIC RH(D): CPT

## 2025-02-26 PROCEDURE — 81001 URINALYSIS AUTO W/SCOPE: CPT

## 2025-02-26 PROCEDURE — 83605 ASSAY OF LACTIC ACID: CPT

## 2025-02-26 PROCEDURE — 87640 STAPH A DNA AMP PROBE: CPT

## 2025-02-26 PROCEDURE — 82746 ASSAY OF FOLIC ACID SERUM: CPT

## 2025-02-26 PROCEDURE — 87641 MR-STAPH DNA AMP PROBE: CPT

## 2025-02-26 PROCEDURE — 82728 ASSAY OF FERRITIN: CPT

## 2025-02-26 PROCEDURE — 83735 ASSAY OF MAGNESIUM: CPT

## 2025-02-26 PROCEDURE — 82435 ASSAY OF BLOOD CHLORIDE: CPT

## 2025-02-26 PROCEDURE — 85018 HEMOGLOBIN: CPT

## 2025-02-26 PROCEDURE — 96374 THER/PROPH/DIAG INJ IV PUSH: CPT

## 2025-02-26 PROCEDURE — 85025 COMPLETE CBC W/AUTO DIFF WBC: CPT

## 2025-02-26 PROCEDURE — 36430 TRANSFUSION BLD/BLD COMPNT: CPT

## 2025-02-26 PROCEDURE — 87086 URINE CULTURE/COLONY COUNT: CPT

## 2025-02-26 PROCEDURE — 85027 COMPLETE CBC AUTOMATED: CPT

## 2025-02-26 PROCEDURE — 99285 EMERGENCY DEPT VISIT HI MDM: CPT

## 2025-02-26 PROCEDURE — 83550 IRON BINDING TEST: CPT

## 2025-02-26 RX ORDER — FERROUS SULFATE 137(45) MG
1 TABLET, EXTENDED RELEASE ORAL
Qty: 30 | Refills: 0
Start: 2025-02-26 | End: 2025-03-27

## 2025-02-26 RX ADMIN — IPRATROPIUM BROMIDE AND ALBUTEROL SULFATE 3 MILLILITER(S): .5; 2.5 SOLUTION RESPIRATORY (INHALATION) at 06:38

## 2025-02-26 RX ADMIN — Medication 25 GRAM(S): at 05:20

## 2025-02-26 RX ADMIN — Medication 100 MICROGRAM(S): at 05:21

## 2025-02-26 RX ADMIN — BRIMONIDINE TARTRATE 1 DROP(S): 1.5 SOLUTION/ DROPS OPHTHALMIC at 05:21

## 2025-02-26 RX ADMIN — AMLODIPINE BESYLATE 10 MILLIGRAM(S): 10 TABLET ORAL at 05:20

## 2025-02-26 RX ADMIN — Medication 40 MILLIGRAM(S): at 05:20

## 2025-02-26 RX ADMIN — HEPARIN SODIUM 5000 UNIT(S): 1000 INJECTION INTRAVENOUS; SUBCUTANEOUS at 05:20

## 2025-02-26 NOTE — PROGRESS NOTE ADULT - PROBLEM SELECTOR PLAN 1
-Witnessed aspiration event in endoscopy .   -Continue Zosyn  -MRSA nasal PCR negative, vanco stopped   -Check MRSA nasal PCR, if negative suggest d/c vanco  -CXR 2/24 with improving L sided opacities   -Continue Duoneb q6h.  -Intermittent hypoxia likely from resolving PNA, seems to have resolved at this time. Keep sats >90% with o2 PRN.
-Witnessed aspiration event in endoscopy   -Continue Zosyn, suggest 7 day course of abx   -MRSA nasal PCR negative, vanco stopped   -CXR 2/24 with improving L sided opacities   -Continue Duoneb q6h.  -Intermittent hypoxia likely from resolving PNA, now resolved. Keep sats >90% with o2 PRN.

## 2025-02-26 NOTE — DISCHARGE NOTE NURSING/CASE MANAGEMENT/SOCIAL WORK - NSTOBACCONEVERSMOKERY/N_GEN_A
Pt called wants to do a virtual visit to follow up and go over her visit from the er this past weekend. She had lab work done today and feels like she needs to do transfusion. No

## 2025-02-26 NOTE — DISCHARGE NOTE PROVIDER - NSDCFUADDAPPT_GEN_ALL_CORE_FT
APPTS ARE READY TO BE MADE: [x] YES    Best Family or Patient Contact (if needed):    Additional Information about above appointments (if needed):    1:   2:   3:     Other comments or requests:    APPTS ARE READY TO BE MADE: [x] YES    Best Family or Patient Contact (if needed):    Additional Information about above appointments (if needed):    1:   2:   3:     Other comments or requests:   Hem/Onc - Prior to outreaching the patient, it was visible that the patient has secured a follow up appointment which was not scheduled by our team. Patient is scheduled with Dr. Beckham 3/10 3:00pm [address] [place] [address]    Gastro - Patient informed us they already have secured a follow up appointment which is not visible on [facility] and declined to provide appointment details.

## 2025-02-26 NOTE — PROGRESS NOTE ADULT - PROVIDER SPECIALTY LIST ADULT
Anesthesia
Anesthesia
Gastroenterology
Internal Medicine
Pulmonology
Gastroenterology
Internal Medicine
Gastroenterology
Internal Medicine
Internal Medicine
Gastroenterology
Internal Medicine
Pulmonology

## 2025-02-26 NOTE — DISCHARGE NOTE PROVIDER - CARE PROVIDER_API CALL
Emir Cifuentes  Gastroenterology  [address] [place] [id number]  Phone: [phone]  Fax: [phone]  Follow Up Time: 1 week

## 2025-02-26 NOTE — DISCHARGE NOTE NURSING/CASE MANAGEMENT/SOCIAL WORK - PATIENT PORTAL LINK FT
You can access the FollowMyHealth Patient Portal offered by [facility] by registering at the following website: [email or web address]. By joining [facility]’s FollowMyHealth portal, you will also be able to view your health information using other applications (apps) compatible with our system.

## 2025-02-26 NOTE — DISCHARGE NOTE PROVIDER - NSDCCPCAREPLAN_GEN_ALL_CORE_FT
PRINCIPAL DISCHARGE DIAGNOSIS  Diagnosis: Nausea and vomiting  Assessment and Plan of Treatment:   Administered Zofran and intravenous fluids, and underwent a gastric emptying study which confirmed a diagnosis consistent with gastroparesis. Subsequent treatment included an upper gastrointestinal endoscopy (EGD) with Botox injection on 2/19.      SECONDARY DISCHARGE DIAGNOSES  Diagnosis: Aspiration pneumonia  Assessment and Plan of Treatment: S/p EGD  Completed antibiotics    Diagnosis: Anemia  Assessment and Plan of Treatment:   Received intravenous iron over five days, one unit of packed red blood cells (PRBC) on 2/25, and was started on iron tablets to further manage her anemia.  Moving forward, it is recommended that you follow up outpatient with both hematology and gastroenterology to manage your anemia and underlying gastrointestinal conditions effectively.

## 2025-02-26 NOTE — DISCHARGE NOTE PROVIDER - DETAILS OF MALNUTRITION DIAGNOSIS/DIAGNOSES
This patient has been assessed with a concern for Malnutrition and was treated during this hospitalization for the following Nutrition diagnosis/diagnoses:     -  02/14/2025: Severe protein-calorie malnutrition

## 2025-02-26 NOTE — PROGRESS NOTE ADULT - NUTRITIONAL ASSESSMENT
This patient has been assessed with a concern for Malnutrition and has been determined to have a diagnosis/diagnoses of Severe protein-calorie malnutrition.    This patient is being managed with:   Diet NPO after Midnight-     NPO Start Date: 17-Feb-2025   NPO Start Time: 23:59  Except Medications  With Ice Chips/Sips of Water  Entered: Feb 17 2025  6:09PM    Diet Regular-  Liquid Protein Supplement     Qty per Day:  1  Supplement Feeding Modality:  Oral  Ensure Max Cans or Servings Per Day:  1       Frequency:  Daily  Entered: Feb 14 2025  5:48PM    The following pending diet order is being considered for treatment of Severe protein-calorie malnutrition:  Diet Regular-  Liquid Protein Supplement     Qty per Day:  1  Supplement Feeding Modality:  Oral  Ensure Max Cans or Servings Per Day:  1       Frequency:  Daily  Entered: Feb 14 2025  3:14PM  
This patient has been assessed with a concern for Malnutrition and has been determined to have a diagnosis/diagnoses of Severe protein-calorie malnutrition.    This patient is being managed with:   Diet Regular-  Liquid Protein Supplement     Qty per Day:  1  Supplement Feeding Modality:  Oral  Ensure Max Cans or Servings Per Day:  1       Frequency:  Daily  Entered: Feb 14 2025  5:48PM    The following pending diet order is being considered for treatment of Severe protein-calorie malnutrition:  Diet Regular-  Liquid Protein Supplement     Qty per Day:  1  Supplement Feeding Modality:  Oral  Ensure Max Cans or Servings Per Day:  1       Frequency:  Daily  Entered: Feb 14 2025  3:14PM  
This patient has been assessed with a concern for Malnutrition and has been determined to have a diagnosis/diagnoses of Severe protein-calorie malnutrition.    This patient is being managed with:   Diet Regular-  Liquid Protein Supplement     Qty per Day:  1  Supplement Feeding Modality:  Oral  Ensure Max Cans or Servings Per Day:  1       Frequency:  Daily  Entered: Feb 14 2025  5:48PM    The following pending diet order is being considered for treatment of Severe protein-calorie malnutrition:  Diet Regular-  Liquid Protein Supplement     Qty per Day:  1  Supplement Feeding Modality:  Oral  Ensure Max Cans or Servings Per Day:  1       Frequency:  Daily  Entered: Feb 14 2025  3:14PM  
This patient has been assessed with a concern for Malnutrition and has been determined to have a diagnosis/diagnoses of Severe protein-calorie malnutrition.    This patient is being managed with:   Diet Consistent Carbohydrate Clear Liquid-  Entered: Feb 20 2025  1:29PM  
This patient has been assessed with a concern for Malnutrition and has been determined to have a diagnosis/diagnoses of Severe protein-calorie malnutrition.    This patient is being managed with:   Diet NPO after Midnight-     NPO Start Date: 18-Feb-2025   NPO Start Time: 23:59  Entered: Feb 18 2025 10:59AM    Diet Clear Liquid-  Entered: Feb 18 2025 10:57AM    Diet NPO after Midnight-     NPO Start Date: 17-Feb-2025   NPO Start Time: 23:59  Except Medications  With Ice Chips/Sips of Water  Entered: Feb 17 2025  6:09PM  
This patient has been assessed with a concern for Malnutrition and has been determined to have a diagnosis/diagnoses of Severe protein-calorie malnutrition.    This patient is being managed with:   Diet NPO after Midnight-     NPO Start Date: 18-Feb-2025   NPO Start Time: 23:59  Entered: Feb 18 2025 10:59AM    Diet Clear Liquid-  Entered: Feb 18 2025 10:57AM    Diet NPO after Midnight-     NPO Start Date: 17-Feb-2025   NPO Start Time: 23:59  Except Medications  With Ice Chips/Sips of Water  Entered: Feb 17 2025  6:09PM  
This patient has been assessed with a concern for Malnutrition and has been determined to have a diagnosis/diagnoses of Severe protein-calorie malnutrition.    This patient is being managed with:   Diet Regular-  Consistent Carbohydrate {No Snacks} (CSTCHO)  No Lactose  Liquid Protein Supplement     Qty per Day:  1  Supplement Feeding Modality:  Oral  Glucerna Shake Cans or Servings Per Day:  1       Frequency:  Daily  Entered: Feb 24 2025 12:45PM  
This patient has been assessed with a concern for Malnutrition and has been determined to have a diagnosis/diagnoses of Severe protein-calorie malnutrition.    This patient is being managed with:   Diet Regular-  Liquid Protein Supplement     Qty per Day:  1  Supplement Feeding Modality:  Oral  Ensure Max Cans or Servings Per Day:  1       Frequency:  Daily  Entered: Feb 14 2025  5:48PM    The following pending diet order is being considered for treatment of Severe protein-calorie malnutrition:  Diet Regular-  Liquid Protein Supplement     Qty per Day:  1  Supplement Feeding Modality:  Oral  Ensure Max Cans or Servings Per Day:  1       Frequency:  Daily  Entered: Feb 14 2025  3:14PM  
This patient has been assessed with a concern for Malnutrition and has been determined to have a diagnosis/diagnoses of Severe protein-calorie malnutrition.    This patient is being managed with:   Diet Full Liquid-  Consistent Carbohydrate {No Snacks} (CSTCHO)  Entered: Feb 22 2025  9:55AM  
This patient has been assessed with a concern for Malnutrition and has been determined to have a diagnosis/diagnoses of Severe protein-calorie malnutrition.    This patient is being managed with:   Diet Full Liquid-  Consistent Carbohydrate {No Snacks} (CSTCHO)  Entered: Feb 22 2025  9:55AM  
This patient has been assessed with a concern for Malnutrition and has been determined to have a diagnosis/diagnoses of Severe protein-calorie malnutrition.    This patient is being managed with:   Diet NPO after Midnight-     NPO Start Date: 18-Feb-2025   NPO Start Time: 23:59  Entered: Feb 18 2025 10:59AM    Diet Clear Liquid-  Entered: Feb 18 2025 10:57AM    Diet NPO after Midnight-     NPO Start Date: 17-Feb-2025   NPO Start Time: 23:59  Except Medications  With Ice Chips/Sips of Water  Entered: Feb 17 2025  6:09PM  
This patient has been assessed with a concern for Malnutrition and has been determined to have a diagnosis/diagnoses of Severe protein-calorie malnutrition.    This patient is being managed with:   Diet Regular-  Consistent Carbohydrate {No Snacks} (CSTCHO)  No Lactose  Liquid Protein Supplement     Qty per Day:  1  Supplement Feeding Modality:  Oral  Glucerna Shake Cans or Servings Per Day:  1       Frequency:  Daily  Entered: Feb 24 2025 12:45PM  
This patient has been assessed with a concern for Malnutrition and has been determined to have a diagnosis/diagnoses of Severe protein-calorie malnutrition.    This patient is being managed with:   Diet Regular-  Consistent Carbohydrate {No Snacks} (CSTCHO)  No Lactose  Liquid Protein Supplement     Qty per Day:  1  Supplement Feeding Modality:  Oral  Glucerna Shake Cans or Servings Per Day:  1       Frequency:  Daily  Entered: Feb 24 2025 12:45PM  
This patient has been assessed with a concern for Malnutrition and has been determined to have a diagnosis/diagnoses of Severe protein-calorie malnutrition.    This patient is being managed with:   Diet Regular-  Liquid Protein Supplement     Qty per Day:  1  Supplement Feeding Modality:  Oral  Ensure Max Cans or Servings Per Day:  1       Frequency:  Daily  Entered: Feb 14 2025  5:48PM    The following pending diet order is being considered for treatment of Severe protein-calorie malnutrition:  Diet Regular-  Liquid Protein Supplement     Qty per Day:  1  Supplement Feeding Modality:  Oral  Ensure Max Cans or Servings Per Day:  1       Frequency:  Daily  Entered: Feb 14 2025  3:14PM  
This patient has been assessed with a concern for Malnutrition and has been determined to have a diagnosis/diagnoses of Severe protein-calorie malnutrition.    This patient is being managed with:   Diet NPO after Midnight-     NPO Start Date: 18-Feb-2025   NPO Start Time: 23:59  Entered: Feb 18 2025 10:59AM    Diet Clear Liquid-  Entered: Feb 18 2025 10:57AM    Diet NPO after Midnight-     NPO Start Date: 17-Feb-2025   NPO Start Time: 23:59  Except Medications  With Ice Chips/Sips of Water  Entered: Feb 17 2025  6:09PM  
This patient has been assessed with a concern for Malnutrition and has been determined to have a diagnosis/diagnoses of Severe protein-calorie malnutrition.    This patient is being managed with:   Diet NPO after Midnight-     NPO Start Date: 18-Feb-2025   NPO Start Time: 23:59  Entered: Feb 18 2025 10:59AM    Diet Clear Liquid-  Entered: Feb 18 2025 10:57AM    Diet NPO after Midnight-     NPO Start Date: 17-Feb-2025   NPO Start Time: 23:59  Except Medications  With Ice Chips/Sips of Water  Entered: Feb 17 2025  6:09PM  
This patient has been assessed with a concern for Malnutrition and has been determined to have a diagnosis/diagnoses of Severe protein-calorie malnutrition.    This patient is being managed with:   Diet Regular-  Liquid Protein Supplement     Qty per Day:  1  Supplement Feeding Modality:  Oral  Ensure Max Cans or Servings Per Day:  1       Frequency:  Daily  Entered: Feb 14 2025  5:48PM    The following pending diet order is being considered for treatment of Severe protein-calorie malnutrition:  Diet Regular-  Liquid Protein Supplement     Qty per Day:  1  Supplement Feeding Modality:  Oral  Ensure Max Cans or Servings Per Day:  1       Frequency:  Daily  Entered: Feb 14 2025  3:14PM  

## 2025-02-26 NOTE — DISCHARGE NOTE PROVIDER - NSDCMRMEDTOKEN_GEN_ALL_CORE_FT
amLODIPine 10 mg oral tablet: 1 tab(s) orally once a day (in the morning)  brimonidine 0.2% ophthalmic solution: 1 drop(s) in each affected eye 3 times a day  dorzolamide-timolol 2.23%-0.68% (2%-0.5% base) ophthalmic solution: 1 drop(s) in each eye 2 times a day  ferrous sulfate 325 mg (65 mg elemental iron) oral tablet: 1 tab(s) orally once a day  latanoprost 0.005% ophthalmic solution: 1 drop(s) in each eye once a day (in the evening)  levothyroxine 100 mcg (0.1 mg) oral tablet: 1 tab(s) orally once a day (in the morning)  metFORMIN 500 mg oral tablet: 1 tab(s) orally once a day with breakfast  pantoprazole 40 mg oral delayed release tablet: 1 tab(s) orally once a day (in the morning)  Vitamin/Supplement: Vitamin B12 tablet, Centrum Silver tablet, Calcium tablet: 1 tablet orally once a day

## 2025-02-26 NOTE — DISCHARGE NOTE PROVIDER - NSFOLLOWUPCLINICS_GEN_ALL_ED_FT
[facility]  Hematology/Oncology  [address] [id number]  Phone: [phone]  Fax:   Follow Up Time: 2 weeks

## 2025-02-26 NOTE — PROGRESS NOTE ADULT - SUBJECTIVE AND OBJECTIVE BOX
Follow-up Pulm Progress Note    No new respiratory events overnight.  Denies SOB/CP.   cough improving     Medications:  MEDICATIONS  (STANDING):  acetaminophen   IVPB .. 1000 milliGRAM(s) IV Intermittent once  albuterol/ipratropium for Nebulization 3 milliLiter(s) Nebulizer every 6 hours  amLODIPine   Tablet 10 milliGRAM(s) Oral daily  brimonidine 0.2% Ophthalmic Solution 1 Drop(s) Both EYES three times a day  dorzolamide 2%/timolol 0.5% Ophthalmic Solution 1 Drop(s) Both EYES two times a day  ferrous    sulfate 325 milliGRAM(s) Oral daily  heparin   Injectable 5000 Unit(s) SubCutaneous every 12 hours  influenza  Vaccine (HIGH DOSE) 0.5 milliLiter(s) IntraMuscular once  latanoprost 0.005% Ophthalmic Solution 1 Drop(s) Both EYES at bedtime  levothyroxine 100 MICROGram(s) Oral daily  pantoprazole  Injectable 40 milliGRAM(s) IV Push two times a day  piperacillin/tazobactam IVPB.. 3.375 Gram(s) IV Intermittent every 8 hours  potassium chloride   Powder 40 milliEquivalent(s) Oral once    MEDICATIONS  (PRN):  guaiFENesin Oral Liquid (Sugar-Free) 200 milliGRAM(s) Oral every 6 hours PRN Cough  ondansetron Injectable 4 milliGRAM(s) IV Push every 6 hours PRN Nausea and/or Vomiting          Vital Signs Last 24 Hrs  T(C): 36.8 (26 Feb 2025 04:27), Max: 36.9 (25 Feb 2025 14:00)  T(F): 98.2 (26 Feb 2025 04:27), Max: 98.4 (25 Feb 2025 14:00)  HR: 75 (26 Feb 2025 04:27) (75 - 90)  BP: 133/65 (26 Feb 2025 04:27) (116/62 - 133/65)  BP(mean): --  RR: 18 (26 Feb 2025 04:27) (18 - 18)  SpO2: 92% (26 Feb 2025 04:27) (92% - 100%)    Parameters below as of 26 Feb 2025 04:27  Patient On (Oxygen Delivery Method): room air              02-25 @ 07:01  -  02-26 @ 07:00  --------------------------------------------------------  IN: 1010 mL / OUT: 0 mL / NET: 1010 mL          LABS:                        8.4    7.17  )-----------( 395      ( 26 Feb 2025 06:44 )             26.3     02-26    139  |  106  |  4[L]  ----------------------------<  97  3.4[L]   |  22  |  0.99    Ca    9.2      26 Feb 2025 06:45  Phos  2.5     02-25  Mg     1.7     02-25            CAPILLARY BLOOD GLUCOSE      POCT Blood Glucose.: 93 mg/dL (25 Feb 2025 21:28)      Urinalysis Basic - ( 26 Feb 2025 06:45 )    Color: x / Appearance: x / SG: x / pH: x  Gluc: 97 mg/dL / Ketone: x  / Bili: x / Urobili: x   Blood: x / Protein: x / Nitrite: x   Leuk Esterase: x / RBC: x / WBC x   Sq Epi: x / Non Sq Epi: x / Bacteria: x              CULTURES:     Culture - Blood (collected 02-19-25 @ 14:03)  Source: .Blood Blood-Peripheral  Final Report (02-24-25 @ 18:00):    No growth at 5 days    Culture - Blood (collected 02-19-25 @ 13:48)  Source: .Blood Blood-Peripheral  Final Report (02-24-25 @ 18:00):    No growth at 5 days          Physical Examination:  PULM: minimal crackles L   CVS: S1, S2 heard    RADIOLOGY REVIEWED  CXR 2/24 with improving L sided opacities

## 2025-02-26 NOTE — DISCHARGE NOTE PROVIDER - HOSPITAL COURSE
HPI:  79-year-old female with a past medical history of diabetes mellitus and hypothyroidism presented with symptoms of nausea and weight loss. A recent emergency department visit had led to a CT scan of the abdomen and pelvis, which did not indicate any acute intra-abdominal issues but revealed a 6.0 cm cystic lesion on the left ovary. This finding was further examined by a transvaginal ultrasound and was evaluated by gynecology. The patient underwent routine testing, including tumor markers which returned normal, and was discharged.    Hospital Course:  Administered Zofran and intravenous fluids, and underwent a gastric emptying study which confirmed a diagnosis consistent with gastroparesis. Subsequent treatment included an upper gastrointestinal endoscopy (EGD) with Botox injection on 2/19 during which a significant amount of bowel prep was still present in the stomach. Unfortunately, the procedure was complicated by a witnessed aspiration event causing the patient to become hypoxic and tachypneic.    Following the aspiration event on 2/19, the patient was evaluated by the Rapid Response Team (RRT) and the Medical Intensive Care Unit (MICU) team, but was deemed not a candidate for MICU admission. Initially, she required high-flow nasal cannula oxygen therapy at 40% FiO2 and 40L/min, which was gradually stepped down to nasal cannula (NC) and subsequently to room air (RA). In addition, the patient completed a course of antibiotics with vancomycin and Zosyn.     She was also treated for iron deficiency anemia, receiving intravenous iron over five days, one unit of packed red blood cells (PRBC) on 2/25, and was started on iron tablets to further manage her anemia.    Moving forward, it is recommended that she follows up outpatient with both hematology and gastroenterology to manage her anemia and underlying gastrointestinal conditions effectively.    Important Medication Changes and Reason: see medication reconciliation     Active or Pending Issues Requiring Follow-up: PCP, GI, hematology    Advanced Directives:   [x] Full code  [ ] DNR  [ ] Hospice    Discharge Diagnoses:  Nausea and vomiting  aspiration pneumonia  iron deficiency anemia

## 2025-02-26 NOTE — DISCHARGE NOTE NURSING/CASE MANAGEMENT/SOCIAL WORK - FINANCIAL ASSISTANCE
[facility] provides services at a reduced cost to those who are determined to be eligible through [facility]’s financial assistance program. Information regarding [facility]’s financial assistance program can be found by going to [email or web address] or by calling [phone].

## 2025-02-26 NOTE — DISCHARGE NOTE PROVIDER - NSDCFUSCHEDAPPT_GEN_ALL_CORE_FT
Mell Rodriguez  [facility]  [address]  Scheduled Appointment: 03/05/2025     Cara Tavarez Physician [facility]  [facility] [address]  Scheduled Appointment: 03/19/2025    [place] [facility]  [facility]  Scheduled Appointment: 05/05/2025    Juany Beckham  [place] [facility]  [facility]  Scheduled Appointment: 05/05/2025

## 2025-02-26 NOTE — PROGRESS NOTE ADULT - ASSESSMENT
79-year-old female medical history of pretension, diabetes, hypothyroidism, presenting today for nausea nonbloody vomiting for about 3 weeks now with significant weight loss of 10 pounds that she was able to measure for the past week.  Patient had a recent ED visit about 11 days prior.  Patient was worked up with CT abdomen pelvis with IV contrast that showed no acute intra-abdominal pathology and there was a 6.0 cm left ovarian cystic lesion that was worked up with transvaginal ultrasound and was evaluated by GYN here.  Patient was then sent home after obtaining tumor markers which are reviewed in HIE that was within normal limits.  Patient states that she has not been able to take anything by mouth for the last 3 weeks with significant weight loss.  Patient can drink a little bit of water but this is the extent of how much she can have.  Patient after eating usually feels nauseous and then vomits.    acute hypoxic resp failure 2/2 aspiration pneumonia  - iv zosyn and vanco  - follow up cxr  - follow up cultures  - keep O2 saturation above 92%    nausea and vomiting  - PPI iv bid  - zofran prn  - GI consult following  - gastric emptying study C/W gastroparesis   - s/p egd and botox injection  - no plans for colonoscopy   - diet order per gi    iron def anemia  - s/p iv iron  - monitor H&H    HTN  - c/w amlodipine    hypothyroid  - c/w synthroid  -  TSH is normal    glaucoma  - c/w eye drops    dvt px  - sq heparin    
79-year-old female medical history of pretension, diabetes, hypothyroidism, presenting today for nausea nonbloody vomiting for about 3 weeks now with significant weight loss of 10 pounds that she was able to measure for the past week.  Patient had a recent ED visit about 11 days prior.  Patient was worked up with CT abdomen pelvis with IV contrast that showed no acute intra-abdominal pathology and there was a 6.0 cm left ovarian cystic lesion that was worked up with transvaginal ultrasound and was evaluated by GYN here.  Patient was then sent home after obtaining tumor markers which are reviewed in HIE that was within normal limits.  Patient states that she has not been able to take anything by mouth for the last 3 weeks with significant weight loss.  Patient can drink a little bit of water but this is the extent of how much she can have.  Patient after eating usually feels nauseous and then vomits.    acute hypoxic resp failure 2/2 aspiration pneumonia  - iv zosyn and vanco  - follow up cxr  - follow up cultures  - keep O2 saturation above 92%    nausea and vomiting  - PPI iv bid  - zofran prn  - GI consult following  - gastric emptying study C/W gastroparesis   - s/p egd and botox injection  - no plans for colonoscopy   - diet order per gi    iron def anemia  - s/p iv iron  - monitor H&H    HTN  - c/w amlodipine    hypothyroid  - c/w synthroid  -  TSH is normal    glaucoma  - c/w eye drops    dvt px  - sq heparin    
79-year-old female medical history of pretension, diabetes, hypothyroidism, presenting today for nausea nonbloody vomiting for about 3 weeks now with significant weight loss of 10 pounds that she was able to measure for the past week.  Patient had a recent ED visit about 11 days prior.  Patient was worked up with CT abdomen pelvis with IV contrast that showed no acute intra-abdominal pathology and there was a 6.0 cm left ovarian cystic lesion that was worked up with transvaginal ultrasound and was evaluated by GYN here.  Patient was then sent home after obtaining tumor markers which are reviewed in HIE that was within normal limits.  Patient states that she has not been able to take anything by mouth for the last 3 weeks with significant weight loss.  Patient can drink a little bit of water but this is the extent of how much she can have.  Patient after eating usually feels nauseous and then vomits.    acute hypoxic resp failure 2/2 aspiration pneumonia  - iv zosyn and vanco  - follow up cxr  - follow up cultures  - keep O2 saturation above 92%  - pulm eval was called    nausea and vomiting  - PPI iv bid  - zofran prn  - GI consult following  - gastric emptying study C/W gastroparesis   - s/p egd and botox injection  - no plans for colonoscopy   - diet order per gi   - advance to solids    iron def anemia  - s/p iv iron  - monitor H&H    HTN  - c/w amlodipine    hypothyroid  - c/w synthroid  -  TSH is normal    glaucoma  - c/w eye drops    dvt px  - sq heparin    
79-year-old female medical history of pretension, diabetes, hypothyroidism, presenting today for nausea nonbloody vomiting for about 3 weeks now with significant weight loss of 10 pounds that she was able to measure for the past week.  Patient had a recent ED visit about 11 days prior.  Patient was worked up with CT abdomen pelvis with IV contrast that showed no acute intra-abdominal pathology and there was a 6.0 cm left ovarian cystic lesion that was worked up with transvaginal ultrasound and was evaluated by GYN here.  Patient was then sent home after obtaining tumor markers which are reviewed in HIE that was within normal limits.  Patient states that she has not been able to take anything by mouth for the last 3 weeks with significant weight loss.  Patient can drink a little bit of water but this is the extent of how much she can have.  Patient after eating usually feels nauseous and then vomits.    acute hypoxic resp failure 2/2 aspiration pneumonia  - iv zosyn and vanco  - follow up cxr  - follow up cultures  - keep O2 saturation above 92%  - pulm eval was called    nausea and vomiting  - PPI iv bid  - zofran prn  - GI consult following  - gastric emptying study C/W gastroparesis   - s/p egd and botox injection  - no plans for colonoscopy   - diet order per gi   - advance to solids    iron def anemia  - s/p iv iron  - monitor H&H    HTN  - c/w amlodipine    hypothyroid  - c/w synthroid  -  TSH is normal    glaucoma  - c/w eye drops    dvt px  - sq heparin    
79-year-old female medical history of pretension, diabetes, hypothyroidism, presenting today for nausea nonbloody vomiting for about 3 weeks now with significant weight loss of 10 pounds that she was able to measure for the past week.  Patient had a recent ED visit about 11 days prior.  Patient was worked up with CT abdomen pelvis with IV contrast that showed no acute intra-abdominal pathology and there was a 6.0 cm left ovarian cystic lesion that was worked up with transvaginal ultrasound and was evaluated by GYN here.  Patient was then sent home after obtaining tumor markers which are reviewed in HIE that was within normal limits.  Patient states that she has not been able to take anything by mouth for the last 3 weeks with significant weight loss.  Patient can drink a little bit of water but this is the extent of how much she can have.  Patient after eating usually feels nauseous and then vomits.    acute hypoxic resp failure 2/2 aspiration pneumonia  - iv zosyn x 7 days , d.c vanco  - follow up cultures NGTD  - keep O2 saturation above 92%  - pulm eval was appreciated    nausea and vomiting  - PPI iv bid  - zofran prn  - GI consult following  - gastric emptying study C/W gastroparesis   - s/p egd and botox injection  - no plans for colonoscopy   - diet order per gi   - advance to solids    iron def anemia  - s/p iv iron  - monitor H&H  - 1 unit of prbc  - start po iron    HTN  - c/w amlodipine    hypothyroid  - c/w synthroid  -  TSH is normal    glaucoma  - c/w eye drops    dvt px  - sq heparin    
vomiting 2/2 gastroparesis  anemia, iron def  aspiration pna      s/p  upper gastrointestinal endoscopy with botox; c/b vomiting and aspiration  cont clears  monitor GI fn  d/w patient  d/w dtr at bedside      Advanced care planning was discussed with patient and family.  Advanced care planning forms were reviewed and discussed.  Risks, benefits and alternatives of gastroenterologic procedures were discussed in detail and all questions were answered.    30 minutes spent.  
vomiting 2/2 gastroparesis  anemia, iron def  aspiration pna      s/p  upper gastrointestinal endoscopy with botox; c/b vomiting and aspiration  cont fulls today  if no vomiting over next 24H then reg diet on monday  monitor GI fn  d/w patient        Advanced care planning was discussed with patient and family.  Advanced care planning forms were reviewed and discussed.  Risks, benefits and alternatives of gastroenterologic procedures were discussed in detail and all questions were answered.    30 minutes spent.  
vomiting 2/2 gastroparesis  anemia, iron def  aspiration pna      s/p  upper gastrointestinal endoscopy with botox; c/b vomiting and aspiration  trail of reg diet today  if tolerating then likely dc planning tommorow  monitor GI fn  d/w patient        Advanced care planning was discussed with patient and family.  Advanced care planning forms were reviewed and discussed.  Risks, benefits and alternatives of gastroenterologic procedures were discussed in detail and all questions were answered.    30 minutes spent.  
79-year-old female medical history of pretension, diabetes, hypothyroidism, presenting today for nausea nonbloody vomiting for about 3 weeks now with significant weight loss of 10 pounds that she was able to measure for the past week.  Patient had a recent ED visit about 11 days prior.  Patient was worked up with CT abdomen pelvis with IV contrast that showed no acute intra-abdominal pathology and there was a 6.0 cm left ovarian cystic lesion that was worked up with transvaginal ultrasound and was evaluated by GYN here.  Patient was then sent home after obtaining tumor markers which are reviewed in HIE that was within normal limits.  Patient states that she has not been able to take anything by mouth for the last 3 weeks with significant weight loss.  Patient can drink a little bit of water but this is the extent of how much she can have.  Patient after eating usually feels nauseous and then vomits.    nausea and vomiting  - PPI iv bid  - zofran prn  - GI consult called    iron def anemia  - iv iron    HTN  - c/w amlodipine    hypothyroid  - c/w synthroid  -  TSH is normal    glaucoma  - c/w eye drops    dvt px  - sq heparin    
79-year-old female medical history of pretension, diabetes, hypothyroidism, presenting today for nausea nonbloody vomiting for about 3 weeks now with significant weight loss of 10 pounds that she was able to measure for the past week.  Patient had a recent ED visit about 11 days prior.  Patient was worked up with CT abdomen pelvis with IV contrast that showed no acute intra-abdominal pathology and there was a 6.0 cm left ovarian cystic lesion that was worked up with transvaginal ultrasound and was evaluated by GYN here.  Patient was then sent home after obtaining tumor markers which are reviewed in HIE that was within normal limits.  Patient states that she has not been able to take anything by mouth for the last 3 weeks with significant weight loss.  Patient can drink a little bit of water but this is the extent of how much she can have.  Patient after eating usually feels nauseous and then vomits.    nausea and vomiting  - resolved, tolearting meals  - PPI iv bid  - zofran prn  - GI consult following  - gastric emptying study C/W gastroparesis  - awaiting EGD and colonoscopy    iron def anemia  - iv iron    HTN  - c/w amlodipine    hypothyroid  - c/w synthroid  -  TSH is normal    glaucoma  - c/w eye drops    dvt px  - sq heparin    
79-year-old female medical history of pretension, diabetes, hypothyroidism, presenting today for nausea nonbloody vomiting for about 3 weeks now with significant weight loss of 10 pounds that she was able to measure for the past week.  Patient had a recent ED visit about 11 days prior.  Patient was worked up with CT abdomen pelvis with IV contrast that showed no acute intra-abdominal pathology and there was a 6.0 cm left ovarian cystic lesion that was worked up with transvaginal ultrasound and was evaluated by GYN here.  Patient was then sent home after obtaining tumor markers which are reviewed in HIE that was within normal limits.  Patient states that she has not been able to take anything by mouth for the last 3 weeks with significant weight loss.  Patient can drink a little bit of water but this is the extent of how much she can have.  Patient after eating usually feels nauseous and then vomits.    aspiration pmeumonia  - iv zosyn and vanco  - follow up cxr  - follow up cultures    nausea and vomiting  - PPI iv bid  - zofran prn  - GI consult following  - gastric emptying study C/W gastroparesis   - s/p egd and botox injection  - no plans for colonoscopy     iron def anemia  - s/p iv iron    HTN  - c/w amlodipine    hypothyroid  - c/w synthroid  -  TSH is normal    glaucoma  - c/w eye drops    dvt px  - sq heparin    
79-year-old female medical history of pretension, diabetes, hypothyroidism, presenting today for nausea nonbloody vomiting for about 3 weeks now with significant weight loss of 10 pounds that she was able to measure for the past week.  Patient had a recent ED visit about 11 days prior.  Patient was worked up with CT abdomen pelvis with IV contrast that showed no acute intra-abdominal pathology and there was a 6.0 cm left ovarian cystic lesion that was worked up with transvaginal ultrasound and was evaluated by GYN here.  Patient was then sent home after obtaining tumor markers which are reviewed in HIE that was within normal limits.  Patient states that she has not been able to take anything by mouth for the last 3 weeks with significant weight loss.  Patient can drink a little bit of water but this is the extent of how much she can have.  Patient after eating usually feels nauseous and then vomits.    nausea and vomiting  - PPI iv bid  - zofran prn  - GI consult saw pt  - gastric emptying study  - eventual EGD and colonoscopy    iron def anemia  - iv iron    HTN  - c/w amlodipine    hypothyroid  - c/w synthroid  -  TSH is normal    glaucoma  - c/w eye drops    dvt px  - sq heparin    
79-year-old female medical history of pretension, diabetes, hypothyroidism, presenting today for nausea nonbloody vomiting for about 3 weeks now with significant weight loss of 10 pounds that she was able to measure for the past week.  Patient had a recent ED visit about 11 days prior.  Patient was worked up with CT abdomen pelvis with IV contrast that showed no acute intra-abdominal pathology and there was a 6.0 cm left ovarian cystic lesion that was worked up with transvaginal ultrasound and was evaluated by GYN here.  Patient was then sent home after obtaining tumor markers which are reviewed in HIE that was within normal limits.  Patient states that she has not been able to take anything by mouth for the last 3 weeks with significant weight loss.  Patient can drink a little bit of water but this is the extent of how much she can have.  Patient after eating usually feels nauseous and then vomits.    nausea and vomiting  - PPI iv bid  - zofran prn  - GI consult saw pt  - gastric emtying study  - eventual EGD and colonoscopy    iron def anemia  - iv iron    HTN  - c/w amlodipine    hypothyroid  - c/w synthroid  -  TSH is normal    glaucoma  - c/w eye drops    dvt px  - sq heparin    
vomiting  anemia, iron def    IV iron  reg diet  avoid opiods or reglan  check gastric emptying study  will need egd/colon for iron def anemia  if GES positive can consider botox to pylorus       Advanced care planning was discussed with patient and family.  Advanced care planning forms were reviewed and discussed.  Risks, benefits and alternatives of gastroenterologic procedures were discussed in detail and all questions were answered.    30 minutes spent.  
vomiting 2/2 gastroparesis  anemia, iron def  aspiration pna      s/p  upper gastrointestinal endoscopy with botox; c/b vomiting and aspiration  cont reg diet  will need outpatient follow up  getting prbc today  dc planning  d/w patient        Advanced care planning was discussed with patient and family.  Advanced care planning forms were reviewed and discussed.  Risks, benefits and alternatives of gastroenterologic procedures were discussed in detail and all questions were answered.    30 minutes spent.  
79-year-old female medical history of pretension, diabetes, hypothyroidism, presenting today for nausea nonbloody vomiting for about 3 weeks now with significant weight loss of 10 pounds that she was able to measure for the past week.  Patient had a recent ED visit about 11 days prior.  Patient was worked up with CT abdomen pelvis with IV contrast that showed no acute intra-abdominal pathology and there was a 6.0 cm left ovarian cystic lesion that was worked up with transvaginal ultrasound and was evaluated by GYN here.  Patient was then sent home after obtaining tumor markers which are reviewed in HIE that was within normal limits.  Patient states that she has not been able to take anything by mouth for the last 3 weeks with significant weight loss.  Patient can drink a little bit of water but this is the extent of how much she can have.  Patient after eating usually feels nauseous and then vomits.    acute hypoxic resp failure 2/2 aspiration pneumonia  - iv zosyn and vanco  - follow up cxr  - follow up cultures  - keep O2 saturation above 92%    nausea and vomiting  - PPI iv bid  - zofran prn  - GI consult following  - gastric emptying study C/W gastroparesis   - s/p egd and botox injection  - no plans for colonoscopy     iron def anemia  - s/p iv iron    HTN  - c/w amlodipine    hypothyroid  - c/w synthroid  -  TSH is normal    glaucoma  - c/w eye drops    dvt px  - sq heparin    
79-year-old female medical history of pretension, diabetes, hypothyroidism, presenting today for nausea nonbloody vomiting for about 3 weeks now with significant weight loss of 10 pounds that she was able to measure for the past week.  Patient had a recent ED visit about 11 days prior.  Patient was worked up with CT abdomen pelvis with IV contrast that showed no acute intra-abdominal pathology and there was a 6.0 cm left ovarian cystic lesion that was worked up with transvaginal ultrasound and was evaluated by GYN here.  Patient was then sent home after obtaining tumor markers which are reviewed in HIE that was within normal limits.  Patient states that she has not been able to take anything by mouth for the last 3 weeks with significant weight loss.  Patient can drink a little bit of water but this is the extent of how much she can have.  Patient after eating usually feels nauseous and then vomits.    acute hypoxic resp failure 2/2 aspiration pneumonia  - iv zosyn and vanco  - follow up cxr  - follow up cultures  - keep O2 saturation above 92%    nausea and vomiting  - PPI iv bid  - zofran prn  - GI consult following  - gastric emptying study C/W gastroparesis   - s/p egd and botox injection  - no plans for colonoscopy   - diet order per gi    iron def anemia  - s/p iv iron  - monitor H&H    HTN  - c/w amlodipine    hypothyroid  - c/w synthroid  -  TSH is normal    glaucoma  - c/w eye drops    dvt px  - sq heparin    
79-year-old female medical history of pretension, diabetes, hypothyroidism, presenting today for nausea nonbloody vomiting for about 3 weeks now with significant weight loss of 10 pounds that she was able to measure for the past week.  Patient had a recent ED visit about 11 days prior.  Patient was worked up with CT abdomen pelvis with IV contrast that showed no acute intra-abdominal pathology and there was a 6.0 cm left ovarian cystic lesion that was worked up with transvaginal ultrasound and was evaluated by GYN here.  Patient was then sent home after obtaining tumor markers which are reviewed in HIE that was within normal limits.  Patient states that she has not been able to take anything by mouth for the last 3 weeks with significant weight loss.  Patient can drink a little bit of water but this is the extent of how much she can have.  Patient after eating usually feels nauseous and then vomits.    nausea and vomiting  - resolved, tolearting meals  - PPI iv bid  - zofran prn  - GI consult following  - gastric emptying study pending   - eventual EGD and colonoscopy    iron def anemia  - iv iron    HTN  - c/w amlodipine    hypothyroid  - c/w synthroid  -  TSH is normal    glaucoma  - c/w eye drops    dvt px  - sq heparin    
vomiting  anemia, iron def    IV iron  reg diet  avoid opiods or reglan  check gastric emptying study  will need egd/colon for iron def anemia  if GES positive can consider botox to pylorus     Advanced care planning was discussed with patient and family.  Advanced care planning forms were reviewed and discussed.  Risks, benefits and alternatives of gastroenterologic procedures were discussed in detail and all questions were answered.    30 minutes spent.  
vomiting  anemia, iron def    IV iron  reg diet  avoid opiods or reglan  check gastric emptying study  will need egd/colon for iron def anemia  if GES positive can consider botox to pylorus   may need  upper gastrointestinal endoscopy if vomiting worsesn had one done 10/2024 as per patient normal    Advanced care planning was discussed with patient and family.  Advanced care planning forms were reviewed and discussed.  Risks, benefits and alternatives of gastroenterologic procedures were discussed in detail and all questions were answered.    30 minutes spent.  
vomiting 2/2 gastroparesis  anemia, iron def    IV iron  reg diet  avoid opiods or reglan  check gastric emptying study  will need egd/colon for iron def anemia  will plan for  upper gastrointestinal endoscopy with botox injection to pylorus for gastroparesis      Advanced care planning was discussed with patient and family.  Advanced care planning forms were reviewed and discussed.  Risks, benefits and alternatives of gastroenterologic procedures were discussed in detail and all questions were answered.    30 minutes spent.  
vomiting 2/2 gastroparesis  anemia, iron def  aspiration pna      s/p  upper gastrointestinal endoscopy with botox; c/b vomiting and aspiration  advance to fulls  monitor GI fn  d/w patient        Advanced care planning was discussed with patient and family.  Advanced care planning forms were reviewed and discussed.  Risks, benefits and alternatives of gastroenterologic procedures were discussed in detail and all questions were answered.    30 minutes spent.  
vomiting 2/2 gastroparesis  anemia, iron def  aspiration pna      s/p  upper gastrointestinal endoscopy with botox; c/b vomiting and aspiration  cont reg diet  will need outpatient follow up  dc planning  d/w patient        Advanced care planning was discussed with patient and family.  Advanced care planning forms were reviewed and discussed.  Risks, benefits and alternatives of gastroenterologic procedures were discussed in detail and all questions were answered.    30 minutes spent.  
vomiting 2/2 gastroparesis  anemia, iron def  aspiration pna    cont hiflow and antibiotics for aspiration  s/p  upper gastrointestinal endoscopy with botox; c/b vomiting and aspiration  start clears  monitor GI fn  d/w patient  d/w dtr over phone      Advanced care planning was discussed with patient and family.  Advanced care planning forms were reviewed and discussed.  Risks, benefits and alternatives of gastroenterologic procedures were discussed in detail and all questions were answered.    30 minutes spent.  
80 y/o F with PMH of DM, hypothyroidism. Present with nausea, weight loss. Patient had a recent ED visit about 11 days prior to admission.  atient was worked up with CT abdomen pelvis with IV contrast that showed no acute intra-abdominal pathology and there was a 6.0 cm left ovarian cystic lesion that was worked up with transvaginal ultrasound and was evaluated by GYN here.  Patient was then sent home after obtaining tumor markers which are reviewed in HIE that was within normal limits. Patient reportedly brought for endoscopy and found to have significant amount of bowel prep in stomach, witnessed aspiration event with patient becoming hypoxic, tachypneic.
80 y/o F with PMH of DM, hypothyroidism. Present with nausea, weight loss. Patient had a recent ED visit about 11 days prior to admission.  atient was worked up with CT abdomen pelvis with IV contrast that showed no acute intra-abdominal pathology and there was a 6.0 cm left ovarian cystic lesion that was worked up with transvaginal ultrasound and was evaluated by GYN here.  Patient was then sent home after obtaining tumor markers which are reviewed in HIE that was within normal limits. Patient reportedly brought for endoscopy and found to have significant amount of bowel prep in stomach, witnessed aspiration event with patient becoming hypoxic, tachypneic.

## 2025-03-05 ENCOUNTER — NON-APPOINTMENT (OUTPATIENT)
Age: 80
End: 2025-03-05

## 2025-03-05 ENCOUNTER — APPOINTMENT (OUTPATIENT)
Dept: OBGYN | Facility: CLINIC | Age: 80
End: 2025-03-05
Payer: MEDICARE

## 2025-03-05 VITALS
HEIGHT: 61 IN | BODY MASS INDEX: 25.49 KG/M2 | WEIGHT: 135 LBS | DIASTOLIC BLOOD PRESSURE: 71 MMHG | SYSTOLIC BLOOD PRESSURE: 125 MMHG

## 2025-03-05 DIAGNOSIS — D50.9 IRON DEFICIENCY ANEMIA, UNSPECIFIED: ICD-10-CM

## 2025-03-05 DIAGNOSIS — E03.9 HYPOTHYROIDISM, UNSPECIFIED: ICD-10-CM

## 2025-03-05 DIAGNOSIS — Z80.3 FAMILY HISTORY OF MALIGNANT NEOPLASM OF BREAST: ICD-10-CM

## 2025-03-05 DIAGNOSIS — N83.209 UNSPECIFIED OVARIAN CYST, UNSPECIFIED SIDE: ICD-10-CM

## 2025-03-05 PROCEDURE — 99203 OFFICE O/P NEW LOW 30 MIN: CPT

## 2025-03-06 LAB
CANCER AG125 SERPL-ACNC: 66 U/ML
CANCER AG19-9 SERPL-ACNC: <2 U/ML
CEA SERPL-MCNC: 1.4 NG/ML

## 2025-03-07 ENCOUNTER — OUTPATIENT (OUTPATIENT)
Dept: OUTPATIENT SERVICES | Facility: HOSPITAL | Age: 80
LOS: 1 days | Discharge: HOME | End: 2025-03-07

## 2025-03-07 DIAGNOSIS — Z87.59 PERSONAL HISTORY OF OTHER COMPLICATIONS OF PREGNANCY, CHILDBIRTH AND THE PUERPERIUM: Chronic | ICD-10-CM

## 2025-03-07 DIAGNOSIS — D64.9 ANEMIA, UNSPECIFIED: ICD-10-CM

## 2025-03-07 DIAGNOSIS — Z90.710 ACQUIRED ABSENCE OF BOTH CERVIX AND UTERUS: Chronic | ICD-10-CM

## 2025-03-10 ENCOUNTER — RESULT REVIEW (OUTPATIENT)
Age: 80
End: 2025-03-10

## 2025-03-10 ENCOUNTER — APPOINTMENT (OUTPATIENT)
Dept: HEMATOLOGY ONCOLOGY | Facility: CLINIC | Age: 80
End: 2025-03-10
Payer: MEDICARE

## 2025-03-10 VITALS
OXYGEN SATURATION: 96 % | SYSTOLIC BLOOD PRESSURE: 124 MMHG | BODY MASS INDEX: 25.01 KG/M2 | RESPIRATION RATE: 17 BRPM | HEIGHT: 61 IN | WEIGHT: 132.48 LBS | HEART RATE: 75 BPM | TEMPERATURE: 97.8 F | DIASTOLIC BLOOD PRESSURE: 63 MMHG

## 2025-03-10 DIAGNOSIS — D50.0 IRON DEFICIENCY ANEMIA SECONDARY TO BLOOD LOSS (CHRONIC): ICD-10-CM

## 2025-03-10 LAB
BASOPHILS # BLD AUTO: 0.04 K/UL — SIGNIFICANT CHANGE UP (ref 0–0.2)
BASOPHILS NFR BLD AUTO: 0.7 % — SIGNIFICANT CHANGE UP (ref 0–2)
EOSINOPHIL # BLD AUTO: 0.11 K/UL — SIGNIFICANT CHANGE UP (ref 0–0.5)
EOSINOPHIL NFR BLD AUTO: 2 % — SIGNIFICANT CHANGE UP (ref 0–6)
HCT VFR BLD CALC: 32 % — LOW (ref 34.5–45)
HGB BLD-MCNC: 10.4 G/DL — LOW (ref 11.5–15.5)
IMM GRANULOCYTES NFR BLD AUTO: 0.4 % — SIGNIFICANT CHANGE UP (ref 0–0.9)
LYMPHOCYTES # BLD AUTO: 1.94 K/UL — SIGNIFICANT CHANGE UP (ref 1–3.3)
LYMPHOCYTES # BLD AUTO: 36 % — SIGNIFICANT CHANGE UP (ref 13–44)
MCHC RBC-ENTMCNC: 25.8 PG — LOW (ref 27–34)
MCHC RBC-ENTMCNC: 32.5 G/DL — SIGNIFICANT CHANGE UP (ref 32–36)
MCV RBC AUTO: 79.4 FL — LOW (ref 80–100)
MONOCYTES # BLD AUTO: 0.7 K/UL — SIGNIFICANT CHANGE UP (ref 0–0.9)
MONOCYTES NFR BLD AUTO: 13 % — SIGNIFICANT CHANGE UP (ref 2–14)
NEUTROPHILS # BLD AUTO: 2.58 K/UL — SIGNIFICANT CHANGE UP (ref 1.8–7.4)
NEUTROPHILS NFR BLD AUTO: 47.9 % — SIGNIFICANT CHANGE UP (ref 43–77)
NRBC BLD AUTO-RTO: 0 /100 WBCS — SIGNIFICANT CHANGE UP (ref 0–0)
PLATELET # BLD AUTO: 414 K/UL — HIGH (ref 150–400)
RBC # BLD: 4.03 M/UL — SIGNIFICANT CHANGE UP (ref 3.8–5.2)
RBC # FLD: 23.5 % — HIGH (ref 10.3–14.5)
WBC # BLD: 5.39 K/UL — SIGNIFICANT CHANGE UP (ref 3.8–10.5)
WBC # FLD AUTO: 5.39 K/UL — SIGNIFICANT CHANGE UP (ref 3.8–10.5)

## 2025-03-10 PROCEDURE — G2211 COMPLEX E/M VISIT ADD ON: CPT

## 2025-03-10 PROCEDURE — 99205 OFFICE O/P NEW HI 60 MIN: CPT

## 2025-03-11 LAB
ALBUMIN SERPL ELPH-MCNC: 4.2 G/DL
ALP BLD-CCNC: 77 U/L
ALT SERPL-CCNC: 12 U/L
ANION GAP SERPL CALC-SCNC: 13 MMOL/L
AST SERPL-CCNC: 20 U/L
BILIRUB SERPL-MCNC: 0.3 MG/DL
BUN SERPL-MCNC: 12 MG/DL
CALCIUM SERPL-MCNC: 9.7 MG/DL
CHLORIDE SERPL-SCNC: 102 MMOL/L
CO2 SERPL-SCNC: 25 MMOL/L
CREAT SERPL-MCNC: 1.2 MG/DL
EGFRCR SERPLBLD CKD-EPI 2021: 45 ML/MIN/1.73M2
GLUCOSE SERPL-MCNC: 105 MG/DL
HAPTOGLOB SERPL-MCNC: 288 MG/DL
IRON SATN MFR SERPL: 23 %
IRON SERPL-MCNC: 69 UG/DL
LDH SERPL-CCNC: 175 U/L
POTASSIUM SERPL-SCNC: 4.7 MMOL/L
PROT SERPL-MCNC: 7.8 G/DL
RETICS #: 37 K/UL — SIGNIFICANT CHANGE UP (ref 25–125)
RETICS/RBC NFR: 0.9 % — SIGNIFICANT CHANGE UP (ref 0.5–2.5)
SODIUM SERPL-SCNC: 140 MMOL/L
TIBC SERPL-MCNC: 297 UG/DL
UIBC SERPL-MCNC: 228 UG/DL

## 2025-03-12 ENCOUNTER — NON-APPOINTMENT (OUTPATIENT)
Age: 80
End: 2025-03-12

## 2025-03-15 LAB
FERRITIN SERPL-MCNC: 273 NG/ML
FOLATE SERPL-MCNC: >20 NG/ML
IF BLOCK AB SER QL: 1 AU/ML
METHYLMALONATE SERPL-SCNC: 143 NMOL/L
PCA AB SER QL IF: NORMAL
VIT B12 SERPL-MCNC: >2000 PG/ML

## 2025-03-15 RX ORDER — CHLORHEXIDINE GLUCONATE 4 %
325 (65 FE) LIQUID (ML) TOPICAL
Refills: 0 | Status: ACTIVE | COMMUNITY
Start: 2025-03-15

## 2025-03-17 LAB
ALBUMIN MFR SERPL ELPH: 51 %
ALBUMIN SERPL-MCNC: 4.1 G/DL
ALBUMIN/GLOB SERPL: 1.1 RATIO
ALPHA1 GLOB MFR SERPL ELPH: 5 %
ALPHA1 GLOB SERPL ELPH-MCNC: 0.4 G/DL
ALPHA2 GLOB MFR SERPL ELPH: 12.9 %
ALPHA2 GLOB SERPL ELPH-MCNC: 1 G/DL
B-GLOBULIN MFR SERPL ELPH: 14.5 %
B-GLOBULIN SERPL ELPH-MCNC: 1.2 G/DL
DEPRECATED KAPPA LC FREE/LAMBDA SER: 1.95 RATIO
GAMMA GLOB FLD ELPH-MCNC: 1.3 G/DL
GAMMA GLOB MFR SERPL ELPH: 16.6 %
IGA SER QL IEP: 500 MG/DL
IGG SER QL IEP: 928 MG/DL
IGM SER QL IEP: 101 MG/DL
INTERPRETATION SERPL IEP-IMP: NORMAL
KAPPA LC CSF-MCNC: 2.17 MG/DL
KAPPA LC SERPL-MCNC: 4.24 MG/DL
M PROTEIN SPEC IFE-MCNC: NORMAL
PROT SERPL-MCNC: 8 G/DL
PROT SERPL-MCNC: 8 G/DL

## 2025-03-19 ENCOUNTER — APPOINTMENT (OUTPATIENT)
Dept: GYNECOLOGIC ONCOLOGY | Facility: CLINIC | Age: 80
End: 2025-03-19
Payer: MEDICARE

## 2025-03-19 VITALS
RESPIRATION RATE: 17 BRPM | HEIGHT: 61 IN | SYSTOLIC BLOOD PRESSURE: 114 MMHG | BODY MASS INDEX: 24.73 KG/M2 | HEART RATE: 91 BPM | WEIGHT: 131 LBS | DIASTOLIC BLOOD PRESSURE: 67 MMHG | TEMPERATURE: 98.2 F

## 2025-03-19 DIAGNOSIS — N83.8 OTHER NONINFLAMMATORY DISORDERS OF OVARY, FALLOPIAN TUBE AND BROAD LIGAMENT: ICD-10-CM

## 2025-03-19 DIAGNOSIS — N83.209 UNSPECIFIED OVARIAN CYST, UNSPECIFIED SIDE: ICD-10-CM

## 2025-03-19 PROCEDURE — 99459 PELVIC EXAMINATION: CPT | Mod: NC

## 2025-03-19 PROCEDURE — 99205 OFFICE O/P NEW HI 60 MIN: CPT

## 2025-03-28 ENCOUNTER — APPOINTMENT (OUTPATIENT)
Dept: MRI IMAGING | Facility: CLINIC | Age: 80
End: 2025-03-28
Payer: MEDICARE

## 2025-03-28 PROCEDURE — 72197 MRI PELVIS W/O & W/DYE: CPT

## 2025-03-28 PROCEDURE — A9585: CPT

## 2025-04-25 ENCOUNTER — APPOINTMENT (OUTPATIENT)
Dept: GYNECOLOGIC ONCOLOGY | Facility: CLINIC | Age: 80
End: 2025-04-25
Payer: MEDICARE

## 2025-04-25 DIAGNOSIS — N83.8 OTHER NONINFLAMMATORY DISORDERS OF OVARY, FALLOPIAN TUBE AND BROAD LIGAMENT: ICD-10-CM

## 2025-04-25 DIAGNOSIS — N83.209 UNSPECIFIED OVARIAN CYST, UNSPECIFIED SIDE: ICD-10-CM

## 2025-04-25 DIAGNOSIS — K31.84 GASTROPARESIS: ICD-10-CM

## 2025-04-25 DIAGNOSIS — E11.9 TYPE 2 DIABETES MELLITUS W/OUT COMPLICATIONS: ICD-10-CM

## 2025-04-25 PROCEDURE — 99214 OFFICE O/P EST MOD 30 MIN: CPT

## 2025-04-30 ENCOUNTER — INPATIENT (INPATIENT)
Facility: HOSPITAL | Age: 80
LOS: 1 days | Discharge: HOME | DRG: 812 | End: 2025-05-02
Attending: STUDENT IN AN ORGANIZED HEALTH CARE EDUCATION/TRAINING PROGRAM | Admitting: STUDENT IN AN ORGANIZED HEALTH CARE EDUCATION/TRAINING PROGRAM
Payer: COMMERCIAL

## 2025-04-30 ENCOUNTER — OUTPATIENT (OUTPATIENT)
Dept: OUTPATIENT SERVICES | Facility: HOSPITAL | Age: 80
LOS: 1 days | End: 2025-04-30

## 2025-04-30 VITALS
OXYGEN SATURATION: 100 % | TEMPERATURE: 98 F | RESPIRATION RATE: 15 BRPM | WEIGHT: 130.95 LBS | HEIGHT: 60 IN | SYSTOLIC BLOOD PRESSURE: 122 MMHG | DIASTOLIC BLOOD PRESSURE: 69 MMHG | HEART RATE: 82 BPM

## 2025-04-30 VITALS
OXYGEN SATURATION: 97 % | RESPIRATION RATE: 20 BRPM | SYSTOLIC BLOOD PRESSURE: 124 MMHG | HEIGHT: 60 IN | DIASTOLIC BLOOD PRESSURE: 69 MMHG | WEIGHT: 130.95 LBS | HEART RATE: 91 BPM | TEMPERATURE: 98 F

## 2025-04-30 DIAGNOSIS — N83.8 OTHER NONINFLAMMATORY DISORDERS OF OVARY, FALLOPIAN TUBE AND BROAD LIGAMENT: ICD-10-CM

## 2025-04-30 DIAGNOSIS — Z90.710 ACQUIRED ABSENCE OF BOTH CERVIX AND UTERUS: Chronic | ICD-10-CM

## 2025-04-30 DIAGNOSIS — Z98.890 OTHER SPECIFIED POSTPROCEDURAL STATES: Chronic | ICD-10-CM

## 2025-04-30 DIAGNOSIS — Z87.59 PERSONAL HISTORY OF OTHER COMPLICATIONS OF PREGNANCY, CHILDBIRTH AND THE PUERPERIUM: Chronic | ICD-10-CM

## 2025-04-30 DIAGNOSIS — E11.9 TYPE 2 DIABETES MELLITUS WITHOUT COMPLICATIONS: ICD-10-CM

## 2025-04-30 DIAGNOSIS — I10 ESSENTIAL (PRIMARY) HYPERTENSION: ICD-10-CM

## 2025-04-30 LAB
ALBUMIN SERPL ELPH-MCNC: 3.9 G/DL — SIGNIFICANT CHANGE UP (ref 3.3–5)
ALP SERPL-CCNC: 60 U/L — SIGNIFICANT CHANGE UP (ref 40–120)
ALT FLD-CCNC: 7 U/L — LOW (ref 10–45)
ANION GAP SERPL CALC-SCNC: 13 MMOL/L — SIGNIFICANT CHANGE UP (ref 5–17)
ANISOCYTOSIS BLD QL: SLIGHT — SIGNIFICANT CHANGE UP
APTT BLD: 35 SEC — SIGNIFICANT CHANGE UP (ref 26.1–36.8)
AST SERPL-CCNC: 14 U/L — SIGNIFICANT CHANGE UP (ref 10–40)
BASOPHILS # BLD AUTO: 0 K/UL — SIGNIFICANT CHANGE UP (ref 0–0.2)
BASOPHILS # BLD AUTO: 0.02 K/UL — SIGNIFICANT CHANGE UP (ref 0–0.2)
BASOPHILS NFR BLD AUTO: 0 % — SIGNIFICANT CHANGE UP (ref 0–2)
BASOPHILS NFR BLD AUTO: 0.2 % — SIGNIFICANT CHANGE UP (ref 0–2)
BILIRUB SERPL-MCNC: 0.2 MG/DL — SIGNIFICANT CHANGE UP (ref 0.2–1.2)
BLD GP AB SCN SERPL QL: NEGATIVE — SIGNIFICANT CHANGE UP
BLD GP AB SCN SERPL QL: NEGATIVE — SIGNIFICANT CHANGE UP
BUN SERPL-MCNC: 20 MG/DL — SIGNIFICANT CHANGE UP (ref 7–23)
CALCIUM SERPL-MCNC: 9.3 MG/DL — SIGNIFICANT CHANGE UP (ref 8.4–10.5)
CHLORIDE SERPL-SCNC: 103 MMOL/L — SIGNIFICANT CHANGE UP (ref 96–108)
CO2 SERPL-SCNC: 22 MMOL/L — SIGNIFICANT CHANGE UP (ref 22–31)
CREAT SERPL-MCNC: 1.33 MG/DL — HIGH (ref 0.5–1.3)
EGFR: 40 ML/MIN/1.73M2 — LOW
EGFR: 40 ML/MIN/1.73M2 — LOW
EOSINOPHIL # BLD AUTO: 0 K/UL — SIGNIFICANT CHANGE UP (ref 0–0.5)
EOSINOPHIL # BLD AUTO: 0.1 K/UL — SIGNIFICANT CHANGE UP (ref 0–0.5)
EOSINOPHIL NFR BLD AUTO: 0 % — SIGNIFICANT CHANGE UP (ref 0–6)
EOSINOPHIL NFR BLD AUTO: 0.9 % — SIGNIFICANT CHANGE UP (ref 0–6)
GLUCOSE SERPL-MCNC: 96 MG/DL — SIGNIFICANT CHANGE UP (ref 70–99)
HCT VFR BLD CALC: 19.4 % — CRITICAL LOW (ref 34.5–45)
HCT VFR BLD CALC: 20.4 % — CRITICAL LOW (ref 34.5–45)
HGB BLD-MCNC: 6.3 G/DL — CRITICAL LOW (ref 11.5–15.5)
HGB BLD-MCNC: 6.4 G/DL — CRITICAL LOW (ref 11.5–15.5)
HYPOCHROMIA BLD QL: SLIGHT — SIGNIFICANT CHANGE UP
IMM GRANULOCYTES NFR BLD AUTO: 0.4 % — SIGNIFICANT CHANGE UP (ref 0–0.9)
INR BLD: 1.02 RATIO — SIGNIFICANT CHANGE UP (ref 0.85–1.16)
LYMPHOCYTES # BLD AUTO: 1.74 K/UL — SIGNIFICANT CHANGE UP (ref 1–3.3)
LYMPHOCYTES # BLD AUTO: 16 % — SIGNIFICANT CHANGE UP (ref 13–44)
LYMPHOCYTES # BLD AUTO: 2.27 K/UL — SIGNIFICANT CHANGE UP (ref 1–3.3)
LYMPHOCYTES # BLD AUTO: 20 % — SIGNIFICANT CHANGE UP (ref 13–44)
MACROCYTES BLD QL: SLIGHT — SIGNIFICANT CHANGE UP
MANUAL SMEAR VERIFICATION: SIGNIFICANT CHANGE UP
MCHC RBC-ENTMCNC: 28.1 PG — SIGNIFICANT CHANGE UP (ref 27–34)
MCHC RBC-ENTMCNC: 28.5 PG — SIGNIFICANT CHANGE UP (ref 27–34)
MCHC RBC-ENTMCNC: 31.4 G/DL — LOW (ref 32–36)
MCHC RBC-ENTMCNC: 32.5 G/DL — SIGNIFICANT CHANGE UP (ref 32–36)
MCV RBC AUTO: 87.8 FL — SIGNIFICANT CHANGE UP (ref 80–100)
MCV RBC AUTO: 89.5 FL — SIGNIFICANT CHANGE UP (ref 80–100)
MONOCYTES # BLD AUTO: 0.1 K/UL — SIGNIFICANT CHANGE UP (ref 0–0.9)
MONOCYTES # BLD AUTO: 0.9 K/UL — SIGNIFICANT CHANGE UP (ref 0–0.9)
MONOCYTES NFR BLD AUTO: 0.9 % — LOW (ref 2–14)
MONOCYTES NFR BLD AUTO: 8.3 % — SIGNIFICANT CHANGE UP (ref 2–14)
NEUTROPHILS # BLD AUTO: 8.1 K/UL — HIGH (ref 1.8–7.4)
NEUTROPHILS # BLD AUTO: 8.96 K/UL — HIGH (ref 1.8–7.4)
NEUTROPHILS NFR BLD AUTO: 74.2 % — SIGNIFICANT CHANGE UP (ref 43–77)
NEUTROPHILS NFR BLD AUTO: 79.1 % — HIGH (ref 43–77)
OB PNL STL: NEGATIVE — SIGNIFICANT CHANGE UP
OVALOCYTES BLD QL SMEAR: SLIGHT — SIGNIFICANT CHANGE UP
PLAT MORPH BLD: NORMAL — SIGNIFICANT CHANGE UP
PLATELET # BLD AUTO: 414 K/UL — HIGH (ref 150–400)
PLATELET # BLD AUTO: 423 K/UL — HIGH (ref 150–400)
POIKILOCYTOSIS BLD QL AUTO: SLIGHT — SIGNIFICANT CHANGE UP
POLYCHROMASIA BLD QL SMEAR: SLIGHT — SIGNIFICANT CHANGE UP
POTASSIUM SERPL-MCNC: 4.1 MMOL/L — SIGNIFICANT CHANGE UP (ref 3.5–5.3)
POTASSIUM SERPL-SCNC: 4.1 MMOL/L — SIGNIFICANT CHANGE UP (ref 3.5–5.3)
PROT SERPL-MCNC: 7 G/DL — SIGNIFICANT CHANGE UP (ref 6–8.3)
PROTHROM AB SERPL-ACNC: 11.6 SEC — SIGNIFICANT CHANGE UP (ref 9.9–13.4)
RBC # BLD: 2.21 M/UL — LOW (ref 3.8–5.2)
RBC # BLD: 2.28 M/UL — LOW (ref 3.8–5.2)
RBC # FLD: 19.8 % — HIGH (ref 10.3–14.5)
RBC # FLD: 20.1 % — HIGH (ref 10.3–14.5)
RBC BLD AUTO: ABNORMAL
RH IG SCN BLD-IMP: POSITIVE — SIGNIFICANT CHANGE UP
RH IG SCN BLD-IMP: POSITIVE — SIGNIFICANT CHANGE UP
SCHISTOCYTES BLD QL AUTO: SLIGHT — SIGNIFICANT CHANGE UP
SODIUM SERPL-SCNC: 138 MMOL/L — SIGNIFICANT CHANGE UP (ref 135–145)
WBC # BLD: 10.9 K/UL — HIGH (ref 3.8–10.5)
WBC # BLD: 11.33 K/UL — HIGH (ref 3.8–10.5)
WBC # FLD AUTO: 10.9 K/UL — HIGH (ref 3.8–10.5)
WBC # FLD AUTO: 11.33 K/UL — HIGH (ref 3.8–10.5)

## 2025-04-30 PROCEDURE — 74177 CT ABD & PELVIS W/CONTRAST: CPT | Mod: 26

## 2025-04-30 PROCEDURE — 93010 ELECTROCARDIOGRAM REPORT: CPT

## 2025-04-30 PROCEDURE — 99285 EMERGENCY DEPT VISIT HI MDM: CPT

## 2025-04-30 NOTE — H&P PST ADULT - NSICDXPASTSURGICALHX_GEN_ALL_CORE_FT
PAST SURGICAL HISTORY:  H/O      H/O: hysterectomy "a long time ago when I was in my 30's"    History of breast surgery     History of thyroidectomy

## 2025-04-30 NOTE — ED ADULT TRIAGE NOTE - CHIEF COMPLAINT QUOTE
Scheduled to have ovarian cyst removal had pre-surgical testing done today and was called back with low hgb 6.4 results. No bleeding, no hx of anemia, not on any ac. Endorsing fatigue and weakness x days. Received a blood transfusion here a month ago.

## 2025-04-30 NOTE — ED PROVIDER NOTE - OBJECTIVE STATEMENT
79-year-old female with history of hypertension, hyperlipidemia, diabetes, hysterectomy, recently was found to be anemic and on pelvic sonogram found to have ovarian mass, patient scheduled for surgery by Dr. Bhagat  GYN oncology on Tuesday , drew during preop lab was found to have hemoglobin of 6.4 and was sent to emergency room for evaluation , 79-year-old female with history of hypertension, hyperlipidemia, diabetes, hysterectomy, recently was found to be anemic and on pelvic sonogram found to have ovarian mass, patient scheduled for surgery by Dr. Bhagat  GYN oncology on Tuesday , janette during preop lab was found to have hemoglobin of 6.4 and was sent to emergency room for evaluation , Patient has generalized weakness.  Denies heavy vaginal bleeding, bright red blood per rectum, melena, dizziness, weakness, LOC, chest pain, shortness of breath.

## 2025-04-30 NOTE — H&P PST ADULT - PROBLEM SELECTOR PLAN 1
Scheduled for Robotic Assisted Bilateral Salpingo Oophorectomy Possible Staging Possible Open on 05/06/25.  Preop instructions provided and patient verbalizes understanding.  Labs done and results pending.  Famotidine provided with instructions. Hibiclens provided with instructions and was signed by patient. Teach-back method was utilized to assess patient's understanding. Patient verbalized understanding.  Patient  instructed to take levothyroxine on the morning of procedure.

## 2025-04-30 NOTE — ED PROVIDER NOTE - PROGRESS NOTE DETAILS
case discussed with gyn Patient was evaluated by OB/GYN, reportedly did not have significant internal bleeding on pelvic exam.  Also did not note bleeding per rectum.  Recommend at least 2 units PRBC transfusion, CT abdomen/pelvis with contrast, recommend obtaining guaiac. - Chico Lopez PA-C Patient was evaluated by OB/GYN, reportedly did not have significant internal bleeding on pelvic exam.  Also did not note bleeding per rectum.  Recommend at least 2 units PRBC transfusion, CT abdomen/pelvis with contrast, recommend obtaining guaiac. pt was consented, PRBC transfusion started. - Chico Lopez PA-C Enzo Song, PGY3 - patient admitted to Dr. Hardy under medicine service. no intrabdominal bleeding on CT scan. patient stable.

## 2025-04-30 NOTE — ED PROVIDER NOTE - CLINICAL SUMMARY MEDICAL DECISION MAKING FREE TEXT BOX
79-year-old female with multiple medical issues, recently found to have ovarian mass, scheduled to have surgery on Tuesday but was found to have very low hemoglobin and was sent by OB/GYN oncology for blood transfusion and admission   patient feels weak and fatigue but no other complaints, will obtain blood work GYN consultation, blood transfusion and admission to the hospital [facility]

## 2025-04-30 NOTE — H&P PST ADULT - ENDOCRINE COMMENTS
anemia s/p 1u PRBC 02/2025 last hgb 10.4 March 2025 denies PE/DVT anemia s/p 1u PRBC and iron infusions x 5 days - 02/2025 last hgb 10.4 March 2025 denies PE/DVT

## 2025-04-30 NOTE — ED PROVIDER NOTE - NSICDXPASTMEDICALHX_GEN_ALL_CORE_FT
PAST MEDICAL HISTORY:  Gastroparesis     Glaucoma     HTN (hypertension)     Hyperthyroidism     Iron deficiency anemia     Multiple thyroid nodules     Obese     Ovarian cyst     Type II diabetes mellitus

## 2025-04-30 NOTE — H&P PST ADULT - PROBLEM/PLAN-1
PCP ordered the diabetic supplies. Does not need from Endo.
Patient needs order for Accu-Check glucometer, strips and lancet for Accu-Check. 3:53 pm  Spoke to patient, verified patient's name, address and . Patient states received a call from pharmacy, glucometer test strips available for  tomorrow, 17. Patient states test strips were ordered through PCP, Dr. Jorge Rodgers office.
DISPLAY PLAN FREE TEXT

## 2025-04-30 NOTE — ED ADULT NURSE REASSESSMENT NOTE - NS ED NURSE REASSESS COMMENT FT1
As per MD Singh, pt receiving 1 unit of PRBCs for medical condition: GI bleed --> Holding off on 2nd unit for Obstetrical procedure, OB/GYN team to evaluate. Pt currently no complaints at this time, safety and comfort provided.

## 2025-04-30 NOTE — ED ADULT NURSE NOTE - OBJECTIVE STATEMENT
80 y/o female AOx3 with pmhx of HTN, DM, hypothyroidism, Anemia, Gastroparesis and ovarian cyst presents with low Hgb after visiting presurgical for Ovarian Cystectomy procedure scheduled for Tuesday (5/6). Pt denies any SOB, weakness, and dizziness. Pt also denies any pain from ovarian cyst, reports "feeling fine" but was told to come to ED for blood transfusion. Pt endorses having previous transfusions for past hospitalization due to low Hgb, denies any complications. Pt denies any chest pain, SOB, dyspnea, n/v/d, dysuria, fevers/chills, cough, abdominal pain, and headache. Breathing spontaneously and unlabored. Abdomen soft, non-distended, and non-tender. IV and labs performed in [facility]doc. Pt placed on cardiac monitor showing NSR. Pt currently resting in stretcher bed with no signs of distress, safety and comfort provided.

## 2025-04-30 NOTE — ED ADULT NURSE NOTE - NSFALLRISKINTERV_ED_ALL_ED

## 2025-04-30 NOTE — CONSULT NOTE ADULT - ASSESSMENT
- 2 units of pRBC  - Please send stool guaic  - CT AP to evaluation for intra-abdominal bleeding    d/w Dr. Bean GYN ONC Fellow  ADRIANA Lu PGY-2 A/P: 79y with adnexal mass presenting due to anemia noted on PSTs.  Patient is scheduled for robotic BSO excision of pelvic mass on 5/6/25 at [facility] with Dr. Tavarez. Patient with mild fatigue otherwise asymptomatic. In ED vital signs wnl. Laboratory evaluation notable for H/H 6.3/19.4 and mild NAKUL with Cr elevated to 1.33 from baseline of 0.99 on 2/26. On exam, no meño blood noted on vaginal exam or BRAYAN. Patient is clinically well appearing at this time.  - 2 units of pRBC  - Please send stool guaic  - CT AP to evaluation for intra-abdominal bleeding  - Final recommendations pending results of laboratory and imaging evaluatino    d/w Dr. Bean GYN ONC Fellow  ADRIANA Lu PGY-2 A/P: 79y with adnexal mass presenting due to anemia noted on PSTs.  Patient is scheduled for robotic BSO and excision of pelvic mass on 5/6/25 at [facility] with Dr. Tavarez. Patient with mild fatigue otherwise asymptomatic. In ED vital signs wnl. Laboratory evaluation notable for H/H 6.3/19.4 and mild NAKUL with Cr elevated to 1.33 from baseline of 0.99 on 2/26. On exam, no meño blood noted on vaginal exam or BRAYAN. Patient is clinically well appearing at this time.  - Recommend 2 units of pRBC with post transfusion CBC to ensure adequate rise  - Recommend sending stool guaic  - Recommend CT AP to evaluation for intra-abdominal bleeding  - Would recommend GI evaluation for possible source of bleeding  - Final recommendations pending results of laboratory and imaging evaluation    d/w Dr. Bean GYN ONC Fellow  ADRIANA Lu PGY-2

## 2025-04-30 NOTE — H&P PST ADULT - HISTORY OF PRESENT ILLNESS
79 yr old female with medical hx of HTN, DM, hypothyroidism, anemia s/p PRBC 2/25 presents for preop evaluation with dx of Other Noninflammatory Disorder of Ovary, Fallopian Tube and Broad ligament.  79 yr old female with medical hx of HTN, DM, hypothyroidism, anemia s/p PRBC 2/25 presents for preop evaluation with dx of Other Noninflammatory Disorder of Ovary, Fallopian Tube and Broad ligament. Patient was hospitalized February x 2 weeks with c/o nausea and weight loss had CT scan abdomen and pelvis which revealed left ovarian cyst.  Patient was then sent for TVUS and GYN evaluation.  Patient is now scheduled for Robotic Assisted Bilateral Salpingo Oophorectomy Possible Staging Possible Open on 05/06/25.      Of note during hospitalization patient had gastric emptying test which revealed Gastroparesis and EGD was scheduled but was aborted due to witnessed aspiration event causing patient to become hypoxic and tachypneic ( RRT.and MICU) evaluated patient.  Patient was treated with high-flow NC then gradually stepped down to NC then room air, she also got IV antibiotics, Iron infusions x 5 days and 1Uprbc.

## 2025-04-30 NOTE — ED PROVIDER NOTE - DATE/TIME 3
Vaccine Information Statement(s) was given today. This has been reviewed, questions answered, and verbal consent given by Parent for injection(s) and administration of Hepatitis A and Hepatitis B.    Patient tolerated without incident. See immunization grid for documentation.     01-May-2025 01:18

## 2025-04-30 NOTE — CONSULT NOTE ADULT - SUBJECTIVE AND OBJECTIVE BOX
BALDEMAR CAMEJO  79y  Female [id number]    HPI:  79y presenting due to       Name of GYN Physician:     POB:      Pgyn: Denies fibroids, cysts, endometriosis, STI's, Abnormal pap smears     Home meds:     Hospital Meds:   MEDICATIONS  (STANDING):    MEDICATIONS  (PRN):      Allergies    No Known Allergies    Intolerances        PAST MEDICAL & SURGICAL HISTORY:  Glaucoma      HTN (hypertension)      Type II diabetes mellitus      Multiple thyroid nodules      Hyperthyroidism      Obese      Ovarian cyst      Gastroparesis      Iron deficiency anemia      H/O: hysterectomy  "a long time ago when I was in my 30's"      H/O       History of thyroidectomy      History of breast surgery          FAMILY HISTORY:  FH: dementia (Mother)        Social History:  Denies smoking use, drug use, alcohol use.   +occasional social alcohol use    Vital Signs Last 24 Hrs  T(C): 36.6 (2025 17:48), Max: 36.6 (2025 09:49)  T(F): 97.9 (2025 17:48), Max: 97.9 (2025 09:49)  HR: 91 (2025 17:48) (82 - 91)  BP: 124/69 (2025 17:48) (122/69 - 124/69)  BP(mean): 86 (2025 09:49) (86 - 86)  RR: 20 (2025 17:48) (15 - 20)  SpO2: 97% (2025 17:48) (97% - 100%)    Parameters below as of 2025 17:48  Patient On (Oxygen Delivery Method): room air        Physical Exam:   General: sitting comfortably in bed, NAD   CV: RR S1S2 no m/r/g  Lungs: CTA b/l, good air flow b/l   Back: No CVA tenderness  Abd: Soft, non-tender, non-distended.  Bowel sounds present.    :  No bleeding on pad.    External labia wnl.  Bimanual exam with no cervical motion tenderness, uterus wnl, adnexa non palpable b/l.  Cervix closed vs. Cervix dilated    cm   Speculum Exam: No active bleeding from os.  Physiologic discharge.    Ext: non-tender b/l, no edema     LABS:                              6.3    11.33 )-----------( 414      ( 2025 20:18 )             19.4     04-30    138  |  103  |  20  ----------------------------<  96  4.1   |  22  |  1.33[H]    Ca    9.3      2025 20:18    TPro  7.0  /  Alb  3.9  /  TBili  0.2  /  DBili  x   /  AST  14  /  ALT  7[L]  /  AlkPhos  60      I&O's Detail    PT/INR - ( 2025 20:18 )   PT: 11.6 sec;   INR: 1.02 ratio         PTT - ( 2025 20:18 )  PTT:35.0 sec  Urinalysis Basic - ( 2025 20:18 )    Color: x / Appearance: x / SG: x / pH: x  Gluc: 96 mg/dL / Ketone: x  / Bili: x / Urobili: x   Blood: x / Protein: x / Nitrite: x   Leuk Esterase: x / RBC: x / WBC x   Sq Epi: x / Non Sq Epi: x / Bacteria: x        RADIOLOGY & ADDITIONAL STUDIES: BALDEMAR CAMEJO  79y  Female [id number]    HPI:  79y with adnexal mass presenting due to anemia noted on PSTs. Patient was hospitalized in February for gastroparesis with incidental finding of L ovarian cyst. CT scan was done during hospitalization which revealed 6 cm cystic lesion on the ovary. She subsequently had ultrasound done in February which revealed 6.1 cm cyst simple cyst with single thin septation and calcification with overall benign appearance.  64 at the time. Patient was referred to Gyn Onc. She had an MRI in March showing atrophic left ovary containing a 6.5 x 5.5 x 5.2 cm cystic lesion with a dominant 6.4 cm unilocular simple cystic component and a smaller posterior inferior component with internal hemorrhagic products and intervening 2 mm thick septation. Patient is scheduled for robotic BSO excision of pelvic mass on 5/6/25 at [facility] with Dr. Tavarez. Patient has also been following with hematology.    Gyn Onc: Dr. Tavarez    HCM: Pap: Status post hyst Mammo:2024 C-scope none  PMH: Diabetes high blood pressure hypothyroidism anemia gastroparesis  PSH: Lumpectomy, hysterectomy >30 years AUB Pfannenstiel  Gyn Hx: Denies GYN history however had AUB in the past possible fibroids denies history of abnormal Pap  Ob Hx: Para 2-0-1-1  Meds: Metformin, amlodipine, levothyroxine, pantoprazole, s/p Venoferx5, PO iron  Allergies: No known drug allergies  FH: Mother 78 years old breast cancer, no reported genetic testing  SH: Never smoker occasional alcohol no recreational drugs  Single retired not sexually active    Vital Signs Last 24 Hrs  T(C): 36.6 (30 Apr 2025 17:48), Max: 36.6 (30 Apr 2025 09:49)  T(F): 97.9 (30 Apr 2025 17:48), Max: 97.9 (30 Apr 2025 09:49)  HR: 91 (30 Apr 2025 17:48) (82 - 91)  BP: 124/69 (30 Apr 2025 17:48) (122/69 - 124/69)  BP(mean): 86 (30 Apr 2025 09:49) (86 - 86)  RR: 20 (30 Apr 2025 17:48) (15 - 20)  SpO2: 97% (30 Apr 2025 17:48) (97% - 100%)    Parameters below as of 30 Apr 2025 17:48  Patient On (Oxygen Delivery Method): room air        Physical Exam:   General: sitting comfortably in bed, NAD   CV: RR S1S2 no m/r/g  Lungs: CTA b/l, good air flow b/l   Back: No CVA tenderness  Abd: Soft, non-tender, non-distended.  Bowel sounds present.    :  No bleeding on pad.    External labia wnl.  Bimanual exam with no cervical motion tenderness, uterus wnl, adnexa non palpable b/l.  Cervix closed vs. Cervix dilated    cm   Speculum Exam: No active bleeding from os.  Physiologic discharge.    Ext: non-tender b/l, no edema     LABS:                              6.3    11.33 )-----------( 414      ( 30 Apr 2025 20:18 )             19.4     04-30    138  |  103  |  20  ----------------------------<  96  4.1   |  22  |  1.33[H]    Ca    9.3      30 Apr 2025 20:18    TPro  7.0  /  Alb  3.9  /  TBili  0.2  /  DBili  x   /  AST  14  /  ALT  7[L]  /  AlkPhos  60  04-30    I&O's Detail    PT/INR - ( 30 Apr 2025 20:18 )   PT: 11.6 sec;   INR: 1.02 ratio         PTT - ( 30 Apr 2025 20:18 )  PTT:35.0 sec  Urinalysis Basic - ( 30 Apr 2025 20:18 )    Color: x / Appearance: x / SG: x / pH: x  Gluc: 96 mg/dL / Ketone: x  / Bili: x / Urobili: x   Blood: x / Protein: x / Nitrite: x   Leuk Esterase: x / RBC: x / WBC x   Sq Epi: x / Non Sq Epi: x / Bacteria: x       BALDEMAR CAMEJO  79y  Female [id number]    HPI:  79y with adnexal mass presenting due to anemia noted on PSTs. Patient was hospitalized in February for gastroparesis with incidental finding of L ovarian cyst. CT scan was done during hospitalization which revealed 6 cm cystic lesion on the ovary. She subsequently had ultrasound done in February which revealed 6.1 cm cyst simple cyst with single thin septation and calcification with overall benign appearance.  64 at the time. Patient was referred to Gyn Onc. She had an MRI in March showing atrophic left ovary containing a 6.5 x 5.5 x 5.2 cm cystic lesion with a dominant 6.4 cm unilocular simple cystic component and a smaller posterior inferior component with internal hemorrhagic products and intervening 2 mm thick septation. Patient is scheduled for robotic BSO excision of pelvic mass on 5/6/25 at [facility] with Dr. Tavarez. Patient has also been following with hematology due to anemia.     Patient reports that she felt increasing fatigue for the last few days. She denies any lightheadedness/dizziness/SOB. She reports good appetite and no N/V. Has been having regular BMs, that appear slightly darker and looser but denies and meño blood; last BM was Monday which is normal for patient. She was started on oral iron in March and reports this change in stool consistency has occurred for the last 2 weeks. Patient was unaware she was anemic until lab draw at [facility]. Denies fevers/chills, SOB, chest pain, abdominal pain, vaginal discharge or bleeding, urinary symptoms.    Gyn Onc: Dr. Tavarez    HCM: Pap: Status post hyst Mammo:2024 C-scope none  PMH: Diabetes high blood pressure hypothyroidism anemia gastroparesis  PSH: Lumpectomy, hysterectomy >30 years AUB Pfannenstiel  Gyn Hx: Denies GYN history however had AUB in the past possible fibroids denies history of abnormal Pap  Ob Hx: Para 2-0-1-1  Meds: Metformin, amlodipine, levothyroxine, pantoprazole, s/p Venoferx5, PO iron  Allergies: No known drug allergies  FH: Mother 78 years old breast cancer, no reported genetic testing  SH: Never smoker occasional alcohol no recreational drugs  Single retired not sexually active    Vital Signs Last 24 Hrs  T(C): 36.6 (30 Apr 2025 17:48), Max: 36.6 (30 Apr 2025 09:49)  T(F): 97.9 (30 Apr 2025 17:48), Max: 97.9 (30 Apr 2025 09:49)  HR: 91 (30 Apr 2025 17:48) (82 - 91)  BP: 124/69 (30 Apr 2025 17:48) (122/69 - 124/69)  BP(mean): 86 (30 Apr 2025 09:49) (86 - 86)  RR: 20 (30 Apr 2025 17:48) (15 - 20)  SpO2: 97% (30 Apr 2025 17:48) (97% - 100%)    Parameters below as of 30 Apr 2025 17:48  Patient On (Oxygen Delivery Method): room air        Physical Exam:   General: sitting comfortably in bed, NAD   CV: RR S1S2 no m/r/g  Lungs: CTA b/l, good air flow b/l   Abd: Soft, non-tender, non-distended.    :  No bleeding on pad. External labia wnl. Bimanual exam with intact cuff, no vaginal bleeding noted. Adnexa non palpable b/l.   BRAYAN: No meño blood noted  Ext: non-tender b/l, no edema     LABS:                              6.3    11.33 )-----------( 414      ( 30 Apr 2025 20:18 )             19.4     04-30    138  |  103  |  20  ----------------------------<  96  4.1   |  22  |  1.33[H]    Ca    9.3      30 Apr 2025 20:18    TPro  7.0  /  Alb  3.9  /  TBili  0.2  /  DBili  x   /  AST  14  /  ALT  7[L]  /  AlkPhos  60  04-30    I&O's Detail    PT/INR - ( 30 Apr 2025 20:18 )   PT: 11.6 sec;   INR: 1.02 ratio         PTT - ( 30 Apr 2025 20:18 )  PTT:35.0 sec  Urinalysis Basic - ( 30 Apr 2025 20:18 )    Color: x / Appearance: x / SG: x / pH: x  Gluc: 96 mg/dL / Ketone: x  / Bili: x / Urobili: x   Blood: x / Protein: x / Nitrite: x   Leuk Esterase: x / RBC: x / WBC x   Sq Epi: x / Non Sq Epi: x / Bacteria: x

## 2025-04-30 NOTE — ED PROVIDER NOTE - RAPID ASSESSMENT
79-year-old female with past medical history of  HTN, DM, hypothyroidism, anemia s/p PRBC 2/25 presenting with anemia.  Patient reports that she was at presurgical testing today in preparation for an ovarian cystectomy.  Patient received a call that she was anemic and needed to come to the ER for blood transfusion.  Patient states that she was admitted to [facility] at the end of February.  Patient was found to be anemic at that time and given a blood transfusion.  Since then patient has been on iron supplementation and is being scheduled to follow-up with a gastroenterologist for further workup of her anemia.  Patient states she has been generally feeling well.  Does endorse some mild fatigue.    **Patient was rapidly assessed by me, Mark Germain PA-C. A limited history was obtained. The patient will be seen and further examined/worked up in the main ED and their care will be completed by the main ED team. Receiving team will follow up on labs, analgesia, any clinical imaging, and perform reassessment and disposition of the patient as clinically indicated. All decisions regarding the progression of care will be made at their discretion.

## 2025-04-30 NOTE — H&P PST ADULT - NSICDXPASTMEDICALHX_GEN_ALL_CORE_FT
PAST MEDICAL HISTORY:  Glaucoma     HTN (hypertension)     Hyperthyroidism     Multiple thyroid nodules     Obese     Type II diabetes mellitus      PAST MEDICAL HISTORY:  Gastroparesis     Glaucoma     HTN (hypertension)     Hyperthyroidism     Iron deficiency anemia     Multiple thyroid nodules     Obese     Ovarian cyst     Type II diabetes mellitus

## 2025-05-01 DIAGNOSIS — Z29.9 ENCOUNTER FOR PROPHYLACTIC MEASURES, UNSPECIFIED: ICD-10-CM

## 2025-05-01 DIAGNOSIS — Z81.8 FAMILY HISTORY OF OTHER MENTAL AND BEHAVIORAL DISORDERS: ICD-10-CM

## 2025-05-01 DIAGNOSIS — I10 ESSENTIAL (PRIMARY) HYPERTENSION: ICD-10-CM

## 2025-05-01 DIAGNOSIS — N83.8 OTHER NONINFLAMMATORY DISORDERS OF OVARY, FALLOPIAN TUBE AND BROAD LIGAMENT: ICD-10-CM

## 2025-05-01 DIAGNOSIS — R71.0 PRECIPITOUS DROP IN HEMATOCRIT: ICD-10-CM

## 2025-05-01 DIAGNOSIS — E03.9 HYPOTHYROIDISM, UNSPECIFIED: ICD-10-CM

## 2025-05-01 DIAGNOSIS — K31.84 GASTROPARESIS: ICD-10-CM

## 2025-05-01 DIAGNOSIS — E11.9 TYPE 2 DIABETES MELLITUS WITHOUT COMPLICATIONS: ICD-10-CM

## 2025-05-01 DIAGNOSIS — N17.9 ACUTE KIDNEY FAILURE, UNSPECIFIED: ICD-10-CM

## 2025-05-01 PROBLEM — N83.209 UNSPECIFIED OVARIAN CYST, UNSPECIFIED SIDE: Chronic | Status: ACTIVE | Noted: 2025-04-30

## 2025-05-01 PROBLEM — D50.9 IRON DEFICIENCY ANEMIA, UNSPECIFIED: Chronic | Status: ACTIVE | Noted: 2025-04-30

## 2025-05-01 LAB
ADD ON TEST-SPECIMEN IN LAB: SIGNIFICANT CHANGE UP
HCT VFR BLD CALC: 28.2 % — LOW (ref 34.5–45)
HGB BLD-MCNC: 9.2 G/DL — LOW (ref 11.5–15.5)
MCHC RBC-ENTMCNC: 28 PG — SIGNIFICANT CHANGE UP (ref 27–34)
MCHC RBC-ENTMCNC: 32.6 G/DL — SIGNIFICANT CHANGE UP (ref 32–36)
MCV RBC AUTO: 85.7 FL — SIGNIFICANT CHANGE UP (ref 80–100)
NRBC BLD AUTO-RTO: 0 /100 WBCS — SIGNIFICANT CHANGE UP (ref 0–0)
PLATELET # BLD AUTO: 403 K/UL — HIGH (ref 150–400)
RBC # BLD: 3.29 M/UL — LOW (ref 3.8–5.2)
RBC # FLD: 17.8 % — HIGH (ref 10.3–14.5)
WBC # BLD: 7.53 K/UL — SIGNIFICANT CHANGE UP (ref 3.8–10.5)
WBC # FLD AUTO: 7.53 K/UL — SIGNIFICANT CHANGE UP (ref 3.8–10.5)

## 2025-05-01 PROCEDURE — 99223 1ST HOSP IP/OBS HIGH 75: CPT | Mod: GC

## 2025-05-01 PROCEDURE — 99223 1ST HOSP IP/OBS HIGH 75: CPT

## 2025-05-01 RX ORDER — LEVOTHYROXINE SODIUM 300 MCG
100 TABLET ORAL DAILY
Refills: 0 | Status: DISCONTINUED | OUTPATIENT
Start: 2025-05-01 | End: 2025-05-02

## 2025-05-01 RX ORDER — B1/B2/B3/B5/B6/B12/VIT C/FOLIC 500-0.5 MG
1 TABLET ORAL DAILY
Refills: 0 | Status: DISCONTINUED | OUTPATIENT
Start: 2025-05-01 | End: 2025-05-02

## 2025-05-01 RX ORDER — LATANOPROST PF 0.05 MG/ML
1 SOLUTION/ DROPS OPHTHALMIC AT BEDTIME
Refills: 0 | Status: DISCONTINUED | OUTPATIENT
Start: 2025-05-01 | End: 2025-05-02

## 2025-05-01 RX ORDER — FERROUS SULFATE 137(45) MG
325 TABLET, EXTENDED RELEASE ORAL DAILY
Refills: 0 | Status: DISCONTINUED | OUTPATIENT
Start: 2025-05-01 | End: 2025-05-02

## 2025-05-01 RX ORDER — AMLODIPINE BESYLATE 10 MG/1
10 TABLET ORAL DAILY
Refills: 0 | Status: DISCONTINUED | OUTPATIENT
Start: 2025-05-01 | End: 2025-05-02

## 2025-05-01 RX ORDER — VITAMIN E (DL,TOCOPHERYL ACET) 22.5 MG/ML
400 DROPS ORAL DAILY
Refills: 0 | Status: DISCONTINUED | OUTPATIENT
Start: 2025-05-01 | End: 2025-05-02

## 2025-05-01 RX ORDER — BRIMONIDINE TARTRATE 1.5 MG/ML
1 SOLUTION/ DROPS OPHTHALMIC THREE TIMES A DAY
Refills: 0 | Status: DISCONTINUED | OUTPATIENT
Start: 2025-05-01 | End: 2025-05-02

## 2025-05-01 RX ORDER — DORZOLAMIDE HYDROCHLORIDE AND TIMOLOL MALEATE 20; 5 MG/ML; MG/ML
1 SOLUTION/ DROPS OPHTHALMIC
Refills: 0 | Status: DISCONTINUED | OUTPATIENT
Start: 2025-05-01 | End: 2025-05-02

## 2025-05-01 RX ADMIN — DORZOLAMIDE HYDROCHLORIDE AND TIMOLOL MALEATE 1 DROP(S): 20; 5 SOLUTION/ DROPS OPHTHALMIC at 16:33

## 2025-05-01 RX ADMIN — Medication 75 MILLILITER(S): at 16:35

## 2025-05-01 RX ADMIN — BRIMONIDINE TARTRATE 1 DROP(S): 1.5 SOLUTION/ DROPS OPHTHALMIC at 21:07

## 2025-05-01 RX ADMIN — LATANOPROST PF 1 DROP(S): 0.05 SOLUTION/ DROPS OPHTHALMIC at 21:07

## 2025-05-01 RX ADMIN — BRIMONIDINE TARTRATE 1 DROP(S): 1.5 SOLUTION/ DROPS OPHTHALMIC at 16:34

## 2025-05-01 NOTE — H&P ADULT - NSHPSOURCEINFORD_GEN_ALL_CORE
Therapy: IV ABX (Ertapenem)  Insurance: Self-pay    The patient has all Medicare products, which do not cover IV ABX in the home.   (Pt would have coverage for short term TCU or IC).   Below is what the patient would be responsible for if the patient wanted to go with [facility].   - Drug would go to the Part-D (Prescription Plan) - Pt would be responsible for the co-pay per dispense. Ertapenem is non-formulary under the patients Part-D, and self-pay quote is with [facility] 55% discount for the drug. Our PA team will try for non-formulary auth, and if approved through Part-D the self-pay quote estimate might change.  - Patient would have to self-pay for the per-aurelio $161.17 per day.  - If not homebound, nursing would also be self-pay $90.00 per visit.    In reference to hospital admission to [facility] on 6/8/24 and referral from Eduarda ROWLEY    Please contact Intake with any questions, [phone] or In Basket pool,  Home Infusion ([id number]).    Patient

## 2025-05-01 NOTE — PROGRESS NOTE ADULT - ASSESSMENT
Assessment/Plan: 79y female with left adnexal mass presenting due to anemia with H/H 6.4/20.4 noted on PSTs. Patient is scheduled for robotic BSO excision of pelvic mass on 5/6/25 at [facility] with Dr. Tavarez. Vitals have been stable. Initial labs significant for anemia with H/H 6.3/19.4. Patient also with mild NAKUL with Cr elevated to 1.33 from baseline of 0.99 on 2/26. 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 Patient now s/p 2 uPRBC, awaiting post transfusion CBC. Patient currently admitted to Medicine for further workup of anemia.     Neuro:  Currently denying any pain   CV: Hemodynamically stable  Pulm: Saturating well on room air. Encourage ambulation.   GI: Continue regular diet.   -no evidence of occult blood in feces  -no evidence of intraabdominal bleeding on CT A/P  : Voiding spontaneously.   Heme: Recommend Venodynes for DVT ppx. Increase OOB as tolerated.   -s/p 2 uPRBC 5/1  -f/u post transfusion CBC to assess for adequate rise in H/H  -consider hematology consult   FEN: SLIV, tolerating PO   ID: Afebrile  Endo: No active issues  Dispo: Continue inpatient care     Tiffanie Crump, PGY1  d/w GYN team     Assessment/Plan: 79y female with left adnexal mass presenting due to anemia with H/H 6.4/20.4 noted on PSTs. Patient is scheduled for robotic BSO excision of pelvic mass on 5/6/25 at [facility] with Dr. Tavarez. Vitals have been stable. Initial labs significant for anemia with H/H 6.3/19.4. Patient also with mild NAKUL with Cr elevated to 1.33 from baseline of 0.99 on 2/26. 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 Patient now s/p 2 uPRBC, awaiting post transfusion CBC. Patient currently admitted to Medicine for further workup of anemia.     Neuro:  Currently denying any pain   CV: Hemodynamically stable  Pulm: Saturating well on room air. Encourage ambulation.   GI: Continue regular diet.   -no evidence of occult blood in feces  -no evidence of intraabdominal bleeding on CT A/P  : Voiding spontaneously.   Heme: Recommend Venodynes for DVT ppx. Increase OOB as tolerated.   -s/p 2 uPRBC 5/1  -f/u post transfusion CBC to assess for adequate rise in H/H  -consider hematology consult   FEN: SLIV, tolerating PO   ID: Afebrile  Endo: No active issues  Dispo: Continue inpatient care     Tiffanie Crump, PGY1  d/w GYN team

## 2025-05-01 NOTE — H&P ADULT - PROBLEM SELECTOR PLAN 5
A1C 5.6 in Nov 2024 ; acceptable for age  hold of LUZ for now  check Ab A1C  Hold home med Metformin 500mg po qd

## 2025-05-01 NOTE — CONSULT NOTE ADULT - ATTENDING COMMENTS
Patient seen with staff and discussion of the evaluation and treatment of anemia. Patient should be transfused to HGB of 10g/dL and discharged as she is planning surgery next week.

## 2025-05-01 NOTE — H&P ADULT - PROBLEM SELECTOR PLAN 2
mild NAKUL with Cr elevated to 1.33 from baseline of 0.99 on 2/26  gentle IV hydration x 12 hrs  monitor BMP

## 2025-05-01 NOTE — ED ADULT NURSE REASSESSMENT NOTE - NS ED NURSE REASSESS COMMENT FT1
Blood transfusion of 1unit PRBCs completed, post 30min vitals recorded and stable. Pt remains calm and cooperative, in NAD at this time. Pt denies CP/SOB, rashes, flank pain or other signs of transfusion reaction at this time. Pt is to receive an additional 1unit PRBCs and be moved to assigned unit. Plan of care discussed and initiated. Pt verbalizes understanding.

## 2025-05-01 NOTE — H&P ADULT - PROBLEM SELECTOR PLAN 1
baseline ~ 9.6  p/w Hb 6.4  reports some fatigue  denies cp, sob dizziness  s/p 2u prbc ; Hb 9.2  no signs/symptoms of overt bleeding  FOBT negative  concern for worsening CARMEN  Hematology Consult

## 2025-05-01 NOTE — H&P ADULT - ASSESSMENT
1F with past medical history of  HTN, DMT2, hypothyroidism, Iron deficiency anemia presenting with acute drop in Hb. FOBT negative. 79F with past medical history of  HTN, DMT2, hypothyroidism, Iron deficiency anemia presenting with acute drop in Hb. FOBT negative.

## 2025-05-01 NOTE — PHARMACOTHERAPY INTERVENTION NOTE - COMMENTS
Performed medication reconciliation and home medication list updated in prescription writer/ outpatient medication review. Medications verified with patient and pharmacy.     Home medications:  amLODIPine 10 mg oral tablet: 1 tab(s) orally once a day (in the morning)  brimonidine 0.2% ophthalmic solution: in each affected eye  brimonidine 0.2% ophthalmic solution: 1 drop(s) in each affected eye 3 times a day  dorzolamide-timolol 2.23%-0.68% (2%-0.5% base) ophthalmic solution: 1 drop(s) in each eye 2 times a day  ferrous sulfate 325 mg (65 mg elemental iron) oral tablet: 1 tab(s) orally once a day  Gimoti 15 mg/actuation nasal spray: 1 spray(s) intranasally 4 times a day 30 mins before each meal  latanoprost 0.005% ophthalmic solution: 1 drop(s) in each eye once a day (in the evening)  levothyroxine 100 mcg (0.1 mg) oral tablet: 1 tab(s) orally once a day (in the morning)  metFORMIN 500 mg oral tablet: 1 tab(s) orally once a day with breakfast  Multiple Vitamins oral tablet: 1 tab(s) orally once a day LD 4/30/2025  pantoprazole 40 mg oral delayed release tablet: 1 tab(s) orally once a day (in the morning)  vitamin E: 1 tab(s) orally once a day     Informed NP that medication reconciliation has been completed.    Jordy Champagne, PharmD  PGY-1 Pharmacy Resident  Teams (preferred) or [phone]

## 2025-05-01 NOTE — CONSULT NOTE ADULT - SUBJECTIVE AND OBJECTIVE BOX
HPI:  79-year-old female with past medical history of  HTN, DMT2, hypothyroidism, Iron deficiency anemia presenting with acute drop in Hb.  Patient reports that she was at presurgical testing yesterday   in preparation for an ovarian cystectomy.  She received a call that she was anemic and needed to come to the ER for blood transfusion.  Of note she was admitted to [facility] for gastroparesis from   -  during which she was  found to be anemic  and received a blood transfusion.  Since then patient has been on iron supplementation and is being scheduled to follow-up with a gastroenterologist for further workup of her anemia. She has been following with Hematology as well. Patient states she has been generally feeling well.  Does endorse some mild fatigue.   (01 May 2025 10:50)      14 point ROS otherwise negative    PAST MEDICAL & SURGICAL HISTORY:  Glaucoma      HTN (hypertension)      Type II diabetes mellitus      Multiple thyroid nodules      Hyperthyroidism      Obese      Ovarian cyst      Gastroparesis      Iron deficiency anemia      H/O: hysterectomy  "a long time ago when I was in my 30's"      H/O       History of thyroidectomy      History of breast surgery          Allergies    No Known Allergies    Intolerances        MEDICATIONS  (STANDING):    MEDICATIONS  (PRN):      FAMILY HISTORY:  FH: dementia (Mother)        SOCIAL HISTORY: No EtOH, no tobacco    Height (cm): 152.4 ( @ :16)  Weight (kg): 58 (:16)  BMI (kg/m2): 25 (:16)  BSA (m2): 1.54 ( @ 02:16)    VITALS:   T(F): 98.6 (25 @ 05:38), Max: 98.6 (25 @ 02:15)  HR: 78 (25 @ 05:38)  BP: 130/68 (25 @ 05:38)  RR: 18 (25 @ 05:38)  SpO2: 96% (25 @ 05:38)  Wt(kg): --    PHYSICAL EXAM    GENERAL: NAD, well-developed  HEAD:  Atraumatic, Normocephalic  EYES: EOMI, PERRLA, conjunctiva and sclera clear  NECK: Supple, No JVD  CHEST/LUNG: Clear to auscultation bilaterally; No wheeze  HEART: Regular rate and rhythm; No murmurs, rubs, or gallops  ABDOMEN: Soft, Nontender, Nondistended; Bowel sounds present  EXTREMITIES:  2+ Peripheral Pulses, No clubbing, cyanosis, or edema  NEUROLOGY: non-focal  SKIN: No rashes or lesions    LABS:                         9.2    7.53  )-----------( 403      ( 01 May 2025 08:47 )             28.2         138  |  103  |  20  ----------------------------<  96  4.1   |  22  |  1.33[H]    Ca    9.3      2025 20:18    TPro  7.0  /  Alb  3.9  /  TBili  0.2  /  DBili  x   /  AST  14  /  ALT  7[L]  /  AlkPhos  60  30      PT/INR - ( 2025 20:18 )   PT: 11.6 sec;   INR: 1.02 ratio         PTT - ( 2025 20:18 )  PTT:35.0 sec  .Blood Blood-Peripheral   @ 14:03   No growth at 5 days  --  --      .Blood Blood-Peripheral   @ 13:48   No growth at 5 days  --  --      Clean Catch Clean Catch (Midstream)   @ 13:53   <10,000 CFU/mL Normal Urogenital Toshia  --  --      Clean Catch Clean Catch (Midstream)   @ 13:21   <10,000 CFU/mL Normal Urogenital Toshia  --  --          IMAGING:

## 2025-05-01 NOTE — PROGRESS NOTE ADULT - NSPROGADDITIONALINFOA_GEN_ALL_CORE
Gyn Onc Fellow Addendum:  Pt seen and examined at bedside. Agree with above.  H/H with appropriate rise this AM s/p 2U PRBCs. VS wnl.   CT without evidence of bleeding, L adnexal mass stable compared to prior.   Patchy opacities and tree-in-bud opacities in the right middle and lower lobes suggesting infectious versus inflammatory disease    NAKUL, consider IV hydration   Reg diet   Encourage ambulation and IS use  Replete lytes prn  Appreciate care per Medicine team   Yuly Hilton MD Gyn Onc Fellow Addendum:  Pt seen and examined at bedside. Agree with above.  H/H with appropriate rise this AM s/p 2U PRBCs. VS wnl.   CT without evidence of bleeding, L adnexal mass stable compared to prior.   Consider heme consult for anemia workup  Patchy opacities and tree-in-bud opacities in the right middle and lower lobes suggesting infectious versus inflammatory disease    NAKUL, consider IV hydration   Reg diet   Encourage ambulation and IS use  Replete lytes prn  Appreciate care per Medicine team   Yuly Hilton MD Gyn Onc Fellow Addendum:  Pt seen and examined at bedside. Agree with above.  H/H with appropriate rise this AM s/p 2U PRBCs. VS wnl.   CT without evidence of bleeding, L adnexal mass stable compared to prior.   Consider heme consult for anemia workup  Patchy opacities and tree-in-bud opacities in the right middle and lower lobes suggesting infectious versus inflammatory disease. May be secondary to patient's reported aspiration event during recent colonoscopy. Afebrile, no leukocytosis, O2 sat and RR wnl.   NAKUL, consider IV hydration   Reg diet   Encourage ambulation and IS use  Replete lytes prn  Appreciate care per Medicine team   Yuly Hilton MD Gyn Onc Fellow Addendum:  Pt seen and examined at bedside. Agree with above.  H/H with appropriate rise this AM s/p 2U PRBCs. VS wnl.   CT without evidence of bleeding, L adnexal mass stable compared to prior.   Consider heme consult for anemia workup  Patchy opacities and tree-in-bud opacities in the right middle and lower lobes suggesting infectious versus inflammatory disease. May be secondary to patient's reported aspiration event during recent colonoscopy. Afebrile, mild leukocytosis on admission now resolved. O2 sat and RR wnl.   NAKUL, consider IV hydration   Reg diet   Encourage ambulation and IS use  Replete lytes prn  Appreciate care per Medicine team   Yuly Hilton MD

## 2025-05-01 NOTE — H&P ADULT - PROBLEM SELECTOR PLAN 3
scheduled for robotic BSO excision of  left ovarian cystic neoplasm on 5/6/25 at [facility]   Seen by Gyn

## 2025-05-01 NOTE — CONSULT NOTE ADULT - ASSESSMENT
79-year-old female with past medical history of  HTN, DMT2, hypothyroidism, Iron deficiency anemia presenting with acute drop in Hb. Hematology consulted for anemia workup. This is likely iso GIB vs hematuria vs CARMEN vs AoCD.    Recommendations:  - Please send FOBT and recommend GI consult for capsule endoscopy  - Please check EPO, retic count, fibrinogen, D-dimer, coags, iron studies (iron, TIBC, ferritin), hemolysis labs (LDH, CMP, Phos, haptoglobin), hemoglobin electrophoresis, TSH, Copper, B12, folate  - Will review peripheral smear  - Ensure she is UTD on cancer screenings  - Rest of care per primary team  - transfuse for hg < 7.0 and < 8.0 if bleeding platelets < 10k, < 20k if febrile and < 50k if bleeding  - Will follow with [facility] at [facility] upon discharge    NOTE INCOMPLETE UNTIL ATTENDING SIGNS    ***************************************************************  Sylvia Johnson, PGY5  Fellow Hematology/Oncology  pager: [phone]   Available on [employer] Teams  After 5pm or on weekends please contact  to page on-call fellow   ***************************************************************     79-year-old female with past medical history of  HTN, DMT2, hypothyroidism, Iron deficiency anemia presenting with acute drop in Hb. Hematology consulted for anemia workup. This is likely iso GIB vs hematuria vs CARMEN vs AoCD.    # History of iron deficiency anemia  - Patient was diagnosed after hospital admission 2/13/25 for anemia in setting of gastroparesis. She was discharged on IV iron.  - CT a/p showed left ovary 6 cm cystic lesion and GYN sent  which was elevated at 66.   - Social History: patient had two children,  from spouse, worked at "[employer] nonsmoker. Daughter had AML at age 55 s/p haplo SCT  - Outpatient labs: K/L 1.95 (H), iron studies (3/10/25): iron 69, TIBC 228, 23% iron sat, ferritin 273, B12 > 2000, Folate > 20, haptoglobin 288, intrinsic factor Ab 1.0, antiparietal Ab < 1:20  - Was given Venofer 200 mg IV x 5 doses in late February 2025 and now on PO ferrous sulfate 1 tab daily    Recommendations:  - Please send FOBT and recommend GI consult for further workup  - Appreciate urology recommendations for hematuria  - Please check EPO, retic count, fibrinogen, D-dimer, coags, iron studies (iron, TIBC, ferritin), hemolysis labs (LDH, CMP, Phos, haptoglobin), hemoglobin electrophoresis, TSH, Copper, B12, folate  - Will review peripheral smear  - Ensure she is UTD on cancer screenings  - Rest of care per primary team  - transfuse for hg < 7.0 and < 8.0 if bleeding platelets < 10k, < 20k if febrile and < 50k if bleeding  - Will follow with [facility] at [facility] upon discharge    NOTE INCOMPLETE UNTIL ATTENDING SIGNS    ***************************************************************  Sylvia Johnson, PGY5  Fellow Hematology/Oncology  pager: [phone]   Available on Microsoft Teams  After 5pm or on weekends please contact  to page on-call fellow   ***************************************************************     79-year-old female with past medical history of  HTN, DMT2, hypothyroidism, Iron deficiency anemia presenting with acute drop in Hb. Hematology consulted for anemia workup. This is likely iso GIB vs hematuria vs CARMEN vs AoCD. Patient presented with Hgb 6.3 and is s/p 2 U with Hgb 9.2.    Labs today (5/1/25): WBC 7.5, Hgb 9.2,   Peripheral smear (5/1/25): normochromic normocytic RBCs, some schistocytes, pencil cells, anisocytosis, some dysmorphic WBCs normal in number, normal morphology and quantity of platelets    # History of iron deficiency anemia  - Patient was diagnosed after hospital admission 2/13/25 for anemia in setting of gastroparesis. She was discharged on IV iron.  - CT a/p showed left ovary 6 cm cystic lesion and GYN sent  which was elevated at 66.   - Social History: patient had two children,  from spouse, worked at [employer], nonsmoker. Daughter had AML at age 55 s/p haplo SCT  - Outpatient labs: K/L 1.95 (H), iron studies (3/10/25): iron 69, TIBC 228, 23% iron sat, ferritin 273, B12 > 2000, Folate > 20, haptoglobin 288, intrinsic factor Ab 1.0, antiparietal Ab < 1:20  - Was given Venofer 200 mg IV x 5 doses in late February 2025 and now on PO ferrous sulfate 1 tab daily  - Daughter with AML s/p transplant (patient of Dr. Branch) now in remission  - EGD 2/19/25: Gastritis. Injected with botulinum toxin.    Recommendations:  - Appreciate urology recommendations for management of hematuria  - Please check EPO, retic count, fibrinogen, D-dimer, coags, iron studies (iron, TIBC, ferritin), hemolysis labs (LDH, CMP, Phos, haptoglobin), hemoglobin electrophoresis, TSH, Copper, B12, folate  - Will review peripheral smear  - Ensure she is UTD on cancer screenings  - Rest of care per primary team  - If there is a plan for surgery, would recommend a goal of Hgb > 10  - Patient prefers to have surgery with GYN and perform colonoscopy outpatient. No contraindications to discharge by hematology.  - Continue oral iron, home medication  - transfuse for hg < 7.0 and < 8.0 if bleeding platelets < 10k, < 20k if febrile and < 50k if bleeding  - Will follow with her hematologist at [facility] (Dr. Juany Beckham) upon discharge.      NOTE INCOMPLETE UNTIL ATTENDING SIGNS    ***************************************************************  Sylvia Johnson, PGY5  Fellow Hematology/Oncology  pager: [phone]   Available on [employer] Teams  After 5pm or on weekends please contact  to page on-call fellow   ***************************************************************

## 2025-05-01 NOTE — PATIENT PROFILE ADULT - FALL HARM RISK - HARM RISK INTERVENTIONS

## 2025-05-01 NOTE — H&P ADULT - HISTORY OF PRESENT ILLNESS
79-year-old female with past medical history of  HTN, DMT2, hypothyroidism, Iron deficiency anemia presenting with acute drop on Hb.  Patient reports that she was at presurgical testing yesterday 4/30  in preparation for an ovarian cystectomy.  She received a call that she was anemic and needed to come to the ER for blood transfusion.  Of note she was admitted to [facility] 2/13 - 2/26 during which she was  found to be anemic at that time and received a blood transfusion.  Since then patient has been on iron supplementation and is being scheduled to follow-up with a gastroenterologist for further workup of her anemia.  Patient states she has been generally feeling well.  Does endorse some mild fatigue.   79-year-old female with past medical history of  HTN, DMT2, hypothyroidism, Iron deficiency anemia presenting with acute drop on Hb.  Patient reports that she was at presurgical testing yesterday 4/30  in preparation for an ovarian cystectomy.  She received a call that she was anemic and needed to come to the ER for blood transfusion.  Of note she was admitted to [facility] for gastroparesis from  2/13 - 2/26 during which she was  found to be anemic  and received a blood transfusion.  Since then patient has been on iron supplementation and is being scheduled to follow-up with a gastroenterologist for further workup of her anemia.  Patient states she has been generally feeling well.  Does endorse some mild fatigue.   79-year-old female with past medical history of  HTN, DMT2, hypothyroidism, Iron deficiency anemia presenting with acute drop in Hb.  Patient reports that she was at presurgical testing yesterday 4/30  in preparation for an ovarian cystectomy.  She received a call that she was anemic and needed to come to the ER for blood transfusion.  Of note she was admitted to [facility] for gastroparesis from  2/13 - 2/26 during which she was  found to be anemic  and received a blood transfusion.  Since then patient has been on iron supplementation and is being scheduled to follow-up with a gastroenterologist for further workup of her anemia. She has been following with Hematology as well. Patient states she has been generally feeling well.  Does endorse some mild fatigue.   79F with past medical history of  HTN, DMT2, hypothyroidism, Iron deficiency anemia presenting with acute drop in Hb.  Patient reports that she was at presurgical testing yesterday 4/30  in preparation for an ovarian cystectomy.  She received a call that she was anemic and needed to come to the ER for blood transfusion.  Of note she was admitted to [facility] for gastroparesis from  2/13 - 2/26 during which she was  found to be anemic  and received a blood transfusion and IV Venofer x 5 doses.  Since then patient has been on PO  iron supplementation and is being scheduled to follow-up with a gastroenterologist for further workup of her anemia. She has been following with Hematology as well. Patient states she has been generally feeling well.  Does endorse some mild fatigue.  Currently she denies cp, sob, dizziness. Denies hematemesis, melena, hematochezia.

## 2025-05-02 ENCOUNTER — NON-APPOINTMENT (OUTPATIENT)
Age: 80
End: 2025-05-02

## 2025-05-02 ENCOUNTER — TRANSCRIPTION ENCOUNTER (OUTPATIENT)
Age: 80
End: 2025-05-02

## 2025-05-02 VITALS
SYSTOLIC BLOOD PRESSURE: 134 MMHG | RESPIRATION RATE: 18 BRPM | DIASTOLIC BLOOD PRESSURE: 77 MMHG | OXYGEN SATURATION: 99 % | HEART RATE: 67 BPM | TEMPERATURE: 98 F

## 2025-05-02 LAB
A1C WITH ESTIMATED AVERAGE GLUCOSE RESULT: 5.5 % — SIGNIFICANT CHANGE UP (ref 4–5.6)
ALBUMIN SERPL ELPH-MCNC: 3.3 G/DL — SIGNIFICANT CHANGE UP (ref 3.3–5)
ALP SERPL-CCNC: 54 U/L — SIGNIFICANT CHANGE UP (ref 40–120)
ALT FLD-CCNC: 7 U/L — LOW (ref 10–45)
ANION GAP SERPL CALC-SCNC: 14 MMOL/L — SIGNIFICANT CHANGE UP (ref 5–17)
APTT BLD: 33.1 SEC — SIGNIFICANT CHANGE UP (ref 26.1–36.8)
AST SERPL-CCNC: 16 U/L — SIGNIFICANT CHANGE UP (ref 10–40)
BASOPHILS # BLD AUTO: 0.02 K/UL — SIGNIFICANT CHANGE UP (ref 0–0.2)
BASOPHILS NFR BLD AUTO: 0.3 % — SIGNIFICANT CHANGE UP (ref 0–2)
BILIRUB SERPL-MCNC: 0.3 MG/DL — SIGNIFICANT CHANGE UP (ref 0.2–1.2)
BUN SERPL-MCNC: 13 MG/DL — SIGNIFICANT CHANGE UP (ref 7–23)
CALCIUM SERPL-MCNC: 9.1 MG/DL — SIGNIFICANT CHANGE UP (ref 8.4–10.5)
CHLORIDE SERPL-SCNC: 106 MMOL/L — SIGNIFICANT CHANGE UP (ref 96–108)
CO2 SERPL-SCNC: 23 MMOL/L — SIGNIFICANT CHANGE UP (ref 22–31)
CREAT SERPL-MCNC: 1.39 MG/DL — HIGH (ref 0.5–1.3)
D DIMER BLD IA.RAPID-MCNC: 1130 NG/ML DDU — HIGH
EGFR: 38 ML/MIN/1.73M2 — LOW
EGFR: 38 ML/MIN/1.73M2 — LOW
EOSINOPHIL # BLD AUTO: 0.14 K/UL — SIGNIFICANT CHANGE UP (ref 0–0.5)
EOSINOPHIL NFR BLD AUTO: 2.4 % — SIGNIFICANT CHANGE UP (ref 0–6)
ESTIMATED AVERAGE GLUCOSE: 111 MG/DL — SIGNIFICANT CHANGE UP (ref 68–114)
FERRITIN SERPL-MCNC: 62 NG/ML — SIGNIFICANT CHANGE UP (ref 13–330)
FIBRINOGEN PPP-MCNC: 343 MG/DL — SIGNIFICANT CHANGE UP (ref 200–445)
FOLATE SERPL-MCNC: >20 NG/ML — SIGNIFICANT CHANGE UP
GLUCOSE SERPL-MCNC: 86 MG/DL — SIGNIFICANT CHANGE UP (ref 70–99)
HAPTOGLOB SERPL-MCNC: 224 MG/DL — HIGH (ref 34–200)
HCT VFR BLD CALC: 27.8 % — LOW (ref 34.5–45)
HCT VFR BLD CALC: 31.8 % — LOW (ref 34.5–45)
HEMOGLOBIN INTERPRETATION: SIGNIFICANT CHANGE UP
HGB A MFR BLD: 97.6 % — SIGNIFICANT CHANGE UP (ref 95.8–98)
HGB A2 MFR BLD: 2.4 % — SIGNIFICANT CHANGE UP (ref 2–3.2)
HGB BLD-MCNC: 10.2 G/DL — LOW (ref 11.5–15.5)
HGB BLD-MCNC: 8.9 G/DL — LOW (ref 11.5–15.5)
IMM GRANULOCYTES NFR BLD AUTO: 0.3 % — SIGNIFICANT CHANGE UP (ref 0–0.9)
INR BLD: 1 RATIO — SIGNIFICANT CHANGE UP (ref 0.85–1.16)
IRON SATN MFR SERPL: 12 % — LOW (ref 14–50)
IRON SATN MFR SERPL: 33 UG/DL — SIGNIFICANT CHANGE UP (ref 30–160)
LDH SERPL L TO P-CCNC: 145 U/L — SIGNIFICANT CHANGE UP (ref 50–242)
LYMPHOCYTES # BLD AUTO: 1.69 K/UL — SIGNIFICANT CHANGE UP (ref 1–3.3)
LYMPHOCYTES # BLD AUTO: 29.2 % — SIGNIFICANT CHANGE UP (ref 13–44)
MCHC RBC-ENTMCNC: 27.8 PG — SIGNIFICANT CHANGE UP (ref 27–34)
MCHC RBC-ENTMCNC: 27.8 PG — SIGNIFICANT CHANGE UP (ref 27–34)
MCHC RBC-ENTMCNC: 32 G/DL — SIGNIFICANT CHANGE UP (ref 32–36)
MCHC RBC-ENTMCNC: 32.1 G/DL — SIGNIFICANT CHANGE UP (ref 32–36)
MCV RBC AUTO: 86.6 FL — SIGNIFICANT CHANGE UP (ref 80–100)
MCV RBC AUTO: 86.9 FL — SIGNIFICANT CHANGE UP (ref 80–100)
MONOCYTES # BLD AUTO: 0.59 K/UL — SIGNIFICANT CHANGE UP (ref 0–0.9)
MONOCYTES NFR BLD AUTO: 10.2 % — SIGNIFICANT CHANGE UP (ref 2–14)
NEUTROPHILS # BLD AUTO: 3.32 K/UL — SIGNIFICANT CHANGE UP (ref 1.8–7.4)
NEUTROPHILS NFR BLD AUTO: 57.6 % — SIGNIFICANT CHANGE UP (ref 43–77)
NRBC BLD AUTO-RTO: 0 /100 WBCS — SIGNIFICANT CHANGE UP (ref 0–0)
NRBC BLD AUTO-RTO: 0 /100 WBCS — SIGNIFICANT CHANGE UP (ref 0–0)
PHOSPHATE SERPL-MCNC: 4 MG/DL — SIGNIFICANT CHANGE UP (ref 2.5–4.5)
PLATELET # BLD AUTO: 405 K/UL — HIGH (ref 150–400)
PLATELET # BLD AUTO: 422 K/UL — HIGH (ref 150–400)
POTASSIUM SERPL-MCNC: 3.6 MMOL/L — SIGNIFICANT CHANGE UP (ref 3.5–5.3)
POTASSIUM SERPL-SCNC: 3.6 MMOL/L — SIGNIFICANT CHANGE UP (ref 3.5–5.3)
PROT SERPL-MCNC: 6.3 G/DL — SIGNIFICANT CHANGE UP (ref 6–8.3)
PROTHROM AB SERPL-ACNC: 11.5 SEC — SIGNIFICANT CHANGE UP (ref 9.9–13.4)
RBC # BLD: 3.2 M/UL — LOW (ref 3.8–5.2)
RBC # BLD: 3.2 M/UL — LOW (ref 3.8–5.2)
RBC # BLD: 3.67 M/UL — LOW (ref 3.8–5.2)
RBC # FLD: 18 % — HIGH (ref 10.3–14.5)
RBC # FLD: 18 % — HIGH (ref 10.3–14.5)
RETICS #: 91.8 K/UL — SIGNIFICANT CHANGE UP (ref 25–125)
RETICS/RBC NFR: 2.9 % — HIGH (ref 0.5–2.5)
SODIUM SERPL-SCNC: 143 MMOL/L — SIGNIFICANT CHANGE UP (ref 135–145)
TIBC SERPL-MCNC: 272 UG/DL — SIGNIFICANT CHANGE UP (ref 220–430)
TSH SERPL-MCNC: 7.27 UIU/ML — HIGH (ref 0.27–4.2)
UIBC SERPL-MCNC: 239 UG/DL — SIGNIFICANT CHANGE UP (ref 110–370)
VIT B12 SERPL-MCNC: 1956 PG/ML — HIGH (ref 232–1245)
WBC # BLD: 5.78 K/UL — SIGNIFICANT CHANGE UP (ref 3.8–10.5)
WBC # BLD: 6.84 K/UL — SIGNIFICANT CHANGE UP (ref 3.8–10.5)
WBC # FLD AUTO: 5.78 K/UL — SIGNIFICANT CHANGE UP (ref 3.8–10.5)
WBC # FLD AUTO: 6.84 K/UL — SIGNIFICANT CHANGE UP (ref 3.8–10.5)

## 2025-05-02 PROCEDURE — 74177 CT ABD & PELVIS W/CONTRAST: CPT | Mod: MC

## 2025-05-02 PROCEDURE — 82272 OCCULT BLD FECES 1-3 TESTS: CPT

## 2025-05-02 PROCEDURE — G0378: CPT

## 2025-05-02 PROCEDURE — 82728 ASSAY OF FERRITIN: CPT

## 2025-05-02 PROCEDURE — 86923 COMPATIBILITY TEST ELECTRIC: CPT

## 2025-05-02 PROCEDURE — 86901 BLOOD TYPING SEROLOGIC RH(D): CPT

## 2025-05-02 PROCEDURE — 93005 ELECTROCARDIOGRAM TRACING: CPT

## 2025-05-02 PROCEDURE — P9016: CPT

## 2025-05-02 PROCEDURE — 80053 COMPREHEN METABOLIC PANEL: CPT

## 2025-05-02 PROCEDURE — 86850 RBC ANTIBODY SCREEN: CPT

## 2025-05-02 PROCEDURE — 82746 ASSAY OF FOLIC ACID SERUM: CPT

## 2025-05-02 PROCEDURE — 85025 COMPLETE CBC W/AUTO DIFF WBC: CPT

## 2025-05-02 PROCEDURE — 85730 THROMBOPLASTIN TIME PARTIAL: CPT

## 2025-05-02 PROCEDURE — 84443 ASSAY THYROID STIM HORMONE: CPT

## 2025-05-02 PROCEDURE — 84100 ASSAY OF PHOSPHORUS: CPT

## 2025-05-02 PROCEDURE — 83036 HEMOGLOBIN GLYCOSYLATED A1C: CPT

## 2025-05-02 PROCEDURE — 85610 PROTHROMBIN TIME: CPT

## 2025-05-02 PROCEDURE — 85384 FIBRINOGEN ACTIVITY: CPT

## 2025-05-02 PROCEDURE — 99285 EMERGENCY DEPT VISIT HI MDM: CPT

## 2025-05-02 PROCEDURE — 99232 SBSQ HOSP IP/OBS MODERATE 35: CPT

## 2025-05-02 PROCEDURE — 99239 HOSP IP/OBS DSCHRG MGMT >30: CPT

## 2025-05-02 PROCEDURE — 82607 VITAMIN B-12: CPT

## 2025-05-02 PROCEDURE — 83540 ASSAY OF IRON: CPT

## 2025-05-02 PROCEDURE — 83020 HEMOGLOBIN ELECTROPHORESIS: CPT | Mod: 26

## 2025-05-02 PROCEDURE — 85027 COMPLETE CBC AUTOMATED: CPT

## 2025-05-02 PROCEDURE — 82525 ASSAY OF COPPER: CPT

## 2025-05-02 PROCEDURE — P9040: CPT

## 2025-05-02 PROCEDURE — 83615 LACTATE (LD) (LDH) ENZYME: CPT

## 2025-05-02 PROCEDURE — 83010 ASSAY OF HAPTOGLOBIN QUANT: CPT

## 2025-05-02 PROCEDURE — 83020 HEMOGLOBIN ELECTROPHORESIS: CPT

## 2025-05-02 PROCEDURE — 85045 AUTOMATED RETICULOCYTE COUNT: CPT

## 2025-05-02 PROCEDURE — 36430 TRANSFUSION BLD/BLD COMPNT: CPT

## 2025-05-02 PROCEDURE — 36415 COLL VENOUS BLD VENIPUNCTURE: CPT

## 2025-05-02 PROCEDURE — 86900 BLOOD TYPING SEROLOGIC ABO: CPT

## 2025-05-02 PROCEDURE — 83550 IRON BINDING TEST: CPT

## 2025-05-02 PROCEDURE — 85379 FIBRIN DEGRADATION QUANT: CPT

## 2025-05-02 RX ORDER — POLYETHYLENE GLYCOL 3350 17 G/17G
17 POWDER, FOR SOLUTION ORAL ONCE
Refills: 0 | Status: COMPLETED | OUTPATIENT
Start: 2025-05-02 | End: 2025-05-02

## 2025-05-02 RX ORDER — BRIMONIDINE TARTRATE 1.5 MG/ML
1 SOLUTION/ DROPS OPHTHALMIC
Refills: 0 | DISCHARGE

## 2025-05-02 RX ADMIN — DORZOLAMIDE HYDROCHLORIDE AND TIMOLOL MALEATE 1 DROP(S): 20; 5 SOLUTION/ DROPS OPHTHALMIC at 17:57

## 2025-05-02 RX ADMIN — BRIMONIDINE TARTRATE 1 DROP(S): 1.5 SOLUTION/ DROPS OPHTHALMIC at 05:38

## 2025-05-02 RX ADMIN — DORZOLAMIDE HYDROCHLORIDE AND TIMOLOL MALEATE 1 DROP(S): 20; 5 SOLUTION/ DROPS OPHTHALMIC at 05:38

## 2025-05-02 RX ADMIN — POLYETHYLENE GLYCOL 3350 17 GRAM(S): 17 POWDER, FOR SOLUTION ORAL at 16:00

## 2025-05-02 RX ADMIN — BRIMONIDINE TARTRATE 1 DROP(S): 1.5 SOLUTION/ DROPS OPHTHALMIC at 12:43

## 2025-05-02 RX ADMIN — Medication 40 MILLIGRAM(S): at 05:37

## 2025-05-02 RX ADMIN — Medication 325 MILLIGRAM(S): at 12:43

## 2025-05-02 RX ADMIN — Medication 1 TABLET(S): at 12:43

## 2025-05-02 RX ADMIN — Medication 100 MICROGRAM(S): at 05:37

## 2025-05-02 RX ADMIN — Medication 400 INTERNATIONAL UNIT(S): at 12:43

## 2025-05-02 RX ADMIN — AMLODIPINE BESYLATE 10 MILLIGRAM(S): 10 TABLET ORAL at 05:38

## 2025-05-02 NOTE — DISCHARGE NOTE PROVIDER - PROVIDER TOKENS
PROVIDER:[TOKEN:[[id number]]],PROVIDER:[TOKEN:[[id number]]],PROVIDER:[TOKEN:[[id number]]] PROVIDER:[TOKEN:[[id number]],FOLLOWUP:[1 week]],PROVIDER:[TOKEN:[[id number]],FOLLOWUP:[1 week]],PROVIDER:[TOKEN:[[id number]],FOLLOWUP:[Routine]]

## 2025-05-02 NOTE — PROGRESS NOTE ADULT - PROBLEM SELECTOR PLAN 3
scheduled for robotic BSO excision of  left ovarian cystic neoplasm on 5/6/25 at [facility]   Seen by Gyn

## 2025-05-02 NOTE — DISCHARGE NOTE PROVIDER - NSDCCPCAREPLAN_GEN_ALL_CORE_FT
PRINCIPAL DISCHARGE DIAGNOSIS  Diagnosis: Drop in hemoglobin  Assessment and Plan of Treatment: You were admitted for anemia. You received 3 units of blood with good response. No signs of bleeding and imaging/fecal occult negative. Hematology and OB/GYN consulted. Your blood levels (hemoglobin) on discharge was _____. Please follow up with your primary care physician, hematologist and OBGYN after discharge for continued monitoring and management.     PRINCIPAL DISCHARGE DIAGNOSIS  Diagnosis: Drop in hemoglobin  Assessment and Plan of Treatment: You were admitted for anemia. You received blood transfusions with good response. No signs of bleeding and imaging/fecal occult negative. Hematology and OB/GYN consulted. Your blood level (hemoglobin) on discharge was 10.2. Please follow up with your primary care physician, hematologist and OBGYN after discharge for continued monitoring and management.

## 2025-05-02 NOTE — PROGRESS NOTE ADULT - ASSESSMENT
79F with past medical history of  HTN, DMT2, hypothyroidism, Iron deficiency anemia presenting with acute drop in Hb. FOBT negative.

## 2025-05-02 NOTE — DISCHARGE NOTE PROVIDER - CARE PROVIDERS DIRECT ADDRESSES
[email or web address]

## 2025-05-02 NOTE — DISCHARGE NOTE NURSING/CASE MANAGEMENT/SOCIAL WORK - FINANCIAL ASSISTANCE
[facility] provides services at a reduced cost to those who are determined to be eligible through [facility]’s financial assistance program. Information regarding [facility]’s financial assistance program can be found by going to [email or web address] or by calling [phone].

## 2025-05-02 NOTE — PROGRESS NOTE ADULT - ASSESSMENT
Assessment/Plan:   79y female with left adnexal mass presenting due to anemia with H/H 6.4/20.4 noted on PSTs. Patient is scheduled for robotic BSO excision of pelvic mass on 5/6/25 at [facility] with Dr. Tavarez. Vitals have been stable. Initial labs significant for anemia with H/H 6.3/19.4 and appropriately elroy after receiving 2uPRBC. Patient also with mild NAKUL with Cr elevated to 1.33 from baseline of 0.99 on 2/26. 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 Patient currently admitted to Medicine for further workup of anemia.     Neuro: Currently denying any pain   CV: Hemodynamically stable  -continue [id number] po qd   Pulm: Saturating well on room air. Encourage ambulation.   GI: Continue regular diet.   -no evidence of occult blood in feces  -no evidence of intraabdominal bleeding on CT A/P  -continue home med Gimoti nasal spray 15mg qid for nausea  : Voiding spontaneously.   Heme: Recommend Venodynes for DVT ppx. Increase OOB as tolerated.   -H/H 6.3/19.4->2uPRBC->9.2/28.2, adequate rise   -S/p hematology consult, follow up recommended labs:  EPO, retic count, fibrinogen, D-dimer, coags, iron studies (iron, TIBC, ferritin), hemolysis labs (LDH, CMP, Phos, haptoglobin), hemoglobin electrophoresis, TSH, Copper, B12, folate  FEN:   -agree with LR@75mL/hr in the setting of NAKUL  -continue to encourage PO intake   ID: Afebrile  Endo:   -Hx of DMT2, care per primary team   -hx of hypothyroidism, continue synthroid  Dispo: Continue inpatient care, rest of care per primary team     Tiffanie Crump, PGY1  d/w GYN team

## 2025-05-02 NOTE — DISCHARGE NOTE PROVIDER - NSDCFUSCHEDAPPT_GEN_ALL_CORE_FT
[facility]  [facility]  Scheduled Appointment: 05/05/2025    Juany Beckham  [facility]  [facility]  Scheduled Appointment: 05/05/2025    Cara Tavarez  [facility]  [facility] [phone]  Scheduled Appointment: 05/06/2025    Cara Tavarez  [facility]  [facility] Amb S[facility]  Scheduled Appointment: 05/06/2025    Cara Tavarez  [place] [facility]  [facility]  Scheduled Appointment: 05/19/2025

## 2025-05-02 NOTE — CHART NOTE - NSCHARTNOTEFT_GEN_A_CORE
79-year-old female with past medical history of  HTN, DMT2, hypothyroidism, Iron deficiency anemia presenting with acute drop in Hb. Hematology consulted for anemia workup. This is likely iso GIB vs hematuria vs CARMEN vs AoCD. Patient presented with Hgb 6.3 and is s/p 2 U with Hgb 9.2. Patient is pending a procedure for ovarian cyst next week.    Peripheral smear (5/1/25): normochromic normocytic RBCs, some schistocytes, pencil cells, anisocytosis, some dysmorphic WBCs normal in number, normal morphology and quantity of platelets  iron studies: iron 33, TIBC 272, ferritin 12 c/w CARMEN    Recommendations:  - Please give IV venofer 200 mg daily x 3 days given iron deficit calculated 700 mg  - Rest of care per primary team  - If there is a plan for surgery, would recommend a goal of Hgb > 10  - no contraindications to discharge from hematology. patient should follow with her outpatient hematologist    ***************************************************************  Sylvia Johnson, PGY5  Fellow Hematology/Oncology  pager: [phone]   Available on [employer] Teams  After 5pm or on weekends please contact  to page on-call fellow   ***************************************************************

## 2025-05-02 NOTE — DISCHARGE NOTE NURSING/CASE MANAGEMENT/SOCIAL WORK - PATIENT PORTAL LINK FT
You can access the FollowMyHealth Patient Portal offered by [facility] by registering at the following website: [email or web address]. By joining [employer]’s FollowMyHealth portal, you will also be able to view your health information using other applications (apps) compatible with our system.

## 2025-05-02 NOTE — PROGRESS NOTE ADULT - ATTENDING COMMENTS
I have examined the patient and discussed the treatment plan with the patient as well as fellows and residents involved in the ongoing care.  We have reviewed pertinent clinical findings and I agree with the findings and plan detailed above with the following addendum.    Appreciate Anemia Workup     Discussed with patient plan to proceed on Tuesday at [facility]   Again discussed surgery and possible admission at [facility] as discussed in office    Patient to receive call Monday PM with instructions    Cara Tavarez MD, FACOG  Gynecologic Oncology

## 2025-05-02 NOTE — PROGRESS NOTE ADULT - NSPROGADDITIONALINFOA_GEN_ALL_CORE
Stable for DC with outpatient follow up. Discussed with patient, 4 DSU ACP, heme/onc. Total time spent DC planning 49 minutes.

## 2025-05-02 NOTE — PROGRESS NOTE ADULT - SUBJECTIVE AND OBJECTIVE BOX
Gyn Progress Note     SUBJECTIVE:   Pt seen and examined at bedside. No events overnight. Patient reports feeling fatigued but otherwise denies dizziness, lightheadedness, chest pain, SOB, or heart palpitations. Patient ambulating. She has been using a metoclopramide nasal spray which she says alleviates her nausea. Denies vomiting, fever or chills. She also denies any episodes of bruising or bleeding including no blood in stool, hematuria, or bleeding from gums.    PHYSICAL EXAM:   T(F): 98.6 (05-01-25 @ 05:38), Max: 98.6 (05-01-25 @ 02:15)  HR: 78 (05-01-25 @ 05:38) (76 - 91)  BP: 130/68 (05-01-25 @ 05:38) (122/69 - 146/78)  RR: 18 (05-01-25 @ 05:38) (15 - 20)  SpO2: 96% (05-01-25 @ 05:38) (95% - 100%)  Wt(kg): --    Constitutional: NAD   CV: clinically well perfused   Lungs: breathing comfortably on room air   Abdomen: Soft, nondistended, no guarding or rebound tenderness    Extremities: No lower extremity edema or calf tenderness bilaterally     LABS:  04-30    138    |  103    |  20     ----------------------------<  96     4.1     |  22     |  1.33[H]  04-30    142    |  107    |  24[H]  ----------------------------<  95     4.5     |  20[L]  |  1.49[H]    Ca    9.3        30 Apr 2025 20:18  Ca    9.5        30 Apr 2025 09:50    TPro  7.0    /  Alb  3.9    /  TBili  0.2    /  DBili  x      /  AST  14     /  ALT  7[L]   /  AlkPhos  60     04-30  TPro  6.5    /  Alb  3.9    /  TBili  0.2    /  DBili  x      /  AST  19     /  ALT  10     /  AlkPhos  59     04-30        PT/INR - ( 30 Apr 2025 20:18 )   PT: 11.6 sec;   INR: 1.02 ratio         PTT - ( 30 Apr 2025 20:18 )  PTT:35.0 sec  Urinalysis Basic - ( 30 Apr 2025 20:18 )    Color: x / Appearance: x / SG: x / pH: x  Gluc: 96 mg/dL / Ketone: x  / Bili: x / Urobili: x   Blood: x / Protein: x / Nitrite: x   Leuk Esterase: x / RBC: x / WBC x   Sq Epi: x / Non Sq Epi: x / Bacteria: x     I&O's Summary    30 Apr 2025 07:01  -  01 May 2025 06:36  --------------------------------------------------------  IN: 0 mL / OUT: 200 mL / NET: -200 mL    Imaging:     ACC: [id number] EXAM:  CT ABDOMEN AND PELVIS IC   ORDERED BY: BLUE GILMORE     PROCEDURE DATE:  04/30/2025          INTERPRETATION:  CLINICAL INFORMATION: Evaluate worsening mass, bleeding.   History of left ovarian cystic lesion.    COMPARISON: Abdominal CT 2/2/2025. MRI of the pelvis dated 3/28/2025.    CONTRAST/COMPLICATIONS:  IV Contrast: Omnipaque 350  90 cc administered   10 cc discarded  Oral Contrast: NONE    PROCEDURE:  CT of the Abdomen and Pelvis was performed.  Sagittal and coronal reformats were performed.    FINDINGS:  LOWER CHEST: Heart is at the upper limits of normal. Aortic valve   calcifications. Patchy opacities seen in the right middle and lower lobes   suggesting infectious versus inflammatory airspace disease along with   tree-in-bud opacities seen in the right middle lobe suggesting infectious   versus inflammatory small airways disease. Interlobular septal thickening   suggesting pulmonary interstitial edema. Bibasilar atelectasis also   noted. Tiny hiatal hernia    LIVER: Mild steatosis suggested.  BILE DUCTS: Normal caliber.  GALLBLADDER: Within normal limits.  SPLEEN: Within normal limits.  PANCREAS: Within normal limits.  ADRENALS: Within normal limits.  KIDNEYS/URETERS: Symmetric bilateral renal enhancement. Left renal cysts   along with subcentimeter hypodensity too small to characterize. No renal   stones. No hydronephrosis.    BLADDER: Within normal limits.  REPRODUCTIVE ORGANS: Hysterectomy. Left ovarian cystic neoplasm,   measuring up to 6.4 cm in greatest axial dimension, similar to the prior   MRI of the pelvis.    BOWEL: No bowel obstruction. Colonic diverticulosis. Appendix is normal.  PERITONEUM/RETROPERITONEUM: Within normal limits.  VESSELS: Atherosclerotic calcifications.  LYMPH NODES: No lymphadenopathy.  ABDOMINAL WALL: Postsurgical changes.  BONES: Degenerative changes.    IMPRESSION:  Patchy opacities in the right middle and lower lobes suggesting   infectious versus inflammatory airspace disease along with tree-in-bud   opacities seen in the right middle lobe suggesting infectious versus   inflammatory small airways disease. Attention on follow-up after   treatment is recommended in 3-6 months to assure resolution.    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    --- End of Report ---     
Gyn Progress Note     SUBJECTIVE:   Pt seen and examined at bedside. No events overnight. Patient denying any abdominal pain. She is ambulating. Tolerating regular diet. She is continuing to take metoclopramide nasal spray with good management of her nausea. Pt denies fever, chills, chest pain, SOB, nausea, vomiting, lightheadedness, dizziness. She continues to deny any vaginal bleeding or blood in her stool (although she reports not having a bowel movement in a few days).    PHYSICAL EXAM:   T(F): 98.1 (05-02-25 @ 05:31), Max: 98.6 (05-01-25 @ 10:50)  HR: 68 (05-02-25 @ 05:31) (68 - 78)  BP: 143/75 (05-02-25 @ 05:31) (130/68 - 152/82)  RR: 18 (05-02-25 @ 05:31) (18 - 18)  SpO2: 99% (05-02-25 @ 05:31) (93% - 99%)  Wt(kg): --    Constitutional: NAD   CV: clinically well perfused   Lungs: breathing comfortably on room air   Abdomen: Soft, nondistended, no guarding or rebound tenderness    Extremities: No lower extremity edema or calf tenderness bilaterally     LABS:  04-30    138    |  103    |  20     ----------------------------<  96     4.1     |  22     |  1.33[H]    Ca    9.3        30 Apr 2025 20:18    TPro  7.0    /  Alb  3.9    /  TBili  0.2    /  DBili  x      /  AST  14     /  ALT  7[L]   /  AlkPhos  60     04-30    PT/INR - ( 30 Apr 2025 20:18 )   PT: 11.6 sec;   INR: 1.02 ratio       PTT - ( 30 Apr 2025 20:18 )  PTT:35.0 sec  Urinalysis Basic - ( 30 Apr 2025 20:18 )    Color: x / Appearance: x / SG: x / pH: x  Gluc: 96 mg/dL / Ketone: x  / Bili: x / Urobili: x   Blood: x / Protein: x / Nitrite: x   Leuk Esterase: x / RBC: x / WBC x   Sq Epi: x / Non Sq Epi: x / Bacteria: x     I&O's Summary    30 Apr 2025 07:01  -  01 May 2025 07:00  --------------------------------------------------------  IN: 0 mL / OUT: 200 mL / NET: -200 mL        MEDICATIONS  (STANDING):  amLODIPine   Tablet 10 milliGRAM(s) Oral daily  brimonidine 0.2% Ophthalmic Solution 1 Drop(s) Both EYES three times a day  dorzolamide 2%/timolol 0.5% Ophthalmic Solution 1 Drop(s) Both EYES two times a day  ferrous    sulfate 325 milliGRAM(s) Oral daily  latanoprost 0.005% Ophthalmic Solution 1 Drop(s) Both EYES at bedtime  levothyroxine 100 MICROGram(s) Oral daily  multivitamin 1 Tablet(s) Oral daily  pantoprazole    Tablet 40 milliGRAM(s) Oral before breakfast  sodium chloride 0.9%. 1000 milliLiter(s) (75 mL/Hr) IV Continuous <Continuous>  vitamin E 400 International Unit(s) Oral daily     Imaging:   ACC: [id number] EXAM:  CT ABDOMEN AND PELVIS IC   ORDERED BY: BLUE GILMORE     PROCEDURE DATE:  04/30/2025          INTERPRETATION:  CLINICAL INFORMATION: Evaluate worsening mass, bleeding.   History of left ovarian cystic lesion.    COMPARISON: Abdominal CT 2/2/2025. MRI of the pelvis dated 3/28/2025.    CONTRAST/COMPLICATIONS:  IV Contrast: Omnipaque 350  90 cc administered   10 cc discarded  Oral Contrast: NONE    PROCEDURE:  CT of the Abdomen and Pelvis was performed.  Sagittal and coronal reformats were performed.    FINDINGS:  LOWER CHEST: Heart is at the upper limits of normal. Aortic valve   calcifications. Patchy opacities seen in the right middle and lower lobes   suggesting infectious versus inflammatory airspace disease along with   tree-in-bud opacities seen in the right middle lobe suggesting infectious   versus inflammatory small airways disease. Interlobular septal thickening   suggesting pulmonary interstitial edema. Bibasilar atelectasis also   noted. Tiny hiatal hernia    LIVER: Mild steatosis suggested.  BILE DUCTS: Normal caliber.  GALLBLADDER: Within normal limits.  SPLEEN: Within normal limits.  PANCREAS: Within normal limits.  ADRENALS: Within normal limits.  KIDNEYS/URETERS: Symmetric bilateral renal enhancement. Left renal cysts   along with subcentimeter hypodensity too small to characterize. No renal   stones. No hydronephrosis.    BLADDER: Within normal limits.  REPRODUCTIVE ORGANS: Hysterectomy. Left ovarian cystic neoplasm,   measuring up to 6.4 cm in greatest axial dimension, similar to the prior   MRI of the pelvis.    BOWEL: No bowel obstruction. Colonic diverticulosis. Appendix is normal.  PERITONEUM/RETROPERITONEUM: Within normal limits.  VESSELS: Atherosclerotic calcifications.  LYMPH NODES: No lymphadenopathy.  ABDOMINAL WALL: Postsurgical changes.  BONES: Degenerative changes.    IMPRESSION:  Patchy opacities in the right middle and lower lobes suggesting   infectious versus inflammatory airspace disease along with tree-in-bud   opacities seen in the right middle lobe suggesting infectious versus   inflammatory small airways disease. Attention on follow-up after   treatment is recommended in 3-6 months to assure resolution.    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    --- End of Report ---  
[facility] Division of Hospital Medicine  Chip Talley DO  Reachable on [employer] Teams    Patient is a 79y old  Female who presents with a chief complaint of anemia (02 May 2025 14:08)    SUBJECTIVE / OVERNIGHT EVENTS: No acute events overnight. Patient seen and examined at bedside this morning, denies acute complaints.    REVIEW OF SYSTEMS:    CONSTITUTIONAL: No weakness, fevers or chills  EYES/ENT: No visual changes;  No vertigo or throat pain   NECK: No pain or stiffness  RESPIRATORY: No cough, wheezing, hemoptysis; No shortness of breath  CARDIOVASCULAR: No chest pain or palpitations  GASTROINTESTINAL: No abdominal or epigastric pain. No nausea, vomiting, or hematemesis; No diarrhea or constipation. No melena or hematochezia.  GENITOURINARY: No dysuria, frequency or hematuria  NEUROLOGICAL: No numbness or weakness  SKIN: No itching, burning, rashes, or lesions  MSK: No joint pain, no back pain  HEME: No easy bleeding, no easy bruising  All other review of systems is negative unless indicated above.    MEDICATIONS  (STANDING):  amLODIPine   Tablet 10 milliGRAM(s) Oral daily  brimonidine 0.2% Ophthalmic Solution 1 Drop(s) Both EYES three times a day  dorzolamide 2%/timolol 0.5% Ophthalmic Solution 1 Drop(s) Both EYES two times a day  ferrous    sulfate 325 milliGRAM(s) Oral daily  latanoprost 0.005% Ophthalmic Solution 1 Drop(s) Both EYES at bedtime  levothyroxine 100 MICROGram(s) Oral daily  multivitamin 1 Tablet(s) Oral daily  pantoprazole    Tablet 40 milliGRAM(s) Oral before breakfast  sodium chloride 0.9%. 1000 milliLiter(s) (75 mL/Hr) IV Continuous <Continuous>  vitamin E 400 International Unit(s) Oral daily    MEDICATIONS  (PRN):      CAPILLARY BLOOD GLUCOSE        I&O's Summary      PHYSICAL EXAM:  Vital Signs Last 24 Hrs  T(C): 36.8 (02 May 2025 12:25), Max: 36.8 (02 May 2025 12:25)  T(F): 98.2 (02 May 2025 12:25), Max: 98.2 (02 May 2025 12:25)  HR: 67 (02 May 2025 12:25) (67 - 74)  BP: 134/77 (02 May 2025 12:25) (132/78 - 143/75)  BP(mean): --  RR: 18 (02 May 2025 12:25) (18 - 18)  SpO2: 99% (02 May 2025 12:25) (95% - 99%)    Parameters below as of 02 May 2025 12:25  Patient On (Oxygen Delivery Method): room air      CONSTITUTIONAL: NAD, well-developed, well-groomed  RESPIRATORY: Normal respiratory effort; lungs are clear to auscultation bilaterally  CARDIOVASCULAR: Regular rate and rhythm, normal S1 and S2, no murmur/rub/gallop  ABDOMEN: Nontender to palpation, nondistended, soft  PSYCH: A+O to person, place, and time; affect appropriate    LABS:                        10.2   6.84  )-----------( 405      ( 02 May 2025 14:13 )             31.8     05-02    143  |  106  |  13  ----------------------------<  86  3.6   |  23  |  1.39[H]    Ca    9.1      02 May 2025 06:51  Phos  4.0     05-02    TPro  6.3  /  Alb  3.3  /  TBili  0.3  /  DBili  x   /  AST  16  /  ALT  7[L]  /  AlkPhos  54  05-02    PT/INR - ( 02 May 2025 06:51 )   PT: 11.5 sec;   INR: 1.00 ratio         PTT - ( 02 May 2025 06:51 )  PTT:33.1 sec      Urinalysis Basic - ( 02 May 2025 06:51 )    Color: x / Appearance: x / SG: x / pH: x  Gluc: 86 mg/dL / Ketone: x  / Bili: x / Urobili: x   Blood: x / Protein: x / Nitrite: x   Leuk Esterase: x / RBC: x / WBC x   Sq Epi: x / Non Sq Epi: x / Bacteria: x

## 2025-05-02 NOTE — DISCHARGE NOTE PROVIDER - CARE PROVIDER_API CALL
Juany Beckham  NP in Adult Health  [address] [place] [address]  Phone: [phone]  Fax: [phone]  Follow Up Time:     Cara Tavarez  Gynecologic Oncology  [address] [place] [address]  Phone: [phone]  Fax: [phone]  Follow Up Time:     Rick Prieto  Family Medicine  [address] [place] [address]  Phone: [phone]  Fax: [phone]  Follow Up Time:    Juany Beckham  NP in Adult Health  [address] [place] [address]  Phone: [phone]  Fax: [phone]  Follow Up Time: 1 week    Cara Tavarez  Gynecologic Oncology  [address] [place] [address]  Phone: [phone]  Fax: [phone]  Follow Up Time: 1 week    Rick Prieto  Family Medicine  [address] [place] [address]  Phone: [phone]  Fax: [phone]  Follow Up Time: Routine

## 2025-05-02 NOTE — DISCHARGE NOTE PROVIDER - HOSPITAL COURSE
79F with past medical history of  HTN, DMT2, hypothyroidism, Iron deficiency anemia presenting with acute drop in Hb.  Patient reports that she was at presurgical testing yesterday 4/30  in preparation for an ovarian cystectomy.  She received a call that she was anemic and needed to come to the ER for blood transfusion.  Of note she was admitted to [facility] for gastroparesis from  2/13 - 2/26 during which she was  found to be anemic  and received a blood transfusion and IV Venofer x 5 doses.  Since then patient has been on PO  iron supplementation and is being scheduled to follow-up with a gastroenterologist for further workup of her anemia. She has been following with Hematology as well. Patient states she has been generally feeling well.  Does endorse some mild fatigue. Currently she denies cp, sob, dizziness. Denies hematemesis, melena, hematochezia.    Patient was admitted for anemia. Patient received 3 units of PRBC with good response. No signs of bleeding and FOBT negative. Hematology and OB/GYN consulted. Patient's hemoglobin on d/c was _____. Patient cleared for d/c by medicine, GYN, and hematology.     On 5/2/25 this case was reviewed with Dr. Talley, the patient is medically stable and optimized for discharge. All medications were reviewed and prescriptions were sent to mutually agreed upon pharmacy.              HPI:  79F with past medical history of  HTN, DMT2, hypothyroidism, Iron deficiency anemia presenting with acute drop in Hb.  Patient reports that she was at presurgical testing yesterday 4/30  in preparation for an ovarian cystectomy.  She received a call that she was anemic and needed to come to the ER for blood transfusion.  Of note she was admitted to [facility] for gastroparesis from  2/13 - 2/26 during which she was  found to be anemic  and received a blood transfusion and IV Venofer x 5 doses.  Since then patient has been on PO  iron supplementation and is being scheduled to follow-up with a gastroenterologist for further workup of her anemia. She has been following with Hematology as well. Patient states she has been generally feeling well.  Does endorse some mild fatigue.  Currently she denies cp, sob, dizziness. Denies hematemesis, melena, hematochezia.   (01 May 2025 10:50)    Hospital Course: Received PRBC and hemoglobin responded.      Important Medication Changes and Reason: None.    Active or Pending Issues Requiring Follow-up: None    Advanced Directives:   [X] Full code  [ ] DNR  [ ] Hospice    Discharge Diagnoses:  Anemia  HTN  DM2  Hypothyroidism                 HPI:  79F with past medical history of  HTN, DMT2, hypothyroidism, Iron deficiency anemia presenting with acute drop in Hb.  Patient reports that she was at presurgical testing yesterday 4/30  in preparation for an ovarian cystectomy.  She received a call that she was anemic and needed to come to the ER for blood transfusion.  Of note she was admitted to [facility] for gastroparesis from  2/13 - 2/26 during which she was  found to be anemic  and received a blood transfusion and IV Venofer x 5 doses.  Since then patient has been on PO  iron supplementation and is being scheduled to follow-up with a gastroenterologist for further workup of her anemia. She has been following with Hematology as well. Patient states she has been generally feeling well.  Does endorse some mild fatigue.  Currently she denies cp, sob, dizziness. Denies hematemesis, melena, hematochezia.   (01 May 2025 10:50)    Hospital Course: Received PRBC and hemoglobin responded.         Important Medication Changes and Reason: None.    Active or Pending Issues Requiring Follow-up: None    Advanced Directives:   [X] Full code  [ ] DNR  [ ] Hospice    Discharge Diagnoses:  Anemia  HTN  DM2  Hypothyroidism

## 2025-05-02 NOTE — PROGRESS NOTE ADULT - PROBLEM SELECTOR PLAN 1
baseline ~ 9.6  p/w Hb 6.4  reports some fatigue  denies cp, sob dizziness  s/p prbc ; Hb 10.2  no signs/symptoms of overt bleeding  FOBT negative  concern for worsening CARMEN  Hematology Consulted, recs appreciated

## 2025-05-02 NOTE — DISCHARGE NOTE NURSING/CASE MANAGEMENT/SOCIAL WORK - NSDCPEFALRISK_GEN_ALL_CORE
For information on Fall & Injury Prevention, visit: [email or web address] OR  [email or web address] OR  https://www.cdc.gov/steadi/patient.html

## 2025-05-02 NOTE — DISCHARGE NOTE PROVIDER - NSDCMRMEDTOKEN_GEN_ALL_CORE_FT
amLODIPine 10 mg oral tablet: 1 tab(s) orally once a day (in the morning)  brimonidine 0.2% ophthalmic solution: 1 drop(s) in each affected eye 3 times a day  dorzolamide-timolol 2.23%-0.68% (2%-0.5% base) ophthalmic solution: 1 drop(s) in each eye 2 times a day  ferrous sulfate 325 mg (65 mg elemental iron) oral tablet: 1 tab(s) orally once a day  Gimoti 15 mg/actuation nasal spray: 1 spray(s) intranasally 4 times a day 30 mins before each meal  latanoprost 0.005% ophthalmic solution: 1 drop(s) in each eye once a day (in the evening)  levothyroxine 100 mcg (0.1 mg) oral tablet: 1 tab(s) orally once a day (in the morning)  metFORMIN 500 mg oral tablet: 1 tab(s) orally once a day with breakfast  Multiple Vitamins oral tablet: 1 tab(s) orally once a day LD 4/30/2025  pantoprazole 40 mg oral delayed release tablet: 1 tab(s) orally once a day (in the morning)  vitamin E: 1 tab(s) orally once a day

## 2025-05-05 ENCOUNTER — APPOINTMENT (OUTPATIENT)
Dept: HEMATOLOGY ONCOLOGY | Facility: CLINIC | Age: 80
End: 2025-05-05

## 2025-05-05 LAB — COPPER SERPL-MCNC: 110 UG/DL — SIGNIFICANT CHANGE UP (ref 80–158)

## 2025-05-05 NOTE — ASU PATIENT PROFILE, ADULT - FALL HARM RISK - UNIVERSAL INTERVENTIONS
Bed in lowest position, wheels locked, appropriate side rails in place/Call bell, personal items and telephone in reach/Instruct patient to call for assistance before getting out of bed or chair/Non-slip footwear when patient is out of bed/[place] to call system/Physically safe environment - no spills, clutter or unnecessary equipment/Purposeful Proactive Rounding/Room/bathroom lighting operational, light cord in reach

## 2025-05-06 ENCOUNTER — TRANSCRIPTION ENCOUNTER (OUTPATIENT)
Age: 80
End: 2025-05-06

## 2025-05-06 ENCOUNTER — INPATIENT (INPATIENT)
Facility: HOSPITAL | Age: 80
LOS: 1 days | Discharge: HOME | End: 2025-05-08
Attending: GENERAL ACUTE CARE HOSPITAL | Admitting: GENERAL ACUTE CARE HOSPITAL
Payer: MEDICARE

## 2025-05-06 ENCOUNTER — APPOINTMENT (OUTPATIENT)
Dept: GYNECOLOGIC ONCOLOGY | Facility: HOSPITAL | Age: 80
End: 2025-05-06

## 2025-05-06 ENCOUNTER — RESULT REVIEW (OUTPATIENT)
Age: 80
End: 2025-05-06

## 2025-05-06 VITALS
RESPIRATION RATE: 16 BRPM | OXYGEN SATURATION: 100 % | DIASTOLIC BLOOD PRESSURE: 68 MMHG | WEIGHT: 130.95 LBS | SYSTOLIC BLOOD PRESSURE: 130 MMHG | HEIGHT: 60 IN | TEMPERATURE: 97 F | HEART RATE: 68 BPM

## 2025-05-06 DIAGNOSIS — Z87.59 PERSONAL HISTORY OF OTHER COMPLICATIONS OF PREGNANCY, CHILDBIRTH AND THE PUERPERIUM: Chronic | ICD-10-CM

## 2025-05-06 DIAGNOSIS — Z90.710 ACQUIRED ABSENCE OF BOTH CERVIX AND UTERUS: Chronic | ICD-10-CM

## 2025-05-06 DIAGNOSIS — Z98.890 OTHER SPECIFIED POSTPROCEDURAL STATES: Chronic | ICD-10-CM

## 2025-05-06 DIAGNOSIS — N83.8 OTHER NONINFLAMMATORY DISORDERS OF OVARY, FALLOPIAN TUBE AND BROAD LIGAMENT: ICD-10-CM

## 2025-05-06 LAB
GLUCOSE BLDC GLUCOMTR-MCNC: 140 MG/DL — HIGH (ref 70–99)
GLUCOSE BLDC GLUCOMTR-MCNC: 158 MG/DL — HIGH (ref 70–99)
GLUCOSE BLDC GLUCOMTR-MCNC: 82 MG/DL — SIGNIFICANT CHANGE UP (ref 70–99)

## 2025-05-06 PROCEDURE — 88302 TISSUE EXAM BY PATHOLOGIST: CPT | Mod: 26

## 2025-05-06 PROCEDURE — 88112 CYTOPATH CELL ENHANCE TECH: CPT | Mod: 26

## 2025-05-06 PROCEDURE — 88342 IMHCHEM/IMCYTCHM 1ST ANTB: CPT | Mod: 26,59

## 2025-05-06 PROCEDURE — 58953 TAH RAD DISSECT FOR DEBULK: CPT | Mod: 52

## 2025-05-06 PROCEDURE — 88331 PATH CONSLTJ SURG 1 BLK 1SPC: CPT | Mod: 26

## 2025-05-06 PROCEDURE — 88341 IMHCHEM/IMCYTCHM EA ADD ANTB: CPT | Mod: 26,59

## 2025-05-06 PROCEDURE — 88360 TUMOR IMMUNOHISTOCHEM/MANUAL: CPT | Mod: 26

## 2025-05-06 PROCEDURE — 88307 TISSUE EXAM BY PATHOLOGIST: CPT | Mod: 26

## 2025-05-06 PROCEDURE — 88305 TISSUE EXAM BY PATHOLOGIST: CPT | Mod: 26

## 2025-05-06 RX ORDER — SODIUM CHLORIDE 9 G/1000ML
500 INJECTION, SOLUTION INTRAVENOUS ONCE
Refills: 0 | Status: COMPLETED | OUTPATIENT
Start: 2025-05-06 | End: 2025-05-06

## 2025-05-06 RX ORDER — DEXTROSE 50 % IN WATER 50 %
15 SYRINGE (ML) INTRAVENOUS ONCE
Refills: 0 | Status: DISCONTINUED | OUTPATIENT
Start: 2025-05-06 | End: 2025-05-08

## 2025-05-06 RX ORDER — VITAMIN E (DL,TOCOPHERYL ACET) 22.5 MG/ML
1 DROPS ORAL
Refills: 0 | DISCHARGE

## 2025-05-06 RX ORDER — SIMETHICONE 80 MG
80 TABLET,CHEWABLE ORAL EVERY 6 HOURS
Refills: 0 | Status: DISCONTINUED | OUTPATIENT
Start: 2025-05-06 | End: 2025-05-08

## 2025-05-06 RX ORDER — ONDANSETRON HCL/PF 4 MG/2 ML
8 VIAL (ML) INJECTION EVERY 8 HOURS
Refills: 0 | Status: DISCONTINUED | OUTPATIENT
Start: 2025-05-06 | End: 2025-05-08

## 2025-05-06 RX ORDER — ACETAMINOPHEN 500 MG/5ML
1000 LIQUID (ML) ORAL EVERY 6 HOURS
Refills: 0 | Status: DISCONTINUED | OUTPATIENT
Start: 2025-05-06 | End: 2025-05-07

## 2025-05-06 RX ORDER — OXYCODONE HYDROCHLORIDE 30 MG/1
1 TABLET ORAL
Qty: 5 | Refills: 0
Start: 2025-05-06

## 2025-05-06 RX ORDER — SODIUM CHLORIDE 9 G/1000ML
1000 INJECTION, SOLUTION INTRAVENOUS
Refills: 0 | Status: DISCONTINUED | OUTPATIENT
Start: 2025-05-06 | End: 2025-05-06

## 2025-05-06 RX ORDER — OXYCODONE HYDROCHLORIDE 30 MG/1
5 TABLET ORAL EVERY 6 HOURS
Refills: 0 | Status: DISCONTINUED | OUTPATIENT
Start: 2025-05-06 | End: 2025-05-08

## 2025-05-06 RX ORDER — KETOROLAC TROMETHAMINE 30 MG/ML
15 INJECTION, SOLUTION INTRAMUSCULAR; INTRAVENOUS EVERY 8 HOURS
Refills: 0 | Status: DISCONTINUED | OUTPATIENT
Start: 2025-05-06 | End: 2025-05-06

## 2025-05-06 RX ORDER — INSULIN LISPRO 100 U/ML
INJECTION, SOLUTION INTRAVENOUS; SUBCUTANEOUS
Refills: 0 | Status: DISCONTINUED | OUTPATIENT
Start: 2025-05-06 | End: 2025-05-08

## 2025-05-06 RX ORDER — DEXTROSE 50 % IN WATER 50 %
25 SYRINGE (ML) INTRAVENOUS ONCE
Refills: 0 | Status: DISCONTINUED | OUTPATIENT
Start: 2025-05-06 | End: 2025-05-08

## 2025-05-06 RX ORDER — GLUCAGON 3 MG/1
1 POWDER NASAL ONCE
Refills: 0 | Status: DISCONTINUED | OUTPATIENT
Start: 2025-05-06 | End: 2025-05-08

## 2025-05-06 RX ORDER — SENNA 187 MG
1 TABLET ORAL AT BEDTIME
Refills: 0 | Status: DISCONTINUED | OUTPATIENT
Start: 2025-05-06 | End: 2025-05-08

## 2025-05-06 RX ORDER — HYDROMORPHONE/SOD CHLOR,ISO/PF 2 MG/10 ML
0.5 SYRINGE (ML) INJECTION
Refills: 0 | Status: DISCONTINUED | OUTPATIENT
Start: 2025-05-06 | End: 2025-05-07

## 2025-05-06 RX ORDER — METOPROLOL SUCCINATE 50 MG/1
5 TABLET, EXTENDED RELEASE ORAL EVERY 6 HOURS
Refills: 0 | Status: DISCONTINUED | OUTPATIENT
Start: 2025-05-06 | End: 2025-05-08

## 2025-05-06 RX ORDER — HEPARIN SODIUM 1000 [USP'U]/ML
5000 INJECTION INTRAVENOUS; SUBCUTANEOUS EVERY 8 HOURS
Refills: 0 | Status: DISCONTINUED | OUTPATIENT
Start: 2025-05-07 | End: 2025-05-08

## 2025-05-06 RX ORDER — B1/B2/B3/B5/B6/B12/VIT C/FOLIC 500-0.5 MG
1 TABLET ORAL
Refills: 0 | DISCHARGE

## 2025-05-06 RX ORDER — SODIUM CHLORIDE 9 G/1000ML
1000 INJECTION, SOLUTION INTRAVENOUS
Refills: 0 | Status: DISCONTINUED | OUTPATIENT
Start: 2025-05-06 | End: 2025-05-08

## 2025-05-06 RX ORDER — INSULIN LISPRO 100 U/ML
INJECTION, SOLUTION INTRAVENOUS; SUBCUTANEOUS AT BEDTIME
Refills: 0 | Status: DISCONTINUED | OUTPATIENT
Start: 2025-05-06 | End: 2025-05-08

## 2025-05-06 RX ORDER — LEVOTHYROXINE SODIUM 300 MCG
100 TABLET ORAL DAILY
Refills: 0 | Status: DISCONTINUED | OUTPATIENT
Start: 2025-05-06 | End: 2025-05-08

## 2025-05-06 RX ORDER — DEXTROSE 50 % IN WATER 50 %
12.5 SYRINGE (ML) INTRAVENOUS ONCE
Refills: 0 | Status: DISCONTINUED | OUTPATIENT
Start: 2025-05-06 | End: 2025-05-08

## 2025-05-06 RX ORDER — ONDANSETRON HCL/PF 4 MG/2 ML
4 VIAL (ML) INJECTION ONCE
Refills: 0 | Status: DISCONTINUED | OUTPATIENT
Start: 2025-05-06 | End: 2025-05-07

## 2025-05-06 RX ORDER — METOCLOPRAMIDE HCL 10 MG
1 TABLET ORAL
Refills: 0 | DISCHARGE

## 2025-05-06 RX ORDER — SODIUM CHLORIDE 9 G/1000ML
1000 INJECTION, SOLUTION INTRAVENOUS
Refills: 0 | Status: DISCONTINUED | OUTPATIENT
Start: 2025-05-06 | End: 2025-05-07

## 2025-05-06 RX ADMIN — SODIUM CHLORIDE 1000 MILLILITER(S): 9 INJECTION, SOLUTION INTRAVENOUS at 22:21

## 2025-05-06 RX ADMIN — SODIUM CHLORIDE 125 MILLILITER(S): 9 INJECTION, SOLUTION INTRAVENOUS at 20:55

## 2025-05-06 RX ADMIN — Medication 400 MILLIGRAM(S): at 23:45

## 2025-05-06 NOTE — PATIENT PROFILE ADULT - FALL HARM RISK - HARM RISK INTERVENTIONS

## 2025-05-06 NOTE — BRIEF OPERATIVE NOTE - COMMENTS
Dictation per Dr. Geneva Calixto, PGY-3 also scrubbed for case Dictation #[id number]  Robbin Calixto, PGY-3 also scrubbed for case

## 2025-05-06 NOTE — ASU DISCHARGE PLAN (ADULT/PEDIATRIC) - ASU DC SPECIAL INSTRUCTIONSFT
Postoperative Instructions    For pain control, take the followin. Ibuprofen 600mg every 6 hours, take with food  2. Add Tylenol 975mg every 6 hours, alternated with ibuprofen  Tylenol and ibuprofen may be obtained over the counter.  3. Oxycodone 5mg every 6 hours as needed for severe pain. A prescription has been sent to your pharmacy.    Return to your regular way of eating.     Resume normal activity as tolerated. Complete vaginal rest, no tampons, no douching, no tub bathing, no sexual activities for 6 weeks unless otherwise instructed by your doctor.      No driving while on narcotic pain medication.      Call your doctor with any signs and symptoms of infection such as fever (>100.4 F), chills, nausea or vomiting.  Call your doctor if you're unable to tolerate food or have difficulty urinating.  Call your doctor if you have pain that is not relieved by your prescribed medications. Call your doctor with redness or swelling at the incision site, fluid leakage or wound separation.    Notify your doctor with any other concerns. Follow up with Dr. Tavarez on 25 at 3:00pm for your post-operative appointment.

## 2025-05-06 NOTE — PATIENT PROFILE ADULT - STATED REASON FOR ADMISSION
[Initial Evaluation] : an initial evaluation for [FreeTextEntry2] : decreased hearing,  imbalanced  "surgery to remove cysts"

## 2025-05-06 NOTE — BRIEF OPERATIVE NOTE - SPECIMENS
1) Pelvic washings 2) Mesenteric nodule 3) Left adnexa 4) Right pelvic side wall 5) Ileal nodule 6) Vaginal nodule 7) Cul-de-sac peritoneum 8) Left pelvic side wall nodule 9) Right adnexa 10) Omentum

## 2025-05-06 NOTE — ASU PREOP CHECKLIST - COMMENTS
pt took synthrio and famotidine at 5:30 am with a sip of water pt took synthroid, norvasc, famotidine and nasal spray between 6- 7 am with a sip water pt took synthroid, norvasc, famotidine and nasal spray between 6- 7 am with a sip water.

## 2025-05-06 NOTE — ASU DISCHARGE PLAN (ADULT/PEDIATRIC) - NS MD DC FALL RISK RISK
For information on Fall & Injury Prevention, visit: [email or web address] OR  [email or web address] OR  https://www.cdc.gov/steadi/patient.html

## 2025-05-06 NOTE — ASU DISCHARGE PLAN (ADULT/PEDIATRIC) - FINANCIAL ASSISTANCE
[facility] provides services at a reduced cost to those who are determined to be eligible through [facility]’s financial assistance program. Information regarding [facility]’s financial assistance program can be found by going to [email or web address] or by calling [phone].

## 2025-05-06 NOTE — ASU DISCHARGE PLAN (ADULT/PEDIATRIC) - CARE PROVIDER_API CALL
Cara Tavarez  Gynecologic Oncology  [address] [place] [id number]  Phone: [phone]  Fax: [phone]  Scheduled Appointment: 05/19/2025 03:00 PM

## 2025-05-06 NOTE — CHART NOTE - NSCHARTNOTEFT_GEN_A_CORE
Gynecological Oncology PA Post Op Note    Patient seen and examined at bedside, resting comfortably with good pain control. Notified by RN, most right lap site protruding out when patient breathes with associated palpable fullness. Denies headache, dizziness, nausea, vomiting, chest pain, palpitations and shortness of breath. Mares in situ, low uop(35cc/1.5 hrs), clear, yellow in bag). Patient tolerating clears well.       Vital Signs Last 24 Hours  T(C): 36.7 (05-06-25 @ 21:00), Max: 36.8 (05-06-25 @ 20:35)  HR: 89 (05-06-25 @ 21:45) (68 - 89)  BP: 120/82 (05-06-25 @ 21:45) (118/81 - 130/68)  RR: 12 (05-06-25 @ 21:45) (10 - 16)  SpO2: 100% (05-06-25 @ 21:45) (90% - 100%)    I&O's Summary      Physical Exam:  General: NAD  Pulmonary: bilaterally clear to ascultation, non-labored respirations  CV: S1S2  Abdomen: soft, non-distended, appropriately tender; right lateral lap site with 1.5cmx1.5 cm swollen induration marked with surgical marker others, including mini lap dressing, C/D/I.   Extremeties: no lower extremity edema or calve tenderness. Bilateral venodynes in place.    Labs:             10.2[L]  6.84  )-----------( 405[H]    ( 05-02 @ 14:13 )             31.8[L]               8.9[L]  5.78  )-----------( 422[H]    ( 05-02 @ 06:51 )             27.8[L]        MEDICATIONS  (STANDING):  acetaminophen   IVPB .. 1000 milliGRAM(s) IV Intermittent every 6 hours  dextrose 5%. 1000 milliLiter(s) (100 mL/Hr) IV Continuous <Continuous>  dextrose 5%. 1000 milliLiter(s) (50 mL/Hr) IV Continuous <Continuous>  dextrose 50% Injectable 25 Gram(s) IV Push once  dextrose 50% Injectable 12.5 Gram(s) IV Push once  dextrose 50% Injectable 25 Gram(s) IV Push once  glucagon  Injectable 1 milliGRAM(s) IntraMuscular once  insulin lispro (ADMELOG) corrective regimen sliding scale   SubCutaneous three times a day before meals  insulin lispro (ADMELOG) corrective regimen sliding scale   SubCutaneous at bedtime  lactated ringers Bolus 500 milliLiter(s) IV Bolus once  lactated ringers. 1000 milliLiter(s) (125 mL/Hr) IV Continuous <Continuous>  levothyroxine 100 MICROGram(s) Oral daily    MEDICATIONS  (PRN):  dextrose Oral Gel 15 Gram(s) Oral once PRN Blood Glucose LESS THAN 70 milliGRAM(s)/deciliter  HYDROmorphone  Injectable 0.5 milliGRAM(s) IV Push every 10 minutes PRN Moderate Pain (4 - 6)  metoprolol tartrate Injectable 5 milliGRAM(s) IV Push every 6 hours PRN Systolic BP>160, Diastolic BP>110  ondansetron    Tablet 8 milliGRAM(s) Oral every 8 hours PRN Nausea and/or Vomiting  ondansetron Injectable 4 milliGRAM(s) IV Push once PRN Nausea and/or Vomiting  oxyCODONE    IR 5 milliGRAM(s) Oral every 6 hours PRN Severe Pain (7 - 10)  senna 1 Tablet(s) Oral at bedtime PRN Constipation  simethicone 80 milliGRAM(s) Chew every 6 hours PRN Gas      Assessment and Plan    79y yo s/p RA BSO, tumor debulking, omentectomy, mini lap, argon beam for left ovarian cyst(Frozen: adenocarcinoma) recovering well in acute post-operative state. See operative note for details. EBL: 150    -Incision: Likely postoperative hematoma, continue to closely monitor for signs of expansion(sites marked with surgical marker for reevaluation in AM) cool packs as needed to reduce swelling  -LR@125 mL  -HSQ q8 hrs  -Reg-CC diet  -Mares catheter overnight, low uop, 500 cc bolus ordered  -IV Tylenol, Oxycodone PRN  -LUZ and glucose monitoring  -Mylicon/Senna/Zofran PRN  -Lopressor PRN  -Synthroid  -F/U AM CBC/BMP/T+S  -Bilateral venodynes when resting in bed  -Encourage ambulation and incentive spirometer use  -Vital signs per routine  -Disposition: admit to T for routine post operative care.       Gaby Roy PA-C  #[id number]

## 2025-05-06 NOTE — ASU PREOP CHECKLIST - PATIENT SENT TO
Pernell Brisenoila 3/28/2022 10:47 AM EDT    Ok with referral?  ----- Message -----  From: Romelia Baxter. Sent: 3/28/2022 10:43 AM EDT  To: [facility] Nurse Pool  Subject: Recent ER Visit     Is there any way I can schedule my appointment this week because I went to the ER last night. I was awakened out of my sleep gasping for air, they did chest x-ray and EKG and everything looked good but I'm a little worried that I may have sleep apnea. operating room

## 2025-05-06 NOTE — BRIEF OPERATIVE NOTE - OPERATION/FINDINGS
EUA: normal external genitalia, vaginal cuff intact with no obvious nodularity or lesions appreciated, rectovaginal exam wnl  LSC: Abdominal survey with omental adhesions, multiple dense adhesions from rectum/large bowel/small bowel mesentery to anterior abdominal wall. Uterus surgically absent, left adnexa with enlarged ~6-7cm cystic structure, ruptured intra-op with clear serous fluid (frozen of left adnexa=adenocarcinoma). Multiple nodules throughout peritoneum, pelvic sidewall, vaginal cuff, rectum, and bowel. Otherwise grossly normal liver, stomach, and appendix.

## 2025-05-06 NOTE — BRIEF OPERATIVE NOTE - NSICDXBRIEFPROCEDURE_GEN_ALL_CORE_FT
PROCEDURES:  Pelvic examination under anesthesia 06-May-2025 20:57:04  Robbin Calixto  Robot-assisted bilateral salpingo-oophorectomy (BSO) using da Nile Xi 06-May-2025 20:57:16  Robbin Calixto  Omentectomy 06-May-2025 20:57:26  Robbin Calixto  Debulking of tumor 06-May-2025 20:57:32  Robbin Calixto

## 2025-05-07 LAB
ANION GAP SERPL CALC-SCNC: 17 MMOL/L — HIGH (ref 7–14)
BASOPHILS # BLD AUTO: 0.02 K/UL — SIGNIFICANT CHANGE UP (ref 0–0.2)
BASOPHILS NFR BLD AUTO: 0.1 % — SIGNIFICANT CHANGE UP (ref 0–2)
BLD GP AB SCN SERPL QL: NEGATIVE — SIGNIFICANT CHANGE UP
BUN SERPL-MCNC: 18 MG/DL — SIGNIFICANT CHANGE UP (ref 7–23)
CALCIUM SERPL-MCNC: 9.3 MG/DL — SIGNIFICANT CHANGE UP (ref 8.4–10.5)
CHLORIDE SERPL-SCNC: 101 MMOL/L — SIGNIFICANT CHANGE UP (ref 98–107)
CO2 SERPL-SCNC: 15 MMOL/L — LOW (ref 22–31)
CREAT SERPL-MCNC: 1.18 MG/DL — SIGNIFICANT CHANGE UP (ref 0.5–1.3)
EGFR: 46 ML/MIN/1.73M2 — LOW
EGFR: 46 ML/MIN/1.73M2 — LOW
EOSINOPHIL # BLD AUTO: 0 K/UL — SIGNIFICANT CHANGE UP (ref 0–0.5)
EOSINOPHIL NFR BLD AUTO: 0 % — SIGNIFICANT CHANGE UP (ref 0–6)
GLUCOSE BLDC GLUCOMTR-MCNC: 112 MG/DL — HIGH (ref 70–99)
GLUCOSE BLDC GLUCOMTR-MCNC: 116 MG/DL — HIGH (ref 70–99)
GLUCOSE BLDC GLUCOMTR-MCNC: 117 MG/DL — HIGH (ref 70–99)
GLUCOSE BLDC GLUCOMTR-MCNC: 133 MG/DL — HIGH (ref 70–99)
GLUCOSE SERPL-MCNC: 105 MG/DL — HIGH (ref 70–99)
HCT VFR BLD CALC: 36.1 % — SIGNIFICANT CHANGE UP (ref 34.5–45)
HGB BLD-MCNC: 11.2 G/DL — LOW (ref 11.5–15.5)
IANC: 13.47 K/UL — HIGH (ref 1.8–7.4)
IMM GRANULOCYTES NFR BLD AUTO: 0.3 % — SIGNIFICANT CHANGE UP (ref 0–0.9)
LYMPHOCYTES # BLD AUTO: 1.56 K/UL — SIGNIFICANT CHANGE UP (ref 1–3.3)
LYMPHOCYTES # BLD AUTO: 9.5 % — LOW (ref 13–44)
MAGNESIUM SERPL-MCNC: 1.5 MG/DL — LOW (ref 1.6–2.6)
MCHC RBC-ENTMCNC: 28.2 PG — SIGNIFICANT CHANGE UP (ref 27–34)
MCHC RBC-ENTMCNC: 31 G/DL — LOW (ref 32–36)
MCV RBC AUTO: 90.9 FL — SIGNIFICANT CHANGE UP (ref 80–100)
MONOCYTES # BLD AUTO: 1.3 K/UL — HIGH (ref 0–0.9)
MONOCYTES NFR BLD AUTO: 7.9 % — SIGNIFICANT CHANGE UP (ref 2–14)
NEUTROPHILS # BLD AUTO: 13.47 K/UL — HIGH (ref 1.8–7.4)
NEUTROPHILS NFR BLD AUTO: 82.2 % — HIGH (ref 43–77)
NRBC # BLD AUTO: 0 K/UL — SIGNIFICANT CHANGE UP (ref 0–0)
NRBC # FLD: 0 K/UL — SIGNIFICANT CHANGE UP (ref 0–0)
NRBC BLD AUTO-RTO: 0 /100 WBCS — SIGNIFICANT CHANGE UP (ref 0–0)
PHOSPHATE SERPL-MCNC: 4.6 MG/DL — HIGH (ref 2.5–4.5)
PLATELET # BLD AUTO: 321 K/UL — SIGNIFICANT CHANGE UP (ref 150–400)
POTASSIUM SERPL-MCNC: 4.7 MMOL/L — SIGNIFICANT CHANGE UP (ref 3.5–5.3)
POTASSIUM SERPL-SCNC: 4.7 MMOL/L — SIGNIFICANT CHANGE UP (ref 3.5–5.3)
RBC # BLD: 3.97 M/UL — SIGNIFICANT CHANGE UP (ref 3.8–5.2)
RBC # FLD: 17 % — HIGH (ref 10.3–14.5)
RH IG SCN BLD-IMP: POSITIVE — SIGNIFICANT CHANGE UP
SODIUM SERPL-SCNC: 133 MMOL/L — LOW (ref 135–145)
WBC # BLD: 16.4 K/UL — HIGH (ref 3.8–10.5)
WBC # FLD AUTO: 16.4 K/UL — HIGH (ref 3.8–10.5)

## 2025-05-07 RX ORDER — MAGNESIUM SULFATE 500 MG/ML
2 SYRINGE (ML) INJECTION ONCE
Refills: 0 | Status: COMPLETED | OUTPATIENT
Start: 2025-05-07 | End: 2025-05-07

## 2025-05-07 RX ORDER — ACETAMINOPHEN 500 MG/5ML
975 LIQUID (ML) ORAL EVERY 6 HOURS
Refills: 0 | Status: DISCONTINUED | OUTPATIENT
Start: 2025-05-07 | End: 2025-05-08

## 2025-05-07 RX ADMIN — HEPARIN SODIUM 5000 UNIT(S): 1000 INJECTION INTRAVENOUS; SUBCUTANEOUS at 12:21

## 2025-05-07 RX ADMIN — Medication 25 GRAM(S): at 12:18

## 2025-05-07 RX ADMIN — Medication 400 MILLIGRAM(S): at 06:07

## 2025-05-07 RX ADMIN — OXYCODONE HYDROCHLORIDE 5 MILLIGRAM(S): 30 TABLET ORAL at 04:01

## 2025-05-07 RX ADMIN — SODIUM CHLORIDE 125 MILLILITER(S): 9 INJECTION, SOLUTION INTRAVENOUS at 04:19

## 2025-05-07 RX ADMIN — HEPARIN SODIUM 5000 UNIT(S): 1000 INJECTION INTRAVENOUS; SUBCUTANEOUS at 04:18

## 2025-05-07 RX ADMIN — Medication 975 MILLIGRAM(S): at 18:00

## 2025-05-07 RX ADMIN — OXYCODONE HYDROCHLORIDE 5 MILLIGRAM(S): 30 TABLET ORAL at 03:31

## 2025-05-07 RX ADMIN — Medication 975 MILLIGRAM(S): at 18:27

## 2025-05-07 RX ADMIN — Medication 1000 MILLIGRAM(S): at 00:15

## 2025-05-07 RX ADMIN — OXYCODONE HYDROCHLORIDE 5 MILLIGRAM(S): 30 TABLET ORAL at 18:00

## 2025-05-07 RX ADMIN — Medication 400 MILLIGRAM(S): at 12:13

## 2025-05-07 RX ADMIN — Medication 100 MICROGRAM(S): at 06:07

## 2025-05-07 RX ADMIN — Medication 1000 MILLIGRAM(S): at 12:31

## 2025-05-07 RX ADMIN — HEPARIN SODIUM 5000 UNIT(S): 1000 INJECTION INTRAVENOUS; SUBCUTANEOUS at 21:22

## 2025-05-07 NOTE — DISCHARGE NOTE PROVIDER - NSDCMRMEDTOKEN_GEN_ALL_CORE_FT
amLODIPine 10 mg oral tablet: 1 tab(s) orally once a day (in the morning)  brimonidine 0.2% ophthalmic solution: 1 drop(s) in each affected eye 3 times a day  dorzolamide-timolol 2.23%-0.68% (2%-0.5% base) ophthalmic solution: 1 drop(s) in each eye 2 times a day  ferrous sulfate 325 mg (65 mg elemental iron) oral tablet: 1 tab(s) orally once a day  Gimoti 15 mg/actuation nasal spray: 1 spray(s) intranasally 4 times a day 30 mins before each meal  latanoprost 0.005% ophthalmic solution: 1 drop(s) in each eye once a day (in the evening)  levothyroxine 100 mcg (0.1 mg) oral tablet: 1 tab(s) orally once a day (in the morning)  metFORMIN 500 mg oral tablet: 1 tab(s) orally once a day with breakfast  Multiple Vitamins oral tablet: 1 tab(s) orally once a day LD 4/30/2025  oxyCODONE 5 mg oral tablet: 1 tab(s) orally every 6 hours as needed for -for severe pain MDD: 4  pantoprazole 40 mg oral delayed release tablet: 1 tab(s) orally once a day (in the morning)  vitamin E: 1 tab(s) orally once a day   acetaminophen 325 mg oral tablet: 3 tab(s) orally every 6 hours  amLODIPine 10 mg oral tablet: 1 tab(s) orally once a day (in the morning)  brimonidine 0.2% ophthalmic solution: 1 drop(s) in each affected eye 3 times a day  dorzolamide-timolol 2.23%-0.68% (2%-0.5% base) ophthalmic solution: 1 drop(s) in each eye 2 times a day  ferrous sulfate 325 mg (65 mg elemental iron) oral tablet: 1 tab(s) orally once a day  Gimoti 15 mg/actuation nasal spray: 1 spray(s) intranasally 4 times a day 30 mins before each meal  ibuprofen 600 mg oral tablet: 1 tab(s) orally every 6 hours  latanoprost 0.005% ophthalmic solution: 1 drop(s) in each eye once a day (in the evening)  levothyroxine 100 mcg (0.1 mg) oral tablet: 1 tab(s) orally once a day (in the morning)  metFORMIN 500 mg oral tablet: 1 tab(s) orally once a day with breakfast  Multiple Vitamins oral tablet: 1 tab(s) orally once a day LD 4/30/2025  oxyCODONE 5 mg oral tablet: 1 tab(s) orally every 6 hours as needed for -for severe pain MDD: 4  pantoprazole 40 mg oral delayed release tablet: 1 tab(s) orally once a day (in the morning)  vitamin E: 1 tab(s) orally once a day   acetaminophen 325 mg oral tablet: 3 tab(s) orally every 6 hours  amLODIPine 10 mg oral tablet: 1 tab(s) orally once a day (in the morning)  brimonidine 0.2% ophthalmic solution: 1 drop(s) in each affected eye 3 times a day  dorzolamide-timolol 2.23%-0.68% (2%-0.5% base) ophthalmic solution: 1 drop(s) in each eye 2 times a day  ferrous sulfate 325 mg (65 mg elemental iron) oral tablet: 1 tab(s) orally once a day  Gimoti 15 mg/actuation nasal spray: 1 spray(s) intranasally 4 times a day 30 mins before each meal  latanoprost 0.005% ophthalmic solution: 1 drop(s) in each eye once a day (in the evening)  levothyroxine 100 mcg (0.1 mg) oral tablet: 1 tab(s) orally once a day (in the morning)  metFORMIN 500 mg oral tablet: 1 tab(s) orally once a day with breakfast  Multiple Vitamins oral tablet: 1 tab(s) orally once a day LD 4/30/2025  oxyCODONE 5 mg oral tablet: 1 tab(s) orally every 6 hours as needed for -for severe pain MDD: 4  pantoprazole 40 mg oral delayed release tablet: 1 tab(s) orally once a day (in the morning)  vitamin E: 1 tab(s) orally once a day

## 2025-05-07 NOTE — DISCHARGE NOTE PROVIDER - HOSPITAL COURSE
Patient was admitted for surgery and underwent a robotic-assisted bilateral salpingoophorectomy, tumor debulking, mini lap, argon beam coagulation with frozen pathology of adenocarcinoma.   On POD#1 patient was ambulating, tolerating PO, voiding spontaneously. Pain was well-controlled. Patient was admitted for surgery and underwent a robotic-assisted bilateral salpingoophorectomy, tumor debulking, mini lap, argon beam coagulation with frozen pathology of adenocarcinoma.   On POD#1 patient was ambulating, tolerating PO, voiding spontaneously. Pain was well-controlled.  On POD#2 patient was meeting all post-operative milestones, ambulating, tolerating PO, voiding spontaneously, with pain well-controlled. Patient was admitted for surgery and underwent a robotic-assisted bilateral salpingoophorectomy, tumor debulking, mini lap, argon beam coagulation with frozen pathology of adenocarcinoma.   On POD#1 patient was ambulating, tolerating PO, voiding spontaneously. Pain was well-controlled.  On POD#2 patient was meeting all post-operative milestones, ambulating, tolerating PO, voiding spontaneously, with pain well-controlled.

## 2025-05-07 NOTE — DISCHARGE NOTE PROVIDER - NSDCFUSCHEDAPPT_GEN_ALL_CORE_FT
[facility]  [facility]  Scheduled Appointment: 05/16/2025    Juany Beckham  [facility]  [facility]  Scheduled Appointment: 05/16/2025    [facility]  [facility]  Scheduled Appointment: 05/19/2025    Juany Beckham  [facility]  [facility]  Scheduled Appointment: 05/19/2025    Cara Tavarez  [facility]  [facility] [address]  Scheduled Appointment: 05/19/2025    [facility]  [facility]  Scheduled Appointment: 05/21/2025    Junay Beckham  [facility]  [facility]  Scheduled Appointment: 05/21/2025     [facility]  [facility]  Scheduled Appointment: 05/16/2025    Juany Beckham  [facility]  [facility]  Scheduled Appointment: 05/16/2025    Cara Tavarez  [facility]  [facility] [address]  Scheduled Appointment: 05/19/2025    [facility]  [facility]  Scheduled Appointment: 05/19/2025    Juany Beckham  [facility]  [facility]  Scheduled Appointment: 05/19/2025    [facility]  [facility]  Scheduled Appointment: 05/21/2025    Juany Beckham  [facility]  [facility]  Scheduled Appointment: 05/21/2025

## 2025-05-07 NOTE — DISCHARGE NOTE PROVIDER - NSDCACTIVITY_GEN_ALL_CORE
Walking - Indoors allowed/No heavy lifting/straining/Walking - Outdoors allowed Showering allowed/Stairs allowed/Walking - Indoors allowed/No heavy lifting/straining/Walking - Outdoors allowed/Activity as tolerated

## 2025-05-07 NOTE — DISCHARGE NOTE PROVIDER - NSDCCPCAREPLAN_GEN_ALL_CORE_FT
PRINCIPAL DISCHARGE DIAGNOSIS  Diagnosis: Multiple lesions of metastatic malignancy  Assessment and Plan of Treatment:

## 2025-05-07 NOTE — CHART NOTE - NSCHARTNOTEFT_GEN_A_CORE
Pt seen at bedside for PM eval.    Pt reports she has abdominal pain which improves with pain medications. Has been eating and drinking normally. Voiding spontaneously. Ambulated in the hallway today. Denies passing flatus.     PE with +appropriately tender abd upon palpation, no rebound, no guarding    Plan:  Continue routine post op care  Lopressor PRN for HTN  ISS for DM2  F/u PT recs    Pt seen with GYN ONC team  David Cleaning PGY2 Pt seen at bedside for PM eval.    Pt reports she has abdominal pain which improves with pain medications. Has been eating and drinking normally. Voiding spontaneously. Ambulated in the hallway today. Denies passing flatus.     PE with +appropriately tender abd upon palpation, no rebound, no guarding    Plan:  Continue routine post op care  Lopressor PRN for HTN  ISS for DM2    Pt seen with GYN ONC team  David Cleaning PGY2

## 2025-05-07 NOTE — DISCHARGE NOTE PROVIDER - PROVIDER TOKENS
PROVIDER:[TOKEN:[[id number]],SCHEDULEDAPPT:[05/19/2025],SCHEDULEDAPPTTIME:[03:00 PM],ESTABLISHEDPATIENT:[T]]

## 2025-05-07 NOTE — PHYSICAL THERAPY INITIAL EVALUATION ADULT - PERTINENT HX OF CURRENT PROBLEM, REHAB EVAL
As per chart review, " 79y yo s/p RA BSO, tumor debulking, omentectomy, mini lap, argon beam for left ovarian cyst(Frozen: adenocarcinoma) recovering well post-operatively. Pt vitally stable. "

## 2025-05-07 NOTE — DISCHARGE NOTE PROVIDER - CARE PROVIDER_API CALL
Cara Tavarez  Gynecologic Oncology  [address] [place] [id number]  Phone: [phone]  Fax: [phone]  Established Patient  Scheduled Appointment: 05/19/2025 03:00 PM

## 2025-05-07 NOTE — PROGRESS NOTE ADULT - NSPROGADDITIONALINFOA_GEN_ALL_CORE
Fellow addendum    VS & labs reviewed  H/H stable  Pain well controlled, transition to PO meds  Tolerating reg diet, not yet passing flatus. Abd soft, ND. Minilap and LSC incision C/D/I  UOP adequate, plan for TOV  Encouraged ambulation & ISS  PT eval  DVT ppx: SQH, SCDs    Dispo: cont routine postop care    Tammy Bean MD

## 2025-05-07 NOTE — PHYSICAL THERAPY INITIAL EVALUATION ADULT - ADDITIONAL COMMENTS
Pt states she lives with her daughter in an apartment +elevator. Prior to admission, pt was ambulating independently without any devices.   Post PT evaluation, pt left seated in chair, call bell and remote within reach, all precautions maintained, NAD. RN aware.

## 2025-05-07 NOTE — PHYSICAL THERAPY INITIAL EVALUATION ADULT - LEVEL OF INDEPENDENCE: GAIT, REHAB EVAL
50 feet using rolling walker then 50 feet with no assistive devices, occasionally holding hand rail in hallway/contact guard/stand-by assist

## 2025-05-07 NOTE — PROGRESS NOTE ADULT - ASSESSMENT
79y yo s/p RA BSO, tumor debulking, omentectomy, mini lap, argon beam for left ovarian cyst(Frozen: adenocarcinoma) recovering well post-operatively. Pt vitally stable.     Neuro: IV Tylenol for pain control overnight will transition to PO pain medication  CV: Hemodynamically stable  Hct: 31.8-> f/u AM CBC  -Lopressor PRN  Pulm: Saturating well on RA. Increase incentive spirometry.  GI: Regular diet   : Mares in place. Adequate UOP- Removal pending AM labs   Heme: Continue HSQ/Venodynes for DVT ppx. Increase OOB.    ID: Afebrile  Endo: ISS  -Continue home Levothyroxine   Dispo: continue inpatient care    Angela Vaughn, PGY1   Pt seen with Gyn Onc team           79y yo s/p RA BSO, tumor debulking, omentectomy, mini lap, argon beam for left ovarian cyst(Frozen: adenocarcinoma) recovering well post-operatively. Pt vitally stable.     Neuro: IV Tylenol for pain control overnight will transition to PO pain medication when tolerating PO  CV: Hemodynamically stable  Hct: 31.8-> f/u AM CBC  -Lopressor PRN  Pulm: Saturating well on RA. Increase incentive spirometry.  GI: Regular diet   : Mares in place. Adequate UOP- Removal pending AM labs   Heme: Continue HSQ/Venodynes for DVT ppx. Increase OOB.    ID: Afebrile  Endo: ISS  -Continue home Levothyroxine   Dispo: continue inpatient care    Angela Vaughn, PGY1   Pt seen with Gyn Onc team

## 2025-05-07 NOTE — DISCHARGE NOTE PROVIDER - NSDCCPTREATMENT_GEN_ALL_CORE_FT
PRINCIPAL PROCEDURE  Procedure: Robot-assisted bilateral salpingo-oophorectomy (BSO) using da Nile Xi  Findings and Treatment:       SECONDARY PROCEDURE  Procedure: Debulking of tumor  Findings and Treatment:

## 2025-05-07 NOTE — DISCHARGE NOTE PROVIDER - NSDCFUADDINST_GEN_ALL_CORE_FT
Return to your regular way of eating.  Resume normal activity as tolerated, but no heavy lifting or strenuous activity for 6 weeks.  No driving for next 2 weeks and/or while on narcotic pain medication.  Complete vaginal rest, no tampons, no douching, no tub bathing, no sexual activities for 6 weeks unless otherwise instructed by your doctor.  Call your doctor with any signs and symptoms of infection such as fever, chills, nausea or vomiting.  Call your doctor with redness or swelling at the incision site, fluid leakage or wound separation.  Call your doctor if you're unable to tolerate food or have difficulty urinating.  Call your doctor if you have pain that is not relieved by your prescribed medications.  Notify your doctor with any other concerns.  Follow up with Dr. Tavarez.

## 2025-05-08 ENCOUNTER — NON-APPOINTMENT (OUTPATIENT)
Age: 80
End: 2025-05-08

## 2025-05-08 ENCOUNTER — TRANSCRIPTION ENCOUNTER (OUTPATIENT)
Age: 80
End: 2025-05-08

## 2025-05-08 VITALS
SYSTOLIC BLOOD PRESSURE: 139 MMHG | HEART RATE: 88 BPM | OXYGEN SATURATION: 100 % | DIASTOLIC BLOOD PRESSURE: 79 MMHG | TEMPERATURE: 98 F | RESPIRATION RATE: 18 BRPM

## 2025-05-08 LAB
GLUCOSE BLDC GLUCOMTR-MCNC: 86 MG/DL — SIGNIFICANT CHANGE UP (ref 70–99)
GLUCOSE BLDC GLUCOMTR-MCNC: 99 MG/DL — SIGNIFICANT CHANGE UP (ref 70–99)

## 2025-05-08 RX ORDER — AMLODIPINE BESYLATE 10 MG/1
10 TABLET ORAL DAILY
Refills: 0 | Status: DISCONTINUED | OUTPATIENT
Start: 2025-05-08 | End: 2025-05-08

## 2025-05-08 RX ORDER — ACETAMINOPHEN 500 MG/5ML
3 LIQUID (ML) ORAL
Qty: 0 | Refills: 0 | DISCHARGE
Start: 2025-05-08

## 2025-05-08 RX ORDER — IBUPROFEN 200 MG
1 TABLET ORAL
Qty: 0 | Refills: 0 | DISCHARGE

## 2025-05-08 RX ORDER — ENOXAPARIN SODIUM 100 MG/ML
40 INJECTION SUBCUTANEOUS EVERY 24 HOURS
Refills: 0 | Status: DISCONTINUED | OUTPATIENT
Start: 2025-05-08 | End: 2025-05-08

## 2025-05-08 RX ADMIN — HEPARIN SODIUM 5000 UNIT(S): 1000 INJECTION INTRAVENOUS; SUBCUTANEOUS at 05:23

## 2025-05-08 RX ADMIN — Medication 975 MILLIGRAM(S): at 11:55

## 2025-05-08 RX ADMIN — ENOXAPARIN SODIUM 40 MILLIGRAM(S): 100 INJECTION SUBCUTANEOUS at 13:12

## 2025-05-08 RX ADMIN — Medication 100 MICROGRAM(S): at 05:23

## 2025-05-08 RX ADMIN — AMLODIPINE BESYLATE 10 MILLIGRAM(S): 10 TABLET ORAL at 09:22

## 2025-05-08 RX ADMIN — Medication 975 MILLIGRAM(S): at 05:22

## 2025-05-08 RX ADMIN — Medication 975 MILLIGRAM(S): at 01:00

## 2025-05-08 RX ADMIN — Medication 975 MILLIGRAM(S): at 00:08

## 2025-05-08 NOTE — PROGRESS NOTE ADULT - NSPROGADDITIONALINFOA_GEN_ALL_CORE
Fellow addendum    VS reviewed  Pain well controlled, cont current regimen  Tolerating reg diet, + flatus. Abd soft, ND. Incision C/D/I  voiding spontaneously  Encouraged ambulation & ISS  DVT ppx: SQH, SCDs    Dispo: discharge    Tammy Bean MD Fellow addendum    VS reviewed  Pain well controlled, cont current regimen  Tolerating reg diet, + flatus. Abd soft, ND. Incision C/D/I  voiding spontaneously  Encouraged ambulation & ISS  DVT ppx: SQH, SCDs    Dispo: discharge    Tammy Bean MD    AWM: D/W F. Labs/Flow reviewed. Resting comfortably. Agree with plan.

## 2025-05-08 NOTE — PROGRESS NOTE ADULT - ASSESSMENT
79y yo POD#2 s/p RA BSO, tumor debulking, omentectomy, mini lap, argon beam for left ovarian cyst (Frozen: adenocarcinoma) recovering well post-operatively, however not yet passing flatus. H/o with hypertension to SBPs 160s overnight; receiving Lopressor PRN.    Neuro: PO pain medications  CV: Hemodynamically stable  H/H stable  -Known HTN - SBPs to 160s overnight  -Lopressor PRN  Pulm: Saturating well on RA. Increase incentive spirometry.  GI: CC-Regular diet   : Voiding spontaneously   Heme: Continue HSQ/Venodynes for DVT ppx. Increase OOB.    ID: Afebrile  Endo: ISS  -Continue home Levothyroxine   Dispo: continue inpatient care; discharge planning    Pt seen with GYN ONC team  David Cleaning PGY2 79y yo POD#2 s/p RA BSO, tumor debulking, omentectomy, mini lap, argon beam for left ovarian cyst (Frozen: adenocarcinoma) recovering well post-operatively, however not yet passing flatus. H/o HTN with hypertension to SBPs 160s overnight; receiving Lopressor PRN.    Neuro: PO pain medications  CV: Hemodynamically stable  H/H stable  -Known HTN - SBPs to 160s overnight  -Lopressor PRN  Pulm: Saturating well on RA. Increase incentive spirometry.  GI: CC-Regular diet   : Voiding spontaneously   Heme: Continue HSQ/Venodynes for DVT ppx. Increase OOB.    ID: Afebrile  Endo: ISS  -Continue home Levothyroxine   Dispo: continue inpatient care; discharge planning    Pt seen with GYN ONC team  David Cleaning PGY2 79y yo POD#2 s/p RA BSO, tumor debulking, omentectomy, mini lap, argon beam for left ovarian cyst (frozen = adenocarcinoma) recovering well post-operatively, however not yet passing flatus. H/o HTN with hypertension to SBPs 160s overnight; receiving Lopressor PRN.    Neuro: PO pain medications  CV: Hemodynamically stable. H/H stable.  - HTN: start home Amlodipine 10mg QD  Pulm: Saturating well on RA. Increase incentive spirometry.  GI: Regular diet.  : Voiding spontaneously.  Heme: Continue HSQ/Venodynes for DVT ppx. Increase OOB.    ID: Afebrile.  Endo: Hx hypothyroidism.  -C/w home Levothyroxine   Dispo: continue inpatient care; discharge planning.    Pt seen with GYN ONC team  David Cleaning PGY2

## 2025-05-08 NOTE — DISCHARGE NOTE NURSING/CASE MANAGEMENT/SOCIAL WORK - PATIENT PORTAL LINK FT
You can access the FollowMyHealth Patient Portal offered by [facility] by registering at the following website: [email or web address]. By joining [employer]’s FollowMyHealth portal, you will also be able to view your health information using other applications (apps) compatible with our system.

## 2025-05-08 NOTE — PROGRESS NOTE ADULT - SUBJECTIVE AND OBJECTIVE BOX
R2 Gyn ONC Progress Note POD#2  HD#3    Subjective:   Pt seen and examined at bedside. No events overnight. Pain well controlled. Patient ambulating. Not yet passing flatus. Endorses decreased appetite however able to tolerate reg diet - ate potatoes for dinner last night. Pt denies fever, chills, chest pain, SOB, nausea, vomiting, lightheadedness, dizziness.      Objective:  T(F): 98.1 (05-08-25 @ 04:21), Max: 98.4 (05-07-25 @ 16:51)  HR: 102 (05-08-25 @ 04:21) (82 - 102)  BP: 162/70 (05-08-25 @ 04:21) (144/69 - 167/90)  RR: 19 (05-08-25 @ 04:21) (18 - 19)  SpO2: 100% (05-08-25 @ 04:21) (99% - 100%)  Wt(kg): --  I&O's Summary    06 May 2025 07:01  -  07 May 2025 07:00  --------------------------------------------------------  IN: 1445 mL / OUT: 550 mL / NET: 895 mL    07 May 2025 07:01  -  08 May 2025 06:32  --------------------------------------------------------  IN: 440 mL / OUT: 2450 mL / NET: -2010 mL      CAPILLARY BLOOD GLUCOSE      POCT Blood Glucose.: 112 mg/dL (07 May 2025 21:20)  POCT Blood Glucose.: 116 mg/dL (07 May 2025 17:04)  POCT Blood Glucose.: 133 mg/dL (07 May 2025 12:23)  POCT Blood Glucose.: 117 mg/dL (07 May 2025 08:32)      MEDICATIONS  (STANDING):  acetaminophen     Tablet .. 975 milliGRAM(s) Oral every 6 hours  dextrose 5%. 1000 milliLiter(s) (100 mL/Hr) IV Continuous <Continuous>  dextrose 5%. 1000 milliLiter(s) (50 mL/Hr) IV Continuous <Continuous>  dextrose 50% Injectable 25 Gram(s) IV Push once  dextrose 50% Injectable 12.5 Gram(s) IV Push once  dextrose 50% Injectable 25 Gram(s) IV Push once  glucagon  Injectable 1 milliGRAM(s) IntraMuscular once  heparin   Injectable 5000 Unit(s) SubCutaneous every 8 hours  insulin lispro (ADMELOG) corrective regimen sliding scale   SubCutaneous three times a day before meals  insulin lispro (ADMELOG) corrective regimen sliding scale   SubCutaneous at bedtime  levothyroxine 100 MICROGram(s) Oral daily  Metoclopramide Nasal Spray 15mg 1 Spray(s) 1 Spray(s) Nasal every 8 hours    MEDICATIONS  (PRN):  dextrose Oral Gel 15 Gram(s) Oral once PRN Blood Glucose LESS THAN 70 milliGRAM(s)/deciliter  metoprolol tartrate Injectable 5 milliGRAM(s) IV Push every 6 hours PRN Systolic BP>160, Diastolic BP>110  ondansetron    Tablet 8 milliGRAM(s) Oral every 8 hours PRN Nausea and/or Vomiting  oxyCODONE    IR 5 milliGRAM(s) Oral every 6 hours PRN Severe Pain (7 - 10)  senna 1 Tablet(s) Oral at bedtime PRN Constipation  simethicone 80 milliGRAM(s) Chew every 6 hours PRN Gas      Physical Exam:  Constitutional: NAD, A+O x3  CV: RR  Lungs: CTA b/l, good air flow b/l  Abdomen: soft, non-distended, appropriately-tender. no guarding, no rebound, normal bowel sounds  Incision: midline vertical mini lap with lsc ports clean, dry, intact  Extremities: no lower extremity edema or calf tenderness bilaterally      LABS:               11.2   16.40 )-----------( 321      ( 05-07 @ 05:00 )             36.1       05-07    133[L]  |  101    |  18     ----------------------------<  105[H]  4.7     |  15[L]  |  1.18     Ca    9.3        07 May 2025 05:00  Phos  4.6       05-07  Mg     1.50      05-07            Urinalysis Basic - ( 07 May 2025 05:00 )    Color: x / Appearance: x / SG: x / pH: x  Gluc: 105 mg/dL / Ketone: x  / Bili: x / Urobili: x   Blood: x / Protein: x / Nitrite: x   Leuk Esterase: x / RBC: x / WBC x   Sq Epi: x / Non Sq Epi: x / Bacteria: x                
R1 Gyn ONC Progress Note POD#1  HD#2    Subjective:   Pt seen and examined at bedside. No events overnight. Pain well controlled. Patient ambulating. Denies flatus. Tolerating liquids, has not yet eaten. Pt denies fever, chills, chest pain, SOB, nausea, vomiting, lightheadedness, dizziness.      Objective:  T(F): 97.8 (05-07-25 @ 05:23), Max: 98.2 (05-06-25 @ 20:35)  HR: 80 (05-07-25 @ 05:23) (68 - 90)  BP: 153/78 (05-07-25 @ 05:23) (118/81 - 153/78)  RR: 18 (05-07-25 @ 05:23) (10 - 18)  SpO2: 95% (05-07-25 @ 05:23) (90% - 100%)  Wt(kg): --  I&O's Summary    06 May 2025 07:01  -  07 May 2025 06:46  --------------------------------------------------------  IN: 1445 mL / OUT: 550 mL / NET: 895 mL      CAPILLARY BLOOD GLUCOSE      POCT Blood Glucose.: 140 mg/dL (06 May 2025 23:21)  POCT Blood Glucose.: 158 mg/dL (06 May 2025 20:37)  POCT Blood Glucose.: 82 mg/dL (06 May 2025 11:11)      MEDICATIONS  (STANDING):  acetaminophen   IVPB .. 1000 milliGRAM(s) IV Intermittent every 6 hours  dextrose 5%. 1000 milliLiter(s) (100 mL/Hr) IV Continuous <Continuous>  dextrose 5%. 1000 milliLiter(s) (50 mL/Hr) IV Continuous <Continuous>  dextrose 50% Injectable 25 Gram(s) IV Push once  dextrose 50% Injectable 12.5 Gram(s) IV Push once  dextrose 50% Injectable 25 Gram(s) IV Push once  glucagon  Injectable 1 milliGRAM(s) IntraMuscular once  heparin   Injectable 5000 Unit(s) SubCutaneous every 8 hours  insulin lispro (ADMELOG) corrective regimen sliding scale   SubCutaneous three times a day before meals  insulin lispro (ADMELOG) corrective regimen sliding scale   SubCutaneous at bedtime  lactated ringers. 1000 milliLiter(s) (125 mL/Hr) IV Continuous <Continuous>  levothyroxine 100 MICROGram(s) Oral daily    MEDICATIONS  (PRN):  dextrose Oral Gel 15 Gram(s) Oral once PRN Blood Glucose LESS THAN 70 milliGRAM(s)/deciliter  metoprolol tartrate Injectable 5 milliGRAM(s) IV Push every 6 hours PRN Systolic BP>160, Diastolic BP>110  ondansetron    Tablet 8 milliGRAM(s) Oral every 8 hours PRN Nausea and/or Vomiting  oxyCODONE    IR 5 milliGRAM(s) Oral every 6 hours PRN Severe Pain (7 - 10)  senna 1 Tablet(s) Oral at bedtime PRN Constipation  simethicone 80 milliGRAM(s) Chew every 6 hours PRN Gas      Physical Exam:  Constitutional: NAD, A+O x3  CV: RR  Lungs: CTA b/l, good air flow b/l  Abdomen: soft, non-distended, appropriately-tender. no guarding, no rebound, normal bowel sounds  Incision: midline vertical +ports clean, dry, intact  Extremities: no lower extremity edema or calf tenderness bilaterally    LABS:

## 2025-05-08 NOTE — DISCHARGE NOTE NURSING/CASE MANAGEMENT/SOCIAL WORK - FINANCIAL ASSISTANCE
[facility] provides services at a reduced cost to those who are determined to be eligible through [facility]’s financial assistance program. Information regarding [facility]’s financial assistance program can be found by going to [email or web address] or by calling [phone].

## 2025-05-08 NOTE — DISCHARGE NOTE NURSING/CASE MANAGEMENT/SOCIAL WORK - NSDCPNINST_GEN_ALL_CORE
Please call provider for a temperature of 100.4F or greater, chills, drainage at incision site, pain not relieved by pain medicine, or nausea/vomiting.

## 2025-05-10 ENCOUNTER — OUTPATIENT (OUTPATIENT)
Dept: OUTPATIENT SERVICES | Facility: HOSPITAL | Age: 80
LOS: 1 days | Discharge: HOME | End: 2025-05-10

## 2025-05-10 DIAGNOSIS — Z98.890 OTHER SPECIFIED POSTPROCEDURAL STATES: Chronic | ICD-10-CM

## 2025-05-10 DIAGNOSIS — D64.9 ANEMIA, UNSPECIFIED: ICD-10-CM

## 2025-05-10 DIAGNOSIS — Z90.710 ACQUIRED ABSENCE OF BOTH CERVIX AND UTERUS: Chronic | ICD-10-CM

## 2025-05-10 DIAGNOSIS — Z87.59 PERSONAL HISTORY OF OTHER COMPLICATIONS OF PREGNANCY, CHILDBIRTH AND THE PUERPERIUM: Chronic | ICD-10-CM

## 2025-05-16 ENCOUNTER — APPOINTMENT (OUTPATIENT)
Dept: HEMATOLOGY ONCOLOGY | Facility: CLINIC | Age: 80
End: 2025-05-16

## 2025-05-16 ENCOUNTER — NON-APPOINTMENT (OUTPATIENT)
Age: 80
End: 2025-05-16

## 2025-05-16 LAB — SURGICAL PATHOLOGY STUDY: SIGNIFICANT CHANGE UP

## 2025-05-18 PROBLEM — C78.6 MALIGNANT NEOPLASM METASTATIC TO PERITONEUM: Status: ACTIVE | Noted: 2025-05-18

## 2025-05-19 ENCOUNTER — RESULT REVIEW (OUTPATIENT)
Age: 80
End: 2025-05-19

## 2025-05-19 ENCOUNTER — APPOINTMENT (OUTPATIENT)
Dept: GYNECOLOGIC ONCOLOGY | Facility: CLINIC | Age: 80
End: 2025-05-19
Payer: MEDICARE

## 2025-05-19 ENCOUNTER — APPOINTMENT (OUTPATIENT)
Dept: HEMATOLOGY ONCOLOGY | Facility: CLINIC | Age: 80
End: 2025-05-19
Payer: MEDICARE

## 2025-05-19 ENCOUNTER — NON-APPOINTMENT (OUTPATIENT)
Age: 80
End: 2025-05-19

## 2025-05-19 VITALS
WEIGHT: 126 LBS | HEART RATE: 71 BPM | HEIGHT: 60 IN | TEMPERATURE: 97.7 F | DIASTOLIC BLOOD PRESSURE: 77 MMHG | SYSTOLIC BLOOD PRESSURE: 135 MMHG | OXYGEN SATURATION: 99 % | RESPIRATION RATE: 16 BRPM | BODY MASS INDEX: 24.74 KG/M2

## 2025-05-19 DIAGNOSIS — D50.0 IRON DEFICIENCY ANEMIA SECONDARY TO BLOOD LOSS (CHRONIC): ICD-10-CM

## 2025-05-19 DIAGNOSIS — C80.1 MALIGNANT (PRIMARY) NEOPLASM, UNSPECIFIED: ICD-10-CM

## 2025-05-19 LAB
BASOPHILS # BLD AUTO: 0.02 K/UL — SIGNIFICANT CHANGE UP (ref 0–0.2)
BASOPHILS NFR BLD AUTO: 0.3 % — SIGNIFICANT CHANGE UP (ref 0–2)
EOSINOPHIL # BLD AUTO: 0.11 K/UL — SIGNIFICANT CHANGE UP (ref 0–0.5)
EOSINOPHIL NFR BLD AUTO: 1.6 % — SIGNIFICANT CHANGE UP (ref 0–6)
HCT VFR BLD CALC: 28.5 % — LOW (ref 34.5–45)
HGB BLD-MCNC: 9.1 G/DL — LOW (ref 11.5–15.5)
IMM GRANULOCYTES NFR BLD AUTO: 0.4 % — SIGNIFICANT CHANGE UP (ref 0–0.9)
LYMPHOCYTES # BLD AUTO: 2.08 K/UL — SIGNIFICANT CHANGE UP (ref 1–3.3)
LYMPHOCYTES # BLD AUTO: 29.4 % — SIGNIFICANT CHANGE UP (ref 13–44)
MCHC RBC-ENTMCNC: 28.5 PG — SIGNIFICANT CHANGE UP (ref 27–34)
MCHC RBC-ENTMCNC: 31.9 G/DL — LOW (ref 32–36)
MCV RBC AUTO: 89.3 FL — SIGNIFICANT CHANGE UP (ref 80–100)
MONOCYTES # BLD AUTO: 0.54 K/UL — SIGNIFICANT CHANGE UP (ref 0–0.9)
MONOCYTES NFR BLD AUTO: 7.6 % — SIGNIFICANT CHANGE UP (ref 2–14)
NEUTROPHILS # BLD AUTO: 4.29 K/UL — SIGNIFICANT CHANGE UP (ref 1.8–7.4)
NEUTROPHILS NFR BLD AUTO: 60.7 % — SIGNIFICANT CHANGE UP (ref 43–77)
NRBC BLD AUTO-RTO: 0 /100 WBCS — SIGNIFICANT CHANGE UP (ref 0–0)
PLATELET # BLD AUTO: 556 K/UL — HIGH (ref 150–400)
RBC # BLD: 3.19 M/UL — LOW (ref 3.8–5.2)
RBC # FLD: 16.9 % — HIGH (ref 10.3–14.5)
RETICS #: 48.8 K/UL — SIGNIFICANT CHANGE UP (ref 25–125)
RETICS/RBC NFR: 1.5 % — SIGNIFICANT CHANGE UP (ref 0.5–2.5)
WBC # BLD: 7.07 K/UL — SIGNIFICANT CHANGE UP (ref 3.8–10.5)
WBC # FLD AUTO: 7.07 K/UL — SIGNIFICANT CHANGE UP (ref 3.8–10.5)

## 2025-05-19 PROCEDURE — 99205 OFFICE O/P NEW HI 60 MIN: CPT

## 2025-05-19 PROCEDURE — 99212 OFFICE O/P EST SF 10 MIN: CPT | Mod: 24

## 2025-05-19 PROCEDURE — G2211 COMPLEX E/M VISIT ADD ON: CPT

## 2025-05-20 ENCOUNTER — APPOINTMENT (OUTPATIENT)
Dept: NUCLEAR MEDICINE | Facility: CLINIC | Age: 80
End: 2025-05-20
Payer: MEDICARE

## 2025-05-20 ENCOUNTER — NON-APPOINTMENT (OUTPATIENT)
Age: 80
End: 2025-05-20

## 2025-05-20 LAB
ALBUMIN SERPL ELPH-MCNC: 4.3 G/DL
ALP BLD-CCNC: 57 U/L
ALT SERPL-CCNC: 8 U/L
ANION GAP SERPL CALC-SCNC: 14 MMOL/L
AST SERPL-CCNC: 14 U/L
BILIRUB SERPL-MCNC: 0.2 MG/DL
BUN SERPL-MCNC: 12 MG/DL
CALCIUM SERPL-MCNC: 9.9 MG/DL
CHLORIDE SERPL-SCNC: 104 MMOL/L
CO2 SERPL-SCNC: 22 MMOL/L
CREAT SERPL-MCNC: 1.23 MG/DL
EGFRCR SERPLBLD CKD-EPI 2021: 44 ML/MIN/1.73M2
FERRITIN SERPL-MCNC: 101 NG/ML
GLUCOSE SERPL-MCNC: 108 MG/DL
IRON SATN MFR SERPL: 18 %
IRON SERPL-MCNC: 53 UG/DL
POTASSIUM SERPL-SCNC: 5 MMOL/L
PROT SERPL-MCNC: 6.9 G/DL
SODIUM SERPL-SCNC: 140 MMOL/L
TIBC SERPL-MCNC: 291 UG/DL
UIBC SERPL-MCNC: 238 UG/DL

## 2025-05-20 PROCEDURE — A9552: CPT

## 2025-05-20 PROCEDURE — 78815 PET IMAGE W/CT SKULL-THIGH: CPT | Mod: PI

## 2025-05-21 ENCOUNTER — APPOINTMENT (OUTPATIENT)
Dept: HEMATOLOGY ONCOLOGY | Facility: CLINIC | Age: 80
End: 2025-05-21

## 2025-05-21 ENCOUNTER — NON-APPOINTMENT (OUTPATIENT)
Age: 80
End: 2025-05-21

## 2025-05-21 LAB — NON-GYNECOLOGICAL CYTOLOGY STUDY: SIGNIFICANT CHANGE UP

## 2025-05-22 ENCOUNTER — RESULT REVIEW (OUTPATIENT)
Age: 80
End: 2025-05-22

## 2025-05-22 ENCOUNTER — APPOINTMENT (OUTPATIENT)
Dept: HEMATOLOGY ONCOLOGY | Facility: CLINIC | Age: 80
End: 2025-05-22
Payer: MEDICARE

## 2025-05-22 VITALS
RESPIRATION RATE: 17 BRPM | OXYGEN SATURATION: 97 % | TEMPERATURE: 98 F | WEIGHT: 126 LBS | HEIGHT: 61 IN | BODY MASS INDEX: 23.79 KG/M2 | SYSTOLIC BLOOD PRESSURE: 197 MMHG | DIASTOLIC BLOOD PRESSURE: 63 MMHG | HEART RATE: 72 BPM

## 2025-05-22 DIAGNOSIS — D50.9 IRON DEFICIENCY ANEMIA, UNSPECIFIED: ICD-10-CM

## 2025-05-22 DIAGNOSIS — C78.6 SECONDARY MALIGNANT NEOPLASM OF RETROPERITONEUM AND PERITONEUM: ICD-10-CM

## 2025-05-22 DIAGNOSIS — Z80.3 FAMILY HISTORY OF MALIGNANT NEOPLASM OF BREAST: ICD-10-CM

## 2025-05-22 DIAGNOSIS — E11.9 TYPE 2 DIABETES MELLITUS W/OUT COMPLICATIONS: ICD-10-CM

## 2025-05-22 DIAGNOSIS — E03.9 HYPOTHYROIDISM, UNSPECIFIED: ICD-10-CM

## 2025-05-22 DIAGNOSIS — K31.84 GASTROPARESIS: ICD-10-CM

## 2025-05-22 LAB
BASOPHILS # BLD AUTO: 0.03 K/UL — SIGNIFICANT CHANGE UP (ref 0–0.2)
BASOPHILS NFR BLD AUTO: 0.4 % — SIGNIFICANT CHANGE UP (ref 0–2)
EOSINOPHIL # BLD AUTO: 0.17 K/UL — SIGNIFICANT CHANGE UP (ref 0–0.5)
EOSINOPHIL NFR BLD AUTO: 2 % — SIGNIFICANT CHANGE UP (ref 0–6)
HCT VFR BLD CALC: 28.4 % — LOW (ref 34.5–45)
HGB BLD-MCNC: 9.1 G/DL — LOW (ref 11.5–15.5)
IMM GRANULOCYTES NFR BLD AUTO: 0.4 % — SIGNIFICANT CHANGE UP (ref 0–0.9)
LYMPHOCYTES # BLD AUTO: 2.28 K/UL — SIGNIFICANT CHANGE UP (ref 1–3.3)
LYMPHOCYTES # BLD AUTO: 27.1 % — SIGNIFICANT CHANGE UP (ref 13–44)
MCHC RBC-ENTMCNC: 28.4 PG — SIGNIFICANT CHANGE UP (ref 27–34)
MCHC RBC-ENTMCNC: 32 G/DL — SIGNIFICANT CHANGE UP (ref 32–36)
MCV RBC AUTO: 88.8 FL — SIGNIFICANT CHANGE UP (ref 80–100)
MONOCYTES # BLD AUTO: 0.66 K/UL — SIGNIFICANT CHANGE UP (ref 0–0.9)
MONOCYTES NFR BLD AUTO: 7.9 % — SIGNIFICANT CHANGE UP (ref 2–14)
NEUTROPHILS # BLD AUTO: 5.23 K/UL — SIGNIFICANT CHANGE UP (ref 1.8–7.4)
NEUTROPHILS NFR BLD AUTO: 62.2 % — SIGNIFICANT CHANGE UP (ref 43–77)
NRBC BLD AUTO-RTO: 0 /100 WBCS — SIGNIFICANT CHANGE UP (ref 0–0)
PLATELET # BLD AUTO: 508 K/UL — HIGH (ref 150–400)
RBC # BLD: 3.2 M/UL — LOW (ref 3.8–5.2)
RBC # FLD: 16.6 % — HIGH (ref 10.3–14.5)
WBC # BLD: 8.4 K/UL — SIGNIFICANT CHANGE UP (ref 3.8–10.5)
WBC # FLD AUTO: 8.4 K/UL — SIGNIFICANT CHANGE UP (ref 3.8–10.5)

## 2025-05-22 PROCEDURE — 99215 OFFICE O/P EST HI 40 MIN: CPT

## 2025-05-23 ENCOUNTER — NON-APPOINTMENT (OUTPATIENT)
Age: 80
End: 2025-05-23

## 2025-05-23 LAB
APTT BLD: 37.1 SEC
CANCER AG19-9 SERPL-ACNC: <2 U/ML
CEA SERPL-MCNC: 1.3 NG/ML
HBV CORE IGG+IGM SER QL: NONREACTIVE
HBV CORE IGM SER QL: NONREACTIVE
HBV SURFACE AB SER QL: NONREACTIVE
HBV SURFACE AG SER QL: NONREACTIVE
HCV AB SER QL: NONREACTIVE
HCV S/CO RATIO: 0.12 S/CO
INR PPP: 0.99 RATIO
PT BLD: 11.7 SEC
STFR SERPL-MCNC: 21.5 NMOL/L

## 2025-05-29 ENCOUNTER — LABORATORY RESULT (OUTPATIENT)
Age: 80
End: 2025-05-29

## 2025-05-31 ENCOUNTER — INPATIENT (INPATIENT)
Facility: HOSPITAL | Age: 80
LOS: 10 days | Discharge: HOME | DRG: 390 | End: 2025-06-11
Attending: SURGERY | Admitting: SURGERY
Payer: COMMERCIAL

## 2025-05-31 VITALS
HEART RATE: 81 BPM | WEIGHT: 123.9 LBS | DIASTOLIC BLOOD PRESSURE: 73 MMHG | OXYGEN SATURATION: 98 % | HEIGHT: 60 IN | TEMPERATURE: 98 F | SYSTOLIC BLOOD PRESSURE: 121 MMHG | RESPIRATION RATE: 16 BRPM

## 2025-05-31 DIAGNOSIS — Z98.890 OTHER SPECIFIED POSTPROCEDURAL STATES: Chronic | ICD-10-CM

## 2025-05-31 DIAGNOSIS — Z87.59 PERSONAL HISTORY OF OTHER COMPLICATIONS OF PREGNANCY, CHILDBIRTH AND THE PUERPERIUM: Chronic | ICD-10-CM

## 2025-05-31 DIAGNOSIS — K56.609 UNSPECIFIED INTESTINAL OBSTRUCTION, UNSPECIFIED AS TO PARTIAL VERSUS COMPLETE OBSTRUCTION: ICD-10-CM

## 2025-05-31 DIAGNOSIS — Z90.710 ACQUIRED ABSENCE OF BOTH CERVIX AND UTERUS: Chronic | ICD-10-CM

## 2025-05-31 LAB
ALBUMIN SERPL ELPH-MCNC: 4.4 G/DL — SIGNIFICANT CHANGE UP (ref 3.3–5)
ALP SERPL-CCNC: 54 U/L — SIGNIFICANT CHANGE UP (ref 40–120)
ALT FLD-CCNC: 9 U/L — LOW (ref 10–45)
ANION GAP SERPL CALC-SCNC: 15 MMOL/L — SIGNIFICANT CHANGE UP (ref 5–17)
ANNOTATION COMMENT IMP: NORMAL
APPEARANCE: CLEAR
APTT BLD: 33.3 SEC — SIGNIFICANT CHANGE UP (ref 26.1–36.8)
AST SERPL-CCNC: 15 U/L — SIGNIFICANT CHANGE UP (ref 10–40)
BASOPHILS # BLD AUTO: 0.02 K/UL
BASOPHILS # BLD AUTO: 0.02 K/UL — SIGNIFICANT CHANGE UP (ref 0–0.2)
BASOPHILS NFR BLD AUTO: 0.2 % — SIGNIFICANT CHANGE UP (ref 0–2)
BASOPHILS NFR BLD AUTO: 0.3 %
BILIRUB SERPL-MCNC: 0.4 MG/DL — SIGNIFICANT CHANGE UP (ref 0.2–1.2)
BILIRUBIN URINE: NEGATIVE
BLOOD URINE: NEGATIVE
BUN SERPL-MCNC: 22 MG/DL — SIGNIFICANT CHANGE UP (ref 7–23)
CALCIUM SERPL-MCNC: 9.7 MG/DL — SIGNIFICANT CHANGE UP (ref 8.4–10.5)
CHLORIDE SERPL-SCNC: 105 MMOL/L — SIGNIFICANT CHANGE UP (ref 96–108)
CO2 SERPL-SCNC: 21 MMOL/L — LOW (ref 22–31)
COLOR: YELLOW
CREAT SERPL-MCNC: 1.4 MG/DL — HIGH (ref 0.5–1.3)
DPYD GENOTYPE: NORMAL
DPYD INTERPRETATION: NORMAL
DPYD PHENOTYPE: NORMAL
DPYD SPECIMEN: NORMAL
EGFR: 38 ML/MIN/1.73M2 — LOW
EGFR: 38 ML/MIN/1.73M2 — LOW
EOSINOPHIL # BLD AUTO: 0.02 K/UL — SIGNIFICANT CHANGE UP (ref 0–0.5)
EOSINOPHIL # BLD AUTO: 0.11 K/UL
EOSINOPHIL NFR BLD AUTO: 0.2 % — SIGNIFICANT CHANGE UP (ref 0–6)
EOSINOPHIL NFR BLD AUTO: 1.9 %
FULL GENE SEQUENCE RESULT: NORMAL
GAS PNL BLDV: SIGNIFICANT CHANGE UP
GLUCOSE QUALITATIVE U: NEGATIVE MG/DL
GLUCOSE SERPL-MCNC: 115 MG/DL — HIGH (ref 70–99)
HCT VFR BLD CALC: 25.1 % — LOW (ref 34.5–45)
HCT VFR BLD CALC: 25.8 %
HGB BLD-MCNC: 8.1 G/DL
HGB BLD-MCNC: 8.1 G/DL — LOW (ref 11.5–15.5)
IMM GRANULOCYTES NFR BLD AUTO: 0.3 %
IMM GRANULOCYTES NFR BLD AUTO: 0.5 % — SIGNIFICANT CHANGE UP (ref 0–0.9)
INR BLD: 1.11 RATIO — SIGNIFICANT CHANGE UP (ref 0.85–1.16)
INTERPRETATION PGDFRB: NORMAL
KETONES URINE: ABNORMAL MG/DL
LEUKOCYTE ESTERASE URINE: ABNORMAL
LIDOCAIN IGE QN: 30 U/L — SIGNIFICANT CHANGE UP (ref 7–60)
LYMPHOCYTES # BLD AUTO: 1.72 K/UL — SIGNIFICANT CHANGE UP (ref 1–3.3)
LYMPHOCYTES # BLD AUTO: 1.86 K/UL
LYMPHOCYTES # BLD AUTO: 16.1 % — SIGNIFICANT CHANGE UP (ref 13–44)
LYMPHOCYTES NFR BLD AUTO: 32 %
Lab: NORMAL
MAN DIFF?: NORMAL
MCHC RBC-ENTMCNC: 28.4 PG
MCHC RBC-ENTMCNC: 28.9 PG — SIGNIFICANT CHANGE UP (ref 27–34)
MCHC RBC-ENTMCNC: 31.4 G/DL
MCHC RBC-ENTMCNC: 32.3 G/DL — SIGNIFICANT CHANGE UP (ref 32–36)
MCV RBC AUTO: 89.6 FL — SIGNIFICANT CHANGE UP (ref 80–100)
MCV RBC AUTO: 90.5 FL
MONOCYTES # BLD AUTO: 0.5 K/UL
MONOCYTES # BLD AUTO: 0.92 K/UL — HIGH (ref 0–0.9)
MONOCYTES NFR BLD AUTO: 8.6 %
MONOCYTES NFR BLD AUTO: 8.6 % — SIGNIFICANT CHANGE UP (ref 2–14)
NEUTROPHILS # BLD AUTO: 3.3 K/UL
NEUTROPHILS # BLD AUTO: 7.95 K/UL — HIGH (ref 1.8–7.4)
NEUTROPHILS NFR BLD AUTO: 56.9 %
NEUTROPHILS NFR BLD AUTO: 74.4 % — SIGNIFICANT CHANGE UP (ref 43–77)
NITRITE URINE: NEGATIVE
NRBC BLD AUTO-RTO: 0 /100 WBCS — SIGNIFICANT CHANGE UP (ref 0–0)
PATHOLOGY STUDY: NORMAL
PH URINE: 5
PLATELET # BLD AUTO: 328 K/UL — SIGNIFICANT CHANGE UP (ref 150–400)
PLATELET # BLD AUTO: 380 K/UL
POTASSIUM SERPL-MCNC: 4 MMOL/L — SIGNIFICANT CHANGE UP (ref 3.5–5.3)
POTASSIUM SERPL-SCNC: 4 MMOL/L — SIGNIFICANT CHANGE UP (ref 3.5–5.3)
PROT SERPL-MCNC: 7.6 G/DL — SIGNIFICANT CHANGE UP (ref 6–8.3)
PROTEIN URINE: NORMAL MG/DL
PROTHROM AB SERPL-ACNC: 12.7 SEC — SIGNIFICANT CHANGE UP (ref 9.9–13.4)
RBC # BLD: 2.8 M/UL — LOW (ref 3.8–5.2)
RBC # BLD: 2.85 M/UL
RBC # FLD: 15.8 % — HIGH (ref 10.3–14.5)
RBC # FLD: 16.3 %
REF LAB TEST METHOD: NORMAL
REVIEWED BY: NORMAL
SODIUM SERPL-SCNC: 141 MMOL/L — SIGNIFICANT CHANGE UP (ref 135–145)
SPECIFIC GRAVITY URINE: 1.02
TA REPEAT RESULT: ABNORMAL
TEST PERFORMANCE INFO SPEC: NORMAL
TROPONIN T, HIGH SENSITIVITY RESULT: 10 NG/L — SIGNIFICANT CHANGE UP (ref 0–51)
UROBILINOGEN URINE: 0.2 MG/DL
WBC # BLD: 10.68 K/UL — HIGH (ref 3.8–10.5)
WBC # FLD AUTO: 10.68 K/UL — HIGH (ref 3.8–10.5)
WBC # FLD AUTO: 5.81 K/UL

## 2025-05-31 PROCEDURE — 99222 1ST HOSP IP/OBS MODERATE 55: CPT | Mod: GC

## 2025-05-31 PROCEDURE — 71046 X-RAY EXAM CHEST 2 VIEWS: CPT | Mod: 26

## 2025-05-31 PROCEDURE — 71045 X-RAY EXAM CHEST 1 VIEW: CPT | Mod: 26,XE

## 2025-05-31 PROCEDURE — 76705 ECHO EXAM OF ABDOMEN: CPT | Mod: 26

## 2025-05-31 PROCEDURE — 93010 ELECTROCARDIOGRAM REPORT: CPT

## 2025-05-31 PROCEDURE — 74177 CT ABD & PELVIS W/CONTRAST: CPT | Mod: 26

## 2025-05-31 PROCEDURE — 99285 EMERGENCY DEPT VISIT HI MDM: CPT

## 2025-05-31 RX ORDER — METOCLOPRAMIDE HCL 10 MG
10 TABLET ORAL EVERY 8 HOURS
Refills: 0 | Status: DISCONTINUED | OUTPATIENT
Start: 2025-05-31 | End: 2025-06-01

## 2025-05-31 RX ORDER — LEVOTHYROXINE SODIUM 300 MCG
75 TABLET ORAL AT BEDTIME
Refills: 0 | Status: DISCONTINUED | OUTPATIENT
Start: 2025-05-31 | End: 2025-06-03

## 2025-05-31 RX ORDER — OCTREOTIDE ACETATE 500 UG/ML
100 INJECTION, SOLUTION INTRAVENOUS; SUBCUTANEOUS EVERY 8 HOURS
Refills: 0 | Status: DISCONTINUED | OUTPATIENT
Start: 2025-05-31 | End: 2025-06-01

## 2025-05-31 RX ORDER — DEXAMETHASONE 0.5 MG/1
4 TABLET ORAL EVERY 12 HOURS
Refills: 0 | Status: DISCONTINUED | OUTPATIENT
Start: 2025-05-31 | End: 2025-06-01

## 2025-05-31 RX ORDER — HEPARIN SODIUM 1000 [USP'U]/ML
5000 INJECTION INTRAVENOUS; SUBCUTANEOUS EVERY 8 HOURS
Refills: 0 | Status: DISCONTINUED | OUTPATIENT
Start: 2025-05-31 | End: 2025-06-03

## 2025-05-31 RX ORDER — BENZOCAINE 220 MG/G
1 SPRAY, METERED PERIODONTAL EVERY 6 HOURS
Refills: 0 | Status: DISCONTINUED | OUTPATIENT
Start: 2025-05-31 | End: 2025-06-03

## 2025-05-31 RX ORDER — SODIUM CHLORIDE 9 G/1000ML
1000 INJECTION, SOLUTION INTRAVENOUS
Refills: 0 | Status: DISCONTINUED | OUTPATIENT
Start: 2025-05-31 | End: 2025-06-01

## 2025-05-31 RX ADMIN — Medication 1000 MILLILITER(S): at 10:25

## 2025-05-31 RX ADMIN — Medication 20 MILLIGRAM(S): at 10:25

## 2025-05-31 RX ADMIN — BENZOCAINE 1 SPRAY(S): 220 SPRAY, METERED PERIODONTAL at 22:07

## 2025-05-31 NOTE — H&P ADULT - ASSESSMENT
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 Goyal  - Malignant SBO protocol   - NPO/IVF/NGT  - Monitor bowel function   - Pain control PRN  - Possibly will need nutrition replacement   - Operative planning pending pancreas protocol CT  - DVT ppx  - Home meds  - Heme/Onc consult   - Gyn Onc consult d/t recent surgery     D/w Dr. Jay Jay Bruner Surgery 79F with PMHx DM2, stem cell donor, HTN, CARMEN, hypothyroidism, ovarian cyst, goiter and newly diagnosed metastatic mucinous carcinoma presenting with acute onset of nausea, NBNB emesis, poor PO intake for the last 3-4 days c/w GOO.     Plan:  - Obtain CT abdomen pancreas protocol  - Admit to Surgical Oncology/Franc Team, Dr. Goyal  - Malignant SBO protocol   - NPO/IVF/NGT  - Monitor bowel function   - Pain control PRN  - Possibly will need nutrition replacement   - Operative planning pending pancreas protocol CT  - DVT ppx  - Home meds  - Heme/Onc consult   - Gyn Onc consult d/t recent surgery     D/w Dr. Jay Jay Bruner Surgery  [id number]

## 2025-05-31 NOTE — ED ADULT NURSE NOTE - NSFALLRISKASMTTYPE_ED_ALL_ED
Full range of motion of upper and lower extremities, no joint tenderness/swelling. Full range of motion of upper and lower extremities, no joint tenderness/swelling. Full range of motion of upper and lower extremities, no joint tenderness/swelling. Initial (On Arrival)

## 2025-05-31 NOTE — H&P ADULT - ATTENDING COMMENTS
79F with PMHx DM2, stem cell donor, HTN, CARMEN, hypothyroidism, ovarian cyst, goiter and newly diagnosed metastatic mucinous carcinoma presenting with acute onset of nausea, NBNB emesis, poor PO intake for the last 3-4 days c/w GOO.     Plan:  - Obtain CT abdomen pancreas protocol  - Admit to Surgical Oncology/Gold Team, Dr. Goyal  - Malignant SBO protocol   - NPO/IVF/NGT  - Monitor bowel function   - Pain control PRN  - Possibly will need nutrition replacement   - Operative planning pending pancreas protocol CT  - DVT ppx  - Home meds  - Heme/Onc consult   - Gyn Onc consult d/t recent surgery     ____________________________    Agree w NGT  Will review CT  May ultimately need bypass/ resection for obstructing lesion

## 2025-05-31 NOTE — ED PROVIDER NOTE - NS_BEDUNITTYPES_ED_ALL_ED
61M s/p right knee arthroscopic I+D 7/17, right knee explant and abx spacer 7/22, now with debridement of LLE wound with wound vac placement on 7/30. SURGERY

## 2025-05-31 NOTE — ED ADULT NURSE NOTE - OBJECTIVE STATEMENT
Pt is a 79y F PMH gastropareses, anemia, prediabetes, colon cancer, hysterectomy c/o nausea, vomiting x appros 4 days. Pt states she noticed emesis being dark and smelling. Pt states she has not had a bowel movement since symptoms began. Pt endorses having colonoscopy on Wednesday. Pt denies fever, chills, cough, sob, cp. Pt is A&Ox4, breathing unlabored and spontaneous, moves all extremities. Pt resting in stretcher, bed in lowest position, aware of plan of care. Comfort and safety measures maintained.

## 2025-05-31 NOTE — H&P ADULT - HISTORY OF PRESENT ILLNESS
79F with PMHx DM2, stem cell donor, HTN, CARMEN, hypothyroidism, ovarian cyst, goiter and newly diagnosed metastatic mucinous carcinoma presenting with acute onset of nausea, NBNB emesis, poor PO intake for the last 3-4 days. Symptoms persisted so decided to present today. Last had a few sips of soup yesterday with nausea. Passing flatus today. Last BM was 4 days ago. States had a colonoscopy 3 days ago as part of her cancer work-up, barely tolerated bowel prep. Denies fever/chills, abdominal pain.     Of note, earlier this year, was found to have left 6 cmovarian cyst and elevated , She was referred to GYN Oncologist and now s/p Robotic Assisted s/p Bilateral Salpingo-oophorectomy, omentectomy, and tumor debulking on 05/06/2025. During the procedure, metastatic carcinoma was discovered in the peritoneum. Primary malignancy is thought to be GI in origin (pancreatobiliary or colon) per pathology.

## 2025-05-31 NOTE — ED PROVIDER NOTE - BIRTH SEX
Female

## 2025-05-31 NOTE — H&P ADULT - NSHPLABSRESULTS_GEN_ALL_CORE
----------  LABORATORY DATA:  ----------                        8.1    10.68 )-----------( 328      ( 31 May 2025 10:33 )             25.1               05-31    141  |  105  |  22  ----------------------------<  115[H]  4.0   |  21[L]  |  1.40[H]    Ca    9.7      31 May 2025 10:33  Phos  3.9     05-31  Mg     2.0     05-31    TPro  7.6  /  Alb  4.4  /  TBili  0.4  /  DBili  x   /  AST  15  /  ALT  9[L]  /  AlkPhos  54  05-31    LIVER FUNCTIONS - ( 31 May 2025 10:33 )  Alb: 4.4 g/dL / Pro: 7.6 g/dL / ALK PHOS: 54 U/L / ALT: 9 U/L / AST: 15 U/L / GGT: x           PT/INR - ( 31 May 2025 10:33 )   PT: 12.7 sec;   INR: 1.11 ratio         PTT - ( 31 May 2025 10:33 )  PTT:33.3 sec  Urinalysis Basic - ( 31 May 2025 10:33 )    Color: x / Appearance: x / SG: x / pH: x  Gluc: 115 mg/dL / Ketone: x  / Bili: x / Urobili: x   Blood: x / Protein: x / Nitrite: x   Leuk Esterase: x / RBC: x / WBC x   Sq Epi: x / Non Sq Epi: x / Bacteria: x    ----------  RADIOGRAPHIC DATA:  ---------  < from: CT Abdomen and Pelvis w/ Oral Cont and w/ IV Cont (05.31.25 @ 13:41) >    FINDINGS:  LOWER CHEST: Cardiomegaly. Aortic valve calcifications.    LIVER: Within normal limits.  BILE DUCTS: Normal caliber.  GALLBLADDER: Within normal limits.  SPLEEN: Within normal limits.  PANCREAS: Within normal limits.  ADRENALS: Within normal limits.  KIDNEYS/URETERS: No hydronephrosis. Left renal cyst and subcentimeter   hypodensities too small to characterize    BLADDER: Within normal limits.  REPRODUCTIVE ORGANS: Hysterectomy.    BOWEL: Colonic diverticulosis. Appendix is normal.    Circumferential mass of the duodenojejunal junction associated with   severe luminal narrowing. Marked upstream dilation of the duodenum and   stomach, consistent with bowel obstruction.    PERITONEUM/RETROPERITONEUM: Peritoneal thickening in the pelvis.  VESSELS: Atherosclerotic changes.  LYMPH NODES: 1.4 x 0.6 mm nodule adjacent to small bowel mass above,   likely lymph node.  ABDOMINAL WALL: Postsurgical change.  BONES: Degenerative changes.    IMPRESSION:  *  Circumferential mass of the duodenal-jejunal junction associated with   severe luminal narrowing resulting in bowel obstruction.  *  Peritoneal thickening in the pelvis, suspicious for peritoneal disease.    < end of copied text >

## 2025-05-31 NOTE — CONSULT NOTE ADULT - NSCONSULTADDITIONALINFOA_GEN_ALL_CORE
Gyn Onc Fellow Addendum  Patient seen and examined at bedside. Agree with above.  Appreciate care per primary Surg Onc team - follow up CT panc protocol today, possible surgical intervention this week with Dr. Goyal  SBO appears 2/2 circumferential duodenojejunal mass, likely primary tumor; less likely 2/2 sequelae from RA-BSO, omentectomy, debulking 5/6   Continue NGT, mSBO protocol  Yuly Hilton MD

## 2025-05-31 NOTE — ED PROVIDER NOTE - CLINICAL SUMMARY MEDICAL DECISION MAKING FREE TEXT BOX
Attending Cintia Grey: 79-year-old female multiple medical issues including history of gastroparesis, anemia, high blood pressure status post recent GYN surgery with pathology showing metastatic carcinoma presenting with concern for nausea, vomiting.  Concern for possible gastroparesis versus gastric outlet obstruction versus bowel obstruction.  Abdomen is soft and nondistended making perforation from recent colonoscopy less likely.  Will obtain labs, CT abdomen pelvis with oral contrast, check electrolytes as patient is not been eating and drinking much, give IV fluids and reevaluate.

## 2025-05-31 NOTE — ED PROVIDER NOTE - PHYSICAL EXAMINATION
Attending Cintia Grey: Gen: NAD, heent: atrauamtic, eomi, perrla, mmm, op pink, uvula midline, neck; nttp, no nuchal rigidity, chest: nttp, no crepitus, cv: rrr, , lungs: ctab, abd: soft, nontender, nondistended, no peritoneal signs, no guarding, ext: wwp, neg homans, skin: no rash, neuro: awake and alert, following commands, speech clear, sensation and strength intact, no focal deficits

## 2025-05-31 NOTE — ED PROVIDER NOTE - OBJECTIVE STATEMENT
Attending Cintia Grey: 79-year-old female with multiple medical issues including history of gastroparesis, anemia, prediabetes with recently diagnosed possible cancer after finding an ovarian mass.  Patient is status post robotic assisted bilateral salpingo-oophorectomy on May 6 as well as omentectomy.  Patient states has been having nausea and vomiting for the last couple of days.  Went for colonoscopy on Wednesday but does not know the results.  Patient not had a bowel movement in some days but thinks she might of passed flatus this morning.  Patient denies any abdominal pain.  Received antiemetic by EMS.  Path from surgery shows evidence of metastatic carcinoma patient states has been having difficulty tolerating p.o.  Reports that notes that her vomit might have small amount of blood in it and had a foul odor.

## 2025-05-31 NOTE — ED PROVIDER NOTE - ATTENDING CONTRIBUTION TO CARE
Attending MD Cintia Grey:  I personally have seen and examined this patient.  Resident note reviewed and agree on plan of care and except where noted.  See HPI, PE, and MDM for details.

## 2025-05-31 NOTE — H&P ADULT - NSHPSOCIALHISTORY_GEN_ALL_CORE
Single, lives alone. She had 2 children. 1 child  at the age of 31 years old due to AIDS. The 2nd child had a history of Leukemia at the age of 56 years old which is now in remission, after the donation of stem cells from the patient. The patient does not smoke, but she drinks alcohol socially.

## 2025-05-31 NOTE — CONSULT NOTE ADULT - ASSESSMENT
***INCOMPLETE NOTE***  79y POD#25 sp RA-BSO, debulking presenting to the ED with nausea and vomiting. VSS. Labs notable for NAKUL. CT revealing small bowel obstruction. Pt overall clinically and hemodynamically stable.    Recommendations:  - ***    D/w . ***  Ronda Irvin PGY2 79y POD#25 sp RA-BSO, debulking presenting to the ED with nausea and vomiting. VSS. Labs notable for NAKUL. CT revealing small bowel obstruction. Pt overall clinically and hemodynamically stable.    Recommendations:  - Medicine consult  - Malignant SBO protocol (decadron, octreotide, reglan)  - GYN Oncology to follow    D/w Dr. Yobani Irvin PGY2

## 2025-05-31 NOTE — CONSULT NOTE ADULT - ATTENDING COMMENTS
Patient seen and evaluated.   PACS imaging reviewed.   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   Now presents with SBO due to primary tumor.   Agree with assessment and plan as above.   Defer to surgical oncology as to management plan.

## 2025-05-31 NOTE — ED ADULT NURSE NOTE - NSFALLRISKINTERV_ED_ALL_ED

## 2025-05-31 NOTE — CONSULT NOTE ADULT - SUBJECTIVE AND OBJECTIVE BOX
***INCOMPLETE NOTE***  BALDEMAR CAMEJO  79y  Female [id number]    HPI:  79y P2 POD# 25 from RA BSO, debulking for LO cyst. Pt followed by Dr. Tavarez for cyst.      Name of GYN Oncologist: Dr. 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    HCM: Pap: Status post hyst Mammo:2024 C-scope none  PMH: Diabetes high blood pressure hypothyroidism anemia gastroparesis  PSH: Lumpectomy, hysterectomy >30 years AUB Pfannenstiel   Gyn Hx: Denies GYN history however had AUB in the past possible fibroids denies history of abnormal Pap  Ob Hx: Para 2-0-1-1   Meds: Metformin, amlodipine, levothyroxine, pantoprazole, IV iron  Allergies: No known drug allergies  FH: Mother 78 years old breast cancer, no reported genetic testing  SH: Never smoker occasional alcohol no recreational drugsSingle retired not sexually active     Vital Signs Last 24 Hrs  T(C): 37.2 (31 May 2025 15:20), Max: 37.2 (31 May 2025 15:20)  T(F): 98.9 (31 May 2025 15:20), Max: 98.9 (31 May 2025 15:20)  HR: 76 (31 May 2025 15:20) (68 - 81)  BP: 126/60 (31 May 2025 15:20) (114/55 - 126/60)  BP(mean): 78 (31 May 2025 10:30) (78 - 78)  RR: 18 (31 May 2025 15:20) (16 - 18)  SpO2: 99% (31 May 2025 15:20) (98% - 100%)    Parameters below as of 31 May 2025 15:20  Patient On (Oxygen Delivery Method): room air        Physical Exam:   Chaperone:   General: sitting comfortably in bed, NAD   HEENT: neck supple, full ROM  CV: Well perfused on exam  Lungs: Saturating well on RA  Back: No CVA tenderness  Abd: Soft, non-tender, non-distended.  Bowel sounds present.    :  No bleeding on pad.    External labia wnl.  Bimanual exam with no cervical motion tenderness, uterus wnl, adnexa non palpable b/l.  Cervix closed vs. Cervix dilated  Speculum Exam: No active bleeding from os.  Physiologic discharge.    Ext: non-tender b/l, no edema     LABS:                              8.1    10.68 )-----------( 328      ( 31 May 2025 10:33 )             25.1     05-31    141  |  105  |  22  ----------------------------<  115[H]  4.0   |  21[L]  |  1.40[H]    Ca    9.7      31 May 2025 10:33  Phos  3.9     05-31  Mg     2.0     05-31    TPro  7.6  /  Alb  4.4  /  TBili  0.4  /  DBili  x   /  AST  15  /  ALT  9[L]  /  AlkPhos  54  05-31    I&O's Detail    PT/INR - ( 31 May 2025 10:33 )   PT: 12.7 sec;   INR: 1.11 ratio         PTT - ( 31 May 2025 10:33 )  PTT:33.3 sec  Urinalysis Basic - ( 31 May 2025 10:33 )    Color: x / Appearance: x / SG: x / pH: x  Gluc: 115 mg/dL / Ketone: x  / Bili: x / Urobili: x   Blood: x / Protein: x / Nitrite: x   Leuk Esterase: x / RBC: x / WBC x   Sq Epi: x / Non Sq Epi: x / Bacteria: x        RADIOLOGY & ADDITIONAL STUDIES:    < from: CT Abdomen and Pelvis w/ Oral Cont and w/ IV Cont (05.31.25 @ 13:41) >  ACC: [id number] EXAM:  CT ABDOMEN AND PELVIS OC IC   ORDERED BY:  JOSEFINA ANDREWS     PROCEDURE DATE:  05/31/2025          INTERPRETATION:  CLINICAL INFORMATION: Vomiting and decreased bowel   movements. History of left ovarian cystic neoplasm.    COMPARISON: CT abdomen pelvis 4/30/2025. PET CT 5/20/2025.    CONTRAST/COMPLICATIONS:  IV Contrast: Omnipaque 350  65 cc administered   35 cc discarded  Oral Contrast: Omnipaque 300  .    PROCEDURE:  CT of the Abdomen and Pelvis was performed.  [place] coronal reformats were performed.    FINDINGS:  LOWER CHEST: Cardiomegaly. Aortic valve calcifications.    LIVER: Within normal limits.  BILE DUCTS: Normal caliber.  GALLBLADDER: Within normal limits.  SPLEEN: Within normal limits.  PANCREAS: Within normal limits.  ADRENALS: Within normal limits.  KIDNEYS/URETERS: No hydronephrosis. Left renal cyst and subcentimeter   hypodensities too small to characterize    BLADDER: Within normal limits.  REPRODUCTIVE ORGANS: Hysterectomy.    BOWEL: Colonic diverticulosis. Appendix is normal.    Circumferential mass of the duodenojejunal junction associated with   severe luminal narrowing. Marked upstream dilation of the duodenum and   stomach, consistent with bowel obstruction.    PERITONEUM/RETROPERITONEUM: Peritoneal thickening in the pelvis.  VESSELS: Atherosclerotic changes.  LYMPH NODES: 1.4 x 0.6 mm nodule adjacent to small bowel mass above,   likely lymph node.  ABDOMINAL WALL: Postsurgical change.  BONES: Degenerative changes.    IMPRESSION:  *  Circumferential mass of the duodenal-jejunal junction associated with   severe luminal narrowing resulting in bowel obstruction.  *  Peritoneal thickening in the pelvis, suspicious for peritoneal disease.      --- End of Report ---    < end of copied text >   BALDEMAR CAMEJO  79y  Female [id number]    HPI:  79y P2 POD# 25 from RA BSO, debulking for LO cyst presenting to the ED with nausea and vomiting. She reports vomiting began on Monday since which she has been unable to tolerate food. She states that symptoms have persisted until today. She has been vomiting 3-4x per day. She endorses passing flatus, most recently this morning. Notably, patient recently had a colonoscopy on Wednesday, and her last bowel movement was on Tuesday. She denies lightheadedness, dizziness, chest pain, dyspnea, abdominal pain, pelvic pain, and difficulty ambulating. She endorses dysuria that began on Monday. She denies flank pain.      Name of GYN Oncologist: Dr. 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 Silva (5/22)  - Colonoscopy (5/28)    HCM: Pap: Status post hyst Mammo:2024 C-scope none  PMH: Diabetes high blood pressure hypothyroidism anemia gastroparesis  PSH: Lumpectomy, hysterectomy >30 years AUB Pfannenstiel   Gyn Hx: Denies GYN history however had AUB in the past possible fibroids denies history of abnormal Pap  Ob Hx: Para 2-0-1-1   Meds: Metformin, amlodipine, levothyroxine, pantoprazole, IV iron  Allergies: No known drug allergies  FH: Mother 78 years old breast cancer, no reported genetic testing  SH: Never smoker occasional alcohol no recreational drugsSingle retired not sexually active     Vital Signs Last 24 Hrs  T(C): 37.2 (31 May 2025 15:20), Max: 37.2 (31 May 2025 15:20)  T(F): 98.9 (31 May 2025 15:20), Max: 98.9 (31 May 2025 15:20)  HR: 76 (31 May 2025 15:20) (68 - 81)  BP: 126/60 (31 May 2025 15:20) (114/55 - 126/60)  BP(mean): 78 (31 May 2025 10:30) (78 - 78)  RR: 18 (31 May 2025 15:20) (16 - 18)  SpO2: 99% (31 May 2025 15:20) (98% - 100%)    Parameters below as of 31 May 2025 15:20  Patient On (Oxygen Delivery Method): room air        Physical Exam:   General: sitting comfortably in bed, NAD   HEENT: neck supple, full ROM  CV: Well perfused on exam  Lungs: Saturating well on RA  Abd: Soft, non-tender, non-distended.    Ext: non-tender b/l, no edema     LABS:                              8.1    10.68 )-----------( 328      ( 31 May 2025 10:33 )             25.1     05-31    141  |  105  |  22  ----------------------------<  115[H]  4.0   |  21[L]  |  1.40[H]    Ca    9.7      31 May 2025 10:33  Phos  3.9     05-31  Mg     2.0     05-31    TPro  7.6  /  Alb  4.4  /  TBili  0.4  /  DBili  x   /  AST  15  /  ALT  9[L]  /  AlkPhos  54  05-31    I&O's Detail    PT/INR - ( 31 May 2025 10:33 )   PT: 12.7 sec;   INR: 1.11 ratio         PTT - ( 31 May 2025 10:33 )  PTT:33.3 sec  Urinalysis Basic - ( 31 May 2025 10:33 )    Color: x / Appearance: x / SG: x / pH: x  Gluc: 115 mg/dL / Ketone: x  / Bili: x / Urobili: x   Blood: x / Protein: x / Nitrite: x   Leuk Esterase: x / RBC: x / WBC x   Sq Epi: x / Non Sq Epi: x / Bacteria: x        RADIOLOGY & ADDITIONAL STUDIES:    < from: CT Abdomen and Pelvis w/ Oral Cont and w/ IV Cont (05.31.25 @ 13:41) >  ACC: [id number] EXAM:  CT ABDOMEN AND PELVIS OC IC   ORDERED BY:  JOSEFINA ANDREWS     PROCEDURE DATE:  05/31/2025          INTERPRETATION:  CLINICAL INFORMATION: Vomiting and decreased bowel   movements. History of left ovarian cystic neoplasm.    COMPARISON: CT abdomen pelvis 4/30/2025. PET CT 5/20/2025.    CONTRAST/COMPLICATIONS:  IV Contrast: Omnipaque 350  65 cc administered   35 cc discarded  Oral Contrast: Omnipaque 300  .    PROCEDURE:  CT of the Abdomen and Pelvis was performed.  [place] coronal reformats were performed.    FINDINGS:  LOWER CHEST: Cardiomegaly. Aortic valve calcifications.    LIVER: Within normal limits.  BILE DUCTS: Normal caliber.  GALLBLADDER: Within normal limits.  SPLEEN: Within normal limits.  PANCREAS: Within normal limits.  ADRENALS: Within normal limits.  KIDNEYS/URETERS: No hydronephrosis. Left renal cyst and subcentimeter   hypodensities too small to characterize    BLADDER: Within normal limits.  REPRODUCTIVE ORGANS: Hysterectomy.    BOWEL: Colonic diverticulosis. Appendix is normal.    Circumferential mass of the duodenojejunal junction associated with   severe luminal narrowing. Marked upstream dilation of the duodenum and   stomach, consistent with bowel obstruction.    PERITONEUM/RETROPERITONEUM: Peritoneal thickening in the pelvis.  VESSELS: Atherosclerotic changes.  LYMPH NODES: 1.4 x 0.6 mm nodule adjacent to small bowel mass above,   likely lymph node.  ABDOMINAL WALL: Postsurgical change.  BONES: Degenerative changes.    IMPRESSION:  *  Circumferential mass of the duodenal-jejunal junction associated with   severe luminal narrowing resulting in bowel obstruction.  *  Peritoneal thickening in the pelvis, suspicious for peritoneal disease.      --- End of Report ---    < end of copied text >

## 2025-05-31 NOTE — H&P ADULT - NSHPPHYSICALEXAM_GEN_ALL_CORE
General: NAD  Neuro: A/Ox4  HEENT: NC/AT  Respiratory: RA, no increased work of breathing  CV: RRR  Abdomen: Soft, Non distended, non tender to palpation without rebound tenderness/guarding/rigidity, well healing incisions   Extremities: URBINA

## 2025-06-01 LAB
ANION GAP SERPL CALC-SCNC: 15 MMOL/L — SIGNIFICANT CHANGE UP (ref 5–17)
BUN SERPL-MCNC: 16 MG/DL — SIGNIFICANT CHANGE UP (ref 7–23)
CALCIUM SERPL-MCNC: 9.3 MG/DL — SIGNIFICANT CHANGE UP (ref 8.4–10.5)
CHLORIDE SERPL-SCNC: 104 MMOL/L — SIGNIFICANT CHANGE UP (ref 96–108)
CO2 SERPL-SCNC: 19 MMOL/L — LOW (ref 22–31)
CREAT SERPL-MCNC: 1.1 MG/DL — SIGNIFICANT CHANGE UP (ref 0.5–1.3)
EGFR: 50 ML/MIN/1.73M2 — LOW
EGFR: 50 ML/MIN/1.73M2 — LOW
GLUCOSE BLDC GLUCOMTR-MCNC: 144 MG/DL — HIGH (ref 70–99)
GLUCOSE SERPL-MCNC: 81 MG/DL — SIGNIFICANT CHANGE UP (ref 70–99)
HCT VFR BLD CALC: 24.4 % — LOW (ref 34.5–45)
HGB BLD-MCNC: 7.7 G/DL — LOW (ref 11.5–15.5)
MAGNESIUM SERPL-MCNC: 1.9 MG/DL — SIGNIFICANT CHANGE UP (ref 1.6–2.6)
MCHC RBC-ENTMCNC: 28.7 PG — SIGNIFICANT CHANGE UP (ref 27–34)
MCHC RBC-ENTMCNC: 31.6 G/DL — LOW (ref 32–36)
MCV RBC AUTO: 91 FL — SIGNIFICANT CHANGE UP (ref 80–100)
NRBC BLD AUTO-RTO: 0 /100 WBCS — SIGNIFICANT CHANGE UP (ref 0–0)
PHOSPHATE SERPL-MCNC: 3.4 MG/DL — SIGNIFICANT CHANGE UP (ref 2.5–4.5)
PLATELET # BLD AUTO: 322 K/UL — SIGNIFICANT CHANGE UP (ref 150–400)
POTASSIUM SERPL-MCNC: 4 MMOL/L — SIGNIFICANT CHANGE UP (ref 3.5–5.3)
POTASSIUM SERPL-SCNC: 4 MMOL/L — SIGNIFICANT CHANGE UP (ref 3.5–5.3)
RBC # BLD: 2.68 M/UL — LOW (ref 3.8–5.2)
RBC # FLD: 15.5 % — HIGH (ref 10.3–14.5)
SODIUM SERPL-SCNC: 138 MMOL/L — SIGNIFICANT CHANGE UP (ref 135–145)
WBC # BLD: 6.11 K/UL — SIGNIFICANT CHANGE UP (ref 3.8–10.5)
WBC # FLD AUTO: 6.11 K/UL — SIGNIFICANT CHANGE UP (ref 3.8–10.5)

## 2025-06-01 PROCEDURE — 99232 SBSQ HOSP IP/OBS MODERATE 35: CPT

## 2025-06-01 PROCEDURE — 99222 1ST HOSP IP/OBS MODERATE 55: CPT | Mod: 24

## 2025-06-01 PROCEDURE — 71045 X-RAY EXAM CHEST 1 VIEW: CPT | Mod: 26

## 2025-06-01 PROCEDURE — 99232 SBSQ HOSP IP/OBS MODERATE 35: CPT | Mod: GC

## 2025-06-01 RX ORDER — GLUCAGON 3 MG/1
1 POWDER NASAL ONCE
Refills: 0 | Status: DISCONTINUED | OUTPATIENT
Start: 2025-06-01 | End: 2025-06-03

## 2025-06-01 RX ORDER — DEXTROSE 50 % IN WATER 50 %
25 SYRINGE (ML) INTRAVENOUS ONCE
Refills: 0 | Status: DISCONTINUED | OUTPATIENT
Start: 2025-06-01 | End: 2025-06-03

## 2025-06-01 RX ORDER — SODIUM/POT/MAG/CALC/CHLOR/ACET 35-20-5MEQ
1 VIAL (ML) INTRAVENOUS
Refills: 0 | Status: DISCONTINUED | OUTPATIENT
Start: 2025-06-01 | End: 2025-06-02

## 2025-06-01 RX ORDER — SODIUM CHLORIDE 9 G/1000ML
1000 INJECTION, SOLUTION INTRAVENOUS
Refills: 0 | Status: DISCONTINUED | OUTPATIENT
Start: 2025-06-01 | End: 2025-06-03

## 2025-06-01 RX ORDER — INSULIN LISPRO 100 U/ML
INJECTION, SOLUTION INTRAVENOUS; SUBCUTANEOUS EVERY 6 HOURS
Refills: 0 | Status: DISCONTINUED | OUTPATIENT
Start: 2025-06-01 | End: 2025-06-03

## 2025-06-01 RX ORDER — DEXTROSE 50 % IN WATER 50 %
12.5 SYRINGE (ML) INTRAVENOUS ONCE
Refills: 0 | Status: DISCONTINUED | OUTPATIENT
Start: 2025-06-01 | End: 2025-06-03

## 2025-06-01 RX ORDER — DEXTROSE 50 % IN WATER 50 %
15 SYRINGE (ML) INTRAVENOUS ONCE
Refills: 0 | Status: DISCONTINUED | OUTPATIENT
Start: 2025-06-01 | End: 2025-06-03

## 2025-06-01 RX ADMIN — Medication 75 EACH: at 19:01

## 2025-06-01 RX ADMIN — SODIUM CHLORIDE 110 MILLILITER(S): 9 INJECTION, SOLUTION INTRAVENOUS at 05:39

## 2025-06-01 RX ADMIN — OCTREOTIDE ACETATE 100 MICROGRAM(S): 500 INJECTION, SOLUTION INTRAVENOUS; SUBCUTANEOUS at 05:42

## 2025-06-01 RX ADMIN — Medication 40 MILLIGRAM(S): at 10:35

## 2025-06-01 RX ADMIN — HEPARIN SODIUM 5000 UNIT(S): 1000 INJECTION INTRAVENOUS; SUBCUTANEOUS at 14:00

## 2025-06-01 RX ADMIN — DEXAMETHASONE 4 MILLIGRAM(S): 0.5 TABLET ORAL at 05:42

## 2025-06-01 RX ADMIN — HEPARIN SODIUM 5000 UNIT(S): 1000 INJECTION INTRAVENOUS; SUBCUTANEOUS at 21:36

## 2025-06-01 RX ADMIN — Medication 75 MICROGRAM(S): at 21:36

## 2025-06-01 RX ADMIN — HEPARIN SODIUM 5000 UNIT(S): 1000 INJECTION INTRAVENOUS; SUBCUTANEOUS at 05:40

## 2025-06-01 RX ADMIN — Medication 10 MILLIGRAM(S): at 05:41

## 2025-06-01 NOTE — PATIENT PROFILE ADULT - FUNCTIONAL ASSESSMENT - DAILY ACTIVITY 4.
PRINCIPAL PROCEDURE  Procedure: Laminectomy with fusion and instrumentation of lumbar spine at multiple levels  Findings and Treatment: L2-5    
4 = No assist / stand by assistance

## 2025-06-01 NOTE — PATIENT PROFILE ADULT - FALL HARM RISK - HARM RISK INTERVENTIONS

## 2025-06-01 NOTE — CONSULT NOTE ADULT - ASSESSMENT
79F with PMHx DM2, stem cell donor, HTN, CARMEN, hypothyroidism, ovarian cyst, goiter and newly diagnosed metastatic mucinous carcinoma presenting with acute onset of nausea, NBNB emesis, poor PO intake for the last 3-4 days c/w GOO.       Current Diet: Diet, NPO (05-31-25 @ 19:49)    Appetite: [  ]Poor [  ]Adequate [  ]Good  Caloric intake:  [   ]  Adequate   [   ] Inadequate      Plan:  [  ] Initiate TPN formula:  Carbohydrates:______grams, Amino Acid:______grams, Lipids:_______ grams    1. Dedicated Central line must be placed for TPN, no TPN will be ordered until line is placed  2. Strict Intake and Output.  3. Weights three times a week  4. Monitor BMP, Mg+, Ionized Ca++, Phosphorus daily  5.  Check Triglycerides daily for first 3 days of TPN, then monthly   6. Pre-albumin weekly.  7. Fingersticks to monitor glucose every 6 hours until stable then may be decreased to twice a day, coverage with ISS - to be ordered by primary team      TPN team, pager [phone]       79F with PMHx DM2, stem cell donor, HTN, CARMEN, hypothyroidism, ovarian cyst, goiter and newly diagnosed metastatic mucinous carcinoma presenting with acute onset of nausea, NBNB emesis, poor PO intake for the last 3-4 days c/w GOO.    TPN consulted for Protein Calorie Malnutrition in the setting of nutritional optimization for anticipated prolonged NPO, pre-op and post op. Pt tentatively planned for OR for resection/bypass next week.           Current Diet: Diet, NPO (05-31-25 @ 19:49)    Appetite: [x ]Poor [  ]Adequate [  ]Good  Caloric intake:  [   ]  Adequate   [x   ] Inadequate      Plan:  [ x] Initiate TPN formula:  Carbohydrates:_70grams, Amino Acid: 140grams, Lipids:0grams    -Nutritional Assessment, pt not meeting nutritional goals enterally, and would require TPN for nutritional support, pre-op and post-op. Please order prealbumin weekly  -Please order prealbumin, CMP, Mg, Phos, ionized calcium and lipid panel.  -Dedicated Central line with dedicated port must be placed for TPN, no TPN will be ordered until line is placed. Obtain CXR to confirm placement, and "central line may be accessed"... " for TPN " once placement confirmed. Please order chlorhexidine cloth cleanses to maintain central line, if not already ordered.  -Strict Intake and Output. Stop IVF once TPN starts to avoid fluid overload.  -Weights three times a week  -Electrolyte Imbalance risk- check CMP, Mg, Phos, iCa and K daily, will replete  in TPN as needed.  -Risk of Hyperglyceredemia. check triglycerides daily until stable at goal SMOF for three days, then weekly.  -Risk of Hyperglycemia,  please order q6 fingersticks and ISS viramontes while on TPN until FS stable then can check twice a day.  -Risk of refeeding syndrome:  continue Thiamine 100 mg daily in TPN bag; monitor potassium, magnesium, phosphorus, glucose and fluid balance closely. Check once this evening around 10-11pm and if necessary obtain SICU consult if pt symptomatic and/or significant electrolyte shifts noted on lab results.  -Risks and benefits regarding TPN explained, including risk of bleeding with the PICC placement , and risk for sepsis. Benefits of TPN explained including the nutrition provided with the amino acids, carbohydrates and lipids in the TPN formula. All questions answered.  -Further care per [facility] team, pager [phone].              Available on TEAMS  TPN spectra [id number] ([phone] when dialing from outside line)  M-F 8A-2:P, Weekends and holidays 8/9A-12/1P  Above reviewed with Dr. Sidney Jesus         79F with PMHx DM2, stem cell donor, HTN, CARMEN, hypothyroidism, ovarian cyst, goiter and newly diagnosed metastatic mucinous carcinoma presenting with acute onset of nausea, NBNB emesis, poor PO intake for the last 3-4 days c/w GOO.    TPN consulted for Protein Calorie Malnutrition in the setting of nutritional optimization for anticipated prolonged NPO, pre-op and post op. Pt tentatively planned for OR for resection/bypass next week.           Current Diet: Diet, NPO (05-31-25 @ 19:49)    Appetite: [x ]Poor [  ]Adequate [  ]Good  Caloric intake:  [   ]  Adequate   [x   ] Inadequate    ***Patient APPROVED to start TPN today, spoke with the PICC team [id number]***    Plan:  [ x] Initiate TPN formula:  Carbohydrates:70grams, Amino Acid: 140grams, Lipids:0grams    -Nutritional Assessment, pt not meeting nutritional goals enterally, and would require TPN for nutritional support, pre-op and post-op. Please order prealbumin weekly  -Please order prealbumin, CMP, Mg, Phos, ionized calcium and lipid panel.  -Dedicated Central line with dedicated port must be placed for TPN, no TPN will be ordered until line is placed. Obtain CXR to confirm placement, and "central line may be accessed"... " for TPN " once placement confirmed. Please order chlorhexidine cloth cleanses to maintain central line, if not already ordered.  -Strict Intake and Output. Stop IVF once TPN starts to avoid fluid overload.  -Weights three times a week  -Electrolyte Imbalance risk- check CMP, Mg, Phos, iCa and K daily, will replete  in TPN as needed.  -Risk of Hyperglyceredemia. check triglycerides daily until stable at goal SMOF for three days, then weekly.  -Risk of Hyperglycemia,  please order q6 fingersticks and ISS viramontes while on TPN until FS stable then can check twice a day.  -Risk of refeeding syndrome:  continue Thiamine 100 mg daily in TPN bag; monitor potassium, magnesium, phosphorus, glucose and fluid balance closely. Check once this evening around 10-11pm and if necessary obtain SICU consult if pt symptomatic and/or significant electrolyte shifts noted on lab results.  -Risks and benefits regarding TPN explained, including risk of bleeding with the PICC placement , and risk for sepsis. Benefits of TPN explained including the nutrition provided with the amino acids, carbohydrates and lipids in the TPN formula. All questions answered.  -Further care per [facility] team, pager [phone].              Available on TEAMS  TPN spectra [id number] ([phone] when dialing from outside line)  M-F 8A-2:P, Weekends and holidays 8/9A-12/1P  Above reviewed with Dr. Sidney Jesus         79F with PMHx DM2, stem cell donor, HTN, CARMEN, hypothyroidism, ovarian cyst, goiter and newly diagnosed metastatic mucinous carcinoma presenting with acute onset of nausea, NBNB emesis, poor PO intake for the last 3-4 days c/w GOO.    TPN consulted for Protein Calorie Malnutrition in the setting of nutritional optimization for anticipated prolonged NPO, pre-op and post op. Pt tentatively planned for OR for resection/bypass next week.           Current Diet: Diet, NPO (05-31-25 @ 19:49)    Appetite: [x ]Poor [  ]Adequate [  ]Good  Caloric intake:  [   ]  Adequate   [x   ] Inadequate    ***Patient APPROVED to start TPN today, spoke with the PICC team [phone]***    Plan:  [ x] Initiate TPN formula at half oals tonight no SMOF tonight:  Carbohydrates: 70grams, Amino Acid: 140grams, Lipids: 0grams (confirmed no allergies to shellfish nor seafood at bedside 6/1)    -Nutritional Assessment, pt not meeting nutritional goals enterally, and would require TPN for nutritional support, pre-op and post-op. Please order prealbumin weekly  -Please order prealbumin, CMP, Mg, Phos, ionized calcium and lipid panel.  -Dedicated Central line with dedicated port must be placed for TPN, no TPN will be ordered until line is placed. Obtain CXR to confirm placement, and "central line may be accessed"... " for TPN " once placement confirmed. Please order chlorhexidine cloth cleanses to maintain central line, if not already ordered.  -Strict Intake and Output. Stop IVF once TPN starts this evening around 6pm, to avoid fluid overload.  -Weights three times a week  -Electrolyte Imbalance risk- check CMP, Mg, Phos, iCa and K daily, will replete  in TPN as needed.  -Risk of Hyperglyceredemia. check triglycerides daily until stable at goal SMOF for three days, then weekly.  -Risk of Hyper/hypoglycemia, please order q6 fingersticks and ISS viramontes while on TPN until FS stable then can check twice a day.  -Risk of refeeding syndrome:  continue Thiamine 100 mg daily in TPN bag; monitor potassium, magnesium, phosphorus, glucose and fluid balance closely. Check once this evening around 10-11pm and if necessary obtain SICU consult if pt symptomatic and/or significant electrolyte shifts noted on lab results.  -Risks and benefits regarding TPN explained, including risk of bleeding with the PICC placement , and risk for sepsis. Benefits of TPN explained including the nutrition provided with the amino acids, carbohydrates and lipids in the TPN formula. All questions answered.  -Further care per [facility] team, pager [phone].              Available on TEAMS  TPN spectra [id number] ([phone] when dialing from outside line)  M-F 8A-2:P, Weekends and holidays 8/9A-12/1P  Above reviewed with Dr. Sidney Jesus

## 2025-06-01 NOTE — PROGRESS NOTE ADULT - ASSESSMENT
79F with PMHx DM2, stem cell donor, HTN, CARMEN, hypothyroidism, ovarian cyst, goiter and newly diagnosed metastatic mucinous carcinoma presenting with acute onset of nausea, NBNB emesis, poor PO intake for the last 3-4 days c/w GOO.     Plan:  - F/U CT A/P pancreas protocol  - NPO/NGT/IVFs  - Malignant SBO protocol  - Monitor bowel fxn  - Possibly will need nutrition replacement   - Operative planning pending pancreas protocol CT  - DVT ppx  - Home meds  - Heme/Onc consult   - Gyn Onc consult d/t recent surgery     [facility]  [phone] 79F with PMHx DM2, stem cell donor, HTN, CARMEN, hypothyroidism, ovarian cyst, goiter and newly diagnosed metastatic mucinous carcinoma presenting with acute onset of nausea, NBNB emesis, poor PO intake for the last 3-4 days c/w GOO.     Plan:  - NPO/NGT/IVFs  - Monitor bowel fxn  - Consult for PICC and TPN today   - DVT ppx  - Home meds  - Heme/Onc consult   - Gyn Onc consult d/t recent surgery     [facility] Surgery  [phone]

## 2025-06-01 NOTE — PROGRESS NOTE ADULT - ASSESSMENT
78yo POD# 26 s/p RA-BSO, Debulking presented to the ED with nausea and vomiting and was found to have an SBO on CT. Pt admitted for inpatient management.    #SBO  - NPO on IVF  - NGT in place  - Continue malignant SBO protocol  - Continue to monitor for bowel function  - HSQ for DVT ppx  - Care per colorectal surgery    Ronda Irvin PGY-2

## 2025-06-01 NOTE — ADVANCED PRACTICE NURSE CONSULT - ASSESSMENT
Central Line Catheter Insertion Note  Patient or patient representative educated about central line associated blood stream infection prevention practices.  Catheter type: 4F,  DL power Picc  : Bard  Power injectable: Yes  LOT#[id number]  EXP DATE 2026-01-31    Informed consent obtained by covering floor team.  Procedure assisted by: JESÚS Fields RN  Time out was preformed, confirming the patient's first and last name, date of birth, MR #, procedure, and correct site prior to start of procedure.    Patient was placed with HOB 30 degrees. Patient placement site was prepped with chlorhexidine solution, then draped using maximum sterile barrier protection. The area was injected with 2ml of 1% lidocaine. Using the Bard Site Rite 8, the catheter was placed using the Modified Seldinger Technique. Strict adherence to outline aseptic technique including handwashing, glove and gown, utilizing mask and cap, plus draping the patient with a sterile drape was observed. Upon completion of line placement, the insertion site was covered with a sterile CHG dressing. Pt tolerated procedure well. V/S stable.    All materials used for catheter insertion, including the intact guide wires, were accounted for at the end of the procedure.  Number of attempts: 1  Complications/Comments: None    Emergency Placement: No  Site: New   Anatomical Site of insertion: R Basilic vein  Catheter size/length: 4F,   33cm  US guided Bard double lumen power picc placed    Post procedure verification with chest Xray as per orders.

## 2025-06-01 NOTE — PROGRESS NOTE ADULT - SUBJECTIVE AND OBJECTIVE BOX
Surgery Daily Progress Note  =====================================================    INTERVAL:   - s/p NGT --> 600mL dark bilious contents   - NG confirmed by CXR    SUBJECTIVE: Patient seen and examined at bedside on AM rounds.     MEDICATIONS  (STANDING):  acetaminophen   IVPB .. 1000 milliGRAM(s) IV Intermittent every 6 hours  influenza  Vaccine (HIGH DOSE) 0.5 milliLiter(s) IntraMuscular once  lactated ringers. 1000 milliLiter(s) (120 mL/Hr) IV Continuous <Continuous>  piperacillin/tazobactam IVPB.. 3.375 Gram(s) IV Intermittent every 8 hours    MEDICATIONS  (PRN):  HYDROmorphone  Injectable 0.2 milliGRAM(s) IV Push every 4 hours PRN Moderate Pain (4 - 6)  HYDROmorphone  Injectable 0.5 milliGRAM(s) IV Push every 4 hours PRN Severe Pain (7 - 10)      OBJECTIVE:    Vital Signs Last 24 Hrs  T(C): 36.8 (22 Oct 2024 23:00), Max: 37.2 (22 Oct 2024 22:09)  T(F): 98.3 (22 Oct 2024 23:00), Max: 98.9 (22 Oct 2024 22:09)  HR: 83 (22 Oct 2024 23:00) (64 - 89)  BP: 121/72 (22 Oct 2024 23:00) (119/68 - 167/79)  BP(mean): 91 (22 Oct 2024 19:45) (87 - 101)  RR: 18 (22 Oct 2024 23:00) (12 - 19)  SpO2: 95% (22 Oct 2024 23:00) (92% - 98%)    Parameters below as of 22 Oct 2024 23:00  Patient On (Oxygen Delivery Method): room air        PHYSICAL EXAM:  NAD  Neuro: A/Ox4  HEENT: NC/AT  Respiratory: RA, no increased work of breathing  CV: WWP  Abdomen: Soft, Non distended, non tender to palpation without rebound tenderness/guarding/rigidity, well healing incisions   NG tube in place, dark bilious contents.         I&O's Detail    21 Oct 2024 07:01  -  22 Oct 2024 07:00  --------------------------------------------------------  IN:    Lactated Ringers: 1660 mL    Oral Fluid: 220 mL  Total IN: 1880 mL    OUT:    Voided (mL): 750 mL  Total OUT: 750 mL    Total NET: 1130 mL      22 Oct 2024 07:01  -  23 Oct 2024 00:45  --------------------------------------------------------  IN:    IV PiggyBack: 300 mL  Total IN: 300 mL    OUT:    Oral Fluid: 0 mL    Voided (mL): 1500 mL  Total OUT: 1500 mL    Total NET: -1200 mL          Daily Height in cm: 172.72 (22 Oct 2024 13:33)    Daily     LABS:                        13.0   17.42 )-----------( 292      ( 22 Oct 2024 21:23 )             39.4     10-22    138  |  101  |  9   ----------------------------<  106[H]  3.7   |  25  |  0.78    Ca    9.3      22 Oct 2024 04:11  Phos  4.1     10-22  Mg     2.0     10-22    TPro  7.0  /  Alb  3.7  /  TBili  1.4[H]  /  DBili  x   /  AST  39  /  ALT  108[H]  /  AlkPhos  93  10-22    PT/INR - ( 22 Oct 2024 04:10 )   PT: 13.0 sec;   INR: 1.13 ratio         PTT - ( 22 Oct 2024 04:10 )  PTT:29.6 sec  Urinalysis Basic - ( 22 Oct 2024 04:11 )    Color: x / Appearance: x / SG: x / pH: x  Gluc: 106 mg/dL / Ketone: x  / Bili: x / Urobili: x   Blood: x / Protein: x / Nitrite: x   Leuk Esterase: x / RBC: x / WBC x   Sq Epi: x / Non Sq Epi: x / Bacteria: x                          Surgery Daily Progress Note  =====================================================    INTERVAL:   - s/p NGT --> 600mL dark bilious contents   - NG confirmed by CXR    SUBJECTIVE: Patient seen and examined at bedside on AM rounds. Feels better with NGT placed. No more nausea or emesis. Pain is well controlled.     MEDICATIONS  (STANDING):  acetaminophen   IVPB .. 1000 milliGRAM(s) IV Intermittent every 6 hours  influenza  Vaccine (HIGH DOSE) 0.5 milliLiter(s) IntraMuscular once  lactated ringers. 1000 milliLiter(s) (120 mL/Hr) IV Continuous <Continuous>  piperacillin/tazobactam IVPB.. 3.375 Gram(s) IV Intermittent every 8 hours    MEDICATIONS  (PRN):  HYDROmorphone  Injectable 0.2 milliGRAM(s) IV Push every 4 hours PRN Moderate Pain (4 - 6)  HYDROmorphone  Injectable 0.5 milliGRAM(s) IV Push every 4 hours PRN Severe Pain (7 - 10)      OBJECTIVE:    Vital Signs Last 24 Hrs  T(C): 36.8 (22 Oct 2024 23:00), Max: 37.2 (22 Oct 2024 22:09)  T(F): 98.3 (22 Oct 2024 23:00), Max: 98.9 (22 Oct 2024 22:09)  HR: 83 (22 Oct 2024 23:00) (64 - 89)  BP: 121/72 (22 Oct 2024 23:00) (119/68 - 167/79)  BP(mean): 91 (22 Oct 2024 19:45) (87 - 101)  RR: 18 (22 Oct 2024 23:00) (12 - 19)  SpO2: 95% (22 Oct 2024 23:00) (92% - 98%)    Parameters below as of 22 Oct 2024 23:00  Patient On (Oxygen Delivery Method): room air        PHYSICAL EXAM:  General: Resting in bed comfortably, in no acute distress   Neuro: A/Ox4  HEENT: NGT in place  Respiratory: RA, no increased work of breathing  CV: WWP  Abdomen: Soft, Non distended, non tender to palpation without rebound tenderness/guarding/rigidity, well healing incisions         I&O's Detail    21 Oct 2024 07:01  -  22 Oct 2024 07:00  --------------------------------------------------------  IN:    Lactated Ringers: 1660 mL    Oral Fluid: 220 mL  Total IN: 1880 mL    OUT:    Voided (mL): 750 mL  Total OUT: 750 mL    Total NET: 1130 mL      22 Oct 2024 07:01  -  23 Oct 2024 00:45  --------------------------------------------------------  IN:    IV PiggyBack: 300 mL  Total IN: 300 mL    OUT:    Oral Fluid: 0 mL    Voided (mL): 1500 mL  Total OUT: 1500 mL    Total NET: -1200 mL          Daily Height in cm: 172.72 (22 Oct 2024 13:33)    Daily     LABS:                        13.0   17.42 )-----------( 292      ( 22 Oct 2024 21:23 )             39.4     10-22    138  |  101  |  9   ----------------------------<  106[H]  3.7   |  25  |  0.78    Ca    9.3      22 Oct 2024 04:11  Phos  4.1     10-22  Mg     2.0     10-22    TPro  7.0  /  Alb  3.7  /  TBili  1.4[H]  /  DBili  x   /  AST  39  /  ALT  108[H]  /  AlkPhos  93  10-22    PT/INR - ( 22 Oct 2024 04:10 )   PT: 13.0 sec;   INR: 1.13 ratio         PTT - ( 22 Oct 2024 04:10 )  PTT:29.6 sec  Urinalysis Basic - ( 22 Oct 2024 04:11 )    Color: x / Appearance: x / SG: x / pH: x  Gluc: 106 mg/dL / Ketone: x  / Bili: x / Urobili: x   Blood: x / Protein: x / Nitrite: x   Leuk Esterase: x / RBC: x / WBC x   Sq Epi: x / Non Sq Epi: x / Bacteria: x

## 2025-06-01 NOTE — CONSULT NOTE ADULT - SUBJECTIVE AND OBJECTIVE BOX
NUTRITION SUPPORT / TPN CONSULT NOTE    HPI:  79F with PMHx DM2, stem cell donor, HTN, CARMEN, hypothyroidism, ovarian cyst, goiter and newly diagnosed metastatic mucinous carcinoma presenting with acute onset of nausea, NBNB emesis, poor PO intake for the last 3-4 days. Symptoms persisted so decided to present today. Last had a few sips of soup yesterday with nausea. Passing flatus today. Last BM was 4 days ago. States had a colonoscopy 3 days ago as part of her cancer work-up, barely tolerated bowel prep. Denies fever/chills, abdominal pain.     Of note, earlier this year, was found to have left 6 cmovarian cyst and elevated , She was referred to GYN Oncologist and now s/p Robotic Assisted s/p Bilateral Salpingo-oophorectomy, omentectomy, and tumor debulking on 2025. During the procedure, metastatic carcinoma was discovered in the peritoneum. Primary malignancy is thought to be GI in origin (pancreatobiliary or colon) per pathology.  (31 May 2025 17:30)      24 hour/overnight events:      MEDICATIONS  (STANDING):  heparin   Injectable 5000 Unit(s) SubCutaneous every 8 hours  lactated ringers. 1000 milliLiter(s) (110 mL/Hr) IV Continuous <Continuous>  levothyroxine Injectable 75 MICROGram(s) IV Push at bedtime  pantoprazole  Injectable 40 milliGRAM(s) IV Push daily    MEDICATIONS  (PRN):  benzocaine 20% Spray 1 Spray(s) Topical every 6 hours PRN sore throat      PAST MEDICAL & SURGICAL HISTORY:  Glaucoma      HTN (hypertension)      Type II diabetes mellitus      Multiple thyroid nodules      Hyperthyroidism      Obese      Ovarian cyst      Gastroparesis      Iron deficiency anemia      H/O: hysterectomy  "a long time ago when I was in my 30's"      H/O       History of thyroidectomy      History of breast surgery          FAMILY HISTORY:  FH: dementia (Mother)        ALLERGIES  No Known Allergies      REVIEW OF SYSTEMS  --------------------------------------------------------------------------------    Skin: No rashes  Head/Eyes/Ears/Mouth: No headache; Normal hearing; Normal vision w/o blurriness; No sinus pain/discomfort, sore throat  Respiratory: No dyspnea, cough, wheezing, hemoptysis  CV: No chest pain, PND, orthopnea  GI: No abdominal pain, diarrhea, constipation, nausea, vomiting, melena, hematochezia  : No increased frequency, dysuria, hematuria, nocturia  MSK: No joint pain/swelling; no back pain; no edema  Neuro: No dizziness/lightheadedness, weakness, seizures, numbness, tingling  Heme: No easy bruising or bleeding  Endo: No heat/cold intolerance  Psych: No significant nervousness, anxiety, stress, depression      VITALS  T(C): 36.4 (25 @ 08:54), Max: 37.2 (25 @ 15:20)  HR: 77 (25 @ 08:54) (69 - 80)  BP: 148/85 (25 @ 08:54) (121/78 - 148/85)  RR: 18 (25 @ 08:54) (16 - 18)  SpO2: 98% (25 @ 08:54) (98% - 100%)  I&O's Detail    31 May 2025 07:01  -  2025 07:00  --------------------------------------------------------  IN:    Lactated Ringers: 1100 mL  Total IN: 1100 mL    OUT:    Nasogastric/Oral tube (mL): 75 mL    Voided (mL): 250 mL  Total OUT: 325 mL    Total NET: 775 mL            LABS:                        7.7    6.11  )-----------( 322      ( 2025 07:11 )             24.4     06-    138  |  104  |  16  ----------------------------<  81  4.0   |  19[L]  |  1.10    Ca    9.3      2025 07:18  Phos  3.4     06  Mg     1.9         TPro  7.6  /  Alb  4.4  /  TBili  0.4  /  DBili  x   /  AST  15  /  ALT  9[L]  /  AlkPhos  54  05-31    Ionized Calcium Levels:     CAPILLARY BLOOD GLUCOSE      CAPILLARY BLOOD GLUCOSE        PT/INR - ( 31 May 2025 10:33 )   PT: 12.7 sec;   INR: 1.11 ratio         PTT - ( 31 May 2025 10:33 )  PTT:33.3 sec        PHYSICAL EXAM  --------------------------------------------------------------------------------      Gen: WD, WN, in no acute distress.  HEENT: PERRLA, EOMI. .  Neck: Supple, no JVD/Bruit. No thyromegaly.  Lungs: CTA B/L.  Cor: RRR, S1 S2, No M/G/R.  Abd: Soft, ND, NT,No HSM, +BS.  Ext: No clubbing, no cyanosis, no edema.  Neuro: A/Ox3. Cranial nerve intact. No focal deficit.

## 2025-06-01 NOTE — PROGRESS NOTE ADULT - SUBJECTIVE AND OBJECTIVE BOX
Gyn ONC Progress Note     HD #2    Subjective:   Pt seen and examined at bedside. No events overnight. Pain well controlled. Patient ambulating. Passing flatus. NGT placed overnight with 600cc removed. Pt denies fever, chills, chest pain, SOB, nausea, vomiting, lightheadedness, dizziness.      Objective:  T(F): 98.3 (06-01-25 @ 04:25), Max: 98.9 (05-31-25 @ 15:20)  HR: 74 (06-01-25 @ 04:25) (68 - 81)  BP: 144/76 (06-01-25 @ 04:25) (114/55 - 144/76)  RR: 18 (06-01-25 @ 04:25) (16 - 18)  SpO2: 98% (06-01-25 @ 04:25) (98% - 100%)  Wt(kg): --  I&O's Summary    31 May 2025 07:01  -  01 Jun 2025 06:16  --------------------------------------------------------  IN: 0 mL / OUT: 325 mL / NET: -325 mL      CAPILLARY BLOOD GLUCOSE          MEDICATIONS  (STANDING):  dexAMETHasone  Injectable 4 milliGRAM(s) IV Push every 12 hours  heparin   Injectable 5000 Unit(s) SubCutaneous every 8 hours  lactated ringers. 1000 milliLiter(s) (110 mL/Hr) IV Continuous <Continuous>  levothyroxine Injectable 75 MICROGram(s) IV Push at bedtime  metoclopramide Injectable 10 milliGRAM(s) IV Push every 8 hours  octreotide  Injectable 100 MICROGram(s) SubCutaneous every 8 hours  pantoprazole  Injectable 40 milliGRAM(s) IV Push daily    MEDICATIONS  (PRN):  benzocaine 20% Spray 1 Spray(s) Topical every 6 hours PRN sore throat      Physical Exam:  Constitutional: NAD, A+O x3  CV: RRR  Lungs: clear to auscultation bilaterally  Abdomen: soft, nondistended, no guarding, no rebound  Extremities: no lower extremity edema or calf tenderness bilaterally; venodynes in place    LABS:  05-31    141    |  105    |  22     ----------------------------<  115[H]  4.0     |  21[L]  |  1.40[H]    Ca    9.7        31 May 2025 10:33  Phos  3.9       05-31  Mg     2.0       05-31    TPro  7.6    /  Alb  4.4    /  TBili  0.4    /  DBili  x      /  AST  15     /  ALT  9[L]   /  AlkPhos  54     05-31        PT/INR - ( 31 May 2025 10:33 )   PT: 12.7 sec;   INR: 1.11 ratio         PTT - ( 31 May 2025 10:33 )  PTT:33.3 sec  Urinalysis Basic - ( 31 May 2025 10:33 )    Color: x / Appearance: x / SG: x / pH: x  Gluc: 115 mg/dL / Ketone: x  / Bili: x / Urobili: x   Blood: x / Protein: x / Nitrite: x   Leuk Esterase: x / RBC: x / WBC x   Sq Epi: x / Non Sq Epi: x / Bacteria: x     Gyn ONC Progress Note     HD #2    Subjective:   Pt seen and examined at bedside. Pain well controlled. Patient ambulating. Not passing flatus, last was yesterday morning. NGT placed overnight with 600cc dark bilious output. Pt denies fever, chills, chest pain, SOB, nausea, vomiting, lightheadedness, dizziness.      Objective:  T(F): 98.3 (06-01-25 @ 04:25), Max: 98.9 (05-31-25 @ 15:20)  HR: 74 (06-01-25 @ 04:25) (68 - 81)  BP: 144/76 (06-01-25 @ 04:25) (114/55 - 144/76)  RR: 18 (06-01-25 @ 04:25) (16 - 18)  SpO2: 98% (06-01-25 @ 04:25) (98% - 100%)  Wt(kg): --  I&O's Summary    31 May 2025 07:01  -  01 Jun 2025 06:16  --------------------------------------------------------  IN: 0 mL / OUT: 325 mL / NET: -325 mL      CAPILLARY BLOOD GLUCOSE          MEDICATIONS  (STANDING):  dexAMETHasone  Injectable 4 milliGRAM(s) IV Push every 12 hours  heparin   Injectable 5000 Unit(s) SubCutaneous every 8 hours  lactated ringers. 1000 milliLiter(s) (110 mL/Hr) IV Continuous <Continuous>  levothyroxine Injectable 75 MICROGram(s) IV Push at bedtime  metoclopramide Injectable 10 milliGRAM(s) IV Push every 8 hours  octreotide  Injectable 100 MICROGram(s) SubCutaneous every 8 hours  pantoprazole  Injectable 40 milliGRAM(s) IV Push daily    MEDICATIONS  (PRN):  benzocaine 20% Spray 1 Spray(s) Topical every 6 hours PRN sore throat      Physical Exam:  Constitutional: NAD, A+O x3  HEENT: NGT in place  CV: RRR  Lungs: clear to auscultation bilaterally  Abdomen: soft, nondistended, no guarding, no rebound  Extremities: no lower extremity edema or calf tenderness bilaterally; venodynes in place    LABS:  05-31    141    |  105    |  22     ----------------------------<  115[H]  4.0     |  21[L]  |  1.40[H]    Ca    9.7        31 May 2025 10:33  Phos  3.9       05-31  Mg     2.0       05-31    TPro  7.6    /  Alb  4.4    /  TBili  0.4    /  DBili  x      /  AST  15     /  ALT  9[L]   /  AlkPhos  54     05-31        PT/INR - ( 31 May 2025 10:33 )   PT: 12.7 sec;   INR: 1.11 ratio         PTT - ( 31 May 2025 10:33 )  PTT:33.3 sec  Urinalysis Basic - ( 31 May 2025 10:33 )    Color: x / Appearance: x / SG: x / pH: x  Gluc: 115 mg/dL / Ketone: x  / Bili: x / Urobili: x   Blood: x / Protein: x / Nitrite: x   Leuk Esterase: x / RBC: x / WBC x   Sq Epi: x / Non Sq Epi: x / Bacteria: x

## 2025-06-01 NOTE — ADVANCED PRACTICE NURSE CONSULT - REASON FOR CONSULT
Vascular Access Team    Evaluation for: Bedside PICC placement  Requested by name: Maged Nick  Date/Time: 6/1/2025 @1303    Indication: TPN  Allergy to CHG or Heparin or Lidocaine: no    Platelets(>20): 322  INR(<3): 1.11  eGFR(>40): 50  Blood cultures sent: no  Anticoagulants/Antiplatelets: heparin sc  Arms DVT/Mastectomy/Fistula/PPM/Defib: Breast surgery    IR or Nephrology or ID clearance needed: no    Consent obtained: yes  TPN approval: yes  Pending: none    Plan: Bedside picc order evaluated. Please contact JESÚS RN #[id number] with any questions.  
Bedside picc order placed.   Indication: DL picc placement for TPN

## 2025-06-02 ENCOUNTER — RESULT REVIEW (OUTPATIENT)
Age: 80
End: 2025-06-02

## 2025-06-02 DIAGNOSIS — D49.0 NEOPLASM OF UNSPECIFIED BEHAVIOR OF DIGESTIVE SYSTEM: ICD-10-CM

## 2025-06-02 LAB
A1C WITH ESTIMATED AVERAGE GLUCOSE RESULT: 5.2 % — SIGNIFICANT CHANGE UP (ref 4–5.6)
ALBUMIN SERPL ELPH-MCNC: 3.6 G/DL — SIGNIFICANT CHANGE UP (ref 3.3–5)
ALP SERPL-CCNC: 49 U/L — SIGNIFICANT CHANGE UP (ref 40–120)
ALT FLD-CCNC: 11 U/L — SIGNIFICANT CHANGE UP (ref 10–45)
ANION GAP SERPL CALC-SCNC: 12 MMOL/L — SIGNIFICANT CHANGE UP (ref 5–17)
AST SERPL-CCNC: 19 U/L — SIGNIFICANT CHANGE UP (ref 10–40)
BILIRUB SERPL-MCNC: 0.2 MG/DL — SIGNIFICANT CHANGE UP (ref 0.2–1.2)
BUN SERPL-MCNC: 19 MG/DL — SIGNIFICANT CHANGE UP (ref 7–23)
CA-I BLD-SCNC: 1.21 MMOL/L — SIGNIFICANT CHANGE UP (ref 1.15–1.33)
CALCIUM SERPL-MCNC: 9 MG/DL — SIGNIFICANT CHANGE UP (ref 8.4–10.5)
CHLORIDE SERPL-SCNC: 102 MMOL/L — SIGNIFICANT CHANGE UP (ref 96–108)
CO2 SERPL-SCNC: 23 MMOL/L — SIGNIFICANT CHANGE UP (ref 22–31)
CREAT SERPL-MCNC: 1.06 MG/DL — SIGNIFICANT CHANGE UP (ref 0.5–1.3)
EGFR: 53 ML/MIN/1.73M2 — LOW
EGFR: 53 ML/MIN/1.73M2 — LOW
ESTIMATED AVERAGE GLUCOSE: 103 MG/DL — SIGNIFICANT CHANGE UP (ref 68–114)
GLUCOSE BLDC GLUCOMTR-MCNC: 117 MG/DL — HIGH (ref 70–99)
GLUCOSE BLDC GLUCOMTR-MCNC: 143 MG/DL — HIGH (ref 70–99)
GLUCOSE BLDC GLUCOMTR-MCNC: 147 MG/DL — HIGH (ref 70–99)
GLUCOSE BLDC GLUCOMTR-MCNC: 158 MG/DL — HIGH (ref 70–99)
GLUCOSE BLDC GLUCOMTR-MCNC: 168 MG/DL — HIGH (ref 70–99)
GLUCOSE SERPL-MCNC: 135 MG/DL — HIGH (ref 70–99)
HCT VFR BLD CALC: 22.1 % — LOW (ref 34.5–45)
HCT VFR BLD CALC: 23.8 % — LOW (ref 34.5–45)
HGB BLD-MCNC: 7 G/DL — CRITICAL LOW (ref 11.5–15.5)
HGB BLD-MCNC: 7.7 G/DL — LOW (ref 11.5–15.5)
MAGNESIUM SERPL-MCNC: 1.9 MG/DL — SIGNIFICANT CHANGE UP (ref 1.6–2.6)
MCHC RBC-ENTMCNC: 28 PG — SIGNIFICANT CHANGE UP (ref 27–34)
MCHC RBC-ENTMCNC: 28.2 PG — SIGNIFICANT CHANGE UP (ref 27–34)
MCHC RBC-ENTMCNC: 31.7 G/DL — LOW (ref 32–36)
MCHC RBC-ENTMCNC: 32.4 G/DL — SIGNIFICANT CHANGE UP (ref 32–36)
MCV RBC AUTO: 86.5 FL — SIGNIFICANT CHANGE UP (ref 80–100)
MCV RBC AUTO: 89.1 FL — SIGNIFICANT CHANGE UP (ref 80–100)
NRBC BLD AUTO-RTO: 0 /100 WBCS — SIGNIFICANT CHANGE UP (ref 0–0)
NRBC BLD AUTO-RTO: 0 /100 WBCS — SIGNIFICANT CHANGE UP (ref 0–0)
NT-PROBNP SERPL-SCNC: 103 PG/ML — SIGNIFICANT CHANGE UP (ref 0–300)
PHOSPHATE SERPL-MCNC: 3.9 MG/DL — SIGNIFICANT CHANGE UP (ref 2.5–4.5)
PLATELET # BLD AUTO: 314 K/UL — SIGNIFICANT CHANGE UP (ref 150–400)
PLATELET # BLD AUTO: 361 K/UL — SIGNIFICANT CHANGE UP (ref 150–400)
POTASSIUM SERPL-MCNC: 3.7 MMOL/L — SIGNIFICANT CHANGE UP (ref 3.5–5.3)
POTASSIUM SERPL-SCNC: 3.7 MMOL/L — SIGNIFICANT CHANGE UP (ref 3.5–5.3)
PREALB SERPL-MCNC: 18 MG/DL — LOW (ref 20–40)
PROT SERPL-MCNC: 6.5 G/DL — SIGNIFICANT CHANGE UP (ref 6–8.3)
RBC # BLD: 2.48 M/UL — LOW (ref 3.8–5.2)
RBC # BLD: 2.75 M/UL — LOW (ref 3.8–5.2)
RBC # FLD: 15.3 % — HIGH (ref 10.3–14.5)
RBC # FLD: 15.3 % — HIGH (ref 10.3–14.5)
SODIUM SERPL-SCNC: 137 MMOL/L — SIGNIFICANT CHANGE UP (ref 135–145)
TRIGL SERPL-MCNC: 61 MG/DL — SIGNIFICANT CHANGE UP
WBC # BLD: 10.02 K/UL — SIGNIFICANT CHANGE UP (ref 3.8–10.5)
WBC # BLD: 9.69 K/UL — SIGNIFICANT CHANGE UP (ref 3.8–10.5)
WBC # FLD AUTO: 10.02 K/UL — SIGNIFICANT CHANGE UP (ref 3.8–10.5)
WBC # FLD AUTO: 9.69 K/UL — SIGNIFICANT CHANGE UP (ref 3.8–10.5)

## 2025-06-02 PROCEDURE — 99497 ADVNCD CARE PLAN 30 MIN: CPT | Mod: 25

## 2025-06-02 PROCEDURE — 99223 1ST HOSP IP/OBS HIGH 75: CPT

## 2025-06-02 PROCEDURE — 99232 SBSQ HOSP IP/OBS MODERATE 35: CPT

## 2025-06-02 PROCEDURE — 93306 TTE W/DOPPLER COMPLETE: CPT | Mod: 26

## 2025-06-02 RX ORDER — SODIUM/POT/MAG/CALC/CHLOR/ACET 35-20-5MEQ
1 VIAL (ML) INTRAVENOUS
Refills: 0 | Status: DISCONTINUED | OUTPATIENT
Start: 2025-06-02 | End: 2025-06-03

## 2025-06-02 RX ORDER — I.V. FAT EMULSION 20 G/100ML
8.3 EMULSION INTRAVENOUS
Qty: 20 | Refills: 0 | Status: DISCONTINUED | OUTPATIENT
Start: 2025-06-02 | End: 2025-06-03

## 2025-06-02 RX ORDER — ONDANSETRON HCL/PF 4 MG/2 ML
4 VIAL (ML) INJECTION EVERY 6 HOURS
Refills: 0 | Status: DISCONTINUED | OUTPATIENT
Start: 2025-06-02 | End: 2025-06-03

## 2025-06-02 RX ORDER — ACETAMINOPHEN 500 MG/5ML
1000 LIQUID (ML) ORAL EVERY 6 HOURS
Refills: 0 | Status: COMPLETED | OUTPATIENT
Start: 2025-06-02 | End: 2025-06-03

## 2025-06-02 RX ADMIN — HEPARIN SODIUM 5000 UNIT(S): 1000 INJECTION INTRAVENOUS; SUBCUTANEOUS at 13:01

## 2025-06-02 RX ADMIN — Medication 1 EACH: at 18:52

## 2025-06-02 RX ADMIN — HEPARIN SODIUM 5000 UNIT(S): 1000 INJECTION INTRAVENOUS; SUBCUTANEOUS at 05:41

## 2025-06-02 RX ADMIN — Medication 400 MILLIGRAM(S): at 23:26

## 2025-06-02 RX ADMIN — Medication 75 EACH: at 06:52

## 2025-06-02 RX ADMIN — Medication 40 MILLIGRAM(S): at 09:33

## 2025-06-02 RX ADMIN — Medication 1 APPLICATION(S): at 05:42

## 2025-06-02 RX ADMIN — Medication 1000 MILLIGRAM(S): at 17:45

## 2025-06-02 RX ADMIN — Medication 75 MICROGRAM(S): at 21:42

## 2025-06-02 RX ADMIN — I.V. FAT EMULSION 8.3 ML/HR: 20 EMULSION INTRAVENOUS at 18:52

## 2025-06-02 RX ADMIN — Medication 4 MILLIGRAM(S): at 03:53

## 2025-06-02 RX ADMIN — INSULIN LISPRO 1: 100 INJECTION, SOLUTION INTRAVENOUS; SUBCUTANEOUS at 11:08

## 2025-06-02 RX ADMIN — Medication 400 MILLIGRAM(S): at 17:25

## 2025-06-02 RX ADMIN — INSULIN LISPRO 1: 100 INJECTION, SOLUTION INTRAVENOUS; SUBCUTANEOUS at 00:16

## 2025-06-02 RX ADMIN — HEPARIN SODIUM 5000 UNIT(S): 1000 INJECTION INTRAVENOUS; SUBCUTANEOUS at 21:42

## 2025-06-02 NOTE — CHART NOTE - NSCHARTNOTEFT_GEN_A_CORE
Pre-operative Note    Pre-operative Diagnosis: gastric outlet obstruction  Procedure: robotic assisted partial duodenectomy, possible bypass possible open   Surgeon: Dr. Goyal    Labs:                        7.7    10.02 )-----------( 361      ( 02 Jun 2025 09:49 )             23.8     06-02    137  |  102  |  19  ----------------------------<  135[H]  3.7   |  23  |  1.06    Ca    9.0      02 Jun 2025 07:00  Phos  3.9     06-02  Mg     1.9     06-02    TPro  6.5  /  Alb  3.6  /  TBili  0.2  /  DBili  x   /  AST  19  /  ALT  11  /  AlkPhos  49  06-02      Type & Screen #1:   Type & Screen #2:   Pregnancy Test:     Imaging  - CXR:  - EKG:    AICD/Pacemaker Interrogation Date:     - Plan:  NPO at midnight/TPN  Insulin adjusted for NPO status  Type and Cross ordered    Please continue VTE ppx  Consent pending  CBC, BMP, coags, T&S

## 2025-06-02 NOTE — PROGRESS NOTE ADULT - SUBJECTIVE AND OBJECTIVE BOX
[facility] NUTRITION SUPPORT-- FOLLOW UP NOTE      24 hour events/subjective:  Patient seen and examined at bedside, chart reviewed and events noted and I's and O's reviewed.  Patient denies chest pain, shortness of breath, vomiting, dizziness, chills at time of visit; nausea intemittently and NGT output 175 mL/24 hours.  Patient planned for OR, remains NPO, anemia noted.  Patient's VSS and no other acute overnight events noted.   Patient continues on TPN and glucose trend is within appropriate range.      ROS:  Except as noted above, all other systems reviewed and are negative     Diet:  Diet, NPO (25 @ 19:49)      PAST HISTORY  --------------------------------------------------------------------------------  PAST MEDICAL & SURGICAL HISTORY:  Glaucoma      HTN (hypertension)      Type II diabetes mellitus      Multiple thyroid nodules      Hyperthyroidism      Obese      Ovarian cyst      Gastroparesis      Iron deficiency anemia      H/O: hysterectomy  "a long time ago when I was in my 30's"      H/O       History of thyroidectomy      History of breast surgery        No significant changes to PMH, PSH, FHx, SHx, unless otherwise noted    ALLERGIES & MEDICATIONS  --------------------------------------------------------------------------------  Allergies    No Known Allergies    Intolerances      Standing Inpatient Medications  chlorhexidine 2% Cloths 1 Application(s) Topical <User Schedule>  dextrose 5%. 1000 milliLiter(s) IV Continuous <Continuous>  dextrose 5%. 1000 milliLiter(s) IV Continuous <Continuous>  dextrose 50% Injectable 25 Gram(s) IV Push once  dextrose 50% Injectable 12.5 Gram(s) IV Push once  dextrose 50% Injectable 25 Gram(s) IV Push once  glucagon  Injectable 1 milliGRAM(s) IntraMuscular once  heparin   Injectable 5000 Unit(s) SubCutaneous every 8 hours  insulin lispro (ADMELOG) corrective regimen sliding scale   SubCutaneous every 6 hours  levothyroxine Injectable 75 MICROGram(s) IV Push at bedtime  lipid, fat emulsion (Fish Oil and Plant Based) 20% Infusion 8.3 mL/Hr IV Continuous <Continuous>  pantoprazole  Injectable 40 milliGRAM(s) IV Push daily  Parenteral Nutrition - Adult 1 Each TPN Continuous <Continuous>  Parenteral Nutrition - Adult 1 Each TPN Continuous <Continuous>    PRN Inpatient Medications  benzocaine 20% Spray 1 Spray(s) Topical every 6 hours PRN  dextrose Oral Gel 15 Gram(s) Oral once PRN  ondansetron Injectable 4 milliGRAM(s) IV Push every 6 hours PRN  sodium chloride 0.9% lock flush 10 milliLiter(s) IV Push every 1 hour PRN        VITALS/PHYSICAL EXAM  --------------------------------------------------------------------------------  T(C): 36.7 (25 @ 08:58), Max: 37.1 (25 @ 00:41)  HR: 72 (25 @ 08:58) (65 - 83)  BP: 136/82 (25 @ 08:58) (136/82 - 146/73)  RR: 18 (25 @ 08:58) (18 - 18)  SpO2: 100% (25 @ 08:58) (98% - 100%)  Wt(kg): --      25 @ 07:01  -  25 @ 07:00  --------------------------------------------------------  IN: 0 mL / OUT: 1625 mL / NET: -1625 mL    25 @ 07:01  -  25 @ 13:09  --------------------------------------------------------  IN: 0 mL / OUT: 550 mL / NET: -550 mL      I&O's Detail    2025 07:01  -  2025 07:00  --------------------------------------------------------  IN:  Total IN: 0 mL    OUT:    Nasogastric/Oral tube (mL): 175 mL    Oral Fluid: 0 mL    Voided (mL): 1450 mL  Total OUT: 1625 mL    Total NET: -1625 mL      2025 07:01  -  2025 13:09  --------------------------------------------------------  IN:  Total IN: 0 mL    OUT:    Voided (mL): 550 mL  Total OUT: 550 mL    Total NET: -550 mL          Physical Exam:  Gen: WD, WN, in no acute distress.  HEENT: +NGT  Neck: Supple, no JVD/Bruit. No thyromegaly.  Abd: Soft, ND, NT,No HSM, +BS.  Ext: No clubbing, no cyanosis, no edema.  Neuro: A/Ox3. Cranial nerve intact. No focal deficit.    	      LABS/STUDIES  --------------------------------------------------------------------------------              7.7    10.02 >-----------<  361      [25 @ 09:49]              23.8     137  |  102  |  19  ----------------------------<  135      [25 @ 07:00]  3.7   |  23  |  1.06        Ca     9.0     [25 @ 07:00]      iCa    1.21     [ @ 06:55]      Mg     1.9     [25 @ 07:00]      Phos  3.9     [25 @ 07:00]    TPro  6.5  /  Alb  3.6  /  TBili  0.2  /  DBili  x   /  AST  19  /  ALT  11  /  AlkPhos  49  [25 @ 07:00]          Ca ionized  Creatinine Trend:  POC glucoseGlucose: 135 mg/dL (25 @ 07:00)  CAPILLARY BLOOD GLUCOSE      POCT Blood Glucose.: 158 mg/dL (2025 11:06)  POCT Blood Glucose.: 143 mg/dL (2025 05:58)  POCT Blood Glucose.: 168 mg/dL (2025 23:59)  POCT Blood Glucose.: 144 mg/dL (2025 18:06)    PrealbuminPrealbumin, Serum: 18 mg/dL (25 @ 06:56)    Triglycerides

## 2025-06-02 NOTE — DIETITIAN INITIAL EVALUATION ADULT - PERTINENT LABORATORY DATA
06-02    137  |  102  |  19  ----------------------------<  135[H]  3.7   |  23  |  1.06    Ca    9.0      02 Jun 2025 07:00  Phos  3.9     06-02  Mg     1.9     06-02    TPro  6.5  /  Alb  3.6  /  TBili  0.2  /  DBili  x   /  AST  19  /  ALT  11  /  AlkPhos  49  06-02  POCT Blood Glucose.: 143 mg/dL (06-02-25 @ 05:58)  A1C with Estimated Average Glucose Result: 5.5 % (05-02-25 @ 06:51)  A1C with Estimated Average Glucose Result: 5.6 % (11-01-24 @ 07:00)

## 2025-06-02 NOTE — DIETITIAN INITIAL EVALUATION ADULT - PERTINENT MEDS FT
MEDICATIONS  (STANDING):  chlorhexidine 2% Cloths 1 Application(s) Topical <User Schedule>  dextrose 5%. 1000 milliLiter(s) (50 mL/Hr) IV Continuous <Continuous>  dextrose 5%. 1000 milliLiter(s) (100 mL/Hr) IV Continuous <Continuous>  dextrose 50% Injectable 25 Gram(s) IV Push once  dextrose 50% Injectable 12.5 Gram(s) IV Push once  dextrose 50% Injectable 25 Gram(s) IV Push once  glucagon  Injectable 1 milliGRAM(s) IntraMuscular once  heparin   Injectable 5000 Unit(s) SubCutaneous every 8 hours  insulin lispro (ADMELOG) corrective regimen sliding scale   SubCutaneous every 6 hours  levothyroxine Injectable 75 MICROGram(s) IV Push at bedtime  pantoprazole  Injectable 40 milliGRAM(s) IV Push daily  Parenteral Nutrition - Adult 1 Each (75 mL/Hr) TPN Continuous <Continuous>    MEDICATIONS  (PRN):  benzocaine 20% Spray 1 Spray(s) Topical every 6 hours PRN sore throat  dextrose Oral Gel 15 Gram(s) Oral once PRN Blood Glucose LESS THAN 70 milliGRAM(s)/deciliter  ondansetron Injectable 4 milliGRAM(s) IV Push every 6 hours PRN Nausea and/or Vomiting  sodium chloride 0.9% lock flush 10 milliLiter(s) IV Push every 1 hour PRN Pre/post blood products, medications, blood draw, and to maintain line patency

## 2025-06-02 NOTE — CONSULT NOTE ADULT - SUBJECTIVE AND OBJECTIVE BOX
[facility] Attending Consultation Note     Patient seen and evaluated at bedside    Chief Complaint:    HPI:  79F with PMHx DM2, stem cell donor, HTN, CARMEN, hypothyroidism, ovarian cyst, goiter and newly diagnosed metastatic mucinous carcinoma presenting with acute onset of nausea, NBNB emesis, poor PO intake for the last 3-4 days. Symptoms persisted so decided to present today. Last had a few sips of soup yesterday with nausea. Passing flatus today. Last BM was 4 days ago. States had a colonoscopy 3 days ago as part of her cancer work-up, barely tolerated bowel prep. Denies fever/chills, abdominal pain.     Of note, earlier this year, was found to have left 6 cmovarian cyst and elevated , She was referred to GYN Oncologist and now s/p Robotic Assisted s/p Bilateral Salpingo-oophorectomy, omentectomy, and tumor debulking on 2025. During the procedure, metastatic carcinoma was discovered in the peritoneum. Primary malignancy is thought to be GI in origin (pancreatobiliary or colon) per pathology.  (31 May 2025 17:30)      PMHx:   Glaucoma    HTN (hypertension)    Type II diabetes mellitus    Multiple thyroid nodules    Hyperthyroidism    Obese    Ovarian cyst    Gastroparesis    Iron deficiency anemia        PSHx:   H/O: hysterectomy    H/O     History of thyroidectomy    History of breast surgery        Allergies:  No Known Allergies      Home Meds:    Current Medications:   benzocaine 20% Spray 1 Spray(s) Topical every 6 hours PRN  chlorhexidine 2% Cloths 1 Application(s) Topical <User Schedule>  dextrose 5%. 1000 milliLiter(s) IV Continuous <Continuous>  dextrose 5%. 1000 milliLiter(s) IV Continuous <Continuous>  dextrose 50% Injectable 25 Gram(s) IV Push once  dextrose 50% Injectable 12.5 Gram(s) IV Push once  dextrose 50% Injectable 25 Gram(s) IV Push once  dextrose Oral Gel 15 Gram(s) Oral once PRN  glucagon  Injectable 1 milliGRAM(s) IntraMuscular once  heparin   Injectable 5000 Unit(s) SubCutaneous every 8 hours  insulin lispro (ADMELOG) corrective regimen sliding scale   SubCutaneous every 6 hours  levothyroxine Injectable 75 MICROGram(s) IV Push at bedtime  lipid, fat emulsion (Fish Oil and Plant Based) 20% Infusion 8.3 mL/Hr IV Continuous <Continuous>  ondansetron Injectable 4 milliGRAM(s) IV Push every 6 hours PRN  pantoprazole  Injectable 40 milliGRAM(s) IV Push daily  Parenteral Nutrition - Adult 1 Each TPN Continuous <Continuous>  Parenteral Nutrition - Adult 1 Each TPN Continuous <Continuous>  sodium chloride 0.9% lock flush 10 milliLiter(s) IV Push every 1 hour PRN      FAMILY HISTORY:  FH: dementia (Mother)        Social History: Personally reviewed   No tobacco, EtOH or IVDU     REVIEW OF SYSTEMS:  Constitutional:     [x ] negative [ ] fevers [ ] chills [ ] weight loss [ ] weight gain  HEENT:                  [x ] negative [ ] dry eyes [ ] eye irritation [ ] postnasal drip [ ] nasal congestion  CV:                         [ x] negative  [ ] chest pain [ ] orthopnea [ ] palpitations [ ] murmur  Resp:                     [x ] negative [ ] cough [ ] shortness of breath [ ] dyspnea [ ] wheezing [ ] sputum [ ]hemoptysis  GI:                          [ x] negative [ ] nausea [ ] vomiting [ ] diarrhea [ ] constipation [ ] abd pain [ ] dysphagia   :                        [ x] negative [ ] dysuria [ ] nocturia [ ] hematuria [ ] increased urinary frequency  Musculoskeletal: [x ] negative [ ] back pain [ ] myalgias [ ] arthralgias [ ] fracture  Skin:                       [ x] negative [ ] rash [ ] itch  Neurological:        [ x] negative [ ] headache [ ] dizziness [ ] syncope [ ] weakness [ ] numbness  Psychiatric:           [ x] negative [ ] anxiety [ ] depression  Endocrine:            [ x] negative [ ] diabetes [ ] thyroid problem  Heme/Lymph:      [ x] negative [ ] anemia [ ] bleeding problem  Allergic/Immune: [ x] negative [ ] itchy eyes [ ] nasal discharge [ ] hives [ ] angioedema    [ x] All other systems negative  [ ] Unable to assess ROS due to      Physical Exam:  T(F): 98.4 (-), Max: 98.7 ()  HR: 79 () (65 - 83)  BP: 134/80 () (134/80 - 146/73)  RR: 18 (-)  SpO2: 98% ()    Gen: Well appearing   HENNT: No JVP   CV: regular rate, regular rhtyhm, no murmur   Pulm: clear to auscultation bilaterally   Abdomen: Non-tender, non-distended   Ext: no edema b/l   Neuro: grossly non-focal     Cardiovascular Diagnostic Testing:      Labs: Personally reviewed                        7.7    10.02 )-----------( 361      ( 2025 09:49 )             23.8     06-02    137  |  102  |  19  ----------------------------<  135[H]  3.7   |  23  |  1.06    Ca    9.0      2025 07:00  Phos  3.9     06-02  Mg     1.9     06-02    TPro  6.5  /  Alb  3.6  /  TBili  0.2  /  DBili  x   /  AST  19  /  ALT  11  /  AlkPhos  49  06-02        Total Cholesterol: --  LDL: --  HDL: --  T

## 2025-06-02 NOTE — DIETITIAN INITIAL EVALUATION ADULT - OTHER INFO
Weight: Pt reports UBW as ~156lbs 2 years ago, reports significant weight loss. Per chart weight was 147lbs (2/7/25), 131lbs (3/19/25), 130lbs (4/30/25). Current dosing weight is 123.6lbs.

## 2025-06-02 NOTE — PROGRESS NOTE ADULT - SUBJECTIVE AND OBJECTIVE BOX
Surgery Progress Note    Overnight events:  - TPN started, IVF stopped    SUBJECTIVE:  - Patient seen and examined at bedside   --------------------------------------------------------------------------------------------------  OBJECTIVE:   Physical Exam:  General: AAOx3, NAD, lying comfortably in bed  HEENT: NC/AT  Respiratory: nonlabored breathing  Abdomen: Soft, Non distended, non tender to palpation without rebound tenderness/guarding/rigidity, well healing incisions   Extremities: WWP, no edema  --------------------------------------------------------------------------------------------------  V/S:  Vital Signs Last 24 Hrs  T(C): 36.8 (01 Jun 2025 21:22), Max: 36.8 (01 Jun 2025 00:17)  T(F): 98.2 (01 Jun 2025 21:22), Max: 98.3 (01 Jun 2025 04:25)  HR: 65 (01 Jun 2025 21:22) (64 - 77)  BP: 145/77 (01 Jun 2025 21:22) (121/78 - 148/85)  BP(mean): --  RR: 18 (01 Jun 2025 21:22) (18 - 18)  SpO2: 100% (01 Jun 2025 21:22) (98% - 100%)    Parameters below as of 01 Jun 2025 21:22  Patient On (Oxygen Delivery Method): room air        --------------------------------------------------------------------------------------------------  I/Os:    31 May 2025 07:01  -  01 Jun 2025 07:00  --------------------------------------------------------  IN:    Lactated Ringers: 1100 mL  Total IN: 1100 mL    OUT:    Nasogastric/Oral tube (mL): 75 mL    Voided (mL): 250 mL  Total OUT: 325 mL    Total NET: 775 mL      01 Jun 2025 07:01  -  02 Jun 2025 00:05  --------------------------------------------------------  IN:  Total IN: 0 mL    OUT:    Nasogastric/Oral tube (mL): 175 mL    Oral Fluid: 0 mL    Voided (mL): 950 mL  Total OUT: 1125 mL    Total NET: -1125 mL        --------------------------------------------------------------------------------------------------  LABS:                        7.7    6.11  )-----------( 322      ( 01 Jun 2025 07:11 )             24.4     01 Jun 2025 07:18    138    |  104    |  16     ----------------------------<  81     4.0     |  19     |  1.10     Ca    9.3        01 Jun 2025 07:18  Phos  3.4       01 Jun 2025 07:18  Mg     1.9       01 Jun 2025 07:18    TPro  7.6    /  Alb  4.4    /  TBili  0.4    /  DBili  x      /  AST  15     /  ALT  9      /  AlkPhos  54     31 May 2025 10:33    PT/INR - ( 31 May 2025 10:33 )   PT: 12.7 sec;   INR: 1.11 ratio         PTT - ( 31 May 2025 10:33 )  PTT:33.3 sec  CAPILLARY BLOOD GLUCOSE      POCT Blood Glucose.: 168 mg/dL (01 Jun 2025 23:59)  POCT Blood Glucose.: 144 mg/dL (01 Jun 2025 18:06)        LIVER FUNCTIONS - ( 31 May 2025 10:33 )  Alb: 4.4 g/dL / Pro: 7.6 g/dL / ALK PHOS: 54 U/L / ALT: 9 U/L / AST: 15 U/L / GGT: x             Urinalysis Basic - ( 01 Jun 2025 07:18 )    Color: x / Appearance: x / SG: x / pH: x  Gluc: 81 mg/dL / Ketone: x  / Bili: x / Urobili: x   Blood: x / Protein: x / Nitrite: x   Leuk Esterase: x / RBC: x / WBC x   Sq Epi: x / Non Sq Epi: x / Bacteria: x      --------------------------------------------------------------------------------------------------  MEDICATIONS  (STANDING):  dextrose 5%. 1000 milliLiter(s) (50 mL/Hr) IV Continuous <Continuous>  dextrose 5%. 1000 milliLiter(s) (100 mL/Hr) IV Continuous <Continuous>  dextrose 50% Injectable 25 Gram(s) IV Push once  dextrose 50% Injectable 12.5 Gram(s) IV Push once  dextrose 50% Injectable 25 Gram(s) IV Push once  glucagon  Injectable 1 milliGRAM(s) IntraMuscular once  heparin   Injectable 5000 Unit(s) SubCutaneous every 8 hours  insulin lispro (ADMELOG) corrective regimen sliding scale   SubCutaneous every 6 hours  levothyroxine Injectable 75 MICROGram(s) IV Push at bedtime  pantoprazole  Injectable 40 milliGRAM(s) IV Push daily  Parenteral Nutrition - Adult 1 Each (75 mL/Hr) TPN Continuous <Continuous>    MEDICATIONS  (PRN):  benzocaine 20% Spray 1 Spray(s) Topical every 6 hours PRN sore throat  dextrose Oral Gel 15 Gram(s) Oral once PRN Blood Glucose LESS THAN 70 milliGRAM(s)/deciliter  sodium chloride 0.9% lock flush 10 milliLiter(s) IV Push every 1 hour PRN Pre/post blood products, medications, blood draw, and to maintain line patency    --------------------------------------------------------------------------------------------------     Surgery Progress Note    Overnight events:  - TPN started, IVF stopped    SUBJECTIVE:  - Patient seen and examined at bedside. Endorsing nausea, NGT flushed at bedside, 175cc/24h documented. -/-  --------------------------------------------------------------------------------------------------  OBJECTIVE:   Physical Exam:  General: AAOx3, NAD, lying comfortably in bed  HEENT: NC/AT  Respiratory: nonlabored breathing  Abdomen: Soft, Non distended, non tender to palpation without rebound tenderness/guarding/rigidity, well healing incisions   Extremities: WWP, no edema  --------------------------------------------------------------------------------------------------  V/S:  Vital Signs Last 24 Hrs  T(C): 36.8 (01 Jun 2025 21:22), Max: 36.8 (01 Jun 2025 00:17)  T(F): 98.2 (01 Jun 2025 21:22), Max: 98.3 (01 Jun 2025 04:25)  HR: 65 (01 Jun 2025 21:22) (64 - 77)  BP: 145/77 (01 Jun 2025 21:22) (121/78 - 148/85)  BP(mean): --  RR: 18 (01 Jun 2025 21:22) (18 - 18)  SpO2: 100% (01 Jun 2025 21:22) (98% - 100%)    Parameters below as of 01 Jun 2025 21:22  Patient On (Oxygen Delivery Method): room air        --------------------------------------------------------------------------------------------------  I/Os:    31 May 2025 07:01  -  01 Jun 2025 07:00  --------------------------------------------------------  IN:    Lactated Ringers: 1100 mL  Total IN: 1100 mL    OUT:    Nasogastric/Oral tube (mL): 75 mL    Voided (mL): 250 mL  Total OUT: 325 mL    Total NET: 775 mL      01 Jun 2025 07:01  -  02 Jun 2025 00:05  --------------------------------------------------------  IN:  Total IN: 0 mL    OUT:    Nasogastric/Oral tube (mL): 175 mL    Oral Fluid: 0 mL    Voided (mL): 950 mL  Total OUT: 1125 mL    Total NET: -1125 mL        --------------------------------------------------------------------------------------------------  LABS:                        7.7    6.11  )-----------( 322      ( 01 Jun 2025 07:11 )             24.4     01 Jun 2025 07:18    138    |  104    |  16     ----------------------------<  81     4.0     |  19     |  1.10     Ca    9.3        01 Jun 2025 07:18  Phos  3.4       01 Jun 2025 07:18  Mg     1.9       01 Jun 2025 07:18    TPro  7.6    /  Alb  4.4    /  TBili  0.4    /  DBili  x      /  AST  15     /  ALT  9      /  AlkPhos  54     31 May 2025 10:33    PT/INR - ( 31 May 2025 10:33 )   PT: 12.7 sec;   INR: 1.11 ratio         PTT - ( 31 May 2025 10:33 )  PTT:33.3 sec  CAPILLARY BLOOD GLUCOSE      POCT Blood Glucose.: 168 mg/dL (01 Jun 2025 23:59)  POCT Blood Glucose.: 144 mg/dL (01 Jun 2025 18:06)        LIVER FUNCTIONS - ( 31 May 2025 10:33 )  Alb: 4.4 g/dL / Pro: 7.6 g/dL / ALK PHOS: 54 U/L / ALT: 9 U/L / AST: 15 U/L / GGT: x             Urinalysis Basic - ( 01 Jun 2025 07:18 )    Color: x / Appearance: x / SG: x / pH: x  Gluc: 81 mg/dL / Ketone: x  / Bili: x / Urobili: x   Blood: x / Protein: x / Nitrite: x   Leuk Esterase: x / RBC: x / WBC x   Sq Epi: x / Non Sq Epi: x / Bacteria: x      --------------------------------------------------------------------------------------------------  MEDICATIONS  (STANDING):  dextrose 5%. 1000 milliLiter(s) (50 mL/Hr) IV Continuous <Continuous>  dextrose 5%. 1000 milliLiter(s) (100 mL/Hr) IV Continuous <Continuous>  dextrose 50% Injectable 25 Gram(s) IV Push once  dextrose 50% Injectable 12.5 Gram(s) IV Push once  dextrose 50% Injectable 25 Gram(s) IV Push once  glucagon  Injectable 1 milliGRAM(s) IntraMuscular once  heparin   Injectable 5000 Unit(s) SubCutaneous every 8 hours  insulin lispro (ADMELOG) corrective regimen sliding scale   SubCutaneous every 6 hours  levothyroxine Injectable 75 MICROGram(s) IV Push at bedtime  pantoprazole  Injectable 40 milliGRAM(s) IV Push daily  Parenteral Nutrition - Adult 1 Each (75 mL/Hr) TPN Continuous <Continuous>    MEDICATIONS  (PRN):  benzocaine 20% Spray 1 Spray(s) Topical every 6 hours PRN sore throat  dextrose Oral Gel 15 Gram(s) Oral once PRN Blood Glucose LESS THAN 70 milliGRAM(s)/deciliter  sodium chloride 0.9% lock flush 10 milliLiter(s) IV Push every 1 hour PRN Pre/post blood products, medications, blood draw, and to maintain line patency    --------------------------------------------------------------------------------------------------

## 2025-06-02 NOTE — DIETITIAN INITIAL EVALUATION ADULT - ORAL INTAKE PTA/DIET HISTORY
Pt reports decreased PO intake for a few weeks due to nausea, emesis. Reports worsening intake following colonoscopy x few days ago. NKFA. Pt denies chewing/swallowing difficulty, diarrhea, constipation. Was drinking Boost/Ensure High Protein 2x daily.

## 2025-06-02 NOTE — DIETITIAN INITIAL EVALUATION ADULT - ADD RECOMMEND
1. Parenteral nutrition per TPN team, nutrition assessment (See recommendations above)  2. Recommend multivitamin daily and thiamine 100mg x 5days   3. RD to remain available and follow-up as medically appropriate.   4. Malnutrition alert placed in EMR

## 2025-06-02 NOTE — PROGRESS NOTE ADULT - ASSESSMENT
79F with PMHx DM2, stem cell donor, HTN, CARMEN, hypothyroidism, ovarian cyst, goiter and newly diagnosed metastatic mucinous carcinoma presenting with acute onset of nausea, NBNB emesis, poor PO intake for the last 3-4 days c/w GOO.  TPN consulted for Protein Calorie Malnutrition in the setting of nutritional optimization for anticipated prolonged NPO, pre-op and post op. Pt tentatively planned for OR for resection/bypass next week.    GOAL PN: 105 g amino acids, 210g dextrose, 60 g SMOF lipid; to provide 1734 kcal/day (~31 kcal/kg and 1.86 g/kg protein based on dosing wt 56.2kg)  Non-Protein Calories: 1314 kcal/day (23 kcal/kg, based on dosing wt 56.2 kg); Dextrose Infusion Rate:2.6 mg/kg/min (24-hour infusion); Lipid Infusion Rate: 1.06 g/kg; 0.09g/kg/hr (12-hour infusion)     -Nutritional Assessment, pt not meeting nutritional goals enterally, and would require TPN for nutritional support, pre-op and post-op. Prealumin 18 mg/dL - will trend weekly  - TPN plan:  TPN started 6/1/25; increase to goal AA/Dex and add 20 G smof today; compress to 18 hours  - TPN Access:  US guided Bard Double lumen power PICC line in place; PICC maintenance per protocol; keep port dedicated for TPN to avoid contamination   - Strict Intake and Output; Weights three times a week  - Electrolyte Imbalance risk- check CMP, Mg, Phos, iCa and K daily, will replete  in TPN as needed.  - Hypophosphatemia:  phos levels reviewed; sodium Phosphate @ 45 mmol  - Hypokalemia:  CMP reviewed; increase Potassium Cloride @ 70 mEq  - Hypomagnesemia:  magnesium levels reviewed; Magnesium Sulfate @ 12 mEq  - Hypocalcemia:  calcium levels reviewed; calcium Gluconate @ 10 mEq  - Risk of refeeding syndrome:  added Thiamine, TE and MVI in TPN BAG to help decrease risk of refeeding; monitor potassium, magnesium, phosphorus, glucose and fluid balance closely  - Risk of hypertriglyceridemia:  TG 61 mg/dL; plan to trend TG closely while on TPN  - Risk of hyperglycemia:  f/u HgA1c in AM; fingersticks every 6 hours with RISS coverage to monitor glucose trend (143-168 mg/dL); no RI in TPN  - Further care per [facility] Surgery team, pager [phone].      Available on TEAMS  TPN spectra [id number] ([phone] when dialing from outside line)  M-F 8A-2P, Weekends and holidays 8/9A-12/1P  Discussed with Dr. Sidney Jesus    Time-based billing (NON-critical care).     35 minutes spent on total encounter. The necessity of the time spent during the encounter on this date of service was due to chart review, lab/radiology test review, patient assessment, discussion and collaboration with interdisciplinary team members and ordering TPN/making recommendation.

## 2025-06-02 NOTE — PROGRESS NOTE ADULT - ASSESSMENT
79F with PMHx DM2, stem cell donor, HTN, CARMEN, hypothyroidism, ovarian cyst, goiter and newly diagnosed metastatic mucinous carcinoma presenting with acute onset of nausea, NBNB emesis, poor PO intake for the last 3-4 days c/w GOO.     Plan:  - NPO/NGT/IVFs  - Monitor bowel fxn  - PICC line placed 6/1; TPN started  - DVT ppx  - Home meds  - Heme/Onc consulted - appreciate recs   - Gyn Onc consulted d/t recent surgery - appreciate recs    [facility] Surgery  [phone]   79F with PMHx DM2, stem cell donor, HTN, CARMEN, hypothyroidism, ovarian cyst, goiter and newly diagnosed metastatic mucinous carcinoma presenting with acute onset of nausea, NBNB emesis, poor PO intake for the last 3-4 days c/w GOO.     Plan:  - NPO/NGT/IVFs  - Monitor bowel fxn  - PICC line placed 6/1; TPN started  - DVT ppx  - Home meds  - pending cardiology risk stratification  - Heme/Onc consulted - appreciate recs   - Gyn Onc consulted d/t recent surgery - appreciate recs    [facility] Surgery  [phone]

## 2025-06-02 NOTE — DIETITIAN INITIAL EVALUATION ADULT - REASON FOR ADMISSION
Intestinal obstruction    Chart reviewed, events noted. This is a "79F with PMHx DM2, stem cell donor, HTN, CARMEN, hypothyroidism, ovarian cyst, goiter and newly diagnosed metastatic mucinous carcinoma presenting with acute onset of nausea, NBNB emesis, poor PO intake for the last 3-4 days c/w GOO."

## 2025-06-02 NOTE — DIETITIAN INITIAL EVALUATION ADULT - NSFNSGIIOFT_GEN_A_CORE
06-01-25 @ 07:01  -  06-02-25 @ 07:00  --------------------------------------------------------  OUT:    Nasogastric/Oral tube (mL): 175 mL  Total OUT: 175 mL    Total NET: -175 mL

## 2025-06-02 NOTE — CONSULT NOTE ADULT - ASSESSMENT
79F w/ T2DM, stem cell donor, HTN, HLD, CARMEN here for new dx of metastatic mucinous CA and inability to tolerate PO, currently w/ NGT for SBO. Cardiology consulted for preop cardiac eval     -EKG w/ NSR no significant STT changes   -recommend inpt TTE ECHO for further evaluation   -planned for surgical resection for bypass     Claudia Mendoza MD [facility]  Attending Interventional Cardiologist, [place].   Avaliable on [employer] Team   79F w/ T2DM, stem cell donor, HTN, HLD, CARMEN here for new dx of metastatic mucinous CA and inability to tolerate PO, currently w/ NGT for SBO. Cardiology consulted for preop cardiac eval     -EKG w/ NSR no significant STT changes   -recommend inpt TTE ECHO for further evaluation   -s/p TTE ECHO normal EF, no significant VHD   -planned for surgical resection for bypass   -optimized from cardiac standpoint to proceed     Claudia Mednoza MD [facility]  Attending Interventional Cardiologist, [place].   Avaliable on [employer] Team

## 2025-06-02 NOTE — DIETITIAN INITIAL EVALUATION ADULT - NS FNS DIET ORDER
Diet, NPO (05-31-25 @ 19:49) [Active]      Parenteral Nutrition - Adult 1 Each (75 mL/Hr) TPN Continuous <Continuous>, 06-01-25 @ 17:00, 17:00, Stop order after: 1 Days

## 2025-06-02 NOTE — DIETITIAN INITIAL EVALUATION ADULT - ALTERNATE MEANS OF NUTRITION
PARENTERAL NUTRITION  [x] CPN (central PN)  [] PPN (peripheral PN; max 900 mOsm/L)  [] Premixed/Multi Chamber Bags     GOAL PN: 105 g amino acids, 210g dextrose, 60 g SMOF lipid; to provide 1734 kcal/day (~31 kcal/kg and 1.86 g/kg protein based on dosing wt 56.2kg)    Non-Protein Calories: 1314 kcal/day (23 kcal/kg, based on dosing wt 56.2 kg)  Dextrose Infusion Rate:2.6 mg/kg/min (24-hour infusion)  Lipid Infusion Rate: 1.06 g/kg; 0.09g/kg/hr (12-hour infusion)/TPN

## 2025-06-02 NOTE — DIETITIAN INITIAL EVALUATION ADULT - ETIOLOGY
related to inadequate protein-energy intake in setting of metastatic mucinous carcinoma (complicated by GOO)

## 2025-06-03 ENCOUNTER — APPOINTMENT (OUTPATIENT)
Dept: SURGICAL ONCOLOGY | Facility: HOSPITAL | Age: 80
End: 2025-06-03

## 2025-06-03 LAB
ALBUMIN SERPL ELPH-MCNC: 3.2 G/DL — LOW (ref 3.3–5)
ALP SERPL-CCNC: 40 U/L — SIGNIFICANT CHANGE UP (ref 40–120)
ALT FLD-CCNC: 10 U/L — SIGNIFICANT CHANGE UP (ref 10–45)
ANION GAP SERPL CALC-SCNC: 12 MMOL/L — SIGNIFICANT CHANGE UP (ref 5–17)
APTT BLD: 51.8 SEC — HIGH (ref 26.1–36.8)
AST SERPL-CCNC: 8 U/L — LOW (ref 10–40)
BASOPHILS # BLD AUTO: 0.01 K/UL — SIGNIFICANT CHANGE UP (ref 0–0.2)
BASOPHILS NFR BLD AUTO: 0.1 % — SIGNIFICANT CHANGE UP (ref 0–2)
BILIRUB SERPL-MCNC: 0.1 MG/DL — LOW (ref 0.2–1.2)
BLD GP AB SCN SERPL QL: NEGATIVE — SIGNIFICANT CHANGE UP
BUN SERPL-MCNC: 33 MG/DL — HIGH (ref 7–23)
CA-I BLD-SCNC: 1.22 MMOL/L — SIGNIFICANT CHANGE UP (ref 1.15–1.33)
CALCIUM SERPL-MCNC: 9 MG/DL — SIGNIFICANT CHANGE UP (ref 8.4–10.5)
CHLORIDE SERPL-SCNC: 103 MMOL/L — SIGNIFICANT CHANGE UP (ref 96–108)
CO2 SERPL-SCNC: 23 MMOL/L — SIGNIFICANT CHANGE UP (ref 22–31)
CREAT SERPL-MCNC: 1.14 MG/DL — SIGNIFICANT CHANGE UP (ref 0.5–1.3)
EGFR: 48 ML/MIN/1.73M2 — LOW
EGFR: 48 ML/MIN/1.73M2 — LOW
EOSINOPHIL # BLD AUTO: 0.14 K/UL — SIGNIFICANT CHANGE UP (ref 0–0.5)
EOSINOPHIL NFR BLD AUTO: 2.1 % — SIGNIFICANT CHANGE UP (ref 0–6)
GLUCOSE BLDC GLUCOMTR-MCNC: 115 MG/DL — HIGH (ref 70–99)
GLUCOSE BLDC GLUCOMTR-MCNC: 132 MG/DL — HIGH (ref 70–99)
GLUCOSE BLDC GLUCOMTR-MCNC: 90 MG/DL — SIGNIFICANT CHANGE UP (ref 70–99)
GLUCOSE SERPL-MCNC: 119 MG/DL — HIGH (ref 70–99)
HCT VFR BLD CALC: 19.1 % — CRITICAL LOW (ref 34.5–45)
HCT VFR BLD CALC: 19.4 % — CRITICAL LOW (ref 34.5–45)
HCT VFR BLD CALC: 24.3 % — LOW (ref 34.5–45)
HGB BLD-MCNC: 6.2 G/DL — CRITICAL LOW (ref 11.5–15.5)
HGB BLD-MCNC: 6.3 G/DL — CRITICAL LOW (ref 11.5–15.5)
HGB BLD-MCNC: 8.2 G/DL — LOW (ref 11.5–15.5)
IMM GRANULOCYTES NFR BLD AUTO: 0.4 % — SIGNIFICANT CHANGE UP (ref 0–0.9)
INR BLD: 1.08 RATIO — SIGNIFICANT CHANGE UP (ref 0.85–1.16)
LYMPHOCYTES # BLD AUTO: 1.78 K/UL — SIGNIFICANT CHANGE UP (ref 1–3.3)
LYMPHOCYTES # BLD AUTO: 26.3 % — SIGNIFICANT CHANGE UP (ref 13–44)
MAGNESIUM SERPL-MCNC: 2 MG/DL — SIGNIFICANT CHANGE UP (ref 1.6–2.6)
MCHC RBC-ENTMCNC: 28.5 PG — SIGNIFICANT CHANGE UP (ref 27–34)
MCHC RBC-ENTMCNC: 29 PG — SIGNIFICANT CHANGE UP (ref 27–34)
MCHC RBC-ENTMCNC: 29.7 PG — SIGNIFICANT CHANGE UP (ref 27–34)
MCHC RBC-ENTMCNC: 32.5 G/DL — SIGNIFICANT CHANGE UP (ref 32–36)
MCHC RBC-ENTMCNC: 32.5 G/DL — SIGNIFICANT CHANGE UP (ref 32–36)
MCHC RBC-ENTMCNC: 33.7 G/DL — SIGNIFICANT CHANGE UP (ref 32–36)
MCV RBC AUTO: 87.8 FL — SIGNIFICANT CHANGE UP (ref 80–100)
MCV RBC AUTO: 88 FL — SIGNIFICANT CHANGE UP (ref 80–100)
MCV RBC AUTO: 89.3 FL — SIGNIFICANT CHANGE UP (ref 80–100)
MONOCYTES # BLD AUTO: 0.66 K/UL — SIGNIFICANT CHANGE UP (ref 0–0.9)
MONOCYTES NFR BLD AUTO: 9.7 % — SIGNIFICANT CHANGE UP (ref 2–14)
MRSA PCR RESULT.: SIGNIFICANT CHANGE UP
NEUTROPHILS # BLD AUTO: 4.15 K/UL — SIGNIFICANT CHANGE UP (ref 1.8–7.4)
NEUTROPHILS NFR BLD AUTO: 61.4 % — SIGNIFICANT CHANGE UP (ref 43–77)
NRBC BLD AUTO-RTO: 0 /100 WBCS — SIGNIFICANT CHANGE UP (ref 0–0)
PHOSPHATE SERPL-MCNC: 5.5 MG/DL — HIGH (ref 2.5–4.5)
PLATELET # BLD AUTO: 252 K/UL — SIGNIFICANT CHANGE UP (ref 150–400)
PLATELET # BLD AUTO: 263 K/UL — SIGNIFICANT CHANGE UP (ref 150–400)
PLATELET # BLD AUTO: 271 K/UL — SIGNIFICANT CHANGE UP (ref 150–400)
POTASSIUM SERPL-MCNC: 3.9 MMOL/L — SIGNIFICANT CHANGE UP (ref 3.5–5.3)
POTASSIUM SERPL-SCNC: 3.9 MMOL/L — SIGNIFICANT CHANGE UP (ref 3.5–5.3)
PREALB SERPL-MCNC: 17 MG/DL — LOW (ref 20–40)
PROT SERPL-MCNC: 5.6 G/DL — LOW (ref 6–8.3)
PROTHROM AB SERPL-ACNC: 12.3 SEC — SIGNIFICANT CHANGE UP (ref 9.9–13.4)
RBC # BLD: 2.14 M/UL — LOW (ref 3.8–5.2)
RBC # BLD: 2.21 M/UL — LOW (ref 3.8–5.2)
RBC # BLD: 2.76 M/UL — LOW (ref 3.8–5.2)
RBC # FLD: 15.2 % — HIGH (ref 10.3–14.5)
RBC # FLD: 15.5 % — HIGH (ref 10.3–14.5)
RBC # FLD: 15.5 % — HIGH (ref 10.3–14.5)
RH IG SCN BLD-IMP: POSITIVE — SIGNIFICANT CHANGE UP
S AUREUS DNA NOSE QL NAA+PROBE: SIGNIFICANT CHANGE UP
SODIUM SERPL-SCNC: 138 MMOL/L — SIGNIFICANT CHANGE UP (ref 135–145)
TRIGL SERPL-MCNC: 64 MG/DL — SIGNIFICANT CHANGE UP
WBC # BLD: 6.77 K/UL — SIGNIFICANT CHANGE UP (ref 3.8–10.5)
WBC # BLD: 7.16 K/UL — SIGNIFICANT CHANGE UP (ref 3.8–10.5)
WBC # BLD: 9.74 K/UL — SIGNIFICANT CHANGE UP (ref 3.8–10.5)
WBC # FLD AUTO: 6.77 K/UL — SIGNIFICANT CHANGE UP (ref 3.8–10.5)
WBC # FLD AUTO: 7.16 K/UL — SIGNIFICANT CHANGE UP (ref 3.8–10.5)
WBC # FLD AUTO: 9.74 K/UL — SIGNIFICANT CHANGE UP (ref 3.8–10.5)

## 2025-06-03 PROCEDURE — 99232 SBSQ HOSP IP/OBS MODERATE 35: CPT

## 2025-06-03 PROCEDURE — 99232 SBSQ HOSP IP/OBS MODERATE 35: CPT | Mod: 57

## 2025-06-03 DEVICE — SURGICEL POWDER 3 GRAMS: Type: IMPLANTABLE DEVICE | Status: FUNCTIONAL

## 2025-06-03 DEVICE — XI STAPLER SUREFORM RELOAD 45 BLUE: Type: IMPLANTABLE DEVICE | Status: FUNCTIONAL

## 2025-06-03 DEVICE — XI STAPLER SUREFORM RELOAD 60 BLUE: Type: IMPLANTABLE DEVICE | Status: FUNCTIONAL

## 2025-06-03 RX ORDER — I.V. FAT EMULSION 20 G/100ML
25 EMULSION INTRAVENOUS
Qty: 60 | Refills: 0 | Status: DISCONTINUED | OUTPATIENT
Start: 2025-06-03 | End: 2025-06-04

## 2025-06-03 RX ORDER — SODIUM/POT/MAG/CALC/CHLOR/ACET 35-20-5MEQ
1 VIAL (ML) INTRAVENOUS
Refills: 0 | Status: DISCONTINUED | OUTPATIENT
Start: 2025-06-03 | End: 2025-06-04

## 2025-06-03 RX ADMIN — Medication 1000 MILLIGRAM(S): at 00:09

## 2025-06-03 RX ADMIN — Medication 1 EACH: at 17:56

## 2025-06-03 RX ADMIN — Medication 1 SPRAY(S): at 13:08

## 2025-06-03 RX ADMIN — Medication 1000 MILLIGRAM(S): at 06:00

## 2025-06-03 RX ADMIN — Medication 1 LOZENGE: at 09:32

## 2025-06-03 RX ADMIN — Medication 1 APPLICATION(S): at 07:19

## 2025-06-03 RX ADMIN — Medication 40 MILLIGRAM(S): at 09:34

## 2025-06-03 RX ADMIN — Medication 1000 MILLIGRAM(S): at 12:20

## 2025-06-03 RX ADMIN — Medication 400 MILLIGRAM(S): at 05:03

## 2025-06-03 RX ADMIN — I.V. FAT EMULSION 25 ML/HR: 20 EMULSION INTRAVENOUS at 17:56

## 2025-06-03 RX ADMIN — Medication 1 EACH: at 07:18

## 2025-06-03 RX ADMIN — Medication 400 MILLIGRAM(S): at 12:05

## 2025-06-03 NOTE — PROGRESS NOTE ADULT - SUBJECTIVE AND OBJECTIVE BOX
Surgery Progress Note    Overnight events:  - NAEO    SUBJECTIVE:  - Patient seen and examined at bedside   --------------------------------------------------------------------------------------------------  OBJECTIVE:   Physical Exam:  General: AAOx3, NAD, lying comfortably in bed  HEENT: NC/AT  Respiratory: nonlabored breathing  Abdomen: Soft, Non distended, non tender to palpation without rebound tenderness/guarding/rigidity, well healing incisions   --------------------------------------------------------------------------------------------------  V/S:  Vital Signs Last 24 Hrs  T(C): 36.3 (02 Jun 2025 20:18), Max: 37.1 (02 Jun 2025 00:41)  T(F): 97.4 (02 Jun 2025 20:18), Max: 98.7 (02 Jun 2025 00:41)  HR: 75 (02 Jun 2025 20:18) (71 - 83)  BP: 160/85 (02 Jun 2025 20:18) (134/80 - 160/85)  BP(mean): --  RR: 18 (02 Jun 2025 20:18) (18 - 18)  SpO2: 98% (02 Jun 2025 20:18) (98% - 100%)    Parameters below as of 02 Jun 2025 20:18  Patient On (Oxygen Delivery Method): room air        --------------------------------------------------------------------------------------------------  I/Os:    01 Jun 2025 07:01  -  02 Jun 2025 07:00  --------------------------------------------------------  IN:  Total IN: 0 mL    OUT:    Nasogastric/Oral tube (mL): 175 mL    Oral Fluid: 0 mL    Voided (mL): 1450 mL  Total OUT: 1625 mL    Total NET: -1625 mL      02 Jun 2025 07:01  -  03 Jun 2025 00:40  --------------------------------------------------------  IN:  Total IN: 0 mL    OUT:    Nasogastric/Oral tube (mL): 75 mL    Oral Fluid: 0 mL    Voided (mL): 950 mL  Total OUT: 1025 mL    Total NET: -1025 mL        --------------------------------------------------------------------------------------------------  LABS:                        7.7    10.02 )-----------( 361      ( 02 Jun 2025 09:49 )             23.8     02 Jun 2025 07:00    137    |  102    |  19     ----------------------------<  135    3.7     |  23     |  1.06     Ca    9.0        02 Jun 2025 07:00  Phos  3.9       02 Jun 2025 07:00  Mg     1.9       02 Jun 2025 07:00    TPro  6.5    /  Alb  3.6    /  TBili  0.2    /  DBili  x      /  AST  19     /  ALT  11     /  AlkPhos  49     02 Jun 2025 07:00      CAPILLARY BLOOD GLUCOSE      POCT Blood Glucose.: 147 mg/dL (02 Jun 2025 23:41)  POCT Blood Glucose.: 117 mg/dL (02 Jun 2025 17:22)  POCT Blood Glucose.: 158 mg/dL (02 Jun 2025 11:06)  POCT Blood Glucose.: 143 mg/dL (02 Jun 2025 05:58)        LIVER FUNCTIONS - ( 02 Jun 2025 07:00 )  Alb: 3.6 g/dL / Pro: 6.5 g/dL / ALK PHOS: 49 U/L / ALT: 11 U/L / AST: 19 U/L / GGT: x             Urinalysis Basic - ( 02 Jun 2025 07:00 )    Color: x / Appearance: x / SG: x / pH: x  Gluc: 135 mg/dL / Ketone: x  / Bili: x / Urobili: x   Blood: x / Protein: x / Nitrite: x   Leuk Esterase: x / RBC: x / WBC x   Sq Epi: x / Non Sq Epi: x / Bacteria: x      --------------------------------------------------------------------------------------------------  MEDICATIONS  (STANDING):  acetaminophen   IVPB .. 1000 milliGRAM(s) IV Intermittent every 6 hours  chlorhexidine 2% Cloths 1 Application(s) Topical <User Schedule>  dextrose 5%. 1000 milliLiter(s) (50 mL/Hr) IV Continuous <Continuous>  dextrose 5%. 1000 milliLiter(s) (100 mL/Hr) IV Continuous <Continuous>  dextrose 50% Injectable 25 Gram(s) IV Push once  dextrose 50% Injectable 12.5 Gram(s) IV Push once  dextrose 50% Injectable 25 Gram(s) IV Push once  glucagon  Injectable 1 milliGRAM(s) IntraMuscular once  heparin   Injectable 5000 Unit(s) SubCutaneous every 8 hours  insulin lispro (ADMELOG) corrective regimen sliding scale   SubCutaneous every 6 hours  levothyroxine Injectable 75 MICROGram(s) IV Push at bedtime  lipid, fat emulsion (Fish Oil and Plant Based) 20% Infusion 8.3 mL/Hr (8.3 mL/Hr) IV Continuous <Continuous>  pantoprazole  Injectable 40 milliGRAM(s) IV Push daily  Parenteral Nutrition - Adult 1 Each TPN Continuous <Continuous>    MEDICATIONS  (PRN):  benzocaine 20% Spray 1 Spray(s) Topical every 6 hours PRN sore throat  dextrose Oral Gel 15 Gram(s) Oral once PRN Blood Glucose LESS THAN 70 milliGRAM(s)/deciliter  ondansetron Injectable 4 milliGRAM(s) IV Push every 6 hours PRN Nausea and/or Vomiting  sodium chloride 0.9% lock flush 10 milliLiter(s) IV Push every 1 hour PRN Pre/post blood products, medications, blood draw, and to maintain line patency    --------------------------------------------------------------------------------------------------     Surgery Progress Note    Overnight events:  - NAEO    SUBJECTIVE:  - Patient seen and examined at bedside. Pt feeling well this morning. No complaints. Denies N/V.   --------------------------------------------------------------------------------------------------  OBJECTIVE:   Physical Exam:  General: AAOx3, NAD, lying comfortably in bed  HEENT: NC/AT  Respiratory: nonlabored breathing  Abdomen: Soft, Non distended, non tender to palpation without rebound tenderness/guarding/rigidity, well healing incisions   --------------------------------------------------------------------------------------------------  V/S:  Vital Signs Last 24 Hrs  T(C): 36.3 (02 Jun 2025 20:18), Max: 37.1 (02 Jun 2025 00:41)  T(F): 97.4 (02 Jun 2025 20:18), Max: 98.7 (02 Jun 2025 00:41)  HR: 75 (02 Jun 2025 20:18) (71 - 83)  BP: 160/85 (02 Jun 2025 20:18) (134/80 - 160/85)  BP(mean): --  RR: 18 (02 Jun 2025 20:18) (18 - 18)  SpO2: 98% (02 Jun 2025 20:18) (98% - 100%)    Parameters below as of 02 Jun 2025 20:18  Patient On (Oxygen Delivery Method): room air        --------------------------------------------------------------------------------------------------  I/Os:    01 Jun 2025 07:01  -  02 Jun 2025 07:00  --------------------------------------------------------  IN:  Total IN: 0 mL    OUT:    Nasogastric/Oral tube (mL): 175 mL    Oral Fluid: 0 mL    Voided (mL): 1450 mL  Total OUT: 1625 mL    Total NET: -1625 mL      02 Jun 2025 07:01  -  03 Jun 2025 00:40  --------------------------------------------------------  IN:  Total IN: 0 mL    OUT:    Nasogastric/Oral tube (mL): 75 mL    Oral Fluid: 0 mL    Voided (mL): 950 mL  Total OUT: 1025 mL    Total NET: -1025 mL        --------------------------------------------------------------------------------------------------  LABS:                        7.7    10.02 )-----------( 361      ( 02 Jun 2025 09:49 )             23.8     02 Jun 2025 07:00    137    |  102    |  19     ----------------------------<  135    3.7     |  23     |  1.06     Ca    9.0        02 Jun 2025 07:00  Phos  3.9       02 Jun 2025 07:00  Mg     1.9       02 Jun 2025 07:00    TPro  6.5    /  Alb  3.6    /  TBili  0.2    /  DBili  x      /  AST  19     /  ALT  11     /  AlkPhos  49     02 Jun 2025 07:00      CAPILLARY BLOOD GLUCOSE      POCT Blood Glucose.: 147 mg/dL (02 Jun 2025 23:41)  POCT Blood Glucose.: 117 mg/dL (02 Jun 2025 17:22)  POCT Blood Glucose.: 158 mg/dL (02 Jun 2025 11:06)  POCT Blood Glucose.: 143 mg/dL (02 Jun 2025 05:58)        LIVER FUNCTIONS - ( 02 Jun 2025 07:00 )  Alb: 3.6 g/dL / Pro: 6.5 g/dL / ALK PHOS: 49 U/L / ALT: 11 U/L / AST: 19 U/L / GGT: x             Urinalysis Basic - ( 02 Jun 2025 07:00 )    Color: x / Appearance: x / SG: x / pH: x  Gluc: 135 mg/dL / Ketone: x  / Bili: x / Urobili: x   Blood: x / Protein: x / Nitrite: x   Leuk Esterase: x / RBC: x / WBC x   Sq Epi: x / Non Sq Epi: x / Bacteria: x      --------------------------------------------------------------------------------------------------  MEDICATIONS  (STANDING):  acetaminophen   IVPB .. 1000 milliGRAM(s) IV Intermittent every 6 hours  chlorhexidine 2% Cloths 1 Application(s) Topical <User Schedule>  dextrose 5%. 1000 milliLiter(s) (50 mL/Hr) IV Continuous <Continuous>  dextrose 5%. 1000 milliLiter(s) (100 mL/Hr) IV Continuous <Continuous>  dextrose 50% Injectable 25 Gram(s) IV Push once  dextrose 50% Injectable 12.5 Gram(s) IV Push once  dextrose 50% Injectable 25 Gram(s) IV Push once  glucagon  Injectable 1 milliGRAM(s) IntraMuscular once  heparin   Injectable 5000 Unit(s) SubCutaneous every 8 hours  insulin lispro (ADMELOG) corrective regimen sliding scale   SubCutaneous every 6 hours  levothyroxine Injectable 75 MICROGram(s) IV Push at bedtime  lipid, fat emulsion (Fish Oil and Plant Based) 20% Infusion 8.3 mL/Hr (8.3 mL/Hr) IV Continuous <Continuous>  pantoprazole  Injectable 40 milliGRAM(s) IV Push daily  Parenteral Nutrition - Adult 1 Each TPN Continuous <Continuous>    MEDICATIONS  (PRN):  benzocaine 20% Spray 1 Spray(s) Topical every 6 hours PRN sore throat  dextrose Oral Gel 15 Gram(s) Oral once PRN Blood Glucose LESS THAN 70 milliGRAM(s)/deciliter  ondansetron Injectable 4 milliGRAM(s) IV Push every 6 hours PRN Nausea and/or Vomiting  sodium chloride 0.9% lock flush 10 milliLiter(s) IV Push every 1 hour PRN Pre/post blood products, medications, blood draw, and to maintain line patency    --------------------------------------------------------------------------------------------------

## 2025-06-03 NOTE — PROGRESS NOTE ADULT - ASSESSMENT
79F with PMHx DM2, stem cell donor, HTN, CARMEN, hypothyroidism, ovarian cyst, goiter and newly diagnosed metastatic mucinous carcinoma presenting with acute onset of nausea, NBNB emesis, poor PO intake for the last 3-4 days c/w GOO.  TPN consulted for Protein Calorie Malnutrition in the setting of nutritional optimization for anticipated prolonged NPO, pre-op and post op. Pt tentatively planned for OR for resection/bypass next week.    GOAL PN: 105 g amino acids, 210g dextrose, 60 g SMOF lipid; to provide 1734 kcal/day (~31 kcal/kg and 1.86 g/kg protein based on dosing wt 56.2kg)  Non-Protein Calories: 1314 kcal/day (23 kcal/kg, based on dosing wt 56.2 kg); Dextrose Infusion Rate:2.6 mg/kg/min (24-hour infusion); Lipid Infusion Rate: 1.06 g/kg; 0.09g/kg/hr (12-hour infusion)     -Nutritional Assessment, pt not meeting nutritional goals enterally, and would require TPN for nutritional support, pre-op and post-op. Prealumin 18 mg/dL - will trend weekly  - TPN plan:  TPN started 6/1/25; continue goal AA/Dex; increase to goal SMOF today; keep compressed to 18 hours  - TPN Access:  US guided Bard Double lumen power PICC line in place; PICC maintenance per protocol; keep port dedicated for TPN to avoid contamination   - Strict Intake and Output; Weights three times a week  - Electrolyte Imbalance risk- check CMP, Mg, Phos, iCa and K daily, will replete  in TPN as needed.  - Hypophosphatemia:  phos levels reviewed; decrease sodium Phosphate @ 20 mmol  - Hypokalemia:  CMP reviewed; increase Potassium Cloride @ 80 mEq  - Hypomagnesemia:  magnesium levels reviewed; Magnesium Sulfate @ 12 mEq  - Hypocalcemia:  calcium levels reviewed; calcium Gluconate @ 10 mEq  - Risk of refeeding syndrome:  added Thiamine, TE and MVI in TPN BAG to help decrease risk of refeeding; monitor potassium, magnesium, phosphorus, glucose and fluid balance closely  - Risk of hypertriglyceridemia:  TG 61 mg/dL; plan to trend TG closely while on TPN  - Risk of hyperglycemia:  HgA1c 5.2%; fingersticks every 6 hours with RISS coverage to monitor glucose trend (117-147 mg/dL); no RI in TPN  - Further care per [facility] Surgery team, pager [phone].      Available on TEAMS  TPN spectra [id number] ([phone] when dialing from outside line)  M-F 8A-2P, Weekends and holidays 8/9A-12/1P  Discussed with Dr. Sidney Jesus    Time-based billing (NON-critical care).     35 minutes spent on total encounter. The necessity of the time spent during the encounter on this date of service was due to chart review, lab/radiology test review, patient assessment, discussion and collaboration with interdisciplinary team members and ordering TPN/making recommendation.

## 2025-06-03 NOTE — PROGRESS NOTE ADULT - ASSESSMENT
79F with PMHx DM2, stem cell donor, HTN, CARMEN, hypothyroidism, ovarian cyst, goiter and newly diagnosed metastatic mucinous carcinoma presenting with acute onset of nausea, NBNB emesis, poor PO intake for the last 3-4 days c/w GOO.     Plan:  - OR today - Robo partial duodenectomy, possible bypass  - NPO/NGT/IVFs  - Monitor bowel fxn  - PICC line placed 6/1; TPN started  - DVT ppx  - Home meds  - pending cardiology risk stratification  - Heme/Onc consulted - appreciate recs   - Gyn Onc consulted d/t recent surgery - appreciate recs    [facility] Surgery  [phone] 79F with PMHx DM2, stem cell donor, HTN, CARMEN, hypothyroidism, ovarian cyst, goiter and newly diagnosed metastatic mucinous carcinoma presenting with acute onset of nausea, NBNB emesis, poor PO intake for the last 3-4 days c/w GOO.     Plan:  - OR today - Robo partial duodenectomy, possible bypass  - NPO/NGT/IVFs  - Hgb 6.2, heparin held 2 units PRBC ordered   - Monitor bowel fxn  - PICC line placed 6/1; TPN started  - DVT ppx-SCD's, heparin held given downtrending H/H.   - Home meds  -Cardiology following, clearance obtained.   - Heme/Onc consulted - appreciate recs   - Gyn Onc consulted d/t recent surgery - rossi recs    [facility] Surgery  [phone]

## 2025-06-03 NOTE — PROGRESS NOTE ADULT - SUBJECTIVE AND OBJECTIVE BOX
[facility] NUTRITION SUPPORT-- FOLLOW UP NOTE      24 hour events/subjective:  Patient seen and examined at bedside, chart reviewed and events noted and I's and O's reviewed.  Patient denies chest pain, shortness of breath, vomiting, dizziness, chills at time of visit; nausea intemittently and NGT output 75 mL/24 hours.  Patient planned for OR today,  remains NPO; planned for PRBC this AM.  Patient's VSS and no other acute overnight events noted.   Patient continues on TPN and glucose trend is within appropriate range.      ROS:  Except as noted above, all other systems reviewed and are negative       Diet:  Diet, NPO (25 @ 19:49)      PAST HISTORY  --------------------------------------------------------------------------------  PAST MEDICAL & SURGICAL HISTORY:  Glaucoma      HTN (hypertension)      Type II diabetes mellitus      Multiple thyroid nodules      Hyperthyroidism      Obese      Ovarian cyst      Gastroparesis      Iron deficiency anemia      H/O: hysterectomy  "a long time ago when I was in my 30's"      H/O       History of thyroidectomy      History of breast surgery        No significant changes to PMH, PSH, FHx, SHx, unless otherwise noted    ALLERGIES & MEDICATIONS  --------------------------------------------------------------------------------  Allergies    No Known Allergies    Intolerances      Standing Inpatient Medications  chlorhexidine 2% Cloths 1 Application(s) Topical <User Schedule>  dextrose 5%. 1000 milliLiter(s) IV Continuous <Continuous>  dextrose 5%. 1000 milliLiter(s) IV Continuous <Continuous>  dextrose 50% Injectable 25 Gram(s) IV Push once  dextrose 50% Injectable 12.5 Gram(s) IV Push once  dextrose 50% Injectable 25 Gram(s) IV Push once  glucagon  Injectable 1 milliGRAM(s) IntraMuscular once  insulin lispro (ADMELOG) corrective regimen sliding scale   SubCutaneous every 6 hours  levothyroxine Injectable 75 MICROGram(s) IV Push at bedtime  lipid, fat emulsion (Fish Oil and Plant Based) 20% Infusion 25 mL/Hr IV Continuous <Continuous>  pantoprazole  Injectable 40 milliGRAM(s) IV Push daily  Parenteral Nutrition - Adult 1 Each TPN Continuous <Continuous>  Parenteral Nutrition - Adult 1 Each TPN Continuous <Continuous>    PRN Inpatient Medications  benzocaine/butamben/tetracaine Spray 1 Spray(s) Topical every 6 hours PRN  benzocaine/menthol Lozenge 1 Lozenge Oral four times a day PRN  dextrose Oral Gel 15 Gram(s) Oral once PRN  ondansetron Injectable 4 milliGRAM(s) IV Push every 6 hours PRN  sodium chloride 0.9% lock flush 10 milliLiter(s) IV Push every 1 hour PRN        VITALS/PHYSICAL EXAM  --------------------------------------------------------------------------------  T(C): 36.9 (25 @ 08:54), Max: 36.9 (25 @ 13:29)  HR: 76 (25 @ 08:54) (67 - 79)  BP: 131/74 (25 @ 08:54) (122/69 - 160/85)  RR: 18 (25 @ 08:54) (18 - 18)  SpO2: 100% (25 @ 08:54) (98% - 100%)  Wt(kg): --      25 @ 07:01  -  25 @ 07:00  --------------------------------------------------------  IN: 299.6 mL / OUT: 1525 mL / NET: -1225.4 mL    25 @ 07:01  -  25 @ 12:13  --------------------------------------------------------  IN: 300 mL / OUT: 200 mL / NET: 100 mL      I&O's Detail    2025 07:  -  2025 07:00  --------------------------------------------------------  IN:    Fat Emulsion (Fish Oil &amp; Plant Based) 20% Infusion: 99.6 mL    IV PiggyBack: 200 mL  Total IN: 299.6 mL    OUT:    Nasogastric/Oral tube (mL): 75 mL    Oral Fluid: 0 mL    Voided (mL): 1450 mL  Total OUT: 1525 mL    Total NET: -1225.4 mL      2025 07:  -  2025 12:13  --------------------------------------------------------  IN:    PRBCs (Packed Red Blood Cells): 300 mL  Total IN: 300 mL    OUT:    Oral Fluid: 0 mL    Voided (mL): 200 mL  Total OUT: 200 mL    Total NET: 100 mL          Physical Exam:  Gen: WD, WN, in no acute distress.  HEENT: +NGT  Neck: Supple, no JVD/Bruit. No thyromegaly.  Abd: Soft, ND, NT,No HSM, +BS.  Ext: No clubbing, no cyanosis, no edema.  Neuro: A/Ox3. Cranial nerve intact. No focal deficit.        LABS/STUDIES  --------------------------------------------------------------------------------              6.2    7.16  >-----------<  263      [25 @ 06:33]              19.1     138  |  103  |  33  ----------------------------<  119      [25 @ 03:53]  3.9   |  23  |  1.14        Ca     9.0     [25 @ 03:53]      iCa    1.22     [ 04:02]      Mg     2.0     [25 @ 03:53]      Phos  5.5     [25 @ 03:53]    TPro  5.6  /  Alb  3.2  /  TBili  0.1  /  DBili  x   /  AST  8   /  ALT  10  /  AlkPhos  40  [25 @ 03:53]    PT/INR: PT 12.3 , INR 1.08       [25 @ 03:53]  PTT: 51.8       [25 @ 03:53]      Ca ionized  Creatinine Trend:  POC glucoseGlucose: 119 mg/dL (25 @ 03:53)  CAPILLARY BLOOD GLUCOSE      POCT Blood Glucose.: 115 mg/dL (2025 12:02)  POCT Blood Glucose.: 132 mg/dL (2025 05:28)  POCT Blood Glucose.: 147 mg/dL (2025 23:41)  POCT Blood Glucose.: 117 mg/dL (2025 17:22)    PrealbuminPrealbumin, Serum: 17 mg/dL (25 @ 03:53)  Prealbumin, Serum: 18 mg/dL (25 @ 06:56)    Triglycerides

## 2025-06-03 NOTE — PROGRESS NOTE ADULT - SUBJECTIVE AND OBJECTIVE BOX
DATE OF SERVICE: 06-03-25      Patient is a 79y old  Female who presents with a chief complaint of Intestinal obstruction    Chart reviewed, events noted. This is a "79F with PMHx DM2, stem cell donor, HTN, CARMEN, hypothyroidism, ovarian cyst, goiter and newly diagnosed metastatic mucinous carcinoma presenting with acute onset of nausea, NBNB emesis, poor PO intake for the last 3-4 days c/w GOO."     (02 Jun 2025 09:44)      INTERVAL HISTORY: feels ok      PHYSICAL EXAM:  T(C): 36.6 (06-03-25 @ 19:57), Max: 36.9 (06-03-25 @ 08:54)  HR: 68 (06-03-25 @ 19:57) (59 - 76)  BP: 178/79 (06-03-25 @ 19:57) (122/69 - 178/79)  RR: 17 (06-03-25 @ 19:57) (17 - 18)  SpO2: 99% (06-03-25 @ 19:57) (97% - 100%)  Wt(kg): --  I&O's Summary    02 Jun 2025 07:01  -  03 Jun 2025 07:00  --------------------------------------------------------  IN: 299.6 mL / OUT: 1525 mL / NET: -1225.4 mL    03 Jun 2025 07:01  -  03 Jun 2025 23:18  --------------------------------------------------------  IN: 628 mL / OUT: 650 mL / NET: -22 mL      Height (cm): 152.4 (06-03 @ 19:57)  Weight (kg): 56.2 (06-03 @ 19:57)  BMI (kg/m2): 24.2 (06-03 @ 19:57)  BSA (m2): 1.52 (06-03 @ 19:57)    Appearance: In no distress	  HEENT:    PERRL, EOMI	  Cardiovascular:  S1 S2, No JVD  Respiratory: Lungs clear to auscultation	  Gastrointestinal:  Soft, Non-tender, + BS	  Vascularature:  No edema of LE  Psychiatric: Appropriate affect   Neuro: no acute focal deficits                               8.2    9.74  )-----------( 252      ( 03 Jun 2025 13:52 )             24.3     06-03    138  |  103  |  33[H]  ----------------------------<  119[H]  3.9   |  23  |  1.14    Ca    9.0      03 Jun 2025 03:53  Phos  5.5     06-03  Mg     2.0     06-03    TPro  5.6[L]  /  Alb  3.2[L]  /  TBili  0.1[L]  /  DBili  x   /  AST  8[L]  /  ALT  10  /  AlkPhos  40  06-03        Labs personally reviewed      ASSESSMENT/PLAN: 	     79F w/ T2DM, stem cell donor, HTN, HLD, CARMEN here for new dx of metastatic mucinous CA and inability to tolerate PO, currently w/ NGT for SBO. Cardiology consulted for preop cardiac eval     -EKG w/ NSR no significant STT changes   -s/p TTE ECHO normal EF, no significant VHD   -planned for surgical resection for bypass   -optimized from cardiac standpoint to proceed           Rj Schwab DO [facility]  Cardiovascular Medicine  [address] [place] [address], [address]  Office: [phone]  Available via Text/call on Microsoft Teams

## 2025-06-04 ENCOUNTER — RESULT REVIEW (OUTPATIENT)
Age: 80
End: 2025-06-04

## 2025-06-04 LAB
ALBUMIN SERPL ELPH-MCNC: 3.2 G/DL — LOW (ref 3.3–5)
ALBUMIN SERPL ELPH-MCNC: 3.6 G/DL — SIGNIFICANT CHANGE UP (ref 3.3–5)
ALP SERPL-CCNC: 48 U/L — SIGNIFICANT CHANGE UP (ref 40–120)
ALP SERPL-CCNC: 48 U/L — SIGNIFICANT CHANGE UP (ref 40–120)
ALT FLD-CCNC: 11 U/L — SIGNIFICANT CHANGE UP (ref 10–45)
ALT FLD-CCNC: 14 U/L — SIGNIFICANT CHANGE UP (ref 10–45)
ANION GAP SERPL CALC-SCNC: 13 MMOL/L — SIGNIFICANT CHANGE UP (ref 5–17)
ANION GAP SERPL CALC-SCNC: 15 MMOL/L — SIGNIFICANT CHANGE UP (ref 5–17)
APTT BLD: 26.5 SEC — SIGNIFICANT CHANGE UP (ref 26.1–36.8)
APTT BLD: 32 SEC — SIGNIFICANT CHANGE UP (ref 26.1–36.8)
AST SERPL-CCNC: 17 U/L — SIGNIFICANT CHANGE UP (ref 10–40)
AST SERPL-CCNC: 22 U/L — SIGNIFICANT CHANGE UP (ref 10–40)
BASOPHILS # BLD AUTO: 0.01 K/UL — SIGNIFICANT CHANGE UP (ref 0–0.2)
BASOPHILS NFR BLD AUTO: 0.1 % — SIGNIFICANT CHANGE UP (ref 0–2)
BILIRUB SERPL-MCNC: 0.3 MG/DL — SIGNIFICANT CHANGE UP (ref 0.2–1.2)
BILIRUB SERPL-MCNC: 0.5 MG/DL — SIGNIFICANT CHANGE UP (ref 0.2–1.2)
BUN SERPL-MCNC: 31 MG/DL — HIGH (ref 7–23)
BUN SERPL-MCNC: 31 MG/DL — HIGH (ref 7–23)
CA-I BLD-SCNC: 1.1 MMOL/L — LOW (ref 1.15–1.33)
CALCIUM SERPL-MCNC: 8.3 MG/DL — LOW (ref 8.4–10.5)
CALCIUM SERPL-MCNC: 8.8 MG/DL — SIGNIFICANT CHANGE UP (ref 8.4–10.5)
CHLORIDE SERPL-SCNC: 100 MMOL/L — SIGNIFICANT CHANGE UP (ref 96–108)
CHLORIDE SERPL-SCNC: 103 MMOL/L — SIGNIFICANT CHANGE UP (ref 96–108)
CO2 SERPL-SCNC: 21 MMOL/L — LOW (ref 22–31)
CO2 SERPL-SCNC: 21 MMOL/L — LOW (ref 22–31)
CREAT SERPL-MCNC: 0.98 MG/DL — SIGNIFICANT CHANGE UP (ref 0.5–1.3)
CREAT SERPL-MCNC: 0.99 MG/DL — SIGNIFICANT CHANGE UP (ref 0.5–1.3)
EGFR: 57 ML/MIN/1.73M2 — LOW
EGFR: 57 ML/MIN/1.73M2 — LOW
EGFR: 58 ML/MIN/1.73M2 — LOW
EGFR: 58 ML/MIN/1.73M2 — LOW
EOSINOPHIL # BLD AUTO: 0 K/UL — SIGNIFICANT CHANGE UP (ref 0–0.5)
EOSINOPHIL NFR BLD AUTO: 0 % — SIGNIFICANT CHANGE UP (ref 0–6)
GLUCOSE BLDC GLUCOMTR-MCNC: 102 MG/DL — HIGH (ref 70–99)
GLUCOSE BLDC GLUCOMTR-MCNC: 145 MG/DL — HIGH (ref 70–99)
GLUCOSE BLDC GLUCOMTR-MCNC: 164 MG/DL — HIGH (ref 70–99)
GLUCOSE BLDC GLUCOMTR-MCNC: 217 MG/DL — HIGH (ref 70–99)
GLUCOSE BLDC GLUCOMTR-MCNC: 287 MG/DL — HIGH (ref 70–99)
GLUCOSE BLDC GLUCOMTR-MCNC: 480 MG/DL — CRITICAL HIGH (ref 70–99)
GLUCOSE SERPL-MCNC: 212 MG/DL — HIGH (ref 70–99)
GLUCOSE SERPL-MCNC: 98 MG/DL — SIGNIFICANT CHANGE UP (ref 70–99)
HCT VFR BLD CALC: 25.8 % — LOW (ref 34.5–45)
HCT VFR BLD CALC: 28.9 % — LOW (ref 34.5–45)
HGB BLD-MCNC: 8.4 G/DL — LOW (ref 11.5–15.5)
HGB BLD-MCNC: 9.3 G/DL — LOW (ref 11.5–15.5)
IMM GRANULOCYTES NFR BLD AUTO: 1.1 % — HIGH (ref 0–0.9)
INR BLD: 1.03 RATIO — SIGNIFICANT CHANGE UP (ref 0.85–1.16)
INR BLD: 1.11 RATIO — SIGNIFICANT CHANGE UP (ref 0.85–1.16)
LYMPHOCYTES # BLD AUTO: 0.79 K/UL — LOW (ref 1–3.3)
LYMPHOCYTES # BLD AUTO: 9.3 % — LOW (ref 13–44)
MAGNESIUM SERPL-MCNC: 2.2 MG/DL — SIGNIFICANT CHANGE UP (ref 1.6–2.6)
MAGNESIUM SERPL-MCNC: 2.3 MG/DL — SIGNIFICANT CHANGE UP (ref 1.6–2.6)
MCHC RBC-ENTMCNC: 28.1 PG — SIGNIFICANT CHANGE UP (ref 27–34)
MCHC RBC-ENTMCNC: 28.4 PG — SIGNIFICANT CHANGE UP (ref 27–34)
MCHC RBC-ENTMCNC: 32.2 G/DL — SIGNIFICANT CHANGE UP (ref 32–36)
MCHC RBC-ENTMCNC: 32.6 G/DL — SIGNIFICANT CHANGE UP (ref 32–36)
MCV RBC AUTO: 86.3 FL — SIGNIFICANT CHANGE UP (ref 80–100)
MCV RBC AUTO: 88.4 FL — SIGNIFICANT CHANGE UP (ref 80–100)
MONOCYTES # BLD AUTO: 0.3 K/UL — SIGNIFICANT CHANGE UP (ref 0–0.9)
MONOCYTES NFR BLD AUTO: 3.5 % — SIGNIFICANT CHANGE UP (ref 2–14)
NEUTROPHILS # BLD AUTO: 7.33 K/UL — SIGNIFICANT CHANGE UP (ref 1.8–7.4)
NEUTROPHILS NFR BLD AUTO: 86 % — HIGH (ref 43–77)
NRBC BLD AUTO-RTO: 0 /100 WBCS — SIGNIFICANT CHANGE UP (ref 0–0)
NRBC BLD AUTO-RTO: 0 /100 WBCS — SIGNIFICANT CHANGE UP (ref 0–0)
PHOSPHATE SERPL-MCNC: 2.6 MG/DL — SIGNIFICANT CHANGE UP (ref 2.5–4.5)
PHOSPHATE SERPL-MCNC: 3.6 MG/DL — SIGNIFICANT CHANGE UP (ref 2.5–4.5)
PLATELET # BLD AUTO: 274 K/UL — SIGNIFICANT CHANGE UP (ref 150–400)
PLATELET # BLD AUTO: 279 K/UL — SIGNIFICANT CHANGE UP (ref 150–400)
POTASSIUM SERPL-MCNC: 4.1 MMOL/L — SIGNIFICANT CHANGE UP (ref 3.5–5.3)
POTASSIUM SERPL-MCNC: 4.6 MMOL/L — SIGNIFICANT CHANGE UP (ref 3.5–5.3)
POTASSIUM SERPL-SCNC: 4.1 MMOL/L — SIGNIFICANT CHANGE UP (ref 3.5–5.3)
POTASSIUM SERPL-SCNC: 4.6 MMOL/L — SIGNIFICANT CHANGE UP (ref 3.5–5.3)
PREALB SERPL-MCNC: 19 MG/DL — LOW (ref 20–40)
PROT SERPL-MCNC: 5.9 G/DL — LOW (ref 6–8.3)
PROT SERPL-MCNC: 6.7 G/DL — SIGNIFICANT CHANGE UP (ref 6–8.3)
PROTHROM AB SERPL-ACNC: 11.8 SEC — SIGNIFICANT CHANGE UP (ref 9.9–13.4)
PROTHROM AB SERPL-ACNC: 12.6 SEC — SIGNIFICANT CHANGE UP (ref 9.9–13.4)
RBC # BLD: 2.99 M/UL — LOW (ref 3.8–5.2)
RBC # BLD: 3.27 M/UL — LOW (ref 3.8–5.2)
RBC # FLD: 15.8 % — HIGH (ref 10.3–14.5)
RBC # FLD: 16 % — HIGH (ref 10.3–14.5)
SODIUM SERPL-SCNC: 136 MMOL/L — SIGNIFICANT CHANGE UP (ref 135–145)
SODIUM SERPL-SCNC: 137 MMOL/L — SIGNIFICANT CHANGE UP (ref 135–145)
TRIGL SERPL-MCNC: 207 MG/DL — HIGH
WBC # BLD: 11.03 K/UL — HIGH (ref 3.8–10.5)
WBC # BLD: 8.52 K/UL — SIGNIFICANT CHANGE UP (ref 3.8–10.5)
WBC # FLD AUTO: 11.03 K/UL — HIGH (ref 3.8–10.5)
WBC # FLD AUTO: 8.52 K/UL — SIGNIFICANT CHANGE UP (ref 3.8–10.5)

## 2025-06-04 PROCEDURE — 88341 IMHCHEM/IMCYTCHM EA ADD ANTB: CPT | Mod: 26,59

## 2025-06-04 PROCEDURE — S2900 ROBOTIC SURGICAL SYSTEM: CPT | Mod: NC

## 2025-06-04 PROCEDURE — 88307 TISSUE EXAM BY PATHOLOGIST: CPT | Mod: 26

## 2025-06-04 PROCEDURE — 99233 SBSQ HOSP IP/OBS HIGH 50: CPT

## 2025-06-04 PROCEDURE — 71045 X-RAY EXAM CHEST 1 VIEW: CPT | Mod: 26

## 2025-06-04 PROCEDURE — 88360 TUMOR IMMUNOHISTOCHEM/MANUAL: CPT | Mod: 26

## 2025-06-04 PROCEDURE — 88342 IMHCHEM/IMCYTCHM 1ST ANTB: CPT | Mod: 26,59

## 2025-06-04 PROCEDURE — 44202 LAP ENTERECTOMY: CPT

## 2025-06-04 PROCEDURE — 43820 GASTROJEJUNOSTOMY WO VAGOTMY: CPT

## 2025-06-04 RX ORDER — SODIUM CHLORIDE 9 G/1000ML
1000 INJECTION, SOLUTION INTRAVENOUS
Refills: 0 | Status: DISCONTINUED | OUTPATIENT
Start: 2025-06-04 | End: 2025-06-11

## 2025-06-04 RX ORDER — HEPARIN SODIUM 1000 [USP'U]/ML
5000 INJECTION INTRAVENOUS; SUBCUTANEOUS EVERY 8 HOURS
Refills: 0 | Status: DISCONTINUED | OUTPATIENT
Start: 2025-06-04 | End: 2025-06-11

## 2025-06-04 RX ORDER — I.V. FAT EMULSION 20 G/100ML
25 EMULSION INTRAVENOUS
Qty: 60 | Refills: 0 | Status: DISCONTINUED | OUTPATIENT
Start: 2025-06-04 | End: 2025-06-05

## 2025-06-04 RX ORDER — SODIUM/POT/MAG/CALC/CHLOR/ACET 35-20-5MEQ
1 VIAL (ML) INTRAVENOUS
Refills: 0 | Status: DISCONTINUED | OUTPATIENT
Start: 2025-06-04 | End: 2025-06-05

## 2025-06-04 RX ORDER — ONDANSETRON HCL/PF 4 MG/2 ML
4 VIAL (ML) INJECTION ONCE
Refills: 0 | Status: DISCONTINUED | OUTPATIENT
Start: 2025-06-04 | End: 2025-06-04

## 2025-06-04 RX ORDER — SODIUM CHLORIDE 9 G/1000ML
1000 INJECTION, SOLUTION INTRAVENOUS
Refills: 0 | Status: DISCONTINUED | OUTPATIENT
Start: 2025-06-04 | End: 2025-06-04

## 2025-06-04 RX ORDER — LEVOTHYROXINE SODIUM 300 MCG
75 TABLET ORAL AT BEDTIME
Refills: 0 | Status: DISCONTINUED | OUTPATIENT
Start: 2025-06-04 | End: 2025-06-11

## 2025-06-04 RX ORDER — DEXTROSE 50 % IN WATER 50 %
25 SYRINGE (ML) INTRAVENOUS ONCE
Refills: 0 | Status: DISCONTINUED | OUTPATIENT
Start: 2025-06-04 | End: 2025-06-11

## 2025-06-04 RX ORDER — INSULIN LISPRO 100 U/ML
INJECTION, SOLUTION INTRAVENOUS; SUBCUTANEOUS EVERY 6 HOURS
Refills: 0 | Status: DISCONTINUED | OUTPATIENT
Start: 2025-06-04 | End: 2025-06-08

## 2025-06-04 RX ORDER — ACETAMINOPHEN 500 MG/5ML
1000 LIQUID (ML) ORAL EVERY 6 HOURS
Refills: 0 | Status: COMPLETED | OUTPATIENT
Start: 2025-06-04 | End: 2025-06-05

## 2025-06-04 RX ORDER — HYDROMORPHONE/SOD CHLOR,ISO/PF 2 MG/10 ML
0.5 SYRINGE (ML) INJECTION EVERY 4 HOURS
Refills: 0 | Status: DISCONTINUED | OUTPATIENT
Start: 2025-06-04 | End: 2025-06-08

## 2025-06-04 RX ORDER — GLUCAGON 3 MG/1
1 POWDER NASAL ONCE
Refills: 0 | Status: DISCONTINUED | OUTPATIENT
Start: 2025-06-04 | End: 2025-06-11

## 2025-06-04 RX ORDER — DEXTROSE 50 % IN WATER 50 %
12.5 SYRINGE (ML) INTRAVENOUS ONCE
Refills: 0 | Status: DISCONTINUED | OUTPATIENT
Start: 2025-06-04 | End: 2025-06-11

## 2025-06-04 RX ORDER — HYDROMORPHONE/SOD CHLOR,ISO/PF 2 MG/10 ML
0.5 SYRINGE (ML) INJECTION
Refills: 0 | Status: DISCONTINUED | OUTPATIENT
Start: 2025-06-04 | End: 2025-06-04

## 2025-06-04 RX ORDER — FENTANYL CITRATE-0.9 % NACL/PF 100MCG/2ML
50 SYRINGE (ML) INTRAVENOUS
Refills: 0 | Status: DISCONTINUED | OUTPATIENT
Start: 2025-06-04 | End: 2025-06-04

## 2025-06-04 RX ORDER — HYDROMORPHONE/SOD CHLOR,ISO/PF 2 MG/10 ML
1 SYRINGE (ML) INJECTION EVERY 4 HOURS
Refills: 0 | Status: DISCONTINUED | OUTPATIENT
Start: 2025-06-04 | End: 2025-06-08

## 2025-06-04 RX ORDER — OXYCODONE HYDROCHLORIDE 30 MG/1
5 TABLET ORAL ONCE
Refills: 0 | Status: DISCONTINUED | OUTPATIENT
Start: 2025-06-04 | End: 2025-06-04

## 2025-06-04 RX ADMIN — Medication 40 MILLIGRAM(S): at 17:06

## 2025-06-04 RX ADMIN — Medication 1 EACH: at 18:27

## 2025-06-04 RX ADMIN — Medication 1 EACH: at 07:21

## 2025-06-04 RX ADMIN — HEPARIN SODIUM 5000 UNIT(S): 1000 INJECTION INTRAVENOUS; SUBCUTANEOUS at 21:29

## 2025-06-04 RX ADMIN — I.V. FAT EMULSION 25 ML/HR: 20 EMULSION INTRAVENOUS at 03:29

## 2025-06-04 RX ADMIN — Medication 400 MILLIGRAM(S): at 11:07

## 2025-06-04 RX ADMIN — I.V. FAT EMULSION 25 ML/HR: 20 EMULSION INTRAVENOUS at 19:10

## 2025-06-04 RX ADMIN — Medication 1 MILLIGRAM(S): at 21:29

## 2025-06-04 RX ADMIN — Medication 1 EACH: at 19:10

## 2025-06-04 RX ADMIN — Medication 40 MILLIGRAM(S): at 05:01

## 2025-06-04 RX ADMIN — Medication 400 MILLIGRAM(S): at 05:01

## 2025-06-04 RX ADMIN — INSULIN LISPRO 3: 100 INJECTION, SOLUTION INTRAVENOUS; SUBCUTANEOUS at 05:16

## 2025-06-04 RX ADMIN — HEPARIN SODIUM 5000 UNIT(S): 1000 INJECTION INTRAVENOUS; SUBCUTANEOUS at 14:03

## 2025-06-04 RX ADMIN — Medication 1 APPLICATION(S): at 18:29

## 2025-06-04 RX ADMIN — Medication 1 MILLIGRAM(S): at 03:38

## 2025-06-04 RX ADMIN — Medication 75 MICROGRAM(S): at 21:29

## 2025-06-04 RX ADMIN — I.V. FAT EMULSION 25 ML/HR: 20 EMULSION INTRAVENOUS at 18:27

## 2025-06-04 RX ADMIN — Medication 1000 MILLIGRAM(S): at 11:37

## 2025-06-04 RX ADMIN — HEPARIN SODIUM 5000 UNIT(S): 1000 INJECTION INTRAVENOUS; SUBCUTANEOUS at 05:01

## 2025-06-04 RX ADMIN — Medication 1 EACH: at 03:28

## 2025-06-04 RX ADMIN — Medication 1 MILLIGRAM(S): at 04:08

## 2025-06-04 RX ADMIN — Medication 400 MILLIGRAM(S): at 17:06

## 2025-06-04 RX ADMIN — Medication 1000 MILLIGRAM(S): at 18:45

## 2025-06-04 NOTE — PHYSICAL THERAPY INITIAL EVALUATION ADULT - PERTINENT HX OF CURRENT PROBLEM, REHAB EVAL
79F with PMHx DM2, stem cell donor, HTN, CARMEN, hypothyroidism, ovarian cyst, goiter and newly diagnosed metastatic mucinous carcinoma presenting with acute onset of nausea, NBNB emesis, poor PO intake for the last 3-4 days. Symptoms persisted so decided to present today. Last had a few sips of soup yesterday with nausea. Passing flatus today. Last BM was 4 days ago. States had a colonoscopy 3 days ago as part of her cancer work-up, barely tolerated bowel prep. Denies fever/chills, abdominal pain.   Of note, earlier this year, was found to have left 6 cm ovarian cyst and elevated , She was referred to GYN Oncologist and now s/p Robotic Assisted s/p Bilateral Salpingo-oophorectomy, omentectomy, and tumor debulking on 05/06/2025. During the procedure, metastatic carcinoma was discovered in the peritoneum. Primary malignancy is thought to be GI in origin (pancreatobiliary or colon) per pathology. Now s/p laparascopic robotic duodenal jejunum tumor resection

## 2025-06-04 NOTE — PROGRESS NOTE ADULT - SUBJECTIVE AND OBJECTIVE BOX
DATE OF SERVICE: 06-04-25 @ 15:29    Patient is a 79y old  Female who presents with a chief complaint of Intestinal obstruction    Chart reviewed, events noted. This is a "79F with PMHx DM2, stem cell donor, HTN, CARMEN, hypothyroidism, ovarian cyst, goiter and newly diagnosed metastatic mucinous carcinoma presenting with acute onset of nausea, NBNB emesis, poor PO intake for the last 3-4 days c/w GOO."     (02 Jun 2025 09:44)      INTERVAL HISTORY: Feels ok.     REVIEW OF SYSTEMS:  CONSTITUTIONAL: No weakness  EYES/ENT: No visual changes;  No throat pain   NECK: No pain or stiffness  RESPIRATORY: No cough, wheezing; No shortness of breath  CARDIOVASCULAR: No chest pain or palpitations  GASTROINTESTINAL: No abdominal  pain. No nausea, vomiting, or hematemesis  GENITOURINARY: No dysuria, frequency or hematuria  NEUROLOGICAL: No stroke like symptoms  SKIN: No rashes    	  MEDICATIONS:        PHYSICAL EXAM:  T(C): 37 (06-04-25 @ 12:51), Max: 37 (06-04-25 @ 02:00)  HR: 93 (06-04-25 @ 14:55) (68 - 108)  BP: 147/90 (06-04-25 @ 14:55) (120/91 - 178/79)  RR: 18 (06-04-25 @ 14:55) (16 - 18)  SpO2: 98% (06-04-25 @ 14:55) (92% - 100%)  Wt(kg): --  I&O's Summary    03 Jun 2025 07:01  -  04 Jun 2025 07:00  --------------------------------------------------------  IN: 884 mL / OUT: 1575 mL / NET: -691 mL    04 Jun 2025 07:01  -  04 Jun 2025 15:29  --------------------------------------------------------  IN: 100 mL / OUT: 1150 mL / NET: -1050 mL      Height (cm): 152.4 (06-03 @ 19:57)  Weight (kg): 56.2 (06-03 @ 19:57)  BMI (kg/m2): 24.2 (06-03 @ 19:57)  BSA (m2): 1.52 (06-03 @ 19:57)    Appearance: In no distress	  HEENT:    PERRL, EOMI	  Cardiovascular:  S1 S2, No JVD  Respiratory: Lungs clear to auscultation	  Gastrointestinal:  Soft, Non-tender, + BS	  Vascularature:  No edema of LE  Psychiatric: Appropriate affect   Neuro: no acute focal deficits                               9.3    8.52  )-----------( 279      ( 04 Jun 2025 02:03 )             28.9     06-04    136  |  100  |  31[H]  ----------------------------<  212[H]  4.6   |  21[L]  |  0.99    Ca    8.3[L]      04 Jun 2025 02:03  Phos  3.6     06-04  Mg     2.3     06-04    TPro  6.7  /  Alb  3.6  /  TBili  0.5  /  DBili  x   /  AST  22  /  ALT  14  /  AlkPhos  48  06-04        Labs personally reviewed      ASSESSMENT/PLAN: 	    79F w/ T2DM, stem cell donor, HTN, HLD, CARMEN here for new dx of metastatic mucinous CA and inability to tolerate PO, currently w/ NGT for SBO. Cardiology consulted for preop cardiac eval     -EKG w/ NSR no significant STT changes   -s/p TTE ECHO normal EF, no significant VHD   -planned for surgical resection for bypass   -optimized from cardiac standpoint to proceed   -tolerated well        Gilma Luther, CYDNEY-NP   Rj Schwab DO [facility]  Cardiovascular Medicine  [address], [address]  Available through call or text on [employer]  Office: [phone]

## 2025-06-04 NOTE — PROGRESS NOTE ADULT - SUBJECTIVE AND OBJECTIVE BOX
Surgery Progress Note    Overnight events  - s/p Robo SBR with side to side anterior GJ. NGT in place, cintron in place.     SUBJECTIVE:  - Patient seen and examined at bedside   --------------------------------------------------------------------------------------------------  OBJECTIVE:   Physical Exam:  General: AAOx3, NAD, lying comfortably in bed  HEENT: NC/AT  Respiratory: nonlabored breathing  Abdomen: non-distended, soft, appropriately tender, Vishnu GARRISON  --------------------------------------------------------------------------------------------------  V/S:  Vital Signs Last 24 Hrs  T(C): 37 (04 Jun 2025 02:00), Max: 37 (04 Jun 2025 02:00)  T(F): 98.6 (04 Jun 2025 02:00), Max: 98.6 (04 Jun 2025 02:00)  HR: 99 (04 Jun 2025 02:30) (59 - 99)  BP: 153/90 (04 Jun 2025 02:30) (123/71 - 178/79)  BP(mean): 114 (04 Jun 2025 02:30) (78 - 119)  RR: 16 (04 Jun 2025 02:30) (16 - 18)  SpO2: 98% (04 Jun 2025 02:30) (95% - 100%)    Parameters below as of 04 Jun 2025 02:30  Patient On (Oxygen Delivery Method): room air        --------------------------------------------------------------------------------------------------  I/Os:    02 Jun 2025 07:01  -  03 Jun 2025 07:00  --------------------------------------------------------  IN:    Fat Emulsion (Fish Oil &amp; Plant Based) 20% Infusion: 99.6 mL    IV PiggyBack: 200 mL  Total IN: 299.6 mL    OUT:    Nasogastric/Oral tube (mL): 75 mL    Oral Fluid: 0 mL    Voided (mL): 1450 mL  Total OUT: 1525 mL    Total NET: -1225.4 mL      03 Jun 2025 07:01  -  04 Jun 2025 03:06  --------------------------------------------------------  IN:    Fat Emulsion (Fish Oil &amp; Plant Based) 20% Infusion: 100 mL    IV PiggyBack: 100 mL    PRBCs (Packed Red Blood Cells): 300 mL    TPN (Total Parenteral Nutrition): 384 mL  Total IN: 884 mL    OUT:    Indwelling Catheter - Urethral (mL): 425 mL    Nasogastric/Oral tube (mL): 50 mL    Oral Fluid: 0 mL    Voided (mL): 600 mL  Total OUT: 1075 mL    Total NET: -191 mL        --------------------------------------------------------------------------------------------------  LABS:                        9.3    8.52  )-----------( 279      ( 04 Jun 2025 02:03 )             28.9     04 Jun 2025 02:03    136    |  100    |  31     ----------------------------<  212    4.6     |  21     |  0.99     Ca    8.3        04 Jun 2025 02:03  Phos  3.6       04 Jun 2025 02:03  Mg     2.3       04 Jun 2025 02:03    TPro  6.7    /  Alb  3.6    /  TBili  0.5    /  DBili  x      /  AST  22     /  ALT  14     /  AlkPhos  48     04 Jun 2025 02:03    PT/INR - ( 04 Jun 2025 02:03 )   PT: 11.8 sec;   INR: 1.03 ratio         PTT - ( 04 Jun 2025 02:03 )  PTT:26.5 sec  CAPILLARY BLOOD GLUCOSE      POCT Blood Glucose.: 217 mg/dL (04 Jun 2025 01:46)  POCT Blood Glucose.: 480 mg/dL (04 Jun 2025 01:44)  POCT Blood Glucose.: 90 mg/dL (03 Jun 2025 17:47)  POCT Blood Glucose.: 115 mg/dL (03 Jun 2025 12:02)  POCT Blood Glucose.: 132 mg/dL (03 Jun 2025 05:28)        LIVER FUNCTIONS - ( 04 Jun 2025 02:03 )  Alb: 3.6 g/dL / Pro: 6.7 g/dL / ALK PHOS: 48 U/L / ALT: 14 U/L / AST: 22 U/L / GGT: x             Urinalysis Basic - ( 04 Jun 2025 02:03 )    Color: x / Appearance: x / SG: x / pH: x  Gluc: 212 mg/dL / Ketone: x  / Bili: x / Urobili: x   Blood: x / Protein: x / Nitrite: x   Leuk Esterase: x / RBC: x / WBC x   Sq Epi: x / Non Sq Epi: x / Bacteria: x      --------------------------------------------------------------------------------------------------  MEDICATIONS  (STANDING):  acetaminophen   IVPB .. 1000 milliGRAM(s) IV Intermittent every 6 hours  chlorhexidine 2% Cloths 1 Application(s) Topical <User Schedule>  dextrose 5%. 1000 milliLiter(s) (50 mL/Hr) IV Continuous <Continuous>  dextrose 5%. 1000 milliLiter(s) (100 mL/Hr) IV Continuous <Continuous>  dextrose 50% Injectable 25 Gram(s) IV Push once  dextrose 50% Injectable 12.5 Gram(s) IV Push once  dextrose 50% Injectable 25 Gram(s) IV Push once  glucagon  Injectable 1 milliGRAM(s) IntraMuscular once  heparin   Injectable 5000 Unit(s) SubCutaneous every 8 hours  insulin lispro (ADMELOG) corrective regimen sliding scale   SubCutaneous every 6 hours  levothyroxine Injectable 75 MICROGram(s) IV Push at bedtime  lipid, fat emulsion (Fish Oil and Plant Based) 20% Infusion 25 mL/Hr (25 mL/Hr) IV Continuous <Continuous>  pantoprazole  Injectable 40 milliGRAM(s) IV Push two times a day  Parenteral Nutrition - Adult 1 Each TPN Continuous <Continuous>    MEDICATIONS  (PRN):  fentaNYL    Injectable 50 MICROGram(s) IV Push every 5 minutes PRN Severe Pain (7 - 10)  HYDROmorphone  Injectable 0.5 milliGRAM(s) IV Push every 4 hours PRN Moderate Pain (4 - 6)  HYDROmorphone  Injectable 1 milliGRAM(s) IV Push every 4 hours PRN Severe Pain (7 - 10)  ondansetron Injectable 4 milliGRAM(s) IV Push once PRN Nausea and/or Vomiting    --------------------------------------------------------------------------------------------------     Surgery Progress Note    Overnight events  - s/p Robo SBR with side to side anterior GJ. NGT in place, cintron in place.     SUBJECTIVE:  - Patient seen and examined at bedside Pt resting comfortably in bed without complaints   --------------------------------------------------------------------------------------------------  OBJECTIVE:   Physical Exam:  General: AAOx3, NAD, lying comfortably in bed  HEENT: NC/AT  Respiratory: nonlabored breathing  Abdomen: non-distended, soft, appropriately tender, MELI, Vishnu  --------------------------------------------------------------------------------------------------  V/S:  Vital Signs Last 24 Hrs  T(C): 37 (04 Jun 2025 02:00), Max: 37 (04 Jun 2025 02:00)  T(F): 98.6 (04 Jun 2025 02:00), Max: 98.6 (04 Jun 2025 02:00)  HR: 99 (04 Jun 2025 02:30) (59 - 99)  BP: 153/90 (04 Jun 2025 02:30) (123/71 - 178/79)  BP(mean): 114 (04 Jun 2025 02:30) (78 - 119)  RR: 16 (04 Jun 2025 02:30) (16 - 18)  SpO2: 98% (04 Jun 2025 02:30) (95% - 100%)    Parameters below as of 04 Jun 2025 02:30  Patient On (Oxygen Delivery Method): room air        --------------------------------------------------------------------------------------------------  I/Os:    02 Jun 2025 07:01  -  03 Jun 2025 07:00  --------------------------------------------------------  IN:    Fat Emulsion (Fish Oil &amp; Plant Based) 20% Infusion: 99.6 mL    IV PiggyBack: 200 mL  Total IN: 299.6 mL    OUT:    Nasogastric/Oral tube (mL): 75 mL    Oral Fluid: 0 mL    Voided (mL): 1450 mL  Total OUT: 1525 mL    Total NET: -1225.4 mL      03 Jun 2025 07:01  -  04 Jun 2025 03:06  --------------------------------------------------------  IN:    Fat Emulsion (Fish Oil &amp; Plant Based) 20% Infusion: 100 mL    IV PiggyBack: 100 mL    PRBCs (Packed Red Blood Cells): 300 mL    TPN (Total Parenteral Nutrition): 384 mL  Total IN: 884 mL    OUT:    Indwelling Catheter - Urethral (mL): 425 mL    Nasogastric/Oral tube (mL): 50 mL    Oral Fluid: 0 mL    Voided (mL): 600 mL  Total OUT: 1075 mL    Total NET: -191 mL        --------------------------------------------------------------------------------------------------  LABS:                        9.3    8.52  )-----------( 279      ( 04 Jun 2025 02:03 )             28.9     04 Jun 2025 02:03    136    |  100    |  31     ----------------------------<  212    4.6     |  21     |  0.99     Ca    8.3        04 Jun 2025 02:03  Phos  3.6       04 Jun 2025 02:03  Mg     2.3       04 Jun 2025 02:03    TPro  6.7    /  Alb  3.6    /  TBili  0.5    /  DBili  x      /  AST  22     /  ALT  14     /  AlkPhos  48     04 Jun 2025 02:03    PT/INR - ( 04 Jun 2025 02:03 )   PT: 11.8 sec;   INR: 1.03 ratio         PTT - ( 04 Jun 2025 02:03 )  PTT:26.5 sec  CAPILLARY BLOOD GLUCOSE      POCT Blood Glucose.: 217 mg/dL (04 Jun 2025 01:46)  POCT Blood Glucose.: 480 mg/dL (04 Jun 2025 01:44)  POCT Blood Glucose.: 90 mg/dL (03 Jun 2025 17:47)  POCT Blood Glucose.: 115 mg/dL (03 Jun 2025 12:02)  POCT Blood Glucose.: 132 mg/dL (03 Jun 2025 05:28)        LIVER FUNCTIONS - ( 04 Jun 2025 02:03 )  Alb: 3.6 g/dL / Pro: 6.7 g/dL / ALK PHOS: 48 U/L / ALT: 14 U/L / AST: 22 U/L / GGT: x             Urinalysis Basic - ( 04 Jun 2025 02:03 )    Color: x / Appearance: x / SG: x / pH: x  Gluc: 212 mg/dL / Ketone: x  / Bili: x / Urobili: x   Blood: x / Protein: x / Nitrite: x   Leuk Esterase: x / RBC: x / WBC x   Sq Epi: x / Non Sq Epi: x / Bacteria: x      --------------------------------------------------------------------------------------------------  MEDICATIONS  (STANDING):  acetaminophen   IVPB .. 1000 milliGRAM(s) IV Intermittent every 6 hours  chlorhexidine 2% Cloths 1 Application(s) Topical <User Schedule>  dextrose 5%. 1000 milliLiter(s) (50 mL/Hr) IV Continuous <Continuous>  dextrose 5%. 1000 milliLiter(s) (100 mL/Hr) IV Continuous <Continuous>  dextrose 50% Injectable 25 Gram(s) IV Push once  dextrose 50% Injectable 12.5 Gram(s) IV Push once  dextrose 50% Injectable 25 Gram(s) IV Push once  glucagon  Injectable 1 milliGRAM(s) IntraMuscular once  heparin   Injectable 5000 Unit(s) SubCutaneous every 8 hours  insulin lispro (ADMELOG) corrective regimen sliding scale   SubCutaneous every 6 hours  levothyroxine Injectable 75 MICROGram(s) IV Push at bedtime  lipid, fat emulsion (Fish Oil and Plant Based) 20% Infusion 25 mL/Hr (25 mL/Hr) IV Continuous <Continuous>  pantoprazole  Injectable 40 milliGRAM(s) IV Push two times a day  Parenteral Nutrition - Adult 1 Each TPN Continuous <Continuous>    MEDICATIONS  (PRN):  fentaNYL    Injectable 50 MICROGram(s) IV Push every 5 minutes PRN Severe Pain (7 - 10)  HYDROmorphone  Injectable 0.5 milliGRAM(s) IV Push every 4 hours PRN Moderate Pain (4 - 6)  HYDROmorphone  Injectable 1 milliGRAM(s) IV Push every 4 hours PRN Severe Pain (7 - 10)  ondansetron Injectable 4 milliGRAM(s) IV Push once PRN Nausea and/or Vomiting    --------------------------------------------------------------------------------------------------

## 2025-06-04 NOTE — BRIEF OPERATIVE NOTE - OPERATION/FINDINGS
Dx laparoscopy, KAIT of midline small bowel adhesions. 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 s/p resection of portion of jejunum, side to side anterior gastrojejunostomy.

## 2025-06-04 NOTE — PROGRESS NOTE ADULT - SUBJECTIVE AND OBJECTIVE BOX
[facility] NUTRITION SUPPORT-- FOLLOW UP NOTE      24 hour events/subjective:  Patient seen and examined at bedside, chart reviewed and events noted and I's and O's reviewed.  Patient denies chest pain, shortness of breath, vomiting, dizziness, chills at time of visit; nausea intemittently and NGT output 50 mL/24 hours.  Patient planned for UGI series Friday, remains NPO.  Patient's VSS and no other acute overnight events noted.   Patient continues on TPN and glucose trend is elevated       ROS:  Except as noted above, all other systems reviewed and are negative     Diet:  Diet, NPO (25 @ 01:49)      PAST HISTORY  --------------------------------------------------------------------------------  PAST MEDICAL & SURGICAL HISTORY:  Glaucoma      HTN (hypertension)      Type II diabetes mellitus      Multiple thyroid nodules      Hyperthyroidism      Obese      Ovarian cyst      Gastroparesis      Iron deficiency anemia      H/O: hysterectomy  "a long time ago when I was in my 30's"      H/O       History of thyroidectomy      History of breast surgery        No significant changes to PMH, PSH, FHx, SHx, unless otherwise noted    ALLERGIES & MEDICATIONS  --------------------------------------------------------------------------------  Allergies    No Known Allergies    Intolerances      Standing Inpatient Medications  acetaminophen   IVPB .. 1000 milliGRAM(s) IV Intermittent every 6 hours  chlorhexidine 2% Cloths 1 Application(s) Topical <User Schedule>  dextrose 5%. 1000 milliLiter(s) IV Continuous <Continuous>  dextrose 5%. 1000 milliLiter(s) IV Continuous <Continuous>  dextrose 50% Injectable 25 Gram(s) IV Push once  dextrose 50% Injectable 12.5 Gram(s) IV Push once  dextrose 50% Injectable 25 Gram(s) IV Push once  glucagon  Injectable 1 milliGRAM(s) IntraMuscular once  heparin   Injectable 5000 Unit(s) SubCutaneous every 8 hours  insulin lispro (ADMELOG) corrective regimen sliding scale   SubCutaneous every 6 hours  levothyroxine Injectable 75 MICROGram(s) IV Push at bedtime  lipid, fat emulsion (Fish Oil and Plant Based) 20% Infusion 25 mL/Hr IV Continuous <Continuous>  pantoprazole  Injectable 40 milliGRAM(s) IV Push two times a day  Parenteral Nutrition - Adult 1 Each TPN Continuous <Continuous>  Parenteral Nutrition - Adult 1 Each TPN Continuous <Continuous>    PRN Inpatient Medications  HYDROmorphone  Injectable 0.5 milliGRAM(s) IV Push every 4 hours PRN  HYDROmorphone  Injectable 1 milliGRAM(s) IV Push every 4 hours PRN        VITALS/PHYSICAL EXAM  --------------------------------------------------------------------------------  T(C): 36.7 (25 @ 08:34), Max: 37 (25 @ 02:00)  HR: 92 (25 08:34) (68 - 108)  BP: 143/85 (25 @ 08:34) (120/91 - 178/79)  RR: 18 (25 @ 08:34) (16 - 18)  SpO2: 100% (25 08:34) (92% - 100%)  Wt(kg): --  Height (cm): 152.4 (25 @ 19:57)  Weight (kg): 56.2 (25 @ 19:57)  BMI (kg/m2): 24.2 (25 @ 19:57)  BSA (m2): 1.52 (25 @ 19:57)    25 @ 07:01  -  25 @ 07:00  --------------------------------------------------------  IN: 884 mL / OUT: 1575 mL / NET: -691 mL    25 @ 07:01  -  25 @ 13:13  --------------------------------------------------------  IN: 0 mL / OUT: 750 mL / NET: -750 mL      I&O's Detail    2025 07:01  -  2025 07:00  --------------------------------------------------------  IN:    Fat Emulsion (Fish Oil &amp; Plant Based) 20% Infusion: 100 mL    IV PiggyBack: 100 mL    PRBCs (Packed Red Blood Cells): 300 mL    TPN (Total Parenteral Nutrition): 384 mL  Total IN: 884 mL    OUT:    Indwelling Catheter - Urethral (mL): 925 mL    Nasogastric/Oral tube (mL): 50 mL    Oral Fluid: 0 mL    Voided (mL): 600 mL  Total OUT: 1575 mL    Total NET: -691 mL      2025 07:01  -  2025 13:13  --------------------------------------------------------  IN:  Total IN: 0 mL    OUT:    Indwelling Catheter - Urethral (mL): 550 mL    Nasogastric/Oral tube (mL): 200 mL    Oral Fluid: 0 mL  Total OUT: 750 mL    Total NET: -750 mL          Physical Exam:  Gen: WD, WN, in no acute distress.  HEENT: +NGT  Neck: Supple, no JVD/Bruit. No thyromegaly.  Abd: Soft, ND, NT,No HSM, +BS.  Ext: No clubbing, no cyanosis, no edema.  Neuro: A/Ox3. Cranial nerve intact. No focal deficit.        LABS/STUDIES  --------------------------------------------------------------------------------              9.3    8.52  >-----------<  279      [25 @ 02:03]              28.9     136  |  100  |  31  ----------------------------<  212      [25 @ 02:03]  4.6   |  21  |  0.99        Ca     8.3     [25 @ 02:03]      iCa    1.10     [ 02:07]      Mg     2.3     [25 @ 02:03]      Phos  3.6     [25 @ 02:03]    TPro  6.7  /  Alb  3.6  /  TBili  0.5  /  DBili  x   /  AST  22  /  ALT  14  /  AlkPhos  48  [25 @ 02:03]    PT/INR: PT 11.8 , INR 1.03       [25 @ 02:03]  PTT: 26.5       [25 @ 02:03]      Ca ionized  Creatinine Trend:  POC glucoseGlucose: 212 mg/dL (25 @ 02:03)  CAPILLARY BLOOD GLUCOSE      POCT Blood Glucose.: 145 mg/dL (2025 12:08)  POCT Blood Glucose.: 287 mg/dL (2025 05:08)  POCT Blood Glucose.: 217 mg/dL (2025 01:46)  POCT Blood Glucose.: 480 mg/dL (2025 01:44)  POCT Blood Glucose.: 90 mg/dL (2025 17:47)    PrealbuminPrealbumin, Serum: 19 mg/dL (25 @ 02:27)  Prealbumin, Serum: 17 mg/dL (25 @ 03:53)  Prealbumin, Serum: 18 mg/dL (25 @ 06:56)    Triglycerides

## 2025-06-04 NOTE — PHYSICAL THERAPY INITIAL EVALUATION ADULT - NAME OF CLINICIAN
Pt  called in asking about a hospital bed. After speaking to Yanira, I was informed that pt is to call into his PCP office and ask them to send in orders for hospital bed to [facility]   no ANGIE Edmondson

## 2025-06-04 NOTE — PROGRESS NOTE ADULT - ASSESSMENT
79F with PMHx DM2, stem cell donor, HTN, CARMEN, hypothyroidism, ovarian cyst, goiter and newly diagnosed metastatic mucinous carcinoma presenting with acute onset of nausea, NBNB emesis, poor PO intake for the last 3-4 days c/w GOO.  TPN consulted for Protein Calorie Malnutrition in the setting of nutritional optimization for anticipated prolonged NPO, pre-op and post op. Pt tentatively planned for OR for resection/bypass next week.    GOAL PN: 105 g amino acids, 210g dextrose, 60 g SMOF lipid; to provide 1734 kcal/day (~31 kcal/kg and 1.86 g/kg protein based on dosing wt 56.2kg)  Non-Protein Calories: 1314 kcal/day (23 kcal/kg, based on dosing wt 56.2 kg); Dextrose Infusion Rate:2.6 mg/kg/min (24-hour infusion); Lipid Infusion Rate: 1.06 g/kg; 0.09g/kg/hr (12-hour infusion)     -Nutritional Assessment, pt not meeting nutritional goals enterally, and would require TPN for nutritional support, pre-op and post-op. Prealumin 18 mg/dL - will trend weekly  - TPN plan:  TPN started 6/1/25; continue TPN; increase AA to 120 G and lower Dex to 180 G given hyperglycemia; keep compressed to 18 hours  - TPN Access:  US guided Bard Double lumen power PICC line in place; PICC maintenance per protocol; keep port dedicated for TPN to avoid contamination   - Strict Intake and Output; Weights three times a week  - Electrolyte Imbalance risk- check CMP, Mg, Phos, iCa and K daily, will replete  in TPN as needed.  - Hypophosphatemia:  phos levels reviewed; continue sodium Phosphate @ 20 mmol  - Hypokalemia:  CMP reviewed; decrease Potassium Cloride @ 50 mEq  - Hypomagnesemia:  magnesium levels reviewed; Magnesium Sulfate @ 12 mEq  - Hypocalcemia:  calcium levels reviewed; calcium Gluconate @ 10 mEq  - Risk of refeeding syndrome:  added Thiamine, TE and MVI in TPN BAG to help decrease risk of refeeding; monitor potassium, magnesium, phosphorus, glucose and fluid balance closely  - Hypertriglyceridemia:  TG 61 mg/dL --> 206 mg/dL; plan to trend TG closely while on TPN  - Hyperglycemia:  HgA1c 5.2%; fingersticks every 6 hours with RISS coverage to monitor glucose trend ( mg/dL); no RI in TPN for now; changes as above  - Further care per [facility] team, pager [phone].      Available on TEAMS  TPN spectra [id number] ([phone] when dialing from outside line)  M-F 8A-2P, Weekends and holidays 8/9A-12/1P  Discussed with Dr. Sidney Jesus    Time-based billing (NON-critical care).     50 minutes spent on total encounter. The necessity of the time spent during the encounter on this date of service was due to chart review, lab/radiology test review, patient assessment, discussion and collaboration with interdisciplinary team members and ordering TPN/making recommendation.

## 2025-06-04 NOTE — PROGRESS NOTE ADULT - ASSESSMENT
79F with PMHx DM2, stem cell donor, HTN, CARMEN, hypothyroidism, ovarian cyst, goiter and newly diagnosed metastatic mucinous carcinoma presenting with acute onset of nausea, NBNB emesis, poor PO intake for the last 3-4 days c/w GOO; s/p robotic SBR, with side to side anterior GJ.     Plan  - NPO  - TPN  - Protonix BID  - cintron  - f/u PACU labs  - OOB and ambulation  - IS  - DVT ppx    [facility] team surgery  [phone] 79F with PMHx DM2, stem cell donor, HTN, CARMEN, hypothyroidism, ovarian cyst, goiter and newly diagnosed metastatic mucinous carcinoma presenting with acute onset of nausea, NBNB emesis, poor PO intake for the last 3-4 days c/w GOO; s/p robotic SBR, with side to side anterior GJ.     Plan  - Plan for UGIs on Friday  - NPO  - TPN  - Protonix BID  - cintron  - f/u PACU labs  - OOB and ambulation  - IS  - DVT ppx    [facility] surgery  [phone]

## 2025-06-04 NOTE — PROGRESS NOTE ADULT - SUBJECTIVE AND OBJECTIVE BOX
Surgery Progress Note    SUBJECTIVE:  - Patient seen and examined at bedside   - Patient states   - Denies  --------------------------------------------------------------------------------------------------  OBJECTIVE:   Physical Exam:  General: AAOx3, NAD, lying comfortably in bed  HEENT: NC/AT  Respiratory: nonlabored breathing  Cardiovascular: RRR, normal S1 and S2, no murmurs or gallops  Abdomen: non-distended, soft, non-tender  Extremities: WWP, no edema  Drain:  Ostomy:   --------------------------------------------------------------------------------------------------  V/S:  Vital Signs Last 24 Hrs  T(C): 37 (04 Jun 2025 02:00), Max: 37 (04 Jun 2025 02:00)  T(F): 98.6 (04 Jun 2025 02:00), Max: 98.6 (04 Jun 2025 02:00)  HR: 99 (04 Jun 2025 02:30) (59 - 99)  BP: 153/90 (04 Jun 2025 02:30) (123/71 - 178/79)  BP(mean): 114 (04 Jun 2025 02:30) (78 - 119)  RR: 16 (04 Jun 2025 02:30) (16 - 18)  SpO2: 98% (04 Jun 2025 02:30) (95% - 100%)    Parameters below as of 04 Jun 2025 02:30  Patient On (Oxygen Delivery Method): room air        --------------------------------------------------------------------------------------------------  I/Os:    02 Jun 2025 07:01  -  03 Jun 2025 07:00  --------------------------------------------------------  IN:    Fat Emulsion (Fish Oil &amp; Plant Based) 20% Infusion: 99.6 mL    IV PiggyBack: 200 mL  Total IN: 299.6 mL    OUT:    Nasogastric/Oral tube (mL): 75 mL    Oral Fluid: 0 mL    Voided (mL): 1450 mL  Total OUT: 1525 mL    Total NET: -1225.4 mL      03 Jun 2025 07:01  -  04 Jun 2025 03:01  --------------------------------------------------------  IN:    Fat Emulsion (Fish Oil &amp; Plant Based) 20% Infusion: 100 mL    IV PiggyBack: 100 mL    PRBCs (Packed Red Blood Cells): 300 mL    TPN (Total Parenteral Nutrition): 384 mL  Total IN: 884 mL    OUT:    Indwelling Catheter - Urethral (mL): 425 mL    Nasogastric/Oral tube (mL): 50 mL    Oral Fluid: 0 mL    Voided (mL): 600 mL  Total OUT: 1075 mL    Total NET: -191 mL        --------------------------------------------------------------------------------------------------  LABS:                        9.3    8.52  )-----------( 279      ( 04 Jun 2025 02:03 )             28.9     04 Jun 2025 02:03    136    |  100    |  31     ----------------------------<  212    4.6     |  21     |  0.99     Ca    8.3        04 Jun 2025 02:03  Phos  3.6       04 Jun 2025 02:03  Mg     2.3       04 Jun 2025 02:03    TPro  6.7    /  Alb  3.6    /  TBili  0.5    /  DBili  x      /  AST  22     /  ALT  14     /  AlkPhos  48     04 Jun 2025 02:03    PT/INR - ( 04 Jun 2025 02:03 )   PT: 11.8 sec;   INR: 1.03 ratio         PTT - ( 04 Jun 2025 02:03 )  PTT:26.5 sec  CAPILLARY BLOOD GLUCOSE      POCT Blood Glucose.: 217 mg/dL (04 Jun 2025 01:46)  POCT Blood Glucose.: 480 mg/dL (04 Jun 2025 01:44)  POCT Blood Glucose.: 90 mg/dL (03 Jun 2025 17:47)  POCT Blood Glucose.: 115 mg/dL (03 Jun 2025 12:02)  POCT Blood Glucose.: 132 mg/dL (03 Jun 2025 05:28)        LIVER FUNCTIONS - ( 04 Jun 2025 02:03 )  Alb: 3.6 g/dL / Pro: 6.7 g/dL / ALK PHOS: 48 U/L / ALT: 14 U/L / AST: 22 U/L / GGT: x             Urinalysis Basic - ( 04 Jun 2025 02:03 )    Color: x / Appearance: x / SG: x / pH: x  Gluc: 212 mg/dL / Ketone: x  / Bili: x / Urobili: x   Blood: x / Protein: x / Nitrite: x   Leuk Esterase: x / RBC: x / WBC x   Sq Epi: x / Non Sq Epi: x / Bacteria: x      --------------------------------------------------------------------------------------------------  MEDICATIONS  (STANDING):  acetaminophen   IVPB .. 1000 milliGRAM(s) IV Intermittent every 6 hours  chlorhexidine 2% Cloths 1 Application(s) Topical <User Schedule>  dextrose 5%. 1000 milliLiter(s) (50 mL/Hr) IV Continuous <Continuous>  dextrose 5%. 1000 milliLiter(s) (100 mL/Hr) IV Continuous <Continuous>  dextrose 50% Injectable 25 Gram(s) IV Push once  dextrose 50% Injectable 12.5 Gram(s) IV Push once  dextrose 50% Injectable 25 Gram(s) IV Push once  glucagon  Injectable 1 milliGRAM(s) IntraMuscular once  heparin   Injectable 5000 Unit(s) SubCutaneous every 8 hours  insulin lispro (ADMELOG) corrective regimen sliding scale   SubCutaneous every 6 hours  levothyroxine Injectable 75 MICROGram(s) IV Push at bedtime  lipid, fat emulsion (Fish Oil and Plant Based) 20% Infusion 25 mL/Hr (25 mL/Hr) IV Continuous <Continuous>  pantoprazole  Injectable 40 milliGRAM(s) IV Push two times a day  Parenteral Nutrition - Adult 1 Each TPN Continuous <Continuous>    MEDICATIONS  (PRN):  fentaNYL    Injectable 50 MICROGram(s) IV Push every 5 minutes PRN Severe Pain (7 - 10)  HYDROmorphone  Injectable 0.5 milliGRAM(s) IV Push every 4 hours PRN Moderate Pain (4 - 6)  HYDROmorphone  Injectable 1 milliGRAM(s) IV Push every 4 hours PRN Severe Pain (7 - 10)  ondansetron Injectable 4 milliGRAM(s) IV Push once PRN Nausea and/or Vomiting    --------------------------------------------------------------------------------------------------

## 2025-06-04 NOTE — PHYSICAL THERAPY INITIAL EVALUATION ADULT - ADDITIONAL COMMENTS
Pt reports that she lives with her dtr in a pvt apt with elevator access with no steps to negotiate. Pt states that she was independent with all ADLs and mobility prior to admission with no AD. Pt does not own any DME.

## 2025-06-05 LAB
ALBUMIN SERPL ELPH-MCNC: 3.1 G/DL — LOW (ref 3.3–5)
ALP SERPL-CCNC: 46 U/L — SIGNIFICANT CHANGE UP (ref 40–120)
ALT FLD-CCNC: 7 U/L — LOW (ref 10–45)
ANION GAP SERPL CALC-SCNC: 12 MMOL/L — SIGNIFICANT CHANGE UP (ref 5–17)
AST SERPL-CCNC: 15 U/L — SIGNIFICANT CHANGE UP (ref 10–40)
BASOPHILS # BLD AUTO: 0.02 K/UL — SIGNIFICANT CHANGE UP (ref 0–0.2)
BASOPHILS NFR BLD AUTO: 0.2 % — SIGNIFICANT CHANGE UP (ref 0–2)
BILIRUB SERPL-MCNC: 0.2 MG/DL — SIGNIFICANT CHANGE UP (ref 0.2–1.2)
BUN SERPL-MCNC: 32 MG/DL — HIGH (ref 7–23)
CA-I BLD-SCNC: 1.19 MMOL/L — SIGNIFICANT CHANGE UP (ref 1.15–1.33)
CALCIUM SERPL-MCNC: 8.5 MG/DL — SIGNIFICANT CHANGE UP (ref 8.4–10.5)
CHLORIDE SERPL-SCNC: 102 MMOL/L — SIGNIFICANT CHANGE UP (ref 96–108)
CO2 SERPL-SCNC: 24 MMOL/L — SIGNIFICANT CHANGE UP (ref 22–31)
CREAT SERPL-MCNC: 0.91 MG/DL — SIGNIFICANT CHANGE UP (ref 0.5–1.3)
EGFR: 63 ML/MIN/1.73M2 — SIGNIFICANT CHANGE UP
EGFR: 63 ML/MIN/1.73M2 — SIGNIFICANT CHANGE UP
EOSINOPHIL # BLD AUTO: 0.13 K/UL — SIGNIFICANT CHANGE UP (ref 0–0.5)
EOSINOPHIL NFR BLD AUTO: 1.4 % — SIGNIFICANT CHANGE UP (ref 0–6)
GLUCOSE BLDC GLUCOMTR-MCNC: 110 MG/DL — HIGH (ref 70–99)
GLUCOSE BLDC GLUCOMTR-MCNC: 131 MG/DL — HIGH (ref 70–99)
GLUCOSE BLDC GLUCOMTR-MCNC: 134 MG/DL — HIGH (ref 70–99)
GLUCOSE BLDC GLUCOMTR-MCNC: 177 MG/DL — HIGH (ref 70–99)
GLUCOSE SERPL-MCNC: 108 MG/DL — HIGH (ref 70–99)
HCT VFR BLD CALC: 24.2 % — LOW (ref 34.5–45)
HCT VFR BLD CALC: 24.7 % — LOW (ref 34.5–45)
HGB BLD-MCNC: 7.9 G/DL — LOW (ref 11.5–15.5)
HGB BLD-MCNC: 7.9 G/DL — LOW (ref 11.5–15.5)
IMM GRANULOCYTES NFR BLD AUTO: 0.6 % — SIGNIFICANT CHANGE UP (ref 0–0.9)
LYMPHOCYTES # BLD AUTO: 1.87 K/UL — SIGNIFICANT CHANGE UP (ref 1–3.3)
LYMPHOCYTES # BLD AUTO: 19.5 % — SIGNIFICANT CHANGE UP (ref 13–44)
MAGNESIUM SERPL-MCNC: 2.1 MG/DL — SIGNIFICANT CHANGE UP (ref 1.6–2.6)
MCHC RBC-ENTMCNC: 28.1 PG — SIGNIFICANT CHANGE UP (ref 27–34)
MCHC RBC-ENTMCNC: 28.4 PG — SIGNIFICANT CHANGE UP (ref 27–34)
MCHC RBC-ENTMCNC: 32 G/DL — SIGNIFICANT CHANGE UP (ref 32–36)
MCHC RBC-ENTMCNC: 32.6 G/DL — SIGNIFICANT CHANGE UP (ref 32–36)
MCV RBC AUTO: 87.1 FL — SIGNIFICANT CHANGE UP (ref 80–100)
MCV RBC AUTO: 87.9 FL — SIGNIFICANT CHANGE UP (ref 80–100)
MONOCYTES # BLD AUTO: 0.88 K/UL — SIGNIFICANT CHANGE UP (ref 0–0.9)
MONOCYTES NFR BLD AUTO: 9.2 % — SIGNIFICANT CHANGE UP (ref 2–14)
NEUTROPHILS # BLD AUTO: 6.63 K/UL — SIGNIFICANT CHANGE UP (ref 1.8–7.4)
NEUTROPHILS NFR BLD AUTO: 69.1 % — SIGNIFICANT CHANGE UP (ref 43–77)
NRBC BLD AUTO-RTO: 0 /100 WBCS — SIGNIFICANT CHANGE UP (ref 0–0)
NRBC BLD AUTO-RTO: 0 /100 WBCS — SIGNIFICANT CHANGE UP (ref 0–0)
PHOSPHATE SERPL-MCNC: 2.7 MG/DL — SIGNIFICANT CHANGE UP (ref 2.5–4.5)
PLATELET # BLD AUTO: 257 K/UL — SIGNIFICANT CHANGE UP (ref 150–400)
PLATELET # BLD AUTO: 272 K/UL — SIGNIFICANT CHANGE UP (ref 150–400)
POTASSIUM SERPL-MCNC: 3.9 MMOL/L — SIGNIFICANT CHANGE UP (ref 3.5–5.3)
POTASSIUM SERPL-SCNC: 3.9 MMOL/L — SIGNIFICANT CHANGE UP (ref 3.5–5.3)
PROT SERPL-MCNC: 5.9 G/DL — LOW (ref 6–8.3)
RBC # BLD: 2.78 M/UL — LOW (ref 3.8–5.2)
RBC # BLD: 2.81 M/UL — LOW (ref 3.8–5.2)
RBC # FLD: 16.1 % — HIGH (ref 10.3–14.5)
RBC # FLD: 16.2 % — HIGH (ref 10.3–14.5)
SODIUM SERPL-SCNC: 138 MMOL/L — SIGNIFICANT CHANGE UP (ref 135–145)
WBC # BLD: 10.94 K/UL — HIGH (ref 3.8–10.5)
WBC # BLD: 9.59 K/UL — SIGNIFICANT CHANGE UP (ref 3.8–10.5)
WBC # FLD AUTO: 10.94 K/UL — HIGH (ref 3.8–10.5)
WBC # FLD AUTO: 9.59 K/UL — SIGNIFICANT CHANGE UP (ref 3.8–10.5)

## 2025-06-05 PROCEDURE — 99232 SBSQ HOSP IP/OBS MODERATE 35: CPT

## 2025-06-05 RX ORDER — SODIUM/POT/MAG/CALC/CHLOR/ACET 35-20-5MEQ
1 VIAL (ML) INTRAVENOUS
Refills: 0 | Status: DISCONTINUED | OUTPATIENT
Start: 2025-06-05 | End: 2025-06-06

## 2025-06-05 RX ORDER — I.V. FAT EMULSION 20 G/100ML
25 EMULSION INTRAVENOUS
Qty: 60 | Refills: 0 | Status: DISCONTINUED | OUTPATIENT
Start: 2025-06-05 | End: 2025-06-06

## 2025-06-05 RX ORDER — SODIUM CHLORIDE 9 G/1000ML
500 INJECTION, SOLUTION INTRAVENOUS ONCE
Refills: 0 | Status: COMPLETED | OUTPATIENT
Start: 2025-06-05 | End: 2025-06-05

## 2025-06-05 RX ORDER — ACETAMINOPHEN 500 MG/5ML
1000 LIQUID (ML) ORAL EVERY 6 HOURS
Refills: 0 | Status: COMPLETED | OUTPATIENT
Start: 2025-06-05 | End: 2025-06-05

## 2025-06-05 RX ADMIN — Medication 75 MICROGRAM(S): at 23:10

## 2025-06-05 RX ADMIN — Medication 1 APPLICATION(S): at 09:49

## 2025-06-05 RX ADMIN — Medication 1000 MILLIGRAM(S): at 23:40

## 2025-06-05 RX ADMIN — Medication 400 MILLIGRAM(S): at 11:15

## 2025-06-05 RX ADMIN — SODIUM CHLORIDE 1000 MILLILITER(S): 9 INJECTION, SOLUTION INTRAVENOUS at 09:48

## 2025-06-05 RX ADMIN — Medication 400 MILLIGRAM(S): at 23:10

## 2025-06-05 RX ADMIN — I.V. FAT EMULSION 25 ML/HR: 20 EMULSION INTRAVENOUS at 19:19

## 2025-06-05 RX ADMIN — Medication 1 EACH: at 17:23

## 2025-06-05 RX ADMIN — Medication 40 MILLIGRAM(S): at 17:09

## 2025-06-05 RX ADMIN — Medication 1 EACH: at 07:15

## 2025-06-05 RX ADMIN — Medication 1000 MILLIGRAM(S): at 11:45

## 2025-06-05 RX ADMIN — HEPARIN SODIUM 5000 UNIT(S): 1000 INJECTION INTRAVENOUS; SUBCUTANEOUS at 13:46

## 2025-06-05 RX ADMIN — Medication 1000 MILLIGRAM(S): at 17:36

## 2025-06-05 RX ADMIN — HEPARIN SODIUM 5000 UNIT(S): 1000 INJECTION INTRAVENOUS; SUBCUTANEOUS at 23:10

## 2025-06-05 RX ADMIN — Medication 400 MILLIGRAM(S): at 17:09

## 2025-06-05 RX ADMIN — HEPARIN SODIUM 5000 UNIT(S): 1000 INJECTION INTRAVENOUS; SUBCUTANEOUS at 06:01

## 2025-06-05 RX ADMIN — Medication 40 MILLIGRAM(S): at 06:01

## 2025-06-05 RX ADMIN — Medication 1 EACH: at 19:18

## 2025-06-05 RX ADMIN — Medication 400 MILLIGRAM(S): at 00:14

## 2025-06-05 RX ADMIN — INSULIN LISPRO 1: 100 INJECTION, SOLUTION INTRAVENOUS; SUBCUTANEOUS at 00:14

## 2025-06-05 RX ADMIN — INSULIN LISPRO 1: 100 INJECTION, SOLUTION INTRAVENOUS; SUBCUTANEOUS at 12:28

## 2025-06-05 RX ADMIN — I.V. FAT EMULSION 25 ML/HR: 20 EMULSION INTRAVENOUS at 17:23

## 2025-06-05 RX ADMIN — Medication 400 MILLIGRAM(S): at 06:01

## 2025-06-05 RX ADMIN — I.V. FAT EMULSION 25 ML/HR: 20 EMULSION INTRAVENOUS at 07:14

## 2025-06-05 NOTE — CHART NOTE - NSCHARTNOTEFT_GEN_A_CORE
NUTRITION FOLLOW UP NOTE    PATIENT SEEN FOR: TPN / Malnutrition follow up.     SOURCE: [x] Patient  [x] Current Medical Record  [] RN  [] Family/support person at bedside  [] Patient unavailable/inappropriate  [x] Other: TPN Team    CHART REVIEWED/EVENTS NOTED.  [] No changes to nutrition care plan to note  [x] Nutrition Status:    DIET ORDER:   ALLAN Mora (06-04-25)      CURRENT DIET ORDER IS:  [] Appropriate:  [] Inadequate:  [] Other:    NUTRITION INTAKE/PROVISION:  [] PO:  [] Enteral Nutrition:  [] Parenteral Nutrition:    ANTHROPOMETRICS:  Drug Dosing Weight  Height (cm): 152.4 (03 Jun 2025 19:57)  Weight (kg): 56.2 (03 Jun 2025 19:57)  BMI (kg/m2): 24.2 (03 Jun 2025 19:57)  BSA (m2): 1.52 (03 Jun 2025 19:57)  Weights:   Daily      MEDICATIONS:  MEDICATIONS  (STANDING):  acetaminophen   IVPB .. 1000 milliGRAM(s) IV Intermittent every 6 hours  chlorhexidine 2% Cloths 1 Application(s) Topical <User Schedule>  dextrose 5%. 1000 milliLiter(s) (50 mL/Hr) IV Continuous <Continuous>  dextrose 5%. 1000 milliLiter(s) (100 mL/Hr) IV Continuous <Continuous>  dextrose 50% Injectable 12.5 Gram(s) IV Push once  dextrose 50% Injectable 25 Gram(s) IV Push once  dextrose 50% Injectable 25 Gram(s) IV Push once  glucagon  Injectable 1 milliGRAM(s) IntraMuscular once  heparin   Injectable 5000 Unit(s) SubCutaneous every 8 hours  insulin lispro (ADMELOG) corrective regimen sliding scale   SubCutaneous every 6 hours  levothyroxine Injectable 75 MICROGram(s) IV Push at bedtime  lipid, fat emulsion (Fish Oil and Plant Based) 20% Infusion 25 mL/Hr (25 mL/Hr) IV Continuous <Continuous>  pantoprazole  Injectable 40 milliGRAM(s) IV Push two times a day  Parenteral Nutrition - Adult 1 Each TPN Continuous <Continuous>  Parenteral Nutrition - Adult 1 Each TPN Continuous <Continuous>    MEDICATIONS  (PRN):  HYDROmorphone  Injectable 0.5 milliGRAM(s) IV Push every 4 hours PRN Moderate Pain (4 - 6)  HYDROmorphone  Injectable 1 milliGRAM(s) IV Push every 4 hours PRN Severe Pain (7 - 10)      NUTRITIONALLY PERTINENT LABS:  06-05 Na138 mmol/L Glu 108 mg/dL[H] K+ 3.9 mmol/L Cr  0.91 mg/dL BUN 32 mg/dL[H] 06-05 Phos 2.7 mg/dL 06-05 Alb 3.1 g/dL[L] 06-04 PAB 19 mg/dL[L] 06-04 Chol --    LDL --    HDL --    Trig 207 mg/dL[H]06-05 ALT 7 U/L[L] AST 15 U/L Alkaline Phosphatase 46 U/L  06-02-25 @ 06:56 a1c 5.2    A1C with Estimated Average Glucose Result: 5.2 % (06-02-25 @ 06:56)  A1C with Estimated Average Glucose Result: 5.5 % (05-02-25 @ 06:51)          Finger Sticks:  POCT Blood Glucose.: 134 mg/dL (06-05 @ 06:10)  POCT Blood Glucose.: 164 mg/dL (06-04 @ 23:38)  POCT Blood Glucose.: 102 mg/dL (06-04 @ 17:31)  POCT Blood Glucose.: 145 mg/dL (06-04 @ 12:08)      NUTRITIONALLY PERTINENT MEDICATIONS/LABS:  [x] Reviewed  [] Relevant notes on medications/labs:    EDEMA:  [x] Reviewed  [] Relevant notes:    GI/ I&O:  [x] Reviewed  [] Relevant notes:  [] Other:    SKIN:   [] No pressure injuries documented, per nursing flowsheet  [] Pressure injury previously noted  [] Change in pressure injury documentation:  [] Other:    ESTIMATED NEEDS:  [] No change:  [] Updated:  Energy:   kcal/day (xx-xx kcal/kg)  Protein:   g/day (xx-xx g/kg)  Fluid:   ml/day or [] defer to team  Based on:    NUTRITION DIAGNOSIS:  [] Prior Dx:  [] New Dx:    EDUCATION:  [] Yes:  [] Not appropriate/warranted    NUTRITION CARE PLAN:  1. Diet:  2. Supplements:  3. Multivitamin/mineral supplementation:  4:     [] Achieved - Continue current nutrition intervention(s)  [] Current medical condition precludes nutrition intervention at this time.    MONITORING AND EVALUATION:   RD remains available upon request and will follow up per protocol.    Margarita Morales RDN, CDN / TEAMS   Available on [facility] TEAMS NUTRITION FOLLOW UP NOTE    PATIENT SEEN FOR: TPN / Malnutrition follow up.     SOURCE: [x] Patient  [x] Current Medical Record  [] RN  [] Family/support person at bedside  [] Patient unavailable/inappropriate  [x] Other: TPN Team    CHART REVIEWED/EVENTS NOTED.  [] No changes to nutrition care plan to note  [x] Nutrition Status:  - Newly diagnosed metastatic mucinous carcinoma presenting with acute onset of nausea, NBNB emesis, poor PO intake.   - TPN initiated 6/01.   - S/p Robot-assisted laparoscopic resection of bowel with side to side anterior GJ on 6/04.   - Pending UGIS Friday per chart.     DIET ORDER:   Diet, NPO (06-04-25)      NUTRITION INTAKE/PROVISION:  [x] Parenteral Nutrition:   [x] CPN (central PN)    NEW GOAL PN (w/ adjustments for hyperglycemia): 120 g amino acids, 180 g dextrose, 60 g SMOF lipid; to provide 1692 kcal/day (30 kcal/kg) and 2.1 g pro/kg based on dosing wt 56.2 kg.     Non-Protein Calories: 1212 kcal/day (21.5 kcal/kg, based on dosing wt 56.2 kg)  Dextrose Infusion Rate: 2.2 mg/kg/min (24-hour infusion)  Lipid Infusion Rate: 1.06 g/kg; 0.09 g/kg/hr (12-hour infusion)    Total Volume/Rate ordered: (6/04)  [x] Compressed: 1.8L x 18-hours    Electrolytes and Additives ordered: (6/04)  NaCl (mEq): 40  Na acetate (mEq): 60  Na Phos (mmol): 20  KCL (mEq): 50  Calcium gluconate (mEq): 10  Mg sulfate (mEq):  12  MTE-4 concentrate (ml): 1  MVI (ml): 10    Insulin (units): 0  Thiamine (mg): 100 x 5 days     ANTHROPOMETRICS:  Drug Dosing Weight  Height (cm): 152.4 (03 Jun 2025 19:57)  Weight (kg): 56.2 (03 Jun 2025 19:57)  BMI (kg/m2): 24.2 (03 Jun 2025 19:57)  Weights:   Daily      MEDICATIONS:  MEDICATIONS  (STANDING):  acetaminophen   IVPB .. 1000 milliGRAM(s) IV Intermittent every 6 hours  chlorhexidine 2% Cloths 1 Application(s) Topical <User Schedule>  dextrose 5%. 1000 milliLiter(s) (50 mL/Hr) IV Continuous <Continuous>  dextrose 5%. 1000 milliLiter(s) (100 mL/Hr) IV Continuous <Continuous>  dextrose 50% Injectable 12.5 Gram(s) IV Push once  dextrose 50% Injectable 25 Gram(s) IV Push once  dextrose 50% Injectable 25 Gram(s) IV Push once  glucagon  Injectable 1 milliGRAM(s) IntraMuscular once  heparin   Injectable 5000 Unit(s) SubCutaneous every 8 hours  insulin lispro (ADMELOG) corrective regimen sliding scale   SubCutaneous every 6 hours  levothyroxine Injectable 75 MICROGram(s) IV Push at bedtime  lipid, fat emulsion (Fish Oil and Plant Based) 20% Infusion 25 mL/Hr (25 mL/Hr) IV Continuous <Continuous>  pantoprazole  Injectable 40 milliGRAM(s) IV Push two times a day  Parenteral Nutrition - Adult 1 Each TPN Continuous <Continuous>  Parenteral Nutrition - Adult 1 Each TPN Continuous <Continuous>    MEDICATIONS  (PRN):  HYDROmorphone  Injectable 0.5 milliGRAM(s) IV Push every 4 hours PRN Moderate Pain (4 - 6)  HYDROmorphone  Injectable 1 milliGRAM(s) IV Push every 4 hours PRN Severe Pain (7 - 10)      NUTRITIONALLY PERTINENT LABS:  06-05 Na138 mmol/L Glu 108 mg/dL[H] K+ 3.9 mmol/L Cr  0.91 mg/dL BUN 32 mg/dL[H] 06-05 Phos 2.7 mg/dL 06-05 Alb 3.1 g/dL[L] 06-04 PAB 19 mg/dL[L] 06-04 Chol --    LDL --    HDL --    Trig 207 mg/dL[H]06-05 ALT 7 U/L[L] AST 15 U/L Alkaline Phosphatase 46 U/L  06-02-25 @ 06:56 a1c 5.2    A1C with Estimated Average Glucose Result: 5.2 % (06-02-25 @ 06:56)  A1C with Estimated Average Glucose Result: 5.5 % (05-02-25 @ 06:51)      Finger Sticks:  POCT Blood Glucose.: 134 mg/dL (06-05 @ 06:10)  POCT Blood Glucose.: 164 mg/dL (06-04 @ 23:38)  POCT Blood Glucose.: 102 mg/dL (06-04 @ 17:31)  POCT Blood Glucose.: 145 mg/dL (06-04 @ 12:08)      NUTRITIONALLY PERTINENT MEDICATIONS/LABS:  [x] Reviewed  [x] Relevant notes on medications/labs:  - SSI for coverage.  - Synthroid ordered.     EDEMA:  [x] Reviewed    GI/ I&O:  [x] Reviewed  [x] Relevant notes: no BM documented. Pt denies any N/V.   [x] Other: ordered for PPI.     SKIN:   [x] No pressure injuries documented, per nursing flowsheet    ESTIMATED NEEDS:  [x] No change:  Energy: 4518-3805 kcal/day (30-35 kcal/kg)  Protein: 84.3-112.4 g/day (1.5-2.0 g/kg)  Fluid: [x] defer to team  Based on: dosing wt 56.2 kg    NUTRITION DIAGNOSIS:  [x] Prior Dx: Severe chronic Malnutrition     EDUCATION:  [x] Not appropriate/warranted; Pt made aware RD remains available PRN.     NUTRITION CARE PLAN:  1. Parenteral nutrition deferred to TPN team, nutrition assessment.   2. Multivitamin/mineral supplementation: continue thiamine x5 days in TPN / deferred to team.   3: RD remains available PRN.      [] Achieved - Continue current nutrition intervention(s)  [] Current medical condition precludes nutrition intervention at this time.    MONITORING AND EVALUATION:   RD remains available upon request and will follow up per protocol.    Margarita Morales RDN, CDN / TEAMS   Available on [facility] TEAMS

## 2025-06-05 NOTE — PROGRESS NOTE ADULT - ASSESSMENT
79F with PMHx DM2, stem cell donor, HTN, CARMEN, hypothyroidism, ovarian cyst, goiter and newly diagnosed metastatic mucinous carcinoma presenting with acute onset of nausea, NBNB emesis, poor PO intake for the last 3-4 days c/w GOO; s/p robotic SBR, with side to side anterior GJ.     Plan  - Plan for UGIs on Friday  - NPO  - TPN  - Protonix BID  - cintron  - f/u PACU labs  - OOB and ambulation  - IS  - DVT ppx    [facility] surgery  [phone]   79F with PMHx DM2, stem cell donor, HTN, CARMEN, hypothyroidism, ovarian cyst, goiter and newly diagnosed metastatic mucinous carcinoma presenting with acute onset of nausea, NBNB emesis, poor PO intake for the last 3-4 days c/w GOO; s/p robotic SBR, with side to side anterior GJ.     Plan  - Plan for UGIS on Friday  - NPO/NGT  - TPN  - 500cc bolus  - Protonix BID  - cintron dc - fu TOV   - f/u PACU labs  - OOB and ambulation  - IS  - DVT ppx    [facility] team surgery  [phone]

## 2025-06-05 NOTE — PROGRESS NOTE ADULT - SUBJECTIVE AND OBJECTIVE BOX
[facility] NUTRITION SUPPORT-- FOLLOW UP NOTE      24 hour events/subjective:   Patient seen and examined at bedside, chart reviewed and events noted and I's and O's reviewed.  Patient denies chest pain, shortness of breath, vomiting, dizziness, chills at time of visit; nausea intemittently and NGT output 1200 mL/24 hours.  Patient planned for UGI series Friday, remains NPO.  Patient's VSS and no other acute overnight events noted.   Patient continues on TPN and glucose trend is improved    ROS:  Except as noted above, all other systems reviewed and are negative       Diet:  Diet, NPO (25 @ 01:49)      PAST HISTORY  --------------------------------------------------------------------------------  PAST MEDICAL & SURGICAL HISTORY:  Glaucoma      HTN (hypertension)      Type II diabetes mellitus      Multiple thyroid nodules      Hyperthyroidism      Obese      Ovarian cyst      Gastroparesis      Iron deficiency anemia      H/O: hysterectomy  "a long time ago when I was in my 30's"      H/O       History of thyroidectomy      History of breast surgery        No significant changes to PMH, PSH, FHx, SHx, unless otherwise noted    ALLERGIES & MEDICATIONS  --------------------------------------------------------------------------------  Allergies    No Known Allergies    Intolerances      Standing Inpatient Medications  acetaminophen   IVPB .. 1000 milliGRAM(s) IV Intermittent every 6 hours  chlorhexidine 2% Cloths 1 Application(s) Topical <User Schedule>  dextrose 5%. 1000 milliLiter(s) IV Continuous <Continuous>  dextrose 5%. 1000 milliLiter(s) IV Continuous <Continuous>  dextrose 50% Injectable 12.5 Gram(s) IV Push once  dextrose 50% Injectable 25 Gram(s) IV Push once  dextrose 50% Injectable 25 Gram(s) IV Push once  glucagon  Injectable 1 milliGRAM(s) IntraMuscular once  heparin   Injectable 5000 Unit(s) SubCutaneous every 8 hours  insulin lispro (ADMELOG) corrective regimen sliding scale   SubCutaneous every 6 hours  lactated ringers Bolus 500 milliLiter(s) IV Bolus once  levothyroxine Injectable 75 MICROGram(s) IV Push at bedtime  lipid, fat emulsion (Fish Oil and Plant Based) 20% Infusion 25 mL/Hr IV Continuous <Continuous>  pantoprazole  Injectable 40 milliGRAM(s) IV Push two times a day  Parenteral Nutrition - Adult 1 Each TPN Continuous <Continuous>  Parenteral Nutrition - Adult 1 Each TPN Continuous <Continuous>    PRN Inpatient Medications  HYDROmorphone  Injectable 0.5 milliGRAM(s) IV Push every 4 hours PRN  HYDROmorphone  Injectable 1 milliGRAM(s) IV Push every 4 hours PRN        VITALS/PHYSICAL EXAM  --------------------------------------------------------------------------------  T(C): 36.7 (25 @ 08:43), Max: 37.1 (25 @ 21:28)  HR: 80 (25 @ 08:43) (75 - 93)  BP: 148/77 (25 @ 08:43) (133/75 - 179/83)  RR: 18 (25 @ 08:43) (18 - 18)  SpO2: 99% (25 08:43) (96% - 99%)  Wt(kg): --  Height (cm): 152.4 (25 @ 19:57)  Weight (kg): 56.2 (25 @ 19:57)  BMI (kg/m2): 24.2 (25 @ 19:57)  BSA (m2): 1.52 (25 @ 19:57)    25 @ 07:01  -  25 @ 07:00  --------------------------------------------------------  IN: 875 mL / OUT: 3050 mL / NET: -2175 mL    25 @ 07:01  -  25 @ 09:42  --------------------------------------------------------  IN: 0 mL / OUT: 300 mL / NET: -300 mL      I&O's Detail    2025 07:01  -  2025 07:00  --------------------------------------------------------  IN:    Fat Emulsion (Fish Oil &amp; Plant Based) 20% Infusion: 25 mL    IV PiggyBack: 200 mL    TPN (Total Parenteral Nutrition): 650 mL  Total IN: 875 mL    OUT:    Indwelling Catheter - Urethral (mL): 1850 mL    Nasogastric/Oral tube (mL): 1200 mL    Oral Fluid: 0 mL  Total OUT: 3050 mL    Total NET: -2175 mL      2025 07:  -  2025 09:42  --------------------------------------------------------  IN:  Total IN: 0 mL    OUT:    Indwelling Catheter - Urethral (mL): 300 mL    Oral Fluid: 0 mL  Total OUT: 300 mL    Total NET: -300 mL          Physical Exam:  Gen: WD, WN, in no acute distress.  HEENT: +NGT  Neck: Supple, no JVD/Bruit. No thyromegaly.  Abd: Soft, ND, NT,No HSM, +BS.  Ext: No clubbing, no cyanosis, no edema.  Neuro: A/Ox3. Cranial nerve intact. No focal deficit.      LABS/STUDIES  --------------------------------------------------------------------------------              7.9    9.59  >-----------<  272      [25 @ 06:56]              24.7     138  |  102  |  32  ----------------------------<  108      [25 @ 06:53]  3.9   |  24  |  0.91        Ca     8.5     [25 @ 06:53]      iCa    1.19     [ 04:00]      Mg     2.1     [25 @ 06:53]      Phos  2.7     [25 @ 06:53]    TPro  5.9  /  Alb  3.1  /  TBili  0.2  /  DBili  x   /  AST  15  /  ALT  7   /  AlkPhos  46  [25 @ 06:53]    PT/INR: PT 12.6 , INR 1.11       [25 @ 18:31]  PTT: 32.0       [25 @ 18:31]      Ca ionized  Creatinine Trend:  POC glucoseGlucose: 108 mg/dL (25 @ 06:53)  Glucose: 98 mg/dL (25 @ 18:31)  CAPILLARY BLOOD GLUCOSE      POCT Blood Glucose.: 134 mg/dL (2025 06:10)  POCT Blood Glucose.: 164 mg/dL (2025 23:38)  POCT Blood Glucose.: 102 mg/dL (2025 17:31)  POCT Blood Glucose.: 145 mg/dL (2025 12:08)    PrealbuminPrealbumin, Serum: 19 mg/dL (25 @ 02:27)  Prealbumin, Serum: 17 mg/dL (25 @ 03:53)  Prealbumin, Serum: 18 mg/dL (25 @ 06:56)    Triglycerides     [facility] NUTRITION SUPPORT-- FOLLOW UP NOTE      24 hour events/subjective:   Patient seen and examined at bedside, chart reviewed and events noted and I's and O's reviewed.  Patient denies chest pain, shortness of breath, vomiting, dizziness, chills at time of visit;  NGT output 1200 mL/24 hours.  Patient planned for UGI series Friday, remains NPO.  Patient's VSS and no other acute overnight events noted.   Patient continues on TPN and glucose trend is improved    ROS:  Except as noted above, all other systems reviewed and are negative       Diet:  Diet, NPO (25 @ 01:49)      PAST HISTORY  --------------------------------------------------------------------------------  PAST MEDICAL & SURGICAL HISTORY:  Glaucoma      HTN (hypertension)      Type II diabetes mellitus      Multiple thyroid nodules      Hyperthyroidism      Obese      Ovarian cyst      Gastroparesis      Iron deficiency anemia      H/O: hysterectomy  "a long time ago when I was in my 30's"      H/O       History of thyroidectomy      History of breast surgery        No significant changes to PMH, PSH, FHx, SHx, unless otherwise noted    ALLERGIES & MEDICATIONS  --------------------------------------------------------------------------------  Allergies    No Known Allergies    Intolerances      Standing Inpatient Medications  acetaminophen   IVPB .. 1000 milliGRAM(s) IV Intermittent every 6 hours  chlorhexidine 2% Cloths 1 Application(s) Topical <User Schedule>  dextrose 5%. 1000 milliLiter(s) IV Continuous <Continuous>  dextrose 5%. 1000 milliLiter(s) IV Continuous <Continuous>  dextrose 50% Injectable 12.5 Gram(s) IV Push once  dextrose 50% Injectable 25 Gram(s) IV Push once  dextrose 50% Injectable 25 Gram(s) IV Push once  glucagon  Injectable 1 milliGRAM(s) IntraMuscular once  heparin   Injectable 5000 Unit(s) SubCutaneous every 8 hours  insulin lispro (ADMELOG) corrective regimen sliding scale   SubCutaneous every 6 hours  lactated ringers Bolus 500 milliLiter(s) IV Bolus once  levothyroxine Injectable 75 MICROGram(s) IV Push at bedtime  lipid, fat emulsion (Fish Oil and Plant Based) 20% Infusion 25 mL/Hr IV Continuous <Continuous>  pantoprazole  Injectable 40 milliGRAM(s) IV Push two times a day  Parenteral Nutrition - Adult 1 Each TPN Continuous <Continuous>  Parenteral Nutrition - Adult 1 Each TPN Continuous <Continuous>    PRN Inpatient Medications  HYDROmorphone  Injectable 0.5 milliGRAM(s) IV Push every 4 hours PRN  HYDROmorphone  Injectable 1 milliGRAM(s) IV Push every 4 hours PRN        VITALS/PHYSICAL EXAM  --------------------------------------------------------------------------------  T(C): 36.7 (25 @ 08:43), Max: 37.1 (25 @ 21:28)  HR: 80 (25 @ 08:43) (75 - 93)  BP: 148/77 (25 @ 08:43) (133/75 - 179/83)  RR: 18 (25 @ 08:43) (18 - 18)  SpO2: 99% (25 @ 08:43) (96% - 99%)  Wt(kg): --  Height (cm): 152.4 (25 @ 19:57)  Weight (kg): 56.2 (25 19:57)  BMI (kg/m2): 24.2 (25 @ 19:57)  BSA (m2): 1.52 (25 @ 19:57)    25 @ 07:01  -  25 @ 07:00  --------------------------------------------------------  IN: 875 mL / OUT: 3050 mL / NET: -2175 mL    25 @ 07:01  -  25 @ 09:42  --------------------------------------------------------  IN: 0 mL / OUT: 300 mL / NET: -300 mL      I&O's Detail    2025 07:01  -  2025 07:00  --------------------------------------------------------  IN:    Fat Emulsion (Fish Oil &amp; Plant Based) 20% Infusion: 25 mL    IV PiggyBack: 200 mL    TPN (Total Parenteral Nutrition): 650 mL  Total IN: 875 mL    OUT:    Indwelling Catheter - Urethral (mL): 1850 mL    Nasogastric/Oral tube (mL): 1200 mL    Oral Fluid: 0 mL  Total OUT: 3050 mL    Total NET: -2175 mL      2025 07:01  -  2025 09:42  --------------------------------------------------------  IN:  Total IN: 0 mL    OUT:    Indwelling Catheter - Urethral (mL): 300 mL    Oral Fluid: 0 mL  Total OUT: 300 mL    Total NET: -300 mL          Physical Exam:  Gen: WD, WN, in no acute distress.  HEENT: +NGT  Neck: Supple, no JVD/Bruit. No thyromegaly.  Abd: Soft, ND, NT,No HSM, +BS.  Ext: No clubbing, no cyanosis, no edema.  Neuro: A/Ox3. Cranial nerve intact. No focal deficit.      LABS/STUDIES  --------------------------------------------------------------------------------              7.9    9.59  >-----------<  272      [25 @ 06:56]              24.7     138  |  102  |  32  ----------------------------<  108      [25 @ 06:53]  3.9   |  24  |  0.91        Ca     8.5     [25 @ 06:53]      iCa    1.19     [ 04:00]      Mg     2.1     [25 @ 06:53]      Phos  2.7     [25 @ 06:53]    TPro  5.9  /  Alb  3.1  /  TBili  0.2  /  DBili  x   /  AST  15  /  ALT  7   /  AlkPhos  46  [25 @ 06:53]    PT/INR: PT 12.6 , INR 1.11       [25 @ 18:31]  PTT: 32.0       [25 @ 18:31]      Ca ionized  Creatinine Trend:  POC glucoseGlucose: 108 mg/dL (25 @ 06:53)  Glucose: 98 mg/dL (25 @ 18:31)  CAPILLARY BLOOD GLUCOSE      POCT Blood Glucose.: 134 mg/dL (2025 06:10)  POCT Blood Glucose.: 164 mg/dL (2025 23:38)  POCT Blood Glucose.: 102 mg/dL (2025 17:31)  POCT Blood Glucose.: 145 mg/dL (2025 12:08)    PrealbuminPrealbumin, Serum: 19 mg/dL (25 @ 02:27)  Prealbumin, Serum: 17 mg/dL (25 @ 03:53)  Prealbumin, Serum: 18 mg/dL (25 @ 06:56)    Triglycerides

## 2025-06-05 NOTE — PROGRESS NOTE ADULT - ASSESSMENT
79F with PMHx DM2, stem cell donor, HTN, CARMEN, hypothyroidism, ovarian cyst, goiter and newly diagnosed metastatic mucinous carcinoma presenting with acute onset of nausea, NBNB emesis, poor PO intake for the last 3-4 days c/w GOO.  TPN consulted for Protein Calorie Malnutrition in the setting of nutritional optimization for anticipated prolonged NPO, pre-op and post op. Pt tentatively planned for OR for resection/bypass next week.    GOAL PN: 105 g amino acids, 210g dextrose, 60 g SMOF lipid; to provide 1734 kcal/day (~31 kcal/kg and 1.86 g/kg protein based on dosing wt 56.2kg)  Non-Protein Calories: 1314 kcal/day (23 kcal/kg, based on dosing wt 56.2 kg); Dextrose Infusion Rate:2.6 mg/kg/min (24-hour infusion); Lipid Infusion Rate: 1.06 g/kg; 0.09g/kg/hr (12-hour infusion)     -Nutritional Assessment, pt not meeting nutritional goals enterally, and would require TPN for nutritional support, pre-op and post-op. Prealumin 18 mg/dL - will trend weekly  - TPN plan:  TPN started 6/1/25; continue TPN; increase AA to 120 G and lower Dex to 180 G given hyperglycemia; keep compressed to 18 hours  - TPN Access:  US guided Bard Double lumen power PICC line in place; PICC maintenance per protocol; keep port dedicated for TPN to avoid contamination   - Strict Intake and Output; Weights three times a week  - Electrolyte Imbalance risk- check CMP, Mg, Phos, iCa and K daily, will replete  in TPN as needed.  - Hypophosphatemia:  phos levels reviewed; continue sodium Phosphate @ 20 mmol  - Hypokalemia:  CMP reviewed; decrease Potassium Cloride @ 50 mEq  - Hypomagnesemia:  magnesium levels reviewed; Magnesium Sulfate @ 12 mEq  - Hypocalcemia:  calcium levels reviewed; calcium Gluconate @ 10 mEq  - Risk of refeeding syndrome:  added Thiamine, TE and MVI in TPN BAG to help decrease risk of refeeding; monitor potassium, magnesium, phosphorus, glucose and fluid balance closely  - Hypertriglyceridemia:  TG 61 mg/dL --> 206 mg/dL; plan to trend TG closely while on TPN  - Hyperglycemia:  HgA1c 5.2%; fingersticks every 6 hours with RISS coverage to monitor glucose trend ( mg/dL); no RI in TPN for now; changes as above  - Further care per [facility] Surgery team, pager [phone].      Available on TEAMS  TPN spectra [id number] ([phone] when dialing from outside line)  M-F 8A-2P, Weekends and holidays 8/9A-12/1P  Discussed with Dr. Sidney Jesus    Time-based billing (NON-critical care).     50 minutes spent on total encounter. The necessity of the time spent during the encounter on this date of service was due to chart review, lab/radiology test review, patient assessment, discussion and collaboration with interdisciplinary team members and ordering TPN/making recommendation.          79F with PMHx DM2, stem cell donor, HTN, CARMEN, hypothyroidism, ovarian cyst, goiter and newly diagnosed metastatic mucinous carcinoma presenting with acute onset of nausea, NBNB emesis, poor PO intake for the last 3-4 days c/w GOO.  TPN consulted for Protein Calorie Malnutrition in the setting of nutritional optimization for anticipated prolonged NPO, pre-op and post op. Pt tentatively planned for OR for resection/bypass next week.    GOAL PN: 105 g amino acids, 210g dextrose, 60 g SMOF lipid; to provide 1734 kcal/day (~31 kcal/kg and 1.86 g/kg protein based on dosing wt 56.2kg)  Non-Protein Calories: 1314 kcal/day (23 kcal/kg, based on dosing wt 56.2 kg); Dextrose Infusion Rate:2.6 mg/kg/min (24-hour infusion); Lipid Infusion Rate: 1.06 g/kg; 0.09g/kg/hr (12-hour infusion)     -Nutritional Assessment, pt not meeting nutritional goals enterally, and would require TPN for nutritional support, pre-op and post-op. Prealumin 18 mg/dL - will trend weekly  - TPN plan:  TPN started 6/1/25; continue TPN; increase AA to 120 G and lower Dex to 180 G given hyperglycemia; increase total volume to 2000 mL; keep compressed to 18 hours  - TPN Access:  US guided Bard Double lumen power PICC line in place; PICC maintenance per protocol; keep port dedicated for TPN to avoid contamination   - Strict Intake and Output; Weights three times a week  - Electrolyte Imbalance risk- check CMP, Mg, Phos, iCa and K daily, will replete  in TPN as needed.  - Hypophosphatemia:  phos levels reviewed; increase  sodium Phosphate @ 30 mmol  - Hypokalemia:  CMP reviewed; continue  Potassium Cloride @ 50 mEq  - Hypomagnesemia:  magnesium levels reviewed; Magnesium Sulfate @ 12 mEq  - Hypocalcemia:  calcium levels reviewed; calcium Gluconate @ 10 mEq  - Risk of refeeding syndrome:  added Thiamine, TE and MVI in TPN BAG to help decrease risk of refeeding; monitor potassium, magnesium, phosphorus, glucose and fluid balance closely  - Hypertriglyceridemia:  TG 61 mg/dL --> 206 mg/dL; plan to trend TG closely while on TPN  - Hyperglycemia:  HgA1c 5.2%; fingersticks every 6 hours with RISS coverage to monitor glucose trend ( mg/dL); no RI in TPN for now; changes as above; can trial increasing Dex in AM if FS stable to add more calories  - Further care per [facility] team, pager [phone].      Available on TEAMS  TPN spectra [id number] ([phone] when dialing from outside line)  M-F 8A-2P, Weekends and holidays 8/9A-12/1P  Discussed with Dr. Sidney Jesus    Time-based billing (NON-critical care).     35 minutes spent on total encounter. The necessity of the time spent during the encounter on this date of service was due to chart review, lab/radiology test review, patient assessment, discussion and collaboration with interdisciplinary team members and ordering TPN/making recommendation.

## 2025-06-05 NOTE — PROGRESS NOTE ADULT - SUBJECTIVE AND OBJECTIVE BOX
DATE OF SERVICE: 06-05-25     Patient is a 79y old  Female who presents with a chief complaint of Intestinal obstruction    Chart reviewed, events noted. This is a "79F with PMHx DM2, stem cell donor, HTN, CARMEN, hypothyroidism, ovarian cyst, goiter and newly diagnosed metastatic mucinous carcinoma presenting with acute onset of nausea, NBNB emesis, poor PO intake for the last 3-4 days c/w GOO."     (02 Jun 2025 09:44)      INTERVAL HISTORY: feels ok      PHYSICAL EXAM:  T(C): 37.1 (06-05-25 @ 21:02), Max: 37.2 (06-05-25 @ 16:22)  HR: 87 (06-05-25 @ 21:02) (80 - 93)  BP: 124/76 (06-05-25 @ 21:02) (124/76 - 149/79)  RR: 18 (06-05-25 @ 21:02) (18 - 18)  SpO2: 98% (06-05-25 @ 21:02) (98% - 100%)  Wt(kg): --  I&O's Summary    04 Jun 2025 07:01  -  05 Jun 2025 07:00  --------------------------------------------------------  IN: 875 mL / OUT: 3050 mL / NET: -2175 mL    05 Jun 2025 07:01  -  05 Jun 2025 22:22  --------------------------------------------------------  IN: 1291 mL / OUT: 1650 mL / NET: -359 mL          Appearance: In no distress	  HEENT:    PERRL, EOMI	  Cardiovascular:  S1 S2, No JVD  Respiratory: Lungs clear to auscultation	  Gastrointestinal:  Soft, Non-tender, + BS	  Vascularature:  No edema of LE  Psychiatric: Appropriate affect   Neuro: no acute focal deficits                               7.9    10.94 )-----------( 257      ( 05 Jun 2025 14:38 )             24.2     06-05    138  |  102  |  32[H]  ----------------------------<  108[H]  3.9   |  24  |  0.91    Ca    8.5      05 Jun 2025 06:53  Phos  2.7     06-05  Mg     2.1     06-05    TPro  5.9[L]  /  Alb  3.1[L]  /  TBili  0.2  /  DBili  x   /  AST  15  /  ALT  7[L]  /  AlkPhos  46  06-05        Labs personally reviewed      ASSESSMENT/PLAN: 	  ASSESSMENT/PLAN: 	    79F w/ T2DM, stem cell donor, HTN, HLD, CARMEN here for new dx of metastatic mucinous CA and inability to tolerate PO, currently w/ NGT for SBO. Cardiology consulted for preop cardiac eval     -EKG w/ NSR no significant STT changes   -s/p TTE ECHO normal EF, no significant VHD   -planned for surgical resection for bypass   -optimized from cardiac standpoint to proceed   -tolerated well          Rj Schwab DO [facility]  Cardiovascular Medicine  [address] [place] [address], [address]  Office: [phone]  Available via Text/call on [employer] Teams

## 2025-06-05 NOTE — PROGRESS NOTE ADULT - SUBJECTIVE AND OBJECTIVE BOX
Surgery Progress Note    Overnight events:  - PM labs drawn and reviewed, WBC 11 (8.5); Hgb 8.4 (9.3); unremarkable  - NAEO    SUBJECTIVE:  - Patient seen and examined at bedside   --------------------------------------------------------------------------------------------------  OBJECTIVE:   Physical Exam:  General: AAOx3, NAD, lying comfortably in bed  HEENT: NC/AT  Respiratory: nonlabored breathing  Abdomen: non-distended, soft, appropriately tender, NGT, Mares  --------------------------------------------------------------------------------------------------  V/S:  Vital Signs Last 24 Hrs  T(C): 36.8 (05 Jun 2025 00:11), Max: 37.1 (04 Jun 2025 21:28)  T(F): 98.2 (05 Jun 2025 00:11), Max: 98.8 (04 Jun 2025 21:28)  HR: 81 (05 Jun 2025 00:11) (75 - 108)  BP: 138/69 (05 Jun 2025 00:11) (120/91 - 179/83)  BP(mean): 110 (04 Jun 2025 03:15) (110 - 119)  RR: 18 (05 Jun 2025 00:11) (16 - 18)  SpO2: 98% (05 Jun 2025 00:11) (92% - 100%)    Parameters below as of 05 Jun 2025 00:11  Patient On (Oxygen Delivery Method): room air        --------------------------------------------------------------------------------------------------  I/Os:    03 Jun 2025 07:01  -  04 Jun 2025 07:00  --------------------------------------------------------  IN:    Fat Emulsion (Fish Oil &amp; Plant Based) 20% Infusion: 100 mL    IV PiggyBack: 100 mL    PRBCs (Packed Red Blood Cells): 300 mL    TPN (Total Parenteral Nutrition): 384 mL  Total IN: 884 mL    OUT:    Indwelling Catheter - Urethral (mL): 925 mL    Nasogastric/Oral tube (mL): 50 mL    Oral Fluid: 0 mL    Voided (mL): 600 mL  Total OUT: 1575 mL    Total NET: -691 mL      04 Jun 2025 07:01  -  05 Jun 2025 01:18  --------------------------------------------------------  IN:    Fat Emulsion (Fish Oil &amp; Plant Based) 20% Infusion: 25 mL    IV PiggyBack: 200 mL    TPN (Total Parenteral Nutrition): 650 mL  Total IN: 875 mL    OUT:    Indwelling Catheter - Urethral (mL): 1500 mL    Nasogastric/Oral tube (mL): 600 mL    Oral Fluid: 0 mL  Total OUT: 2100 mL    Total NET: -1225 mL        --------------------------------------------------------------------------------------------------  LABS:                        8.4    11.03 )-----------( 274      ( 04 Jun 2025 18:31 )             25.8     04 Jun 2025 18:31    137    |  103    |  31     ----------------------------<  98     4.1     |  21     |  0.98     Ca    8.8        04 Jun 2025 18:31  Phos  2.6       04 Jun 2025 18:31  Mg     2.2       04 Jun 2025 18:31    TPro  5.9    /  Alb  3.2    /  TBili  0.3    /  DBili  x      /  AST  17     /  ALT  11     /  AlkPhos  48     04 Jun 2025 18:31    PT/INR - ( 04 Jun 2025 18:31 )   PT: 12.6 sec;   INR: 1.11 ratio         PTT - ( 04 Jun 2025 18:31 )  PTT:32.0 sec  CAPILLARY BLOOD GLUCOSE      POCT Blood Glucose.: 164 mg/dL (04 Jun 2025 23:38)  POCT Blood Glucose.: 102 mg/dL (04 Jun 2025 17:31)  POCT Blood Glucose.: 145 mg/dL (04 Jun 2025 12:08)  POCT Blood Glucose.: 287 mg/dL (04 Jun 2025 05:08)  POCT Blood Glucose.: 217 mg/dL (04 Jun 2025 01:46)  POCT Blood Glucose.: 480 mg/dL (04 Jun 2025 01:44)        LIVER FUNCTIONS - ( 04 Jun 2025 18:31 )  Alb: 3.2 g/dL / Pro: 5.9 g/dL / ALK PHOS: 48 U/L / ALT: 11 U/L / AST: 17 U/L / GGT: x             Urinalysis Basic - ( 04 Jun 2025 18:31 )    Color: x / Appearance: x / SG: x / pH: x  Gluc: 98 mg/dL / Ketone: x  / Bili: x / Urobili: x   Blood: x / Protein: x / Nitrite: x   Leuk Esterase: x / RBC: x / WBC x   Sq Epi: x / Non Sq Epi: x / Bacteria: x      --------------------------------------------------------------------------------------------------  MEDICATIONS  (STANDING):  chlorhexidine 2% Cloths 1 Application(s) Topical <User Schedule>  dextrose 5%. 1000 milliLiter(s) (50 mL/Hr) IV Continuous <Continuous>  dextrose 5%. 1000 milliLiter(s) (100 mL/Hr) IV Continuous <Continuous>  dextrose 50% Injectable 12.5 Gram(s) IV Push once  dextrose 50% Injectable 25 Gram(s) IV Push once  dextrose 50% Injectable 25 Gram(s) IV Push once  glucagon  Injectable 1 milliGRAM(s) IntraMuscular once  heparin   Injectable 5000 Unit(s) SubCutaneous every 8 hours  insulin lispro (ADMELOG) corrective regimen sliding scale   SubCutaneous every 6 hours  levothyroxine Injectable 75 MICROGram(s) IV Push at bedtime  lipid, fat emulsion (Fish Oil and Plant Based) 20% Infusion 25 mL/Hr (25 mL/Hr) IV Continuous <Continuous>  pantoprazole  Injectable 40 milliGRAM(s) IV Push two times a day  Parenteral Nutrition - Adult 1 Each TPN Continuous <Continuous>    MEDICATIONS  (PRN):  HYDROmorphone  Injectable 0.5 milliGRAM(s) IV Push every 4 hours PRN Moderate Pain (4 - 6)  HYDROmorphone  Injectable 1 milliGRAM(s) IV Push every 4 hours PRN Severe Pain (7 - 10)    --------------------------------------------------------------------------------------------------     Surgery Progress Note    Overnight events:  - PM labs drawn and reviewed, WBC 11 (8.5); Hgb 8.4 (9.3); unremarkable  - NAEO    SUBJECTIVE:  - Patient seen and examined at bedside. Hgb decrease from 8.4 to 7.9 this AM, pending PM CBC. Denies passing gas or BM.  output, nothing overnight recorded. Ambulating.   --------------------------------------------------------------------------------------------------  OBJECTIVE:   Physical Exam:  General: AAOx3, NAD, lying comfortably in bed  HEENT: NC/AT  Respiratory: nonlabored breathing  Abdomen: non-distended, soft, appropriately tender, NGT, Mares  --------------------------------------------------------------------------------------------------  V/S:  Vital Signs Last 24 Hrs  T(C): 36.8 (05 Jun 2025 00:11), Max: 37.1 (04 Jun 2025 21:28)  T(F): 98.2 (05 Jun 2025 00:11), Max: 98.8 (04 Jun 2025 21:28)  HR: 81 (05 Jun 2025 00:11) (75 - 108)  BP: 138/69 (05 Jun 2025 00:11) (120/91 - 179/83)  BP(mean): 110 (04 Jun 2025 03:15) (110 - 119)  RR: 18 (05 Jun 2025 00:11) (16 - 18)  SpO2: 98% (05 Jun 2025 00:11) (92% - 100%)    Parameters below as of 05 Jun 2025 00:11  Patient On (Oxygen Delivery Method): room air        --------------------------------------------------------------------------------------------------  I/Os:    03 Jun 2025 07:01  -  04 Jun 2025 07:00  --------------------------------------------------------  IN:    Fat Emulsion (Fish Oil &amp; Plant Based) 20% Infusion: 100 mL    IV PiggyBack: 100 mL    PRBCs (Packed Red Blood Cells): 300 mL    TPN (Total Parenteral Nutrition): 384 mL  Total IN: 884 mL    OUT:    Indwelling Catheter - Urethral (mL): 925 mL    Nasogastric/Oral tube (mL): 50 mL    Oral Fluid: 0 mL    Voided (mL): 600 mL  Total OUT: 1575 mL    Total NET: -691 mL      04 Jun 2025 07:01  -  05 Jun 2025 01:18  --------------------------------------------------------  IN:    Fat Emulsion (Fish Oil &amp; Plant Based) 20% Infusion: 25 mL    IV PiggyBack: 200 mL    TPN (Total Parenteral Nutrition): 650 mL  Total IN: 875 mL    OUT:    Indwelling Catheter - Urethral (mL): 1500 mL    Nasogastric/Oral tube (mL): 600 mL    Oral Fluid: 0 mL  Total OUT: 2100 mL    Total NET: -1225 mL        --------------------------------------------------------------------------------------------------  LABS:                        8.4    11.03 )-----------( 274      ( 04 Jun 2025 18:31 )             25.8     04 Jun 2025 18:31    137    |  103    |  31     ----------------------------<  98     4.1     |  21     |  0.98     Ca    8.8        04 Jun 2025 18:31  Phos  2.6       04 Jun 2025 18:31  Mg     2.2       04 Jun 2025 18:31    TPro  5.9    /  Alb  3.2    /  TBili  0.3    /  DBili  x      /  AST  17     /  ALT  11     /  AlkPhos  48     04 Jun 2025 18:31    PT/INR - ( 04 Jun 2025 18:31 )   PT: 12.6 sec;   INR: 1.11 ratio         PTT - ( 04 Jun 2025 18:31 )  PTT:32.0 sec  CAPILLARY BLOOD GLUCOSE      POCT Blood Glucose.: 164 mg/dL (04 Jun 2025 23:38)  POCT Blood Glucose.: 102 mg/dL (04 Jun 2025 17:31)  POCT Blood Glucose.: 145 mg/dL (04 Jun 2025 12:08)  POCT Blood Glucose.: 287 mg/dL (04 Jun 2025 05:08)  POCT Blood Glucose.: 217 mg/dL (04 Jun 2025 01:46)  POCT Blood Glucose.: 480 mg/dL (04 Jun 2025 01:44)        LIVER FUNCTIONS - ( 04 Jun 2025 18:31 )  Alb: 3.2 g/dL / Pro: 5.9 g/dL / ALK PHOS: 48 U/L / ALT: 11 U/L / AST: 17 U/L / GGT: x             Urinalysis Basic - ( 04 Jun 2025 18:31 )    Color: x / Appearance: x / SG: x / pH: x  Gluc: 98 mg/dL / Ketone: x  / Bili: x / Urobili: x   Blood: x / Protein: x / Nitrite: x   Leuk Esterase: x / RBC: x / WBC x   Sq Epi: x / Non Sq Epi: x / Bacteria: x      --------------------------------------------------------------------------------------------------  MEDICATIONS  (STANDING):  chlorhexidine 2% Cloths 1 Application(s) Topical <User Schedule>  dextrose 5%. 1000 milliLiter(s) (50 mL/Hr) IV Continuous <Continuous>  dextrose 5%. 1000 milliLiter(s) (100 mL/Hr) IV Continuous <Continuous>  dextrose 50% Injectable 12.5 Gram(s) IV Push once  dextrose 50% Injectable 25 Gram(s) IV Push once  dextrose 50% Injectable 25 Gram(s) IV Push once  glucagon  Injectable 1 milliGRAM(s) IntraMuscular once  heparin   Injectable 5000 Unit(s) SubCutaneous every 8 hours  insulin lispro (ADMELOG) corrective regimen sliding scale   SubCutaneous every 6 hours  levothyroxine Injectable 75 MICROGram(s) IV Push at bedtime  lipid, fat emulsion (Fish Oil and Plant Based) 20% Infusion 25 mL/Hr (25 mL/Hr) IV Continuous <Continuous>  pantoprazole  Injectable 40 milliGRAM(s) IV Push two times a day  Parenteral Nutrition - Adult 1 Each TPN Continuous <Continuous>    MEDICATIONS  (PRN):  HYDROmorphone  Injectable 0.5 milliGRAM(s) IV Push every 4 hours PRN Moderate Pain (4 - 6)  HYDROmorphone  Injectable 1 milliGRAM(s) IV Push every 4 hours PRN Severe Pain (7 - 10)    --------------------------------------------------------------------------------------------------

## 2025-06-06 LAB
ALBUMIN SERPL ELPH-MCNC: 3.1 G/DL — LOW (ref 3.3–5)
ALP SERPL-CCNC: 52 U/L — SIGNIFICANT CHANGE UP (ref 40–120)
ALT FLD-CCNC: 8 U/L — LOW (ref 10–45)
ANION GAP SERPL CALC-SCNC: 14 MMOL/L — SIGNIFICANT CHANGE UP (ref 5–17)
AST SERPL-CCNC: 17 U/L — SIGNIFICANT CHANGE UP (ref 10–40)
BASOPHILS # BLD AUTO: 0.01 K/UL — SIGNIFICANT CHANGE UP (ref 0–0.2)
BASOPHILS NFR BLD AUTO: 0.1 % — SIGNIFICANT CHANGE UP (ref 0–2)
BILIRUB SERPL-MCNC: 0.2 MG/DL — SIGNIFICANT CHANGE UP (ref 0.2–1.2)
BUN SERPL-MCNC: 29 MG/DL — HIGH (ref 7–23)
CA-I BLD-SCNC: 1.22 MMOL/L — SIGNIFICANT CHANGE UP (ref 1.15–1.33)
CALCIUM SERPL-MCNC: 8.9 MG/DL — SIGNIFICANT CHANGE UP (ref 8.4–10.5)
CHLORIDE SERPL-SCNC: 103 MMOL/L — SIGNIFICANT CHANGE UP (ref 96–108)
CO2 SERPL-SCNC: 20 MMOL/L — LOW (ref 22–31)
CREAT SERPL-MCNC: 0.89 MG/DL — SIGNIFICANT CHANGE UP (ref 0.5–1.3)
EGFR: 65 ML/MIN/1.73M2 — SIGNIFICANT CHANGE UP
EGFR: 65 ML/MIN/1.73M2 — SIGNIFICANT CHANGE UP
EOSINOPHIL # BLD AUTO: 0.14 K/UL — SIGNIFICANT CHANGE UP (ref 0–0.5)
EOSINOPHIL NFR BLD AUTO: 1.6 % — SIGNIFICANT CHANGE UP (ref 0–6)
GLUCOSE BLDC GLUCOMTR-MCNC: 108 MG/DL — HIGH (ref 70–99)
GLUCOSE BLDC GLUCOMTR-MCNC: 117 MG/DL — HIGH (ref 70–99)
GLUCOSE BLDC GLUCOMTR-MCNC: 140 MG/DL — HIGH (ref 70–99)
GLUCOSE BLDC GLUCOMTR-MCNC: 180 MG/DL — HIGH (ref 70–99)
GLUCOSE SERPL-MCNC: 129 MG/DL — HIGH (ref 70–99)
HCT VFR BLD CALC: 23.3 % — LOW (ref 34.5–45)
HGB BLD-MCNC: 7.4 G/DL — LOW (ref 11.5–15.5)
IMM GRANULOCYTES NFR BLD AUTO: 0.6 % — SIGNIFICANT CHANGE UP (ref 0–0.9)
LYMPHOCYTES # BLD AUTO: 1.85 K/UL — SIGNIFICANT CHANGE UP (ref 1–3.3)
LYMPHOCYTES # BLD AUTO: 20.9 % — SIGNIFICANT CHANGE UP (ref 13–44)
MAGNESIUM SERPL-MCNC: 2.1 MG/DL — SIGNIFICANT CHANGE UP (ref 1.6–2.6)
MCHC RBC-ENTMCNC: 28.2 PG — SIGNIFICANT CHANGE UP (ref 27–34)
MCHC RBC-ENTMCNC: 31.8 G/DL — LOW (ref 32–36)
MCV RBC AUTO: 88.9 FL — SIGNIFICANT CHANGE UP (ref 80–100)
MONOCYTES # BLD AUTO: 0.65 K/UL — SIGNIFICANT CHANGE UP (ref 0–0.9)
MONOCYTES NFR BLD AUTO: 7.3 % — SIGNIFICANT CHANGE UP (ref 2–14)
NEUTROPHILS # BLD AUTO: 6.16 K/UL — SIGNIFICANT CHANGE UP (ref 1.8–7.4)
NEUTROPHILS NFR BLD AUTO: 69.5 % — SIGNIFICANT CHANGE UP (ref 43–77)
NRBC BLD AUTO-RTO: 0 /100 WBCS — SIGNIFICANT CHANGE UP (ref 0–0)
PHOSPHATE SERPL-MCNC: 2.8 MG/DL — SIGNIFICANT CHANGE UP (ref 2.5–4.5)
PLATELET # BLD AUTO: 282 K/UL — SIGNIFICANT CHANGE UP (ref 150–400)
POTASSIUM SERPL-MCNC: 3.7 MMOL/L — SIGNIFICANT CHANGE UP (ref 3.5–5.3)
POTASSIUM SERPL-SCNC: 3.7 MMOL/L — SIGNIFICANT CHANGE UP (ref 3.5–5.3)
PROT SERPL-MCNC: 6.1 G/DL — SIGNIFICANT CHANGE UP (ref 6–8.3)
RBC # BLD: 2.62 M/UL — LOW (ref 3.8–5.2)
RBC # FLD: 16.3 % — HIGH (ref 10.3–14.5)
SODIUM SERPL-SCNC: 137 MMOL/L — SIGNIFICANT CHANGE UP (ref 135–145)
WBC # BLD: 8.86 K/UL — SIGNIFICANT CHANGE UP (ref 3.8–10.5)
WBC # FLD AUTO: 8.86 K/UL — SIGNIFICANT CHANGE UP (ref 3.8–10.5)

## 2025-06-06 PROCEDURE — 74240 X-RAY XM UPR GI TRC 1CNTRST: CPT | Mod: 26

## 2025-06-06 PROCEDURE — 74018 RADEX ABDOMEN 1 VIEW: CPT | Mod: 26,XE

## 2025-06-06 PROCEDURE — 99232 SBSQ HOSP IP/OBS MODERATE 35: CPT

## 2025-06-06 RX ORDER — ACETAMINOPHEN 500 MG/5ML
1000 LIQUID (ML) ORAL EVERY 6 HOURS
Refills: 0 | Status: COMPLETED | OUTPATIENT
Start: 2025-06-06 | End: 2025-06-07

## 2025-06-06 RX ORDER — SODIUM/POT/MAG/CALC/CHLOR/ACET 35-20-5MEQ
1 VIAL (ML) INTRAVENOUS
Refills: 0 | Status: DISCONTINUED | OUTPATIENT
Start: 2025-06-06 | End: 2025-06-07

## 2025-06-06 RX ORDER — I.V. FAT EMULSION 20 G/100ML
25 EMULSION INTRAVENOUS
Qty: 60 | Refills: 0 | Status: DISCONTINUED | OUTPATIENT
Start: 2025-06-06 | End: 2025-06-07

## 2025-06-06 RX ADMIN — Medication 400 MILLIGRAM(S): at 21:36

## 2025-06-06 RX ADMIN — HEPARIN SODIUM 5000 UNIT(S): 1000 INJECTION INTRAVENOUS; SUBCUTANEOUS at 05:02

## 2025-06-06 RX ADMIN — Medication 400 MILLIGRAM(S): at 05:58

## 2025-06-06 RX ADMIN — Medication 40 MILLIGRAM(S): at 05:02

## 2025-06-06 RX ADMIN — HEPARIN SODIUM 5000 UNIT(S): 1000 INJECTION INTRAVENOUS; SUBCUTANEOUS at 13:34

## 2025-06-06 RX ADMIN — INSULIN LISPRO 1: 100 INJECTION, SOLUTION INTRAVENOUS; SUBCUTANEOUS at 05:46

## 2025-06-06 RX ADMIN — I.V. FAT EMULSION 25 ML/HR: 20 EMULSION INTRAVENOUS at 19:22

## 2025-06-06 RX ADMIN — HEPARIN SODIUM 5000 UNIT(S): 1000 INJECTION INTRAVENOUS; SUBCUTANEOUS at 21:37

## 2025-06-06 RX ADMIN — Medication 400 MILLIGRAM(S): at 13:35

## 2025-06-06 RX ADMIN — I.V. FAT EMULSION 25 ML/HR: 20 EMULSION INTRAVENOUS at 17:32

## 2025-06-06 RX ADMIN — Medication 1 EACH: at 19:22

## 2025-06-06 RX ADMIN — Medication 1 EACH: at 07:15

## 2025-06-06 RX ADMIN — Medication 75 MICROGRAM(S): at 21:37

## 2025-06-06 RX ADMIN — Medication 40 MILLIGRAM(S): at 17:45

## 2025-06-06 RX ADMIN — Medication 1000 MILLIGRAM(S): at 22:06

## 2025-06-06 RX ADMIN — Medication 1 APPLICATION(S): at 13:34

## 2025-06-06 RX ADMIN — Medication 1 EACH: at 17:31

## 2025-06-06 RX ADMIN — Medication 1000 MILLIGRAM(S): at 06:28

## 2025-06-06 RX ADMIN — Medication 1000 MILLIGRAM(S): at 14:35

## 2025-06-06 NOTE — PROGRESS NOTE ADULT - ASSESSMENT
79F with PMHx DM2, stem cell donor, HTN, CARMEN, hypothyroidism, ovarian cyst, goiter and newly diagnosed metastatic mucinous carcinoma presenting with acute onset of nausea, NBNB emesis, poor PO intake for the last 3-4 days c/w GOO; s/p robotic SBR, with side to side anterior GJ.     Plan  - Plan for UGIS on Friday  - NPO/NGT  - TPN  - Protonix BID  - OOB and ambulation  - IS  - DVT ppx    [facility] team surgery  [phone]   79F with PMHx DM2, stem cell donor, HTN, CARMEN, hypothyroidism, ovarian cyst, goiter and newly diagnosed metastatic mucinous carcinoma presenting with acute onset of nausea, NBNB emesis, poor PO intake for the last 3-4 days c/w GOO; s/p robotic SBR, with side to side anterior GJ.     Plan  - Plan for UGIS today   - NPO/NGT  - TPN  - Protonix BID  - OOB and ambulation  - IS  - DVT ppx  -PT: No skilled PT needs     [facility] team surgery  [phone]

## 2025-06-06 NOTE — PROGRESS NOTE ADULT - SUBJECTIVE AND OBJECTIVE BOX
Surgery Progress Note    Overnight events:  - NAEO    SUBJECTIVE:  - Patient seen and examined at bedside   --------------------------------------------------------------------------------------------------  OBJECTIVE:   Physical Exam:  General: AAOx3, NAD, lying comfortably in bed  HEENT: NC/AT  Respiratory: nonlabored breathing  Abdomen: non-distended, soft, appropriately tender, NGT  --------------------------------------------------------------------------------------------------  V/S:  Vital Signs Last 24 Hrs  T(C): 37.1 (05 Jun 2025 21:02), Max: 37.2 (05 Jun 2025 16:22)  T(F): 98.8 (05 Jun 2025 21:02), Max: 98.9 (05 Jun 2025 16:22)  HR: 87 (05 Jun 2025 21:02) (80 - 93)  BP: 124/76 (05 Jun 2025 21:02) (124/76 - 149/79)  BP(mean): --  RR: 18 (05 Jun 2025 21:02) (18 - 18)  SpO2: 98% (05 Jun 2025 21:02) (98% - 100%)    Parameters below as of 05 Jun 2025 21:02  Patient On (Oxygen Delivery Method): room air        --------------------------------------------------------------------------------------------------  I/Os:    04 Jun 2025 07:01  -  05 Jun 2025 07:00  --------------------------------------------------------  IN:    Fat Emulsion (Fish Oil &amp; Plant Based) 20% Infusion: 25 mL    IV PiggyBack: 200 mL    TPN (Total Parenteral Nutrition): 650 mL  Total IN: 875 mL    OUT:    Indwelling Catheter - Urethral (mL): 1850 mL    Nasogastric/Oral tube (mL): 1200 mL    Oral Fluid: 0 mL  Total OUT: 3050 mL    Total NET: -2175 mL      05 Jun 2025 07:01  -  06 Jun 2025 00:13  --------------------------------------------------------  IN:    IV PiggyBack: 200 mL    Lactated Ringers Bolus: 500 mL    TPN (Total Parenteral Nutrition): 591 mL  Total IN: 1291 mL    OUT:    Indwelling Catheter - Urethral (mL): 300 mL    Nasogastric/Oral tube (mL): 800 mL    Oral Fluid: 0 mL    Voided (mL): 1250 mL  Total OUT: 2350 mL    Total NET: -1059 mL        --------------------------------------------------------------------------------------------------  LABS:                        7.9    10.94 )-----------( 257      ( 05 Jun 2025 14:38 )             24.2     05 Jun 2025 06:53    138    |  102    |  32     ----------------------------<  108    3.9     |  24     |  0.91     Ca    8.5        05 Jun 2025 06:53  Phos  2.7       05 Jun 2025 06:53  Mg     2.1       05 Jun 2025 06:53    TPro  5.9    /  Alb  3.1    /  TBili  0.2    /  DBili  x      /  AST  15     /  ALT  7      /  AlkPhos  46     05 Jun 2025 06:53    PT/INR - ( 04 Jun 2025 18:31 )   PT: 12.6 sec;   INR: 1.11 ratio         PTT - ( 04 Jun 2025 18:31 )  PTT:32.0 sec  CAPILLARY BLOOD GLUCOSE      POCT Blood Glucose.: 131 mg/dL (05 Jun 2025 23:56)  POCT Blood Glucose.: 110 mg/dL (05 Jun 2025 17:27)  POCT Blood Glucose.: 177 mg/dL (05 Jun 2025 12:05)  POCT Blood Glucose.: 134 mg/dL (05 Jun 2025 06:10)        LIVER FUNCTIONS - ( 05 Jun 2025 06:53 )  Alb: 3.1 g/dL / Pro: 5.9 g/dL / ALK PHOS: 46 U/L / ALT: 7 U/L / AST: 15 U/L / GGT: x             Urinalysis Basic - ( 05 Jun 2025 06:53 )    Color: x / Appearance: x / SG: x / pH: x  Gluc: 108 mg/dL / Ketone: x  / Bili: x / Urobili: x   Blood: x / Protein: x / Nitrite: x   Leuk Esterase: x / RBC: x / WBC x   Sq Epi: x / Non Sq Epi: x / Bacteria: x      --------------------------------------------------------------------------------------------------  MEDICATIONS  (STANDING):  chlorhexidine 2% Cloths 1 Application(s) Topical <User Schedule>  dextrose 5%. 1000 milliLiter(s) (50 mL/Hr) IV Continuous <Continuous>  dextrose 5%. 1000 milliLiter(s) (100 mL/Hr) IV Continuous <Continuous>  dextrose 50% Injectable 25 Gram(s) IV Push once  dextrose 50% Injectable 12.5 Gram(s) IV Push once  dextrose 50% Injectable 25 Gram(s) IV Push once  glucagon  Injectable 1 milliGRAM(s) IntraMuscular once  heparin   Injectable 5000 Unit(s) SubCutaneous every 8 hours  insulin lispro (ADMELOG) corrective regimen sliding scale   SubCutaneous every 6 hours  levothyroxine Injectable 75 MICROGram(s) IV Push at bedtime  lipid, fat emulsion (Fish Oil and Plant Based) 20% Infusion 25 mL/Hr (25 mL/Hr) IV Continuous <Continuous>  pantoprazole  Injectable 40 milliGRAM(s) IV Push two times a day  Parenteral Nutrition - Adult 1 Each TPN Continuous <Continuous>    MEDICATIONS  (PRN):  HYDROmorphone  Injectable 0.5 milliGRAM(s) IV Push every 4 hours PRN Moderate Pain (4 - 6)  HYDROmorphone  Injectable 1 milliGRAM(s) IV Push every 4 hours PRN Severe Pain (7 - 10)    --------------------------------------------------------------------------------------------------     Surgery Progress Note    Overnight events:  - NAEO    SUBJECTIVE:  - Patient seen and examined at bedside. Pt feeling well. Still without bowel function but does not endorse any nausea or vomiting. States she had been up and ambulating. Pain well controlled.   --------------------------------------------------------------------------------------------------  OBJECTIVE:   Physical Exam:  General: AAOx3, NAD, lying comfortably in bed  HEENT: NC/AT  Respiratory: nonlabored breathing  Abdomen: non-distended, soft, appropriately tender, NGT  --------------------------------------------------------------------------------------------------  V/S:  Vital Signs Last 24 Hrs  T(C): 37.1 (05 Jun 2025 21:02), Max: 37.2 (05 Jun 2025 16:22)  T(F): 98.8 (05 Jun 2025 21:02), Max: 98.9 (05 Jun 2025 16:22)  HR: 87 (05 Jun 2025 21:02) (80 - 93)  BP: 124/76 (05 Jun 2025 21:02) (124/76 - 149/79)  BP(mean): --  RR: 18 (05 Jun 2025 21:02) (18 - 18)  SpO2: 98% (05 Jun 2025 21:02) (98% - 100%)    Parameters below as of 05 Jun 2025 21:02  Patient On (Oxygen Delivery Method): room air        --------------------------------------------------------------------------------------------------  I/Os:    04 Jun 2025 07:01  -  05 Jun 2025 07:00  --------------------------------------------------------  IN:    Fat Emulsion (Fish Oil &amp; Plant Based) 20% Infusion: 25 mL    IV PiggyBack: 200 mL    TPN (Total Parenteral Nutrition): 650 mL  Total IN: 875 mL    OUT:    Indwelling Catheter - Urethral (mL): 1850 mL    Nasogastric/Oral tube (mL): 1200 mL    Oral Fluid: 0 mL  Total OUT: 3050 mL    Total NET: -2175 mL      05 Jun 2025 07:01  -  06 Jun 2025 00:13  --------------------------------------------------------  IN:    IV PiggyBack: 200 mL    Lactated Ringers Bolus: 500 mL    TPN (Total Parenteral Nutrition): 591 mL  Total IN: 1291 mL    OUT:    Indwelling Catheter - Urethral (mL): 300 mL    Nasogastric/Oral tube (mL): 800 mL    Oral Fluid: 0 mL    Voided (mL): 1250 mL  Total OUT: 2350 mL    Total NET: -1059 mL        --------------------------------------------------------------------------------------------------  LABS:                        7.9    10.94 )-----------( 257      ( 05 Jun 2025 14:38 )             24.2     05 Jun 2025 06:53    138    |  102    |  32     ----------------------------<  108    3.9     |  24     |  0.91     Ca    8.5        05 Jun 2025 06:53  Phos  2.7       05 Jun 2025 06:53  Mg     2.1       05 Jun 2025 06:53    TPro  5.9    /  Alb  3.1    /  TBili  0.2    /  DBili  x      /  AST  15     /  ALT  7      /  AlkPhos  46     05 Jun 2025 06:53    PT/INR - ( 04 Jun 2025 18:31 )   PT: 12.6 sec;   INR: 1.11 ratio         PTT - ( 04 Jun 2025 18:31 )  PTT:32.0 sec  CAPILLARY BLOOD GLUCOSE      POCT Blood Glucose.: 131 mg/dL (05 Jun 2025 23:56)  POCT Blood Glucose.: 110 mg/dL (05 Jun 2025 17:27)  POCT Blood Glucose.: 177 mg/dL (05 Jun 2025 12:05)  POCT Blood Glucose.: 134 mg/dL (05 Jun 2025 06:10)        LIVER FUNCTIONS - ( 05 Jun 2025 06:53 )  Alb: 3.1 g/dL / Pro: 5.9 g/dL / ALK PHOS: 46 U/L / ALT: 7 U/L / AST: 15 U/L / GGT: x             Urinalysis Basic - ( 05 Jun 2025 06:53 )    Color: x / Appearance: x / SG: x / pH: x  Gluc: 108 mg/dL / Ketone: x  / Bili: x / Urobili: x   Blood: x / Protein: x / Nitrite: x   Leuk Esterase: x / RBC: x / WBC x   Sq Epi: x / Non Sq Epi: x / Bacteria: x      --------------------------------------------------------------------------------------------------  MEDICATIONS  (STANDING):  chlorhexidine 2% Cloths 1 Application(s) Topical <User Schedule>  dextrose 5%. 1000 milliLiter(s) (50 mL/Hr) IV Continuous <Continuous>  dextrose 5%. 1000 milliLiter(s) (100 mL/Hr) IV Continuous <Continuous>  dextrose 50% Injectable 25 Gram(s) IV Push once  dextrose 50% Injectable 12.5 Gram(s) IV Push once  dextrose 50% Injectable 25 Gram(s) IV Push once  glucagon  Injectable 1 milliGRAM(s) IntraMuscular once  heparin   Injectable 5000 Unit(s) SubCutaneous every 8 hours  insulin lispro (ADMELOG) corrective regimen sliding scale   SubCutaneous every 6 hours  levothyroxine Injectable 75 MICROGram(s) IV Push at bedtime  lipid, fat emulsion (Fish Oil and Plant Based) 20% Infusion 25 mL/Hr (25 mL/Hr) IV Continuous <Continuous>  pantoprazole  Injectable 40 milliGRAM(s) IV Push two times a day  Parenteral Nutrition - Adult 1 Each TPN Continuous <Continuous>    MEDICATIONS  (PRN):  HYDROmorphone  Injectable 0.5 milliGRAM(s) IV Push every 4 hours PRN Moderate Pain (4 - 6)  HYDROmorphone  Injectable 1 milliGRAM(s) IV Push every 4 hours PRN Severe Pain (7 - 10)    --------------------------------------------------------------------------------------------------

## 2025-06-06 NOTE — PROGRESS NOTE ADULT - ASSESSMENT
79F with PMHx DM2, stem cell donor, HTN, CARMEN, hypothyroidism, ovarian cyst, goiter and newly diagnosed metastatic mucinous carcinoma presenting with acute onset of nausea, NBNB emesis, poor PO intake for the last 3-4 days c/w GOO.  TPN consulted for Protein Calorie Malnutrition in the setting of nutritional optimization for anticipated prolonged NPO, pre-op and post op. Pt tentatively planned for OR for resection/bypass next week.    GOAL PN: 105 g amino acids, 210g dextrose, 60 g SMOF lipid; to provide 1734 kcal/day (~31 kcal/kg and 1.86 g/kg protein based on dosing wt 56.2kg)  Non-Protein Calories: 1314 kcal/day (23 kcal/kg, based on dosing wt 56.2 kg); Dextrose Infusion Rate:2.6 mg/kg/min (24-hour infusion); Lipid Infusion Rate: 1.06 g/kg; 0.09g/kg/hr (12-hour infusion)     -Nutritional Assessment, pt not meeting nutritional goals enterally, and would require TPN for nutritional support, pre-op and post-op. Prealumin 18 mg/dL - will trend weekly  - TPN plan:  TPN started 6/1/25; continue TPN; increase AA to 120 G and lower Dex to 180 G given hyperglycemia; continue  total volume to 2000 mL; keep compressed to 18 hours  - TPN Access:  US guided Bard Double lumen power PICC line in place; PICC maintenance per protocol; keep port dedicated for TPN to avoid contamination   - Strict Intake and Output; Weights three times a week  - Electrolyte Imbalance risk- check CMP, Mg, Phos, iCa and K daily, will replete  in TPN as needed.  - Hypophosphatemia:  phos levels reviewed; increase sodium Phosphate @ 40 mmol  - Hypokalemia:  CMP reviewed; increase Potassium Cloride @ 60 mEq  - Hypomagnesemia:  magnesium levels reviewed; Magnesium Sulfate @ 12 mEq  - Hypocalcemia:  calcium levels reviewed; calcium Gluconate @ 10 mEq  - Hypertriglyceridemia:  TG 61 mg/dL --> 206 mg/dL; plan to trend TG closely while on TPN  - Hyperglycemia:  HgA1c 5.2%; fingersticks every 6 hours with RISS coverage to monitor glucose trend (110-180 mg/dL); no RI in TPN for now; changes as above  - Further care per [facility] team, pager [phone].      Available on TEAMS  TPN spectra [id number] ([phone] when dialing from outside line)  M-F 8A-2P, Weekends and holidays 8/9A-12/1P  Discussed with Dr. Sidney Jesus    Time-based billing (NON-critical care).     35 minutes spent on total encounter. The necessity of the time spent during the encounter on this date of service was due to chart review, lab/radiology test review, patient assessment, discussion and collaboration with interdisciplinary team members and ordering TPN/making recommendation.

## 2025-06-06 NOTE — PROGRESS NOTE ADULT - SUBJECTIVE AND OBJECTIVE BOX
[facility] NUTRITION SUPPORT-- FOLLOW UP NOTE      24 hour events/subjective:  Patient not seen and examined at bedside as off the floor due to UGIs, chart reviewed and events noted and I's and O's reviewed.  Patient continues with NGT output 1600 mL/24 hours; remains NPO.  Patient's VSS and no other acute overnight events noted.   Patient continues on TPN and glucose trend is improved    ROS:  Except as noted above, all other systems reviewed and are negative     Diet:  Diet, NPO (25 @ 01:49)      PAST HISTORY  --------------------------------------------------------------------------------  PAST MEDICAL & SURGICAL HISTORY:  Glaucoma      HTN (hypertension)      Type II diabetes mellitus      Multiple thyroid nodules      Hyperthyroidism      Obese      Ovarian cyst      Gastroparesis      Iron deficiency anemia      H/O: hysterectomy  "a long time ago when I was in my 30's"      H/O       History of thyroidectomy      History of breast surgery        No significant changes to PMH, PSH, FHx, SHx, unless otherwise noted    ALLERGIES & MEDICATIONS  --------------------------------------------------------------------------------  Allergies    No Known Allergies    Intolerances      Standing Inpatient Medications  acetaminophen   IVPB .. 1000 milliGRAM(s) IV Intermittent every 6 hours  chlorhexidine 2% Cloths 1 Application(s) Topical <User Schedule>  dextrose 5%. 1000 milliLiter(s) IV Continuous <Continuous>  dextrose 5%. 1000 milliLiter(s) IV Continuous <Continuous>  dextrose 50% Injectable 25 Gram(s) IV Push once  dextrose 50% Injectable 12.5 Gram(s) IV Push once  dextrose 50% Injectable 25 Gram(s) IV Push once  glucagon  Injectable 1 milliGRAM(s) IntraMuscular once  heparin   Injectable 5000 Unit(s) SubCutaneous every 8 hours  insulin lispro (ADMELOG) corrective regimen sliding scale   SubCutaneous every 6 hours  levothyroxine Injectable 75 MICROGram(s) IV Push at bedtime  lipid, fat emulsion (Fish Oil and Plant Based) 20% Infusion 25 mL/Hr IV Continuous <Continuous>  pantoprazole  Injectable 40 milliGRAM(s) IV Push two times a day  Parenteral Nutrition - Adult 1 Each TPN Continuous <Continuous>  Parenteral Nutrition - Adult 1 Each TPN Continuous <Continuous>    PRN Inpatient Medications  HYDROmorphone  Injectable 0.5 milliGRAM(s) IV Push every 4 hours PRN  HYDROmorphone  Injectable 1 milliGRAM(s) IV Push every 4 hours PRN        VITALS/PHYSICAL EXAM  --------------------------------------------------------------------------------  T(C): 36.7 (25 @ 09:01), Max: 37.2 (25 @ 16:22)  HR: 91 (25 @ 09:01) (86 - 94)  BP: 142/81 (25 @ 09:01) (124/76 - 142/91)  RR: 18 (25 @ 09:01) (18 - 18)  SpO2: 98% (25 @ 09:01) (98% - 100%)  Wt(kg): --      25 @ 07:01  -  25 @ 07:00  --------------------------------------------------------  IN: 1291 mL / OUT: 3750 mL / NET: -2459 mL    25 @ 07:01  -  25 @ 12:57  --------------------------------------------------------  IN: 0 mL / OUT: 400 mL / NET: -400 mL      I&O's Detail    2025 07:  -  2025 07:00  --------------------------------------------------------  IN:    IV PiggyBack: 200 mL    Lactated Ringers Bolus: 500 mL    TPN (Total Parenteral Nutrition): 591 mL  Total IN: 1291 mL    OUT:    Indwelling Catheter - Urethral (mL): 300 mL    Nasogastric/Oral tube (mL): 1600 mL    Oral Fluid: 0 mL    Voided (mL): 1850 mL  Total OUT: 3750 mL    Total NET: -2459 mL      2025 07:01  -  2025 12:57  --------------------------------------------------------  IN:  Total IN: 0 mL    OUT:    Voided (mL): 400 mL  Total OUT: 400 mL    Total NET: -400 mL          Physical Exam:  Gen: WD, WN, in no acute distress.  HEENT: +NGT  Neck: Supple, no JVD/Bruit. No thyromegaly.  Abd: Soft, ND, NT,No HSM, +BS.  Ext: No clubbing, no cyanosis, no edema.  Neuro: A/Ox3. Cranial nerve intact. No focal deficit.        LABS/STUDIES  --------------------------------------------------------------------------------              7.4    8.86  >-----------<  282      [25 @ 07:55]              23.3     137  |  103  |  29  ----------------------------<  129      [25 @ 07:55]  3.7   |  20  |  0.89        Ca     8.9     [25 @ 07:55]      iCa    1.22     [ @ 07:59]      Mg     2.1     [25 @ 07:55]      Phos  2.8     [25 @ 07:55]    TPro  6.1  /  Alb  3.1  /  TBili  0.2  /  DBili  x   /  AST  17  /  ALT  8   /  AlkPhos  52  [25 @ 07:55]    PT/INR: PT 12.6 , INR 1.11       [25 @ 18:31]  PTT: 32.0       [25 @ 18:31]      Ca ionized  Creatinine Trend:  POC glucoseGlucose: 129 mg/dL (25 @ 07:55)  CAPILLARY BLOOD GLUCOSE      POCT Blood Glucose.: 117 mg/dL (2025 12:27)  POCT Blood Glucose.: 180 mg/dL (2025 05:41)  POCT Blood Glucose.: 131 mg/dL (2025 23:56)  POCT Blood Glucose.: 110 mg/dL (2025 17:27)    PrealbuminPrealbumin, Serum: 19 mg/dL (25 @ 02:27)  Prealbumin, Serum: 17 mg/dL (25 @ 03:53)  Prealbumin, Serum: 18 mg/dL (25 @ 06:56)    Triglycerides

## 2025-06-06 NOTE — PROGRESS NOTE ADULT - SUBJECTIVE AND OBJECTIVE BOX
DATE OF SERVICE: 06-06-25 @ 08:40    Patient is a 79y old  Female who presents with a chief complaint of Intestinal obstruction    Chart reviewed, events noted. This is a "79F with PMHx DM2, stem cell donor, HTN, CARMEN, hypothyroidism, ovarian cyst, goiter and newly diagnosed metastatic mucinous carcinoma presenting with acute onset of nausea, NBNB emesis, poor PO intake for the last 3-4 days c/w GOO."     (02 Jun 2025 09:44)      INTERVAL HISTORY:     REVIEW OF SYSTEMS:  CONSTITUTIONAL: No weakness  EYES/ENT: No visual changes;  No throat pain   NECK: No pain or stiffness  RESPIRATORY: No cough, wheezing; No shortness of breath  CARDIOVASCULAR: No chest pain or palpitations  GASTROINTESTINAL: No abdominal  pain. No nausea, vomiting, or hematemesis  GENITOURINARY: No dysuria, frequency or hematuria  NEUROLOGICAL: No stroke like symptoms  SKIN: No rashes    	  MEDICATIONS:        PHYSICAL EXAM:  T(C): 36.7 (06-06-25 @ 05:06), Max: 37.2 (06-05-25 @ 16:22)  HR: 94 (06-06-25 @ 05:06) (80 - 94)  BP: 129/75 (06-06-25 @ 05:06) (124/76 - 148/77)  RR: 18 (06-06-25 @ 05:06) (18 - 18)  SpO2: 98% (06-06-25 @ 05:06) (98% - 100%)  Wt(kg): --  I&O's Summary    05 Jun 2025 07:01  -  06 Jun 2025 07:00  --------------------------------------------------------  IN: 1291 mL / OUT: 3750 mL / NET: -2459 mL          Appearance: In no distress	  HEENT:    PERRL, EOMI	  Cardiovascular:  S1 S2, No JVD  Respiratory: Lungs clear to auscultation	  Gastrointestinal:  Soft, Non-tender, + BS	  Vascularature:  No edema of LE  Psychiatric: Appropriate affect   Neuro: no acute focal deficits                               7.4    8.86  )-----------( 282      ( 06 Jun 2025 07:55 )             23.3     06-05    138  |  102  |  32[H]  ----------------------------<  108[H]  3.9   |  24  |  0.91    Ca    8.5      05 Jun 2025 06:53  Phos  2.7     06-05  Mg     2.1     06-05    TPro  5.9[L]  /  Alb  3.1[L]  /  TBili  0.2  /  DBili  x   /  AST  15  /  ALT  7[L]  /  AlkPhos  46  06-05        Labs personally reviewed      ASSESSMENT/PLAN: 	    79F w/ T2DM, stem cell donor, HTN, HLD, CARMEN here for new dx of metastatic mucinous CA and inability to tolerate PO, currently w/ NGT for SBO. Cardiology consulted for preop cardiac eval     -EKG w/ NSR no significant STT changes   -s/p TTE ECHO normal EF, no significant VHD   -planned for surgical resection for bypass   -optimized from cardiac standpoint to proceed   -tolerated well          CYDNEY Echevarria-IRASEMA Schwab DO [facility]  Cardiovascular Medicine  [address], [facility], [address]  Available through call or text on [employer] TEAMs  Office: [phone]   DATE OF SERVICE: 06-06-25 @ 08:40    Patient is a 79y old  Female who presents with a chief complaint of Intestinal obstruction    Chart reviewed, events noted. This is a "79F with PMHx DM2, stem cell donor, HTN, CARMEN, hypothyroidism, ovarian cyst, goiter and newly diagnosed metastatic mucinous carcinoma presenting with acute onset of nausea, NBNB emesis, poor PO intake for the last 3-4 days c/w GOO."     (02 Jun 2025 09:44)      INTERVAL HISTORY: Feels ok.     REVIEW OF SYSTEMS:  CONSTITUTIONAL: No weakness  EYES/ENT: No visual changes;  No throat pain   NECK: No pain or stiffness  RESPIRATORY: No cough, wheezing; No shortness of breath  CARDIOVASCULAR: No chest pain or palpitations  GASTROINTESTINAL: No abdominal  pain. No nausea, vomiting, or hematemesis  GENITOURINARY: No dysuria, frequency or hematuria  NEUROLOGICAL: No stroke like symptoms  SKIN: No rashes    	  MEDICATIONS:        PHYSICAL EXAM:  T(C): 36.7 (06-06-25 @ 05:06), Max: 37.2 (06-05-25 @ 16:22)  HR: 94 (06-06-25 @ 05:06) (80 - 94)  BP: 129/75 (06-06-25 @ 05:06) (124/76 - 148/77)  RR: 18 (06-06-25 @ 05:06) (18 - 18)  SpO2: 98% (06-06-25 @ 05:06) (98% - 100%)  Wt(kg): --  I&O's Summary    05 Jun 2025 07:01  -  06 Jun 2025 07:00  --------------------------------------------------------  IN: 1291 mL / OUT: 3750 mL / NET: -2459 mL          Appearance: In no distress	  HEENT:    PERRL, EOMI	  Cardiovascular:  S1 S2, No JVD  Respiratory: Lungs clear to auscultation	  Gastrointestinal:  Soft, Non-tender, + BS	  Vascularature:  No edema of LE  Psychiatric: Appropriate affect   Neuro: no acute focal deficits                               7.4    8.86  )-----------( 282      ( 06 Jun 2025 07:55 )             23.3     06-05    138  |  102  |  32[H]  ----------------------------<  108[H]  3.9   |  24  |  0.91    Ca    8.5      05 Jun 2025 06:53  Phos  2.7     06-05  Mg     2.1     06-05    TPro  5.9[L]  /  Alb  3.1[L]  /  TBili  0.2  /  DBili  x   /  AST  15  /  ALT  7[L]  /  AlkPhos  46  06-05        Labs personally reviewed      ASSESSMENT/PLAN: 	    79F w/ T2DM, stem cell donor, HTN, HLD, CARMEN here for new dx of metastatic mucinous CA and inability to tolerate PO, currently w/ NGT for SBO. Cardiology consulted for preop cardiac eval     -EKG w/ NSR no significant STT changes   -s/p TTE ECHO normal EF, no significant VHD   -planned for surgical resection for bypass   -optimized from cardiac standpoint to proceed   -tolerated well          Gilma Luther, CYDNEY-NP   Rj Schwab DO [facility]  Cardiovascular Medicine  [address], [address]  Available through call or text on [employer] TEAMs  Office: [phone]

## 2025-06-07 LAB
ACANTHOCYTES BLD QL SMEAR: SLIGHT — SIGNIFICANT CHANGE UP
ALBUMIN SERPL ELPH-MCNC: 3.2 G/DL — LOW (ref 3.3–5)
ALP SERPL-CCNC: 46 U/L — SIGNIFICANT CHANGE UP (ref 40–120)
ALT FLD-CCNC: 7 U/L — LOW (ref 10–45)
ANION GAP SERPL CALC-SCNC: 13 MMOL/L — SIGNIFICANT CHANGE UP (ref 5–17)
APTT BLD: 33.1 SEC — SIGNIFICANT CHANGE UP (ref 26.1–36.8)
AST SERPL-CCNC: 15 U/L — SIGNIFICANT CHANGE UP (ref 10–40)
BASOPHILS # BLD AUTO: 0 K/UL — SIGNIFICANT CHANGE UP (ref 0–0.2)
BASOPHILS NFR BLD AUTO: 0 % — SIGNIFICANT CHANGE UP (ref 0–2)
BILIRUB SERPL-MCNC: 0.2 MG/DL — SIGNIFICANT CHANGE UP (ref 0.2–1.2)
BLD GP AB SCN SERPL QL: NEGATIVE — SIGNIFICANT CHANGE UP
BUN SERPL-MCNC: 34 MG/DL — HIGH (ref 7–23)
CA-I BLD-SCNC: 1.26 MMOL/L — SIGNIFICANT CHANGE UP (ref 1.15–1.33)
CALCIUM SERPL-MCNC: 9 MG/DL — SIGNIFICANT CHANGE UP (ref 8.4–10.5)
CHLORIDE SERPL-SCNC: 103 MMOL/L — SIGNIFICANT CHANGE UP (ref 96–108)
CO2 SERPL-SCNC: 22 MMOL/L — SIGNIFICANT CHANGE UP (ref 22–31)
CREAT SERPL-MCNC: 0.9 MG/DL — SIGNIFICANT CHANGE UP (ref 0.5–1.3)
EGFR: 64 ML/MIN/1.73M2 — SIGNIFICANT CHANGE UP
EGFR: 64 ML/MIN/1.73M2 — SIGNIFICANT CHANGE UP
EOSINOPHIL # BLD AUTO: 0.07 K/UL — SIGNIFICANT CHANGE UP (ref 0–0.5)
EOSINOPHIL NFR BLD AUTO: 0.9 % — SIGNIFICANT CHANGE UP (ref 0–6)
GLUCOSE BLDC GLUCOMTR-MCNC: 124 MG/DL — HIGH (ref 70–99)
GLUCOSE BLDC GLUCOMTR-MCNC: 148 MG/DL — HIGH (ref 70–99)
GLUCOSE BLDC GLUCOMTR-MCNC: 156 MG/DL — HIGH (ref 70–99)
GLUCOSE BLDC GLUCOMTR-MCNC: 89 MG/DL — SIGNIFICANT CHANGE UP (ref 70–99)
GLUCOSE SERPL-MCNC: 95 MG/DL — SIGNIFICANT CHANGE UP (ref 70–99)
HCT VFR BLD CALC: 21.3 % — LOW (ref 34.5–45)
HCT VFR BLD CALC: 21.8 % — LOW (ref 34.5–45)
HCT VFR BLD CALC: 25.3 % — LOW (ref 34.5–45)
HGB BLD-MCNC: 6.7 G/DL — CRITICAL LOW (ref 11.5–15.5)
HGB BLD-MCNC: 7 G/DL — CRITICAL LOW (ref 11.5–15.5)
HGB BLD-MCNC: 8.2 G/DL — LOW (ref 11.5–15.5)
HYPOCHROMIA BLD QL: SLIGHT — SIGNIFICANT CHANGE UP
INR BLD: 0.95 RATIO — SIGNIFICANT CHANGE UP (ref 0.85–1.16)
LYMPHOCYTES # BLD AUTO: 1.54 K/UL — SIGNIFICANT CHANGE UP (ref 1–3.3)
LYMPHOCYTES # BLD AUTO: 20.1 % — SIGNIFICANT CHANGE UP (ref 13–44)
MAGNESIUM SERPL-MCNC: 2.1 MG/DL — SIGNIFICANT CHANGE UP (ref 1.6–2.6)
MANUAL SMEAR VERIFICATION: SIGNIFICANT CHANGE UP
MCHC RBC-ENTMCNC: 28 PG — SIGNIFICANT CHANGE UP (ref 27–34)
MCHC RBC-ENTMCNC: 28.2 PG — SIGNIFICANT CHANGE UP (ref 27–34)
MCHC RBC-ENTMCNC: 29 PG — SIGNIFICANT CHANGE UP (ref 27–34)
MCHC RBC-ENTMCNC: 31.5 G/DL — LOW (ref 32–36)
MCHC RBC-ENTMCNC: 32.1 G/DL — SIGNIFICANT CHANGE UP (ref 32–36)
MCHC RBC-ENTMCNC: 32.4 G/DL — SIGNIFICANT CHANGE UP (ref 32–36)
MCV RBC AUTO: 87.9 FL — SIGNIFICANT CHANGE UP (ref 80–100)
MCV RBC AUTO: 89.1 FL — SIGNIFICANT CHANGE UP (ref 80–100)
MCV RBC AUTO: 89.4 FL — SIGNIFICANT CHANGE UP (ref 80–100)
MONOCYTES # BLD AUTO: 0.6 K/UL — SIGNIFICANT CHANGE UP (ref 0–0.9)
MONOCYTES NFR BLD AUTO: 7.9 % — SIGNIFICANT CHANGE UP (ref 2–14)
MYELOCYTES NFR BLD: 0.9 % — HIGH (ref 0–0)
NEUTROPHILS # BLD AUTO: 5.37 K/UL — SIGNIFICANT CHANGE UP (ref 1.8–7.4)
NEUTROPHILS NFR BLD AUTO: 70.2 % — SIGNIFICANT CHANGE UP (ref 43–77)
NRBC BLD AUTO-RTO: 0 /100 WBCS — SIGNIFICANT CHANGE UP (ref 0–0)
NRBC BLD AUTO-RTO: 0 /100 WBCS — SIGNIFICANT CHANGE UP (ref 0–0)
PHOSPHATE SERPL-MCNC: 4.3 MG/DL — SIGNIFICANT CHANGE UP (ref 2.5–4.5)
PLAT MORPH BLD: NORMAL — SIGNIFICANT CHANGE UP
PLATELET # BLD AUTO: 274 K/UL — SIGNIFICANT CHANGE UP (ref 150–400)
PLATELET # BLD AUTO: 292 K/UL — SIGNIFICANT CHANGE UP (ref 150–400)
PLATELET # BLD AUTO: 307 K/UL — SIGNIFICANT CHANGE UP (ref 150–400)
POIKILOCYTOSIS BLD QL AUTO: SLIGHT — SIGNIFICANT CHANGE UP
POTASSIUM SERPL-MCNC: 3.9 MMOL/L — SIGNIFICANT CHANGE UP (ref 3.5–5.3)
POTASSIUM SERPL-SCNC: 3.9 MMOL/L — SIGNIFICANT CHANGE UP (ref 3.5–5.3)
PREALB SERPL-MCNC: 19 MG/DL — LOW (ref 20–40)
PROT SERPL-MCNC: 5.9 G/DL — LOW (ref 6–8.3)
PROTHROM AB SERPL-ACNC: 10.9 SEC — SIGNIFICANT CHANGE UP (ref 9.9–13.4)
RBC # BLD: 2.39 M/UL — LOW (ref 3.8–5.2)
RBC # BLD: 2.48 M/UL — LOW (ref 3.8–5.2)
RBC # BLD: 2.83 M/UL — LOW (ref 3.8–5.2)
RBC # FLD: 15.9 % — HIGH (ref 10.3–14.5)
RBC # FLD: 16 % — HIGH (ref 10.3–14.5)
RBC # FLD: 16 % — HIGH (ref 10.3–14.5)
RBC BLD AUTO: ABNORMAL
RH IG SCN BLD-IMP: POSITIVE — SIGNIFICANT CHANGE UP
SCHISTOCYTES BLD QL AUTO: SLIGHT — SIGNIFICANT CHANGE UP
SMUDGE CELLS # BLD: PRESENT — SIGNIFICANT CHANGE UP
SODIUM SERPL-SCNC: 138 MMOL/L — SIGNIFICANT CHANGE UP (ref 135–145)
TRIGL SERPL-MCNC: 73 MG/DL — SIGNIFICANT CHANGE UP
WBC # BLD: 7.65 K/UL — SIGNIFICANT CHANGE UP (ref 3.8–10.5)
WBC # BLD: 8.19 K/UL — SIGNIFICANT CHANGE UP (ref 3.8–10.5)
WBC # BLD: 8.47 K/UL — SIGNIFICANT CHANGE UP (ref 3.8–10.5)
WBC # FLD AUTO: 7.65 K/UL — SIGNIFICANT CHANGE UP (ref 3.8–10.5)
WBC # FLD AUTO: 8.19 K/UL — SIGNIFICANT CHANGE UP (ref 3.8–10.5)
WBC # FLD AUTO: 8.47 K/UL — SIGNIFICANT CHANGE UP (ref 3.8–10.5)

## 2025-06-07 PROCEDURE — 99232 SBSQ HOSP IP/OBS MODERATE 35: CPT

## 2025-06-07 RX ORDER — SODIUM/POT/MAG/CALC/CHLOR/ACET 35-20-5MEQ
1 VIAL (ML) INTRAVENOUS
Refills: 0 | Status: DISCONTINUED | OUTPATIENT
Start: 2025-06-07 | End: 2025-06-08

## 2025-06-07 RX ORDER — I.V. FAT EMULSION 20 G/100ML
25 EMULSION INTRAVENOUS
Qty: 60 | Refills: 0 | Status: DISCONTINUED | OUTPATIENT
Start: 2025-06-07 | End: 2025-06-08

## 2025-06-07 RX ORDER — ACETAMINOPHEN 500 MG/5ML
1000 LIQUID (ML) ORAL EVERY 6 HOURS
Refills: 0 | Status: COMPLETED | OUTPATIENT
Start: 2025-06-07 | End: 2025-06-08

## 2025-06-07 RX ADMIN — I.V. FAT EMULSION 25 ML/HR: 20 EMULSION INTRAVENOUS at 17:20

## 2025-06-07 RX ADMIN — Medication 1000 MILLIGRAM(S): at 17:43

## 2025-06-07 RX ADMIN — Medication 75 MICROGRAM(S): at 21:45

## 2025-06-07 RX ADMIN — Medication 1000 MILLIGRAM(S): at 12:47

## 2025-06-07 RX ADMIN — HEPARIN SODIUM 5000 UNIT(S): 1000 INJECTION INTRAVENOUS; SUBCUTANEOUS at 05:02

## 2025-06-07 RX ADMIN — INSULIN LISPRO 1: 100 INJECTION, SOLUTION INTRAVENOUS; SUBCUTANEOUS at 12:16

## 2025-06-07 RX ADMIN — HEPARIN SODIUM 5000 UNIT(S): 1000 INJECTION INTRAVENOUS; SUBCUTANEOUS at 14:32

## 2025-06-07 RX ADMIN — Medication 40 MILLIGRAM(S): at 17:12

## 2025-06-07 RX ADMIN — Medication 400 MILLIGRAM(S): at 02:45

## 2025-06-07 RX ADMIN — Medication 1 EACH: at 17:18

## 2025-06-07 RX ADMIN — HEPARIN SODIUM 5000 UNIT(S): 1000 INJECTION INTRAVENOUS; SUBCUTANEOUS at 21:45

## 2025-06-07 RX ADMIN — Medication 1 APPLICATION(S): at 07:58

## 2025-06-07 RX ADMIN — Medication 40 MILLIGRAM(S): at 05:02

## 2025-06-07 RX ADMIN — Medication 1 EACH: at 19:22

## 2025-06-07 RX ADMIN — Medication 400 MILLIGRAM(S): at 23:39

## 2025-06-07 RX ADMIN — I.V. FAT EMULSION 25 ML/HR: 20 EMULSION INTRAVENOUS at 19:22

## 2025-06-07 RX ADMIN — Medication 400 MILLIGRAM(S): at 17:13

## 2025-06-07 RX ADMIN — Medication 400 MILLIGRAM(S): at 12:17

## 2025-06-07 RX ADMIN — Medication 1 EACH: at 07:28

## 2025-06-07 NOTE — PROGRESS NOTE ADULT - ASSESSMENT
79F with PMHx DM2, stem cell donor, HTN, CARMEN, hypothyroidism, ovarian cyst, goiter and newly diagnosed metastatic mucinous carcinoma presenting with acute onset of nausea, NBNB emesis, poor PO intake for the last 3-4 days c/w GOO.  TPN consulted for Protein Calorie Malnutrition in the setting of nutritional optimization for anticipated prolonged NPO, pre-op and post op. Pt tentatively planned for OR for resection/bypass next week.    GOAL PN: 105 g amino acids, 210g dextrose, 60 g SMOF lipid; to provide 1734 kcal/day (~31 kcal/kg and 1.86 g/kg protein based on dosing wt 56.2kg)  Non-Protein Calories: 1314 kcal/day (23 kcal/kg, based on dosing wt 56.2 kg); Dextrose Infusion Rate:2.6 mg/kg/min (24-hour infusion); Lipid Infusion Rate: 1.06 g/kg; 0.09g/kg/hr (12-hour infusion)    TPN orders :    Total Volume: l  Cycle: hrs    AA: g   CHO:  g   SMOF lipids: g     NaCl: meq   NaAce: meq   NaPhos: mmol  KCl: meq  KAce: meq  KPhos: mmol  CaGlu: meq   MgSO4: meq     Thiamine: 0mg   Insulin: 0U   Trace Elements: 1ml  Multi-vitamins: 10ml       -Nutritional Assessment, pt not meeting nutritional goals enterally, and would require TPN for nutritional support, pre-op and post-op. Prealumin 18 mg/dL - will trend weekly  - TPN plan:  TPN started 6/1/25; continue TPN; increase AA to 120 G and lower Dex to 180 G given hyperglycemia; continue  total volume to 2000 mL; keep compressed to 18 hours  - TPN Access:  US guided Bard Double lumen power PICC line in place; PICC maintenance per protocol; keep port dedicated for TPN to avoid contamination   - Strict Intake and Output; Weights three times a week  -Electrolyte Imbalance risk- check CMP, Mg, Phos, iCa and K daily, will replete  in TPN as needed. See below:  -Hyper/hypoPhosphatemia risk: see order outlined for [NaPhos] above for any repletion changes, or any changes in total volume for Ca/Phos solubility curve, to avoid Ca+ precipitation..  -Hyper/hypoKalemia risk: see order outlined above for {KCl] or [KPhos] for any repletion changes.  -Hyper/hypoMagnesemia risk: see order outlined above for [MgSO4] above for any repletion changes.  -Hyper/hypoCarbia risk: see order outlined above for [NaAce] for any repletion changes.  -Hyper/hypoNatremia: see order outlined above for [NaCl, NaAce, NaPhos] for any repletion changes or any changes in total volume.  -Hyper/hypoCalcemia: see order outlined above for [CaGlu] for any repletion changes, or any changes in total volume for Ca/Phos solubility curve, to avoid Ca+ precipitation.  -Hyper/hypoglycemia risk: please order q6 fingersticks and ISS viramontes while on TPN, see order outlined above for any changes in Insulin requirements in the TPN bag.  Risk of hyperglycemia:  HgA1c 5.2%; can check fingersticks (FS) every 12 hours(as FS have been stable)  with RISS coverage to monitor glucose trend;   POCT Blood Glucose.: 148 mg/dL (07 Jun 2025 06:17)  POCT Blood Glucose.: 140 mg/dL (06 Jun 2025 23:49)  POCT Blood Glucose.: 108 mg/dL (06 Jun 2025 17:40)  POCT Blood Glucose.: 117 mg/dL (06 Jun 2025 12:27)--->no RI in TPN, can check BID.    - Hypertriglyceridemia:  TG 61 mg/dL --> 206 mg/dL; plan to trend TG closely while on TPN  - Hyperglycemia:  HgA1c 5.2%; fingersticks every 6 hours with RISS coverage to monitor glucose trend (110-180 mg/dL); no RI in TPN for now; changes as above  - Further care per [facility] team, pager [phone].      Available on TEAMS  TPN spectra [id number] ([phone] when dialing from outside line)  M-F 8A-2P, Weekends and holidays 8/9A-12/1P  Discussed with Dr. Sidney Jesus    Time-based billing (NON-critical care).     35 minutes spent on total encounter. The necessity of the time spent during the encounter on this date of service was due to chart review, lab/radiology test review, patient assessment, discussion and collaboration with interdisciplinary team members and ordering TPN/making recommendation.          79F with PMHx DM2, stem cell donor, HTN, CARMEN, hypothyroidism, ovarian cyst, goiter and newly diagnosed metastatic mucinous carcinoma presenting with acute onset of nausea, NBNB emesis, poor PO intake for the last 3-4 days c/w GOO.  TPN consulted for Protein Calorie Malnutrition in the setting of nutritional optimization for anticipated prolonged NPO, pre-op and post op. Pt tentatively planned for OR for resection/bypass next week.    GOAL PN: 105 g amino acids, 210g dextrose, 60 g SMOF lipid; to provide 1734 kcal/day (~31 kcal/kg and 1.86 g/kg protein based on dosing wt 56.2kg)  Non-Protein Calories: 1314 kcal/day (23 kcal/kg, based on dosing wt 56.2 kg); Dextrose Infusion Rate:2.6 mg/kg/min (24-hour infusion); Lipid Infusion Rate: 1.06 g/kg; 0.09g/kg/hr (12-hour infusion)    TPN orders :    Total Volume: 2.0 l  Cycle: 14hrs    AA: 120g   CHO:  180g   SMOF lipids: 60g     NaCl: 40meq   NaAce: 60meq   NaPhos: 40mmol  KCl: 80meq  KAce: 0meq  KPhos: 0mmol  CaGlu: 10meq   MgSO4: 12meq     Thiamine: 100mg   Insulin: 0U   Trace Elements: 1ml  Multi-vitamins: 10ml       -Nutritional Assessment, pt not meeting nutritional goals enterally, and would require TPN for nutritional support, pre-op and post-op. Prealumin 18 mg/dL - will trend weekly  - TPN plan:  TPN started 6/1/25; continue TPN; increased AA to 120G and lowered Dex to 180G given hyperglycemia; continue  total volume to 2000 mL; keep compress further to 14hours  - TPN Access:  US guided Bard Double lumen power PICC line in place; PICC maintenance per protocol; keep port dedicated for TPN to avoid contamination   - Strict Intake and Output; Weights three times a week  -Electrolyte Imbalance risk- check CMP, Mg, Phos, iCa and K daily, will replete  in TPN as needed. See below:  -Hyper/hypoPhosphatemia risk: see order outlined for [NaPhos] above for any repletion changes, or any changes in total volume for Ca/Phos solubility curve, to avoid Ca+ precipitation.  -Hyper/hypoKalemia risk: see order outlined above for {KCl] or [KPhos] for any repletion changes.  -Hyper/hypoMagnesemia risk: see order outlined above for [MgSO4] above for any repletion changes.  -Hyper/hypoCarbia risk: see order outlined above for [NaAce] for any repletion changes.  -Hyper/hypoNatremia: see order outlined above for [NaCl, NaAce, NaPhos] for any repletion changes or any changes in total volume.  -Hyper/hypoCalcemia: see order outlined above for [CaGlu] for any repletion changes, or any changes in total volume for Ca/Phos solubility curve, to avoid Ca+ precipitation.  -Hyper/hypoglycemia risk: Risk of hyperglycemia:  HgA1c 5.2%; can check fingersticks (FS) every 12 hours (as FS have been stable) with RISS coverage to monitor glucose trend;   POCT Blood Glucose.: 148 mg/dL (07 Jun 2025 06:17)  POCT Blood Glucose.: 140 mg/dL (06 Jun 2025 23:49)  POCT Blood Glucose.: 108 mg/dL (06 Jun 2025 17:40)  POCT Blood Glucose.: 117 mg/dL (06 Jun 2025 12:27)---> see order outlined above for any changes in Insulin requirements in the TPN bag- no RI in TPN, can check BID.  - Hypertriglyceridemia:  TG 61 mg/dL --> 206 mg/dL--->73mg/dl on 6/7; plan to trend TG closely while on TPN  - Further care per [facility] team, pager [phone].      Available on TEAMS  TPN spectra [id number] ([phone] when dialing from outside line)  M-F 8A-2P, Weekends and holidays 8/9A-12/1P  Discussed with Dr. Sidney Jesus    Time-based billing (NON-critical care).     35 minutes spent on total encounter. The necessity of the time spent during the encounter on this date of service was due to chart review, lab/radiology test review, patient assessment, discussion and collaboration with interdisciplinary team members and ordering TPN/making recommendation.

## 2025-06-07 NOTE — PROVIDER CONTACT NOTE (CRITICAL VALUE NOTIFICATION) - SITUATION
Hemoglobin 6.3, hematocrit 19.4
Hemoglobin 7.0
hemoglobin 6.2, hematocrit 19.1
Hgb 7.0
Hemoglobin 6.7

## 2025-06-07 NOTE — PROGRESS NOTE ADULT - SUBJECTIVE AND OBJECTIVE BOX
Surgery Progress Note    Overnight events:  - Patient NGT was removed at 8pm at night time.     SUBJECTIVE:  - Patient seen and examined at bedside   --------------------------------------------------------------------------------------------------  OBJECTIVE:   Physical Exam:  General: AAOx3, NAD, lying comfortably in bed  HEENT: NC/AT  Respiratory: nonlabored breathing  Abdomen: non-distended, soft, appropriately tender  --------------------------------------------------------------------------------------------------  V/S:  Vital Signs Last 24 Hrs  T(C): 36.8 (07 Jun 2025 00:28), Max: 36.8 (06 Jun 2025 16:33)  T(F): 98.3 (07 Jun 2025 00:28), Max: 98.3 (07 Jun 2025 00:28)  HR: 79 (07 Jun 2025 00:28) (79 - 94)  BP: 117/71 (07 Jun 2025 00:28) (117/71 - 162/85)  BP(mean): --  RR: 18 (07 Jun 2025 00:28) (18 - 18)  SpO2: 100% (07 Jun 2025 00:28) (96% - 100%)    Parameters below as of 07 Jun 2025 00:28  Patient On (Oxygen Delivery Method): room air        --------------------------------------------------------------------------------------------------  I/Os:    05 Jun 2025 07:01  -  06 Jun 2025 07:00  --------------------------------------------------------  IN:    IV PiggyBack: 200 mL    Lactated Ringers Bolus: 500 mL    TPN (Total Parenteral Nutrition): 591 mL  Total IN: 1291 mL    OUT:    Indwelling Catheter - Urethral (mL): 300 mL    Nasogastric/Oral tube (mL): 1600 mL    Oral Fluid: 0 mL    Voided (mL): 1850 mL  Total OUT: 3750 mL    Total NET: -2459 mL      06 Jun 2025 07:01  -  07 Jun 2025 03:23  --------------------------------------------------------  IN:  Total IN: 0 mL    OUT:    Oral Fluid: 0 mL    Voided (mL): 700 mL  Total OUT: 700 mL    Total NET: -700 mL        --------------------------------------------------------------------------------------------------  LABS:                        7.4    8.86  )-----------( 282      ( 06 Jun 2025 07:55 )             23.3     06 Jun 2025 07:55    137    |  103    |  29     ----------------------------<  129    3.7     |  20     |  0.89     Ca    8.9        06 Jun 2025 07:55  Phos  2.8       06 Jun 2025 07:55  Mg     2.1       06 Jun 2025 07:55    TPro  6.1    /  Alb  3.1    /  TBili  0.2    /  DBili  x      /  AST  17     /  ALT  8      /  AlkPhos  52     06 Jun 2025 07:55      CAPILLARY BLOOD GLUCOSE      POCT Blood Glucose.: 140 mg/dL (06 Jun 2025 23:49)  POCT Blood Glucose.: 108 mg/dL (06 Jun 2025 17:40)  POCT Blood Glucose.: 117 mg/dL (06 Jun 2025 12:27)  POCT Blood Glucose.: 180 mg/dL (06 Jun 2025 05:41)        LIVER FUNCTIONS - ( 06 Jun 2025 07:55 )  Alb: 3.1 g/dL / Pro: 6.1 g/dL / ALK PHOS: 52 U/L / ALT: 8 U/L / AST: 17 U/L / GGT: x             Urinalysis Basic - ( 06 Jun 2025 07:55 )    Color: x / Appearance: x / SG: x / pH: x  Gluc: 129 mg/dL / Ketone: x  / Bili: x / Urobili: x   Blood: x / Protein: x / Nitrite: x   Leuk Esterase: x / RBC: x / WBC x   Sq Epi: x / Non Sq Epi: x / Bacteria: x      --------------------------------------------------------------------------------------------------  MEDICATIONS  (STANDING):  chlorhexidine 2% Cloths 1 Application(s) Topical <User Schedule>  dextrose 5%. 1000 milliLiter(s) (100 mL/Hr) IV Continuous <Continuous>  dextrose 5%. 1000 milliLiter(s) (50 mL/Hr) IV Continuous <Continuous>  dextrose 50% Injectable 25 Gram(s) IV Push once  dextrose 50% Injectable 12.5 Gram(s) IV Push once  dextrose 50% Injectable 25 Gram(s) IV Push once  glucagon  Injectable 1 milliGRAM(s) IntraMuscular once  heparin   Injectable 5000 Unit(s) SubCutaneous every 8 hours  insulin lispro (ADMELOG) corrective regimen sliding scale   SubCutaneous every 6 hours  levothyroxine Injectable 75 MICROGram(s) IV Push at bedtime  lipid, fat emulsion (Fish Oil and Plant Based) 20% Infusion 25 mL/Hr (25 mL/Hr) IV Continuous <Continuous>  pantoprazole  Injectable 40 milliGRAM(s) IV Push two times a day  Parenteral Nutrition - Adult 1 Each TPN Continuous <Continuous>    MEDICATIONS  (PRN):  HYDROmorphone  Injectable 0.5 milliGRAM(s) IV Push every 4 hours PRN Moderate Pain (4 - 6)  HYDROmorphone  Injectable 1 milliGRAM(s) IV Push every 4 hours PRN Severe Pain (7 - 10)    --------------------------------------------------------------------------------------------------

## 2025-06-07 NOTE — PROVIDER CONTACT NOTE (CRITICAL VALUE NOTIFICATION) - PERSON GIVING RESULT:
Floyd Grant/ heladio
[facility]- Cydney Valenzuela
Gerardo Granados
Floyd Grant/ heladio
[facility]- Darwin LOPEZ

## 2025-06-07 NOTE — PROVIDER CONTACT NOTE (CRITICAL VALUE NOTIFICATION) - ACTION/TREATMENT ORDERED:
Pending
Ida Dumont made aware. Stated will repeat CBC
Provider made aware. Provider states he will relay to day team to see what they want to do.
Provider made aware. Hold heparin, 1 unit PRBCs to be ordered. Day shift nurse made aware
Pending

## 2025-06-07 NOTE — PROVIDER CONTACT NOTE (CRITICAL VALUE NOTIFICATION) - BACKGROUND
pt came in for malignant SBO
pt came in for malignant SBO
Patient admitted for intestinal obstruction
Hx Iron Deficiency anemia
Hx Iron deficiency anemia

## 2025-06-07 NOTE — PROVIDER CONTACT NOTE (CRITICAL VALUE NOTIFICATION) - ASSESSMENT
Patient resting comfortably in bed at the moment. No blood noticed in NGT output, no s/s of pain or discomfort, changes in mentation or vision
Pt A&Ox4, VSS as charted in flowsheet. pt denies any pain or distress. No s/s of bleeding. TPN and lipids infusing. Plan for OR today
Pt A&Ox4, VSS as charted in flowsheet. pt denies any pain or distress. No s/s of bleeding. TPN and lipids infusing. Plan for OR today
Asymptomatic
Asymptomatic

## 2025-06-07 NOTE — PROGRESS NOTE ADULT - SUBJECTIVE AND OBJECTIVE BOX
DATE OF SERVICE: 06-07-25 @ 13:05    Patient is a 79y old  Female who presents with a chief complaint of Intestinal obstruction    Chart reviewed, events noted. This is a "79F with PMHx DM2, stem cell donor, HTN, CARMEN, hypothyroidism, ovarian cyst, goiter and newly diagnosed metastatic mucinous carcinoma presenting with acute onset of nausea, NBNB emesis, poor PO intake for the last 3-4 days c/w GOO."     (02 Jun 2025 09:44)      INTERVAL HISTORY: feels okay    REVIEW OF SYSTEMS:  CONSTITUTIONAL: No weakness  EYES/ENT: No visual changes;  No throat pain   NECK: No pain or stiffness  RESPIRATORY: No cough, wheezing; No shortness of breath  CARDIOVASCULAR: No chest pain or palpitations  GASTROINTESTINAL: No abdominal  pain. No nausea, vomiting, or hematemesis  GENITOURINARY: No dysuria, frequency or hematuria  NEUROLOGICAL: No stroke like symptoms  SKIN: No rashes    	  MEDICATIONS:        PHYSICAL EXAM:  T(C): 36.7 (06-07-25 @ 08:59), Max: 36.8 (06-06-25 @ 16:33)  HR: 85 (06-07-25 @ 08:59) (79 - 94)  BP: 128/75 (06-07-25 @ 08:59) (117/71 - 162/85)  RR: 18 (06-07-25 @ 08:59) (18 - 18)  SpO2: 100% (06-07-25 @ 08:59) (96% - 100%)  Wt(kg): --  I&O's Summary    06 Jun 2025 07:01  -  07 Jun 2025 07:00  --------------------------------------------------------  IN: 0 mL / OUT: 1250 mL / NET: -1250 mL    07 Jun 2025 07:01  -  07 Jun 2025 13:05  --------------------------------------------------------  IN: 680 mL / OUT: 0 mL / NET: 680 mL          Appearance: In no distress	  HEENT:    PERRL, EOMI	  Cardiovascular:  S1 S2, No JVD  Respiratory: Lungs clear to auscultation	  Gastrointestinal:  Soft, Non-tender, + BS	  Vascularature:  No edema of LE  Psychiatric: Appropriate affect   Neuro: no acute focal deficits                               7.0    8.47  )-----------( 307      ( 07 Jun 2025 11:21 )             21.8     06-07    138  |  103  |  34[H]  ----------------------------<  95  3.9   |  22  |  0.90    Ca    9.0      07 Jun 2025 07:10  Phos  4.3     06-07  Mg     2.1     06-07    TPro  5.9[L]  /  Alb  3.2[L]  /  TBili  0.2  /  DBili  x   /  AST  15  /  ALT  7[L]  /  AlkPhos  46  06-07        Labs personally reviewed      ASSESSMENT/PLAN: 	    79F w/ T2DM, stem cell donor, HTN, HLD, CARMEN here for new dx of metastatic mucinous CA and inability to tolerate PO, currently w/ NGT for SBO. Cardiology consulted for preop cardiac eval     -EKG w/ NSR no significant STT changes   -s/p TTE ECHO normal EF, no significant VHD   -planned for surgical resection for bypass   -optimized from cardiac standpoint to proceed   -tolerated well            Iolani Behrbom, AG-IRASEMA Schwab DO [facility]  Cardiovascular Medicine  [address], [address]  Available through call or text on [employer]  Office: [phone]

## 2025-06-07 NOTE — PROGRESS NOTE ADULT - SUBJECTIVE AND OBJECTIVE BOX
[facility] NUTRITION SUPPORT-- FOLLOW UP NOTE      24 hour events/subjective:    Pt continues on TPN for nutritional support. Pt is asymptomatic for CP, SOB, fever, chills nor night sweats.      Diet:  Diet, NPO (06-04-25 @ 01:49)      PAST HISTORY  --------------------------------------------------------------------------------  No significant changes to PMH, PSH, FHx, SHx, unless otherwise noted    ALLERGIES & MEDICATIONS  --------------------------------------------------------------------------------  Allergies    No Known Allergies    Intolerances      Standing Inpatient Medications  chlorhexidine 2% Cloths 1 Application(s) Topical <User Schedule>  dextrose 5%. 1000 milliLiter(s) IV Continuous <Continuous>  dextrose 5%. 1000 milliLiter(s) IV Continuous <Continuous>  dextrose 50% Injectable 25 Gram(s) IV Push once  dextrose 50% Injectable 12.5 Gram(s) IV Push once  dextrose 50% Injectable 25 Gram(s) IV Push once  glucagon  Injectable 1 milliGRAM(s) IntraMuscular once  heparin   Injectable 5000 Unit(s) SubCutaneous every 8 hours  insulin lispro (ADMELOG) corrective regimen sliding scale   SubCutaneous every 6 hours  levothyroxine Injectable 75 MICROGram(s) IV Push at bedtime  lipid, fat emulsion (Fish Oil and Plant Based) 20% Infusion 25 mL/Hr IV Continuous <Continuous>  pantoprazole  Injectable 40 milliGRAM(s) IV Push two times a day  Parenteral Nutrition - Adult 1 Each TPN Continuous <Continuous>    PRN Inpatient Medications  HYDROmorphone  Injectable 0.5 milliGRAM(s) IV Push every 4 hours PRN  HYDROmorphone  Injectable 1 milliGRAM(s) IV Push every 4 hours PRN      REVIEW OF SYSTEMS  --------------------------------------------------------------------------------  Gen: as per HPI  Skin: No rashes  Head/Eyes/Ears/Mouth: No headache; No sore throat  Respiratory: No dyspnea, cough,   CV: No chest pain, PND, orthopnea  GI: as per HPI  : No increased frequency, dysuria, hematuria, nocturia  MSK: No joint pain/swelling; no back pain; no edema  Neuro: No dizziness/lightheadedness, weakness, seizures, numbness, tingling  Psych: No significant nervousness, anxiety, stress, depression    All other systems were reviewed and are negative, except as noted.        LABS/STUDIES  --------------------------------------------------------------------------------              7.4    8.86  >-----------<  282      [06-06-25 @ 07:55]              23.3     137  |  103  |  29  ----------------------------<  129      [06-06-25 @ 07:55]  3.7   |  20  |  0.89        Ca     8.9     [06-06-25 @ 07:55]      iCa    1.22     [06-06 @ 07:59]      Mg     2.1     [06-06-25 @ 07:55]      Phos  2.8     [06-06-25 @ 07:55]    TPro  6.1  /  Alb  3.1  /  TBili  0.2  /  DBili  x   /  AST  17  /  ALT  8   /  AlkPhos  52  [06-06-25 @ 07:55]              Glucose: 129 mg/dL (06-06-25 @ 07:55)    Prealbumin, Serum: 19 mg/dL (06-04-25 @ 02:27)  Prealbumin, Serum: 17 mg/dL (06-03-25 @ 03:53)  Prealbumin, Serum: 18 mg/dL (06-02-25 @ 06:56)    Triglycerides      CAPILLARY BLOOD GLUCOSE      POCT Blood Glucose.: 148 mg/dL (07 Jun 2025 06:17)  POCT Blood Glucose.: 140 mg/dL (06 Jun 2025 23:49)  POCT Blood Glucose.: 108 mg/dL (06 Jun 2025 17:40)  POCT Blood Glucose.: 117 mg/dL (06 Jun 2025 12:27)      VITALS/PHYSICAL EXAM  --------------------------------------------------------------------------------  T(C): 36.8 (06-07-25 @ 00:28), Max: 36.8 (06-06-25 @ 16:33)  HR: 79 (06-07-25 @ 00:28) (79 - 94)  BP: 117/71 (06-07-25 @ 00:28) (117/71 - 162/85)  RR: 18 (06-07-25 @ 00:28) (18 - 18)  SpO2: 100% (06-07-25 @ 00:28) (96% - 100%)  Wt(kg): --        06-05-25 @ 07:01  -  06-06-25 @ 07:00  --------------------------------------------------------  IN: 1291 mL / OUT: 3750 mL / NET: -2459 mL    06-06-25 @ 07:01  -  06-07-25 @ 06:28  --------------------------------------------------------  IN: 0 mL / OUT: 1250 mL / NET: -1250 mL      Physical Exam:    Gen: NAD, well-appearing  HEENT: PERRL, NCAT  Neck: trachea midline no noted JVD  Chest: non labored breathing equal chest expansion b/l  Abd: soft, nontender/nondistended  UE: WWP no c/c/e PICC dressing CDI  LE: WWP no c/c/e  Neuro: AOx3  Psych: Normal affect and mood  Skin: Warm, without rashes  .  .  .

## 2025-06-07 NOTE — PROVIDER CONTACT NOTE (CRITICAL VALUE NOTIFICATION) - RECOMMENDATIONS
Pending
Pending
notify provider, blood transfusion? Hold heparin?
Ida Dumont made aware
notify provider, blood transfusion?

## 2025-06-07 NOTE — PROGRESS NOTE ADULT - ASSESSMENT
79F with PMHx DM2, stem cell donor, HTN, CARMEN, hypothyroidism, ovarian cyst, goiter and newly diagnosed metastatic mucinous carcinoma presenting with acute onset of nausea, NBNB emesis, poor PO intake for the last 3-4 days c/w GOO; s/p robotic SBR, with side to side anterior GJ.     Plan  - UGIS completed  - contrast in the colon confirmed on xray  - NPO/IVF  - TPN  - Protonix BID  - OOB and ambulation  - IS  - DVT ppx  -PT: No skilled PT needs     [facility] team surgery  [phone]

## 2025-06-08 LAB
ALBUMIN SERPL ELPH-MCNC: 3.2 G/DL — LOW (ref 3.3–5)
ALP SERPL-CCNC: 47 U/L — SIGNIFICANT CHANGE UP (ref 40–120)
ALT FLD-CCNC: 9 U/L — LOW (ref 10–45)
ANION GAP SERPL CALC-SCNC: 12 MMOL/L — SIGNIFICANT CHANGE UP (ref 5–17)
AST SERPL-CCNC: 14 U/L — SIGNIFICANT CHANGE UP (ref 10–40)
BILIRUB SERPL-MCNC: 0.2 MG/DL — SIGNIFICANT CHANGE UP (ref 0.2–1.2)
BUN SERPL-MCNC: 40 MG/DL — HIGH (ref 7–23)
CA-I BLD-SCNC: 1.26 MMOL/L — SIGNIFICANT CHANGE UP (ref 1.15–1.33)
CALCIUM SERPL-MCNC: 9.2 MG/DL — SIGNIFICANT CHANGE UP (ref 8.4–10.5)
CHLORIDE SERPL-SCNC: 105 MMOL/L — SIGNIFICANT CHANGE UP (ref 96–108)
CO2 SERPL-SCNC: 22 MMOL/L — SIGNIFICANT CHANGE UP (ref 22–31)
CREAT SERPL-MCNC: 0.89 MG/DL — SIGNIFICANT CHANGE UP (ref 0.5–1.3)
EGFR: 65 ML/MIN/1.73M2 — SIGNIFICANT CHANGE UP
EGFR: 65 ML/MIN/1.73M2 — SIGNIFICANT CHANGE UP
GLUCOSE BLDC GLUCOMTR-MCNC: 100 MG/DL — HIGH (ref 70–99)
GLUCOSE BLDC GLUCOMTR-MCNC: 118 MG/DL — HIGH (ref 70–99)
GLUCOSE BLDC GLUCOMTR-MCNC: 130 MG/DL — HIGH (ref 70–99)
GLUCOSE BLDC GLUCOMTR-MCNC: 136 MG/DL — HIGH (ref 70–99)
GLUCOSE SERPL-MCNC: 90 MG/DL — SIGNIFICANT CHANGE UP (ref 70–99)
HCT VFR BLD CALC: 24.7 % — LOW (ref 34.5–45)
HGB BLD-MCNC: 8.1 G/DL — LOW (ref 11.5–15.5)
MAGNESIUM SERPL-MCNC: 2.2 MG/DL — SIGNIFICANT CHANGE UP (ref 1.6–2.6)
MCHC RBC-ENTMCNC: 29.2 PG — SIGNIFICANT CHANGE UP (ref 27–34)
MCHC RBC-ENTMCNC: 32.8 G/DL — SIGNIFICANT CHANGE UP (ref 32–36)
MCV RBC AUTO: 89.2 FL — SIGNIFICANT CHANGE UP (ref 80–100)
NRBC BLD AUTO-RTO: 0 /100 WBCS — SIGNIFICANT CHANGE UP (ref 0–0)
PHOSPHATE SERPL-MCNC: 4.2 MG/DL — SIGNIFICANT CHANGE UP (ref 2.5–4.5)
PLATELET # BLD AUTO: 305 K/UL — SIGNIFICANT CHANGE UP (ref 150–400)
POTASSIUM SERPL-MCNC: 4.6 MMOL/L — SIGNIFICANT CHANGE UP (ref 3.5–5.3)
POTASSIUM SERPL-SCNC: 4.6 MMOL/L — SIGNIFICANT CHANGE UP (ref 3.5–5.3)
PROT SERPL-MCNC: 5.9 G/DL — LOW (ref 6–8.3)
RBC # BLD: 2.77 M/UL — LOW (ref 3.8–5.2)
RBC # FLD: 15.8 % — HIGH (ref 10.3–14.5)
SODIUM SERPL-SCNC: 139 MMOL/L — SIGNIFICANT CHANGE UP (ref 135–145)
TRIGL SERPL-MCNC: 104 MG/DL — SIGNIFICANT CHANGE UP
WBC # BLD: 7.58 K/UL — SIGNIFICANT CHANGE UP (ref 3.8–10.5)
WBC # FLD AUTO: 7.58 K/UL — SIGNIFICANT CHANGE UP (ref 3.8–10.5)

## 2025-06-08 PROCEDURE — 99232 SBSQ HOSP IP/OBS MODERATE 35: CPT

## 2025-06-08 RX ORDER — ACETAMINOPHEN 500 MG/5ML
1000 LIQUID (ML) ORAL EVERY 6 HOURS
Refills: 0 | Status: COMPLETED | OUTPATIENT
Start: 2025-06-08 | End: 2025-06-09

## 2025-06-08 RX ORDER — SODIUM/POT/MAG/CALC/CHLOR/ACET 35-20-5MEQ
1 VIAL (ML) INTRAVENOUS
Refills: 0 | Status: DISCONTINUED | OUTPATIENT
Start: 2025-06-08 | End: 2025-06-09

## 2025-06-08 RX ORDER — I.V. FAT EMULSION 20 G/100ML
25 EMULSION INTRAVENOUS
Qty: 60 | Refills: 0 | Status: DISCONTINUED | OUTPATIENT
Start: 2025-06-08 | End: 2025-06-09

## 2025-06-08 RX ORDER — DEXTROSE 50 % IN WATER 50 %
25 SYRINGE (ML) INTRAVENOUS ONCE
Refills: 0 | Status: DISCONTINUED | OUTPATIENT
Start: 2025-06-08 | End: 2025-06-11

## 2025-06-08 RX ORDER — INSULIN LISPRO 100 U/ML
INJECTION, SOLUTION INTRAVENOUS; SUBCUTANEOUS
Refills: 0 | Status: DISCONTINUED | OUTPATIENT
Start: 2025-06-08 | End: 2025-06-11

## 2025-06-08 RX ORDER — ONDANSETRON HCL/PF 4 MG/2 ML
4 VIAL (ML) INJECTION ONCE
Refills: 0 | Status: COMPLETED | OUTPATIENT
Start: 2025-06-08 | End: 2025-06-08

## 2025-06-08 RX ADMIN — Medication 1 EACH: at 19:07

## 2025-06-08 RX ADMIN — I.V. FAT EMULSION 25 ML/HR: 20 EMULSION INTRAVENOUS at 19:07

## 2025-06-08 RX ADMIN — I.V. FAT EMULSION 25 ML/HR: 20 EMULSION INTRAVENOUS at 18:33

## 2025-06-08 RX ADMIN — Medication 1000 MILLIGRAM(S): at 00:34

## 2025-06-08 RX ADMIN — HEPARIN SODIUM 5000 UNIT(S): 1000 INJECTION INTRAVENOUS; SUBCUTANEOUS at 22:05

## 2025-06-08 RX ADMIN — Medication 1000 MILLIGRAM(S): at 06:00

## 2025-06-08 RX ADMIN — Medication 1000 MILLIGRAM(S): at 18:51

## 2025-06-08 RX ADMIN — Medication 400 MILLIGRAM(S): at 23:31

## 2025-06-08 RX ADMIN — Medication 1 EACH: at 18:32

## 2025-06-08 RX ADMIN — Medication 400 MILLIGRAM(S): at 18:21

## 2025-06-08 RX ADMIN — Medication 1 EACH: at 07:03

## 2025-06-08 RX ADMIN — Medication 75 MICROGRAM(S): at 22:06

## 2025-06-08 RX ADMIN — HEPARIN SODIUM 5000 UNIT(S): 1000 INJECTION INTRAVENOUS; SUBCUTANEOUS at 05:17

## 2025-06-08 RX ADMIN — HEPARIN SODIUM 5000 UNIT(S): 1000 INJECTION INTRAVENOUS; SUBCUTANEOUS at 14:20

## 2025-06-08 RX ADMIN — Medication 40 MILLIGRAM(S): at 05:16

## 2025-06-08 RX ADMIN — Medication 400 MILLIGRAM(S): at 05:17

## 2025-06-08 RX ADMIN — Medication 40 MILLIGRAM(S): at 18:21

## 2025-06-08 RX ADMIN — Medication 1 APPLICATION(S): at 05:17

## 2025-06-08 NOTE — PROGRESS NOTE ADULT - SUBJECTIVE AND OBJECTIVE BOX
__________________________________________________________________   ===================>> SURGERY PROGRESS NOTE <<===================  -----------------------------------------------------------------------------------------------------------  Interval/Subjective:  no acute events over night, tolerating diet, had bowel movement.   __________________________________________________________________   ===================>> VITAL SIGNS / EXAM <<=========================  -----------------------------------------------------------------------------------------------------------  T(C): 37 (09:10), Max: 37 (09:10)  HR: 79 (09:10) (70 - 89)  BP: 162/75 (09:10) (110/65 - 162/75)  RR: 18 (09:10) (18 - 18)  SpO2: 100% (09:10) (99% - 100%)    I & O's:  IN:    Fat Emulsion (Fish Oil &amp; Plant Based) 20% Infusion: 25 mL    IV PiggyBack: 200 mL    PRBCs (Packed Red Blood Cells): 300 mL    TPN (Total Parenteral Nutrition): 730 mL  Total IN: 1255 mL    OUT:    Oral Fluid: 0 mL    Voided (mL): 3050 mL  Total OUT: 3050 mL    Physical Exam:  General: NAD, resting comfortably in bed  HEENT: Normocephalic atraumatic  Respiratory: Nonlabored respirations  Cardio: regular rate, normotensive  Abdomen: non-distended, soft, appropriately tender  __________________________________________________________________   ===================>> LAB AND IMAGING <<==========================  -----------------------------------------------------------------------------------------------------------  CBC: 25 @ 07:17          8.1   7.58 >----< 305          24.7    Chemistry: 25 @ 07:17  139|105|40    ------------< 90  4.6|22|0.89    Ca: 9.2 25 @ 07:17  M.2 25 @ 07:17  Phos: 4.2 06-08-25 @ 07:17    LFT's: 25 @ 07:17  AST: 14  ALT: 9  ALP: 47  T.Bili: 0.2  D.Bili: --  CBC: 25 @ 18:26          8.2   8.19 >----< 292          25.3    __________________________________________________________________   ===================>> ASSESSMENT AND PLAN <<======================  -----------------------------------------------------------------------------------------------------------  A:  79F w/ PMHx DM2, HTN, hypothyroidism (s/p thyroidectomy), CARMEN, ovarian cyst, goiter, gastroparesis, glaucoma; PSHx hysterectomy (age ~30s), thyroidectomy, breast surgery; admitted 23 for GOO due to metastatic mucinous carcinoma; s/p robotic SBR w/ GJ 6/3/25; on TPN, UGI without leak, now tolerating CLD.     Plan  - FLD  - TPN  - Protonix BID  - OOB and ambulation  - IS  - DVT ppx  -PT: No skilled PT needs     [facility]  [phone]

## 2025-06-08 NOTE — PROGRESS NOTE ADULT - SUBJECTIVE AND OBJECTIVE BOX
[facility] NUTRITION SUPPORT-- FOLLOW UP NOTE      24 hour events/subjective:    Pt continues on TPN for nutritional support. Pt is asymptomatic for CP, SOB, fever, chills nor night sweats.      Diet:  Diet, Clear Liquid (06-07-25 @ 10:27)      PAST HISTORY  --------------------------------------------------------------------------------  No significant changes to PMH, PSH, FHx, SHx, unless otherwise noted    ALLERGIES & MEDICATIONS  --------------------------------------------------------------------------------  Allergies    No Known Allergies    Intolerances      Standing Inpatient Medications  chlorhexidine 2% Cloths 1 Application(s) Topical <User Schedule>  dextrose 5%. 1000 milliLiter(s) IV Continuous <Continuous>  dextrose 5%. 1000 milliLiter(s) IV Continuous <Continuous>  dextrose 50% Injectable 25 Gram(s) IV Push once  dextrose 50% Injectable 12.5 Gram(s) IV Push once  dextrose 50% Injectable 25 Gram(s) IV Push once  glucagon  Injectable 1 milliGRAM(s) IntraMuscular once  heparin   Injectable 5000 Unit(s) SubCutaneous every 8 hours  insulin lispro (ADMELOG) corrective regimen sliding scale   SubCutaneous every 6 hours  levothyroxine Injectable 75 MICROGram(s) IV Push at bedtime  lipid, fat emulsion (Fish Oil and Plant Based) 20% Infusion 25 mL/Hr IV Continuous <Continuous>  pantoprazole  Injectable 40 milliGRAM(s) IV Push two times a day  Parenteral Nutrition - Adult 1 Each TPN Continuous <Continuous>    PRN Inpatient Medications  HYDROmorphone  Injectable 0.5 milliGRAM(s) IV Push every 4 hours PRN  HYDROmorphone  Injectable 1 milliGRAM(s) IV Push every 4 hours PRN      REVIEW OF SYSTEMS  --------------------------------------------------------------------------------  Gen: as per HPI  Skin: No rashes  Head/Eyes/Ears/Mouth: No headache; No sore throat  Respiratory: No dyspnea, cough,   CV: No chest pain, PND, orthopnea  GI: as per HPI  : No increased frequency, dysuria, hematuria, nocturia  MSK: No joint pain/swelling; no back pain; no edema  Neuro: No dizziness/lightheadedness, weakness, seizures, numbness, tingling  Psych: No significant nervousness, anxiety, stress, depression    All other systems were reviewed and are negative, except as noted.        LABS/STUDIES  --------------------------------------------------------------------------------              8.1    7.58  >-----------<  305      [06-08-25 @ 07:17]              24.7     139  |  105  |  40  ----------------------------<  90      [06-08-25 @ 07:17]  4.6   |  22  |  0.89        Ca     9.2     [06-08-25 @ 07:17]      iCa    1.26     [06-08 @ 07:30]      Mg     2.2     [06-08-25 @ 07:17]      Phos  4.2     [06-08-25 @ 07:17]    TPro  5.9  /  Alb  3.2  /  TBili  0.2  /  DBili  x   /  AST  14  /  ALT  9   /  AlkPhos  47  [06-08-25 @ 07:17]    PT/INR: PT 10.9 , INR 0.95       [06-07-25 @ 11:21]  PTT: 33.1       [06-07-25 @ 11:21]          Glucose: 90 mg/dL (06-08-25 @ 07:17)    Prealbumin, Serum: 19 mg/dL (06-07-25 @ 07:10)  Prealbumin, Serum: 19 mg/dL (06-04-25 @ 02:27)  Prealbumin, Serum: 17 mg/dL (06-03-25 @ 03:53)  Prealbumin, Serum: 18 mg/dL (06-02-25 @ 06:56)    Triglycerides      CAPILLARY BLOOD GLUCOSE      POCT Blood Glucose.: 130 mg/dL (08 Jun 2025 05:46)  POCT Blood Glucose.: 124 mg/dL (07 Jun 2025 23:38)  POCT Blood Glucose.: 89 mg/dL (07 Jun 2025 17:04)  POCT Blood Glucose.: 156 mg/dL (07 Jun 2025 12:14)      VITALS/PHYSICAL EXAM  --------------------------------------------------------------------------------  T(C): 36.8 (06-08-25 @ 04:27), Max: 36.9 (06-07-25 @ 13:30)  HR: 89 (06-08-25 @ 04:27) (70 - 89)  BP: 145/79 (06-08-25 @ 04:27) (110/65 - 160/83)  RR: 18 (06-08-25 @ 04:27) (18 - 18)  SpO2: 99% (06-08-25 @ 04:27) (99% - 100%)  Wt(kg): --        06-07-25 @ 07:01  -  06-08-25 @ 07:00  --------------------------------------------------------  IN: 1255 mL / OUT: 3050 mL / NET: -1795 mL    06-08-25 @ 07:01  -  06-08-25 @ 08:23  --------------------------------------------------------  IN: 0 mL / OUT: 300 mL / NET: -300 mL      Physical Exam:    Gen: NAD, well-appearing  HEENT: PERRL, NCAT  Neck: trachea midline no noted JVD  Chest: non labored breathing equal chest expansion b/l  Abd: soft, nontender/nondistended  UE: WWP no c/c/e PICC dressing CDI  LE: WWP no c/c/e  Neuro: AOx3  Psych: Normal affect and mood  Skin: Warm, without rashes  .  .  .   [facility] NUTRITION SUPPORT-- FOLLOW UP NOTE      24 hour events/subjective:    Pt continues on TPN for nutritional support. Pt is asymptomatic for CP, SOB, fever, chills nor night sweats. NGT removed 6/6, now on CLEARS diet, tolerating minimal amounts. Pt asymptomatic for N/V/abd pain.        Diet:  Diet, Clear Liquid (06-07-25 @ 10:27)      PAST HISTORY  --------------------------------------------------------------------------------  No significant changes to PMH, PSH, FHx, SHx, unless otherwise noted    ALLERGIES & MEDICATIONS  --------------------------------------------------------------------------------  Allergies    No Known Allergies    Intolerances      Standing Inpatient Medications  chlorhexidine 2% Cloths 1 Application(s) Topical <User Schedule>  dextrose 5%. 1000 milliLiter(s) IV Continuous <Continuous>  dextrose 5%. 1000 milliLiter(s) IV Continuous <Continuous>  dextrose 50% Injectable 25 Gram(s) IV Push once  dextrose 50% Injectable 12.5 Gram(s) IV Push once  dextrose 50% Injectable 25 Gram(s) IV Push once  glucagon  Injectable 1 milliGRAM(s) IntraMuscular once  heparin   Injectable 5000 Unit(s) SubCutaneous every 8 hours  insulin lispro (ADMELOG) corrective regimen sliding scale   SubCutaneous every 6 hours  levothyroxine Injectable 75 MICROGram(s) IV Push at bedtime  lipid, fat emulsion (Fish Oil and Plant Based) 20% Infusion 25 mL/Hr IV Continuous <Continuous>  pantoprazole  Injectable 40 milliGRAM(s) IV Push two times a day  Parenteral Nutrition - Adult 1 Each TPN Continuous <Continuous>    PRN Inpatient Medications  HYDROmorphone  Injectable 0.5 milliGRAM(s) IV Push every 4 hours PRN  HYDROmorphone  Injectable 1 milliGRAM(s) IV Push every 4 hours PRN      REVIEW OF SYSTEMS  --------------------------------------------------------------------------------  Gen: as per HPI  Skin: No rashes  Head/Eyes/Ears/Mouth: No headache; No sore throat  Respiratory: No dyspnea, cough,   CV: No chest pain, PND, orthopnea  GI: as per HPI  : No increased frequency, dysuria, hematuria, nocturia  MSK: No joint pain/swelling; no back pain; no edema  Neuro: No dizziness/lightheadedness, weakness, seizures, numbness, tingling  Psych: No significant nervousness, anxiety, stress, depression    All other systems were reviewed and are negative, except as noted.        LABS/STUDIES  --------------------------------------------------------------------------------              8.1    7.58  >-----------<  305      [06-08-25 @ 07:17]              24.7     139  |  105  |  40  ----------------------------<  90      [06-08-25 @ 07:17]  4.6   |  22  |  0.89        Ca     9.2     [06-08-25 @ 07:17]      iCa    1.26     [06-08 @ 07:30]      Mg     2.2     [06-08-25 @ 07:17]      Phos  4.2     [06-08-25 @ 07:17]    TPro  5.9  /  Alb  3.2  /  TBili  0.2  /  DBili  x   /  AST  14  /  ALT  9   /  AlkPhos  47  [06-08-25 @ 07:17]    PT/INR: PT 10.9 , INR 0.95       [06-07-25 @ 11:21]  PTT: 33.1       [06-07-25 @ 11:21]          Glucose: 90 mg/dL (06-08-25 @ 07:17)    Prealbumin, Serum: 19 mg/dL (06-07-25 @ 07:10)  Prealbumin, Serum: 19 mg/dL (06-04-25 @ 02:27)  Prealbumin, Serum: 17 mg/dL (06-03-25 @ 03:53)  Prealbumin, Serum: 18 mg/dL (06-02-25 @ 06:56)    Triglycerides      CAPILLARY BLOOD GLUCOSE      POCT Blood Glucose.: 130 mg/dL (08 Jun 2025 05:46)  POCT Blood Glucose.: 124 mg/dL (07 Jun 2025 23:38)  POCT Blood Glucose.: 89 mg/dL (07 Jun 2025 17:04)  POCT Blood Glucose.: 156 mg/dL (07 Jun 2025 12:14)      VITALS/PHYSICAL EXAM  --------------------------------------------------------------------------------  T(C): 36.8 (06-08-25 @ 04:27), Max: 36.9 (06-07-25 @ 13:30)  HR: 89 (06-08-25 @ 04:27) (70 - 89)  BP: 145/79 (06-08-25 @ 04:27) (110/65 - 160/83)  RR: 18 (06-08-25 @ 04:27) (18 - 18)  SpO2: 99% (06-08-25 @ 04:27) (99% - 100%)  Wt(kg): --        06-07-25 @ 07:01  -  06-08-25 @ 07:00  --------------------------------------------------------  IN: 1255 mL / OUT: 3050 mL / NET: -1795 mL    06-08-25 @ 07:01  -  06-08-25 @ 08:23  --------------------------------------------------------  IN: 0 mL / OUT: 300 mL / NET: -300 mL      Physical Exam:    Gen: NAD, well-appearing  HEENT: PERRL, NCAT  Neck: trachea midline no noted JVD  Chest: non labored breathing equal chest expansion b/l  Abd: soft, nontender/nondistended  UE: WWP no c/c/e PICC dressing CDI  LE: WWP no c/c/e  Neuro: AOx3  Psych: Normal affect and mood  Skin: Warm, without rashes  .  .  .

## 2025-06-08 NOTE — PROGRESS NOTE ADULT - ASSESSMENT
79F with PMHx DM2, stem cell donor, HTN, CARMEN, hypothyroidism, ovarian cyst, goiter and newly diagnosed metastatic mucinous carcinoma presenting with acute onset of nausea, NBNB emesis, poor PO intake for the last 3-4 days c/w GOO.  TPN consulted for Protein Calorie Malnutrition in the setting of nutritional optimization for anticipated prolonged NPO, pre-op and post op. Pt tentatively planned for OR for resection/bypass next week.    GOAL PN: 105 g amino acids, 210g dextrose, 60 g SMOF lipid; to provide 1734 kcal/day (~31 kcal/kg and 1.86 g/kg protein based on dosing wt 56.2kg)  Non-Protein Calories: 1314 kcal/day (23 kcal/kg, based on dosing wt 56.2 kg); Dextrose Infusion Rate:2.6 mg/kg/min (24-hour infusion); Lipid Infusion Rate: 1.06 g/kg; 0.09g/kg/hr (12-hour infusion)    TPN orders :    Total Volume: 2.0 l  Cycle: 14hrs    AA: 120g   CHO:  180g   SMOF lipids: 60g     NaCl: 40meq   NaAce: 60meq   NaPhos: 40mmol  KCl: 80meq  KAce: 0meq  KPhos: 0mmol  CaGlu: 10meq   MgSO4: 12meq     Thiamine: 100mg   Insulin: 0U   Trace Elements: 1ml  Multi-vitamins: 10ml       -Nutritional Assessment, pt not meeting nutritional goals enterally, and would require TPN for nutritional support, pre-op and post-op. Prealumin 18 mg/dL - will trend weekly  - TPN plan:  TPN started 6/1/25; continue TPN; increased AA to 120G and lowered Dex to 180G given hyperglycemia; continue  total volume to 2000 mL; keep compress further to 14hours  - TPN Access:  US guided Bard Double lumen power PICC line in place; PICC maintenance per protocol; keep port dedicated for TPN to avoid contamination   - Strict Intake and Output; Weights three times a week  -Electrolyte Imbalance risk- check CMP, Mg, Phos, iCa and K daily, will replete  in TPN as needed. See below:  -Hyper/hypoPhosphatemia risk: see order outlined for [NaPhos] above for any repletion changes, or any changes in total volume for Ca/Phos solubility curve, to avoid Ca+ precipitation.  -Hyper/hypoKalemia risk: see order outlined above for {KCl] or [KPhos] for any repletion changes.  -Hyper/hypoMagnesemia risk: see order outlined above for [MgSO4] above for any repletion changes.  -Hyper/hypoCarbia risk: see order outlined above for [NaAce] for any repletion changes.  -Hyper/hypoNatremia: see order outlined above for [NaCl, NaAce, NaPhos] for any repletion changes or any changes in total volume.  -Hyper/hypoCalcemia: see order outlined above for [CaGlu] for any repletion changes, or any changes in total volume for Ca/Phos solubility curve, to avoid Ca+ precipitation.  -Hyper/hypoglycemia risk: Risk of hyperglycemia:  HgA1c 5.2%; can check fingersticks (FS) every 12 hours (as FS have been stable) with RISS coverage to monitor glucose trend;   POCT Blood Glucose.: 130 mg/dL (08 Jun 2025 05:46)  POCT Blood Glucose.: 124 mg/dL (07 Jun 2025 23:38)  POCT Blood Glucose.: 89 mg/dL (07 Jun 2025 17:04)  POCT Blood Glucose.: 156 mg/dL (07 Jun 2025 12:14)---> see order outlined above for any changes in Insulin requirements in the TPN bag- no RI in TPN, can check BID.  - Hypertriglyceridemia:  TG 61 mg/dL --> 206 mg/dL--->73mg/dl on 6/7; plan to trend TG closely while on TPN  - Further care per [facility] team, pager [phone].      Available on TEAMS  TPN spectra [id number] ([phone] when dialing from outside line)  M-F 8A-2P, Weekends and holidays 8/9A-12/1P  Discussed with Dr. Sidney Jesus    Time-based billing (NON-critical care).     35 minutes spent on total encounter. The necessity of the time spent during the encounter on this date of service was due to chart review, lab/radiology test review, patient assessment, discussion and collaboration with interdisciplinary team members and ordering TPN/making recommendation.              79F with PMHx DM2, stem cell donor, HTN, CARMEN, hypothyroidism, ovarian cyst, goiter and newly diagnosed metastatic mucinous carcinoma presenting with acute onset of nausea, NBNB emesis, poor PO intake for the last 3-4 days c/w GOO.  TPN consulted for Protein Calorie Malnutrition in the setting of nutritional optimization for anticipated prolonged NPO, pre-op and post op. Pt now s/p robot-assisted laparoscopic resection of bowel, Dx laparoscopy, KAIT of midline small bowel adhesions. 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 s/p resection of portion of jejunum, side to side anterior gastrojejunostomy.      GOAL PN: 105 g amino acids, 210g dextrose, 60 g SMOF lipid; to provide 1734 kcal/day (~31 kcal/kg and 1.86 g/kg protein based on dosing wt 56.2kg)  Non-Protein Calories: 1314 kcal/day (23 kcal/kg, based on dosing wt 56.2 kg); Dextrose Infusion Rate:2.6 mg/kg/min (24-hour infusion); Lipid Infusion Rate: 1.06 g/kg; 0.09g/kg/hr (12-hour infusion)    TPN orders :    Total Volume: 2.0 l  Cycle: 14hrs    AA: 120g   CHO:  180g   SMOF lipids: 60g     NaCl: 40meq   NaAce: 60meq   NaPhos: 40mmol  KCl: 80meq  KAce: 0meq  KPhos: 0mmol  CaGlu: 10meq   MgSO4: 12meq     Thiamine: 100mg   Insulin: 0U   Trace Elements: 1ml  Multi-vitamins: 10ml       -Nutritional Assessment, pt not meeting nutritional goals enterally, and would require TPN for nutritional support, pre-op and post-op. Prealbumin 18 mg/dL - will trend weekly  -TPN plan:  TPN started 6/1/25; continue TPN; increased AA to 120G and lowered Dex to 180G given hyperglycemia; continue  total volume to 2000 mL; keep compress further to 14hours  -TPN Access:  US guided Bard Double lumen power PICC line in place; PICC maintenance per protocol; keep port dedicated for TPN to avoid contamination   -Strict Intake and Output; Weights three times a week  -Electrolyte Imbalance risk- check CMP, Mg, Phos, iCa and K daily, will replete  in TPN as needed. See below:  -Hyper/hypoPhosphatemia risk: see order outlined for [NaPhos] above for any repletion changes, or any changes in total volume for Ca/Phos solubility curve, to avoid Ca+ precipitation.  -Hyper/hypoKalemia risk: see order outlined above for {KCl] or [KPhos] for any repletion changes.  -Hyper/hypoMagnesemia risk: see order outlined above for [MgSO4] above for any repletion changes.  -Hyper/hypoCarbia risk: see order outlined above for [NaAce] for any repletion changes.  -Hyper/hypoNatremia: see order outlined above for [NaCl, NaAce, NaPhos] for any repletion changes or any changes in total volume.  -Hyper/hypoCalcemia: see order outlined above for [CaGlu] for any repletion changes, or any changes in total volume for Ca/Phos solubility curve, to avoid Ca+ precipitation.  -Hyper/hypoglycemia risk: Risk of hyperglycemia:  HgA1c 5.2%; can check fingersticks (FS) every 12 hours (as FS have been stable) with RISS coverage to monitor glucose trend;   POCT Blood Glucose.: 130 mg/dL (08 Jun 2025 05:46)  POCT Blood Glucose.: 124 mg/dL (07 Jun 2025 23:38)  POCT Blood Glucose.: 89 mg/dL (07 Jun 2025 17:04)  POCT Blood Glucose.: 156 mg/dL (07 Jun 2025 12:14)--->no RI in TPN, can check BID.  -Hypertriglyceridemia:  TG 61 mg/dL --> 206 mg/dL--->73mg/dl on 6/7, was 104 on 6/8; plan to trend TG closely while on TPN  -Further care per [facility] team, pager [phone].      Available on TEAMS  TPN spectra [id number] ([phone] when dialing from outside line)  M-F 8A-2P, Weekends and holidays 8/9A-12/1P  Discussed with Dr. Sidney Jesus    Time-based billing (NON-critical care).     35 minutes spent on total encounter. The necessity of the time spent during the encounter on this date of service was due to chart review, lab/radiology test review, patient assessment, discussion and collaboration with interdisciplinary team members and ordering TPN/making recommendation.              79F with PMHx DM2, stem cell donor, HTN, CARMEN, hypothyroidism, ovarian cyst, goiter and newly diagnosed metastatic mucinous carcinoma presenting with acute onset of nausea, NBNB emesis, poor PO intake for the last 3-4 days c/w GOO.  TPN consulted for Protein Calorie Malnutrition in the setting of nutritional optimization for anticipated prolonged NPO, pre-op and post op. Pt now s/p robot-assisted laparoscopic resection of bowel, Dx laparoscopy, KAIT of midline small bowel adhesions. 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 s/p resection of portion of jejunum, side to side anterior gastrojejunostomy.      GOAL PN: 105 g amino acids, 210g dextrose, 60 g SMOF lipid; to provide 1734 kcal/day (~31 kcal/kg and 1.86 g/kg protein based on dosing wt 56.2kg)  Non-Protein Calories: 1314 kcal/day (23 kcal/kg, based on dosing wt 56.2 kg); Dextrose Infusion Rate:2.6 mg/kg/min (24-hour infusion); Lipid Infusion Rate: 1.06 g/kg; 0.09g/kg/hr (12-hour infusion)    TPN orders :    Total Volume: 2.0 l  Cycle: 14hrs    AA: 120g   CHO:  180g   SMOF lipids: 60g     NaCl: 40meq   NaAce: 60meq   NaPhos: 40mmol  KCl: 80meq  KAce: 0meq  KPhos: 0mmol  CaGlu: 10meq   MgSO4: 12meq     Thiamine: 100mg   Insulin: 0U   Trace Elements: 1ml  Multi-vitamins: 10ml       -Nutritional Assessment, pt not meeting nutritional goals enterally, and would require TPN for nutritional support, pre-op and post-op. Prealbumin 18 mg/dL - will trend weekly  -TPN plan: TPN started 6/1/25; continue TPN; increased AA to 120G and lowered Dex to 180G given hyperglycemia; continue  total volume to 2000 mL; keep compressed at 14hours. Pt advanced to Clears and tolerating minimal amounts.  -TPN Access:  US guided Bard Double lumen power PICC line in place; PICC maintenance per protocol; keep port dedicated for TPN to avoid contamination   -Strict Intake and Output; Weights three times a week  -Electrolyte Imbalance risk- check CMP, Mg, Phos, iCa and K daily, will replete  in TPN as needed. See below:  -Hyper/hypoPhosphatemia risk: see order outlined for [NaPhos] above for any repletion changes, or any changes in total volume for Ca/Phos solubility curve, to avoid Ca+ precipitation.  -Hyper/hypoKalemia risk: see order outlined above for {KCl] or [KPhos] for any repletion changes.  -Hyper/hypoMagnesemia risk: see order outlined above for [MgSO4] above for any repletion changes.  -Hyper/hypoCarbia risk: see order outlined above for [NaAce] for any repletion changes.  -Hyper/hypoNatremia: see order outlined above for [NaCl, NaAce, NaPhos] for any repletion changes or any changes in total volume.  -Hyper/hypoCalcemia: see order outlined above for [CaGlu] for any repletion changes, or any changes in total volume for Ca/Phos solubility curve, to avoid Ca+ precipitation.  -Hyper/hypoglycemia risk: Risk of hyperglycemia:  HgA1c 5.2%; can check fingersticks (FS) every 12 hours (as FS have been stable) with RISS coverage to monitor glucose trend;   POCT Blood Glucose.: 130 mg/dL (08 Jun 2025 05:46)  POCT Blood Glucose.: 124 mg/dL (07 Jun 2025 23:38)  POCT Blood Glucose.: 89 mg/dL (07 Jun 2025 17:04)  POCT Blood Glucose.: 156 mg/dL (07 Jun 2025 12:14)--->no RI in TPN, can check BID.  -Hypertriglyceridemia:  TG 61 mg/dL --> 206 mg/dL--->73mg/dl on 6/7, was 104 on 6/8; plan to trend TG closely while on TPN  -Further care per [facility] team, pager [phone].      Available on TEAMS  TPN spectra [id number] ([phone] when dialing from outside line)  M-F 8A-2P, Weekends and holidays 8/9A-12/1P  Discussed with Dr. Sidney Jesus    Time-based billing (NON-critical care).     35 minutes spent on total encounter. The necessity of the time spent during the encounter on this date of service was due to chart review, lab/radiology test review, patient assessment, discussion and collaboration with interdisciplinary team members and ordering TPN/making recommendation.

## 2025-06-09 LAB
ALBUMIN SERPL ELPH-MCNC: 3.2 G/DL — LOW (ref 3.3–5)
ALP SERPL-CCNC: 48 U/L — SIGNIFICANT CHANGE UP (ref 40–120)
ALT FLD-CCNC: 10 U/L — SIGNIFICANT CHANGE UP (ref 10–45)
ANION GAP SERPL CALC-SCNC: 15 MMOL/L — SIGNIFICANT CHANGE UP (ref 5–17)
AST SERPL-CCNC: 17 U/L — SIGNIFICANT CHANGE UP (ref 10–40)
BILIRUB SERPL-MCNC: 0.2 MG/DL — SIGNIFICANT CHANGE UP (ref 0.2–1.2)
BUN SERPL-MCNC: 39 MG/DL — HIGH (ref 7–23)
CA-I BLD-SCNC: 1.23 MMOL/L — SIGNIFICANT CHANGE UP (ref 1.15–1.33)
CALCIUM SERPL-MCNC: 9 MG/DL — SIGNIFICANT CHANGE UP (ref 8.4–10.5)
CHLORIDE SERPL-SCNC: 104 MMOL/L — SIGNIFICANT CHANGE UP (ref 96–108)
CO2 SERPL-SCNC: 19 MMOL/L — LOW (ref 22–31)
CREAT SERPL-MCNC: 0.92 MG/DL — SIGNIFICANT CHANGE UP (ref 0.5–1.3)
EGFR: 63 ML/MIN/1.73M2 — SIGNIFICANT CHANGE UP
EGFR: 63 ML/MIN/1.73M2 — SIGNIFICANT CHANGE UP
GLUCOSE BLDC GLUCOMTR-MCNC: 112 MG/DL — HIGH (ref 70–99)
GLUCOSE BLDC GLUCOMTR-MCNC: 122 MG/DL — HIGH (ref 70–99)
GLUCOSE BLDC GLUCOMTR-MCNC: 172 MG/DL — HIGH (ref 70–99)
GLUCOSE BLDC GLUCOMTR-MCNC: 95 MG/DL — SIGNIFICANT CHANGE UP (ref 70–99)
GLUCOSE SERPL-MCNC: 93 MG/DL — SIGNIFICANT CHANGE UP (ref 70–99)
HCT VFR BLD CALC: 24.7 % — LOW (ref 34.5–45)
HGB BLD-MCNC: 8 G/DL — LOW (ref 11.5–15.5)
MAGNESIUM SERPL-MCNC: 2.2 MG/DL — SIGNIFICANT CHANGE UP (ref 1.6–2.6)
MCHC RBC-ENTMCNC: 29.4 PG — SIGNIFICANT CHANGE UP (ref 27–34)
MCHC RBC-ENTMCNC: 32.4 G/DL — SIGNIFICANT CHANGE UP (ref 32–36)
MCV RBC AUTO: 90.8 FL — SIGNIFICANT CHANGE UP (ref 80–100)
NRBC BLD AUTO-RTO: 0 /100 WBCS — SIGNIFICANT CHANGE UP (ref 0–0)
PHOSPHATE SERPL-MCNC: 4.9 MG/DL — HIGH (ref 2.5–4.5)
PLATELET # BLD AUTO: 341 K/UL — SIGNIFICANT CHANGE UP (ref 150–400)
POTASSIUM SERPL-MCNC: 4.5 MMOL/L — SIGNIFICANT CHANGE UP (ref 3.5–5.3)
POTASSIUM SERPL-SCNC: 4.5 MMOL/L — SIGNIFICANT CHANGE UP (ref 3.5–5.3)
PROT SERPL-MCNC: 5.9 G/DL — LOW (ref 6–8.3)
RBC # BLD: 2.72 M/UL — LOW (ref 3.8–5.2)
RBC # FLD: 15.8 % — HIGH (ref 10.3–14.5)
SODIUM SERPL-SCNC: 138 MMOL/L — SIGNIFICANT CHANGE UP (ref 135–145)
SURGICAL PATHOLOGY STUDY: SIGNIFICANT CHANGE UP
WBC # BLD: 8.12 K/UL — SIGNIFICANT CHANGE UP (ref 3.8–10.5)
WBC # FLD AUTO: 8.12 K/UL — SIGNIFICANT CHANGE UP (ref 3.8–10.5)

## 2025-06-09 PROCEDURE — 99232 SBSQ HOSP IP/OBS MODERATE 35: CPT

## 2025-06-09 RX ORDER — AMLODIPINE BESYLATE 10 MG/1
10 TABLET ORAL DAILY
Refills: 0 | Status: DISCONTINUED | OUTPATIENT
Start: 2025-06-09 | End: 2025-06-09

## 2025-06-09 RX ORDER — AMLODIPINE BESYLATE 10 MG/1
10 TABLET ORAL DAILY
Refills: 0 | Status: DISCONTINUED | OUTPATIENT
Start: 2025-06-09 | End: 2025-06-11

## 2025-06-09 RX ORDER — SODIUM/POT/MAG/CALC/CHLOR/ACET 35-20-5MEQ
1 VIAL (ML) INTRAVENOUS
Refills: 0 | Status: DISCONTINUED | OUTPATIENT
Start: 2025-06-09 | End: 2025-06-10

## 2025-06-09 RX ORDER — ALTEPLASE 2.2 MG/2ML
2 INJECTION, POWDER, LYOPHILIZED, FOR SOLUTION INTRAVENOUS ONCE
Refills: 0 | Status: COMPLETED | OUTPATIENT
Start: 2025-06-09 | End: 2025-06-09

## 2025-06-09 RX ORDER — I.V. FAT EMULSION 20 G/100ML
8.3 EMULSION INTRAVENOUS
Qty: 20 | Refills: 0 | Status: DISCONTINUED | OUTPATIENT
Start: 2025-06-09 | End: 2025-06-10

## 2025-06-09 RX ADMIN — Medication 75 MICROGRAM(S): at 22:43

## 2025-06-09 RX ADMIN — HEPARIN SODIUM 5000 UNIT(S): 1000 INJECTION INTRAVENOUS; SUBCUTANEOUS at 22:43

## 2025-06-09 RX ADMIN — Medication 1000 MILLIGRAM(S): at 16:40

## 2025-06-09 RX ADMIN — Medication 1 EACH: at 07:11

## 2025-06-09 RX ADMIN — I.V. FAT EMULSION 8.3 ML/HR: 20 EMULSION INTRAVENOUS at 19:15

## 2025-06-09 RX ADMIN — Medication 400 MILLIGRAM(S): at 05:08

## 2025-06-09 RX ADMIN — INSULIN LISPRO 1: 100 INJECTION, SOLUTION INTRAVENOUS; SUBCUTANEOUS at 07:30

## 2025-06-09 RX ADMIN — HEPARIN SODIUM 5000 UNIT(S): 1000 INJECTION INTRAVENOUS; SUBCUTANEOUS at 05:08

## 2025-06-09 RX ADMIN — Medication 1 EACH: at 19:14

## 2025-06-09 RX ADMIN — Medication 1 EACH: at 17:44

## 2025-06-09 RX ADMIN — Medication 40 MILLIGRAM(S): at 05:08

## 2025-06-09 RX ADMIN — Medication 40 MILLIGRAM(S): at 17:36

## 2025-06-09 RX ADMIN — Medication 400 MILLIGRAM(S): at 16:10

## 2025-06-09 RX ADMIN — Medication 1000 MILLIGRAM(S): at 06:00

## 2025-06-09 RX ADMIN — ALTEPLASE 2 MILLIGRAM(S): 2.2 INJECTION, POWDER, LYOPHILIZED, FOR SOLUTION INTRAVENOUS at 14:02

## 2025-06-09 RX ADMIN — Medication 1 APPLICATION(S): at 05:08

## 2025-06-09 RX ADMIN — Medication 1000 MILLIGRAM(S): at 00:00

## 2025-06-09 RX ADMIN — HEPARIN SODIUM 5000 UNIT(S): 1000 INJECTION INTRAVENOUS; SUBCUTANEOUS at 14:44

## 2025-06-09 RX ADMIN — Medication 4 MILLIGRAM(S): at 00:25

## 2025-06-09 RX ADMIN — I.V. FAT EMULSION 8.3 ML/HR: 20 EMULSION INTRAVENOUS at 17:46

## 2025-06-09 RX ADMIN — AMLODIPINE BESYLATE 10 MILLIGRAM(S): 10 TABLET ORAL at 05:08

## 2025-06-09 NOTE — PROVIDER CONTACT NOTE (OTHER) - ASSESSMENT
Initial , which was immediately repeated for a BGM of 217. Pt A&Ox3, mentating appropriately. VS as charted.
Pt is asymptomatic, denies pain or discomfort, Last vitals Temp 97.9, HR 80, /89, O2 100.
Asymptomatic

## 2025-06-09 NOTE — PROVIDER CONTACT NOTE (OTHER) - SITUATION
pt is s/p laparoscopic resection of the bowel. Pt has NGT placed, arrived to PACU receiving TPN and lipids via IV. Lab work (and blood glucose) drawn initially from Right arm PICC line.
/75
Pt BP is 165/89

## 2025-06-09 NOTE — PROGRESS NOTE ADULT - SUBJECTIVE AND OBJECTIVE BOX
Surgery Progress Note    Overnight events:  - Patient report nausea around 1130 pm;   - Patient seen and examined at bedside, patient report passing gas and BMs; report drinking well. Abdomen soft, nondistended.  - 1 dose of IV zofran give    SUBJECTIVE:  - Patient seen and examined at bedside   --------------------------------------------------------------------------------------------------  OBJECTIVE:   Physical Exam:  General: AAOx3, NAD, lying comfortably in bed  HEENT: NC/AT  Respiratory: nonlabored breathing  Abdomen: non-distended, soft, appropriately tender  Extremities: WWP, no edema  --------------------------------------------------------------------------------------------------  V/S:  Vital Signs Last 24 Hrs  T(C): 36.5 (08 Jun 2025 20:42), Max: 37 (08 Jun 2025 09:10)  T(F): 97.7 (08 Jun 2025 20:42), Max: 98.6 (08 Jun 2025 09:10)  HR: 72 (08 Jun 2025 20:42) (70 - 89)  BP: 154/79 (08 Jun 2025 20:42) (121/75 - 162/75)  BP(mean): --  RR: 18 (08 Jun 2025 20:42) (18 - 18)  SpO2: 100% (08 Jun 2025 20:42) (98% - 100%)    Parameters below as of 08 Jun 2025 20:42  Patient On (Oxygen Delivery Method): room air        --------------------------------------------------------------------------------------------------  I/Os:    07 Jun 2025 07:01  -  08 Jun 2025 07:00  --------------------------------------------------------  IN:    Fat Emulsion (Fish Oil &amp; Plant Based) 20% Infusion: 25 mL    IV PiggyBack: 200 mL    PRBCs (Packed Red Blood Cells): 300 mL    TPN (Total Parenteral Nutrition): 730 mL  Total IN: 1255 mL    OUT:    Oral Fluid: 0 mL    Voided (mL): 3050 mL  Total OUT: 3050 mL    Total NET: -1795 mL      08 Jun 2025 07:01  -  09 Jun 2025 01:02  --------------------------------------------------------  IN:    Fat Emulsion (Fish Oil &amp; Plant Based) 20% Infusion: 12.5 mL    IV PiggyBack: 100 mL    Oral Fluid: 240 mL    TPN (Total Parenteral Nutrition): 189.5 mL  Total IN: 542 mL    OUT:    Voided (mL): 1450 mL  Total OUT: 1450 mL    Total NET: -908 mL        --------------------------------------------------------------------------------------------------  LABS:                        8.1    7.58  )-----------( 305      ( 08 Jun 2025 07:17 )             24.7     08 Jun 2025 07:17    139    |  105    |  40     ----------------------------<  90     4.6     |  22     |  0.89     Ca    9.2        08 Jun 2025 07:17  Phos  4.2       08 Jun 2025 07:17  Mg     2.2       08 Jun 2025 07:17    TPro  5.9    /  Alb  3.2    /  TBili  0.2    /  DBili  x      /  AST  14     /  ALT  9      /  AlkPhos  47     08 Jun 2025 07:17    PT/INR - ( 07 Jun 2025 11:21 )   PT: 10.9 sec;   INR: 0.95 ratio         PTT - ( 07 Jun 2025 11:21 )  PTT:33.1 sec  CAPILLARY BLOOD GLUCOSE      POCT Blood Glucose.: 136 mg/dL (08 Jun 2025 21:22)  POCT Blood Glucose.: 100 mg/dL (08 Jun 2025 16:20)  POCT Blood Glucose.: 118 mg/dL (08 Jun 2025 11:56)  POCT Blood Glucose.: 130 mg/dL (08 Jun 2025 05:46)        LIVER FUNCTIONS - ( 08 Jun 2025 07:17 )  Alb: 3.2 g/dL / Pro: 5.9 g/dL / ALK PHOS: 47 U/L / ALT: 9 U/L / AST: 14 U/L / GGT: x             Urinalysis Basic - ( 08 Jun 2025 07:17 )    Color: x / Appearance: x / SG: x / pH: x  Gluc: 90 mg/dL / Ketone: x  / Bili: x / Urobili: x   Blood: x / Protein: x / Nitrite: x   Leuk Esterase: x / RBC: x / WBC x   Sq Epi: x / Non Sq Epi: x / Bacteria: x      --------------------------------------------------------------------------------------------------  MEDICATIONS  (STANDING):  acetaminophen   IVPB .. 1000 milliGRAM(s) IV Intermittent every 6 hours  chlorhexidine 2% Cloths 1 Application(s) Topical <User Schedule>  dextrose 5%. 1000 milliLiter(s) (100 mL/Hr) IV Continuous <Continuous>  dextrose 5%. 1000 milliLiter(s) (50 mL/Hr) IV Continuous <Continuous>  dextrose 50% Injectable 25 Gram(s) IV Push once  dextrose 50% Injectable 12.5 Gram(s) IV Push once  dextrose 50% Injectable 25 Gram(s) IV Push once  dextrose 50% Injectable 25 Gram(s) IV Push once  glucagon  Injectable 1 milliGRAM(s) IntraMuscular once  heparin   Injectable 5000 Unit(s) SubCutaneous every 8 hours  insulin lispro (ADMELOG) corrective regimen sliding scale   SubCutaneous three times a day before meals  levothyroxine Injectable 75 MICROGram(s) IV Push at bedtime  lipid, fat emulsion (Fish Oil and Plant Based) 20% Infusion 25 mL/Hr (25 mL/Hr) IV Continuous <Continuous>  pantoprazole  Injectable 40 milliGRAM(s) IV Push two times a day  Parenteral Nutrition - Adult 1 Each TPN Continuous <Continuous>    MEDICATIONS  (PRN):    --------------------------------------------------------------------------------------------------     Surgery Progress Note    Overnight events:  - Patient report nausea around 1130 pm;   - Patient seen and examined at bedside, patient report passing gas and BMs; report drinking well. Abdomen soft, nondistended.  - 1 dose of IV zofran given    SUBJECTIVE:  - Patient seen and examined at bedside. Admits to some nausea overnight that since resolved. Notes she is still passing flatus and had BM. Pt has been OOB and ambulating well.   --------------------------------------------------------------------------------------------------  OBJECTIVE:   Physical Exam:  General: AAOx3, NAD, lying comfortably in bed  HEENT: NC/AT  Respiratory: nonlabored breathing  Abdomen: non-distended, soft, appropriately tender  Extremities: WWP, no edema  --------------------------------------------------------------------------------------------------  V/S:  Vital Signs Last 24 Hrs  T(C): 36.5 (08 Jun 2025 20:42), Max: 37 (08 Jun 2025 09:10)  T(F): 97.7 (08 Jun 2025 20:42), Max: 98.6 (08 Jun 2025 09:10)  HR: 72 (08 Jun 2025 20:42) (70 - 89)  BP: 154/79 (08 Jun 2025 20:42) (121/75 - 162/75)  BP(mean): --  RR: 18 (08 Jun 2025 20:42) (18 - 18)  SpO2: 100% (08 Jun 2025 20:42) (98% - 100%)    Parameters below as of 08 Jun 2025 20:42  Patient On (Oxygen Delivery Method): room air        --------------------------------------------------------------------------------------------------  I/Os:    07 Jun 2025 07:01  -  08 Jun 2025 07:00  --------------------------------------------------------  IN:    Fat Emulsion (Fish Oil &amp; Plant Based) 20% Infusion: 25 mL    IV PiggyBack: 200 mL    PRBCs (Packed Red Blood Cells): 300 mL    TPN (Total Parenteral Nutrition): 730 mL  Total IN: 1255 mL    OUT:    Oral Fluid: 0 mL    Voided (mL): 3050 mL  Total OUT: 3050 mL    Total NET: -1795 mL      08 Jun 2025 07:01  -  09 Jun 2025 01:02  --------------------------------------------------------  IN:    Fat Emulsion (Fish Oil &amp; Plant Based) 20% Infusion: 12.5 mL    IV PiggyBack: 100 mL    Oral Fluid: 240 mL    TPN (Total Parenteral Nutrition): 189.5 mL  Total IN: 542 mL    OUT:    Voided (mL): 1450 mL  Total OUT: 1450 mL    Total NET: -908 mL        --------------------------------------------------------------------------------------------------  LABS:                        8.1    7.58  )-----------( 305      ( 08 Jun 2025 07:17 )             24.7     08 Jun 2025 07:17    139    |  105    |  40     ----------------------------<  90     4.6     |  22     |  0.89     Ca    9.2        08 Jun 2025 07:17  Phos  4.2       08 Jun 2025 07:17  Mg     2.2       08 Jun 2025 07:17    TPro  5.9    /  Alb  3.2    /  TBili  0.2    /  DBili  x      /  AST  14     /  ALT  9      /  AlkPhos  47     08 Jun 2025 07:17    PT/INR - ( 07 Jun 2025 11:21 )   PT: 10.9 sec;   INR: 0.95 ratio         PTT - ( 07 Jun 2025 11:21 )  PTT:33.1 sec  CAPILLARY BLOOD GLUCOSE      POCT Blood Glucose.: 136 mg/dL (08 Jun 2025 21:22)  POCT Blood Glucose.: 100 mg/dL (08 Jun 2025 16:20)  POCT Blood Glucose.: 118 mg/dL (08 Jun 2025 11:56)  POCT Blood Glucose.: 130 mg/dL (08 Jun 2025 05:46)        LIVER FUNCTIONS - ( 08 Jun 2025 07:17 )  Alb: 3.2 g/dL / Pro: 5.9 g/dL / ALK PHOS: 47 U/L / ALT: 9 U/L / AST: 14 U/L / GGT: x             Urinalysis Basic - ( 08 Jun 2025 07:17 )    Color: x / Appearance: x / SG: x / pH: x  Gluc: 90 mg/dL / Ketone: x  / Bili: x / Urobili: x   Blood: x / Protein: x / Nitrite: x   Leuk Esterase: x / RBC: x / WBC x   Sq Epi: x / Non Sq Epi: x / Bacteria: x      --------------------------------------------------------------------------------------------------  MEDICATIONS  (STANDING):  acetaminophen   IVPB .. 1000 milliGRAM(s) IV Intermittent every 6 hours  chlorhexidine 2% Cloths 1 Application(s) Topical <User Schedule>  dextrose 5%. 1000 milliLiter(s) (100 mL/Hr) IV Continuous <Continuous>  dextrose 5%. 1000 milliLiter(s) (50 mL/Hr) IV Continuous <Continuous>  dextrose 50% Injectable 25 Gram(s) IV Push once  dextrose 50% Injectable 12.5 Gram(s) IV Push once  dextrose 50% Injectable 25 Gram(s) IV Push once  dextrose 50% Injectable 25 Gram(s) IV Push once  glucagon  Injectable 1 milliGRAM(s) IntraMuscular once  heparin   Injectable 5000 Unit(s) SubCutaneous every 8 hours  insulin lispro (ADMELOG) corrective regimen sliding scale   SubCutaneous three times a day before meals  levothyroxine Injectable 75 MICROGram(s) IV Push at bedtime  lipid, fat emulsion (Fish Oil and Plant Based) 20% Infusion 25 mL/Hr (25 mL/Hr) IV Continuous <Continuous>  pantoprazole  Injectable 40 milliGRAM(s) IV Push two times a day  Parenteral Nutrition - Adult 1 Each TPN Continuous <Continuous>    MEDICATIONS  (PRN):    --------------------------------------------------------------------------------------------------

## 2025-06-09 NOTE — DISCHARGE NOTE PROVIDER - NSDCCPTREATMENT_GEN_ALL_CORE_FT
PRINCIPAL PROCEDURE  Procedure: Robot-assisted laparoscopic resection of bowel  Findings and Treatment: BATHING: Please do not submerge wound underwater. You may shower and/or sponge bathe.  ACTIVITY: No heavy lifting anything more than 10-15lbs or straining. Otherwise, you may return to your usual level of physical activity. If you are taking narcotic pain medication (such as Percocet), do NOT drive a car, operate machinery or make important decisions.  DIET: Low fiber diet  NOTIFY YOUR SURGEON IF: You have any bleeding that does not stop, any pus draining from your wound, any fever (over 100.4 F) or chills, persistent nausea/vomiting with inability to tolerate food or liquids, persistent diarrhea, or if your pain is not controlled on your discharge pain medications.  FOLLOW-UP:  1. Please call to make a follow-up appointment within one week of discharge with Dr. Goyal   2. Please follow up with your primary care physician in one week regarding your hospitalization.

## 2025-06-09 NOTE — DISCHARGE NOTE PROVIDER - NSDCMRMEDTOKEN_GEN_ALL_CORE_FT
acetaminophen 325 mg oral tablet: 3 tab(s) orally every 6 hours  amLODIPine 10 mg oral tablet: 1 tab(s) orally once a day (in the morning)  brimonidine 0.2% ophthalmic solution: 1 drop(s) in each affected eye 3 times a day  dorzolamide-timolol 2.23%-0.68% (2%-0.5% base) ophthalmic solution: 1 drop(s) in each eye 2 times a day  ferrous sulfate 325 mg (65 mg elemental iron) oral tablet: 1 tab(s) orally once a day  Gimoti 15 mg/actuation nasal spray: 1 spray(s) intranasally 4 times a day 30 mins before each meal  latanoprost 0.005% ophthalmic solution: 1 drop(s) in each eye once a day (in the evening)  levothyroxine 100 mcg (0.1 mg) oral tablet: 1 tab(s) orally once a day (in the morning)  metFORMIN 500 mg oral tablet: 1 tab(s) orally once a day with breakfast  Multiple Vitamins oral tablet: 1 tab(s) orally once a day LD 4/30/2025  oxyCODONE 5 mg oral tablet: 1 tab(s) orally every 6 hours as needed for -for severe pain MDD: 4  pantoprazole 40 mg oral delayed release tablet: 1 tab(s) orally once a day (in the morning)  vitamin E: 1 tab(s) orally once a day   acetaminophen 325 mg oral tablet: 3 tab(s) orally every 6 hours as needed for  mild pain  amLODIPine 10 mg oral tablet: 1 tab(s) orally once a day (in the morning)  brimonidine 0.2% ophthalmic solution: 1 drop(s) in each affected eye 3 times a day  dorzolamide-timolol 2.23%-0.68% (2%-0.5% base) ophthalmic solution: 1 drop(s) in each eye 2 times a day  ferrous sulfate 325 mg (65 mg elemental iron) oral tablet: 1 tab(s) orally once a day  Gimoti 15 mg/actuation nasal spray: 1 spray(s) intranasally 4 times a day 30 mins before each meal  latanoprost 0.005% ophthalmic solution: 1 drop(s) in each eye once a day (in the evening)  levothyroxine 100 mcg (0.1 mg) oral tablet: 1 tab(s) orally once a day (in the morning)  metFORMIN 500 mg oral tablet: 1 tab(s) orally once a day with breakfast  Multiple Vitamins oral tablet: 1 tab(s) orally once a day LD 4/30/2025  oxyCODONE 5 mg oral tablet: 1 tab(s) orally every 6 hours as needed for -for severe pain MDD: 4  pantoprazole 40 mg oral delayed release tablet: 1 tab(s) orally once a day (in the morning)  vitamin E: 1 tab(s) orally once a day   acetaminophen 325 mg oral tablet: 3 tab(s) orally every 6 hours as needed for  mild pain  amLODIPine 10 mg oral tablet: 1 tab(s) orally once a day (in the morning)  brimonidine 0.2% ophthalmic solution: 1 drop(s) in each affected eye 3 times a day  dorzolamide-timolol 2.23%-0.68% (2%-0.5% base) ophthalmic solution: 1 drop(s) in each eye 2 times a day  ferrous sulfate 325 mg (65 mg elemental iron) oral tablet: 1 tab(s) orally once a day  Gimoti 15 mg/actuation nasal spray: 1 spray(s) intranasally 4 times a day 30 mins before each meal  latanoprost 0.005% ophthalmic solution: 1 drop(s) in each eye once a day (in the evening)  levothyroxine 100 mcg (0.1 mg) oral tablet: 1 tab(s) orally once a day (in the morning)  metFORMIN 500 mg oral tablet: 1 tab(s) orally once a day with breakfast  Multiple Vitamins oral tablet: 1 tab(s) orally once a day  4/30/2025  pantoprazole 40 mg oral delayed release tablet: 1 tab(s) orally once a day (in the morning)  vitamin E: 1 tab(s) orally once a day

## 2025-06-09 NOTE — DISCHARGE NOTE PROVIDER - CARE PROVIDER_API CALL
Sonny Goyal  Surgery  [address] [place] [id number]  Phone: [phone]  Fax: [phone]  Follow Up Time: 1 week

## 2025-06-09 NOTE — DISCHARGE NOTE PROVIDER - HOSPITAL COURSE
79F with PMHx DM2, stem cell donor, HTN, CARMEN, hypothyroidism, ovarian cyst, goiter and newly diagnosed metastatic mucinous carcinoma presenting with acute onset of nausea, NBNB emesis, poor PO intake for the last 3-4 days. Symptoms persisted so decided to present today. Last had a few sips of soup yesterday with nausea. Passing flatus today. Last BM was 4 days ago. States had a colonoscopy 3 days ago as part of her cancer work-up, barely tolerated bowel prep. Denies fever/chills, abdominal pain.     Of note, earlier this year, was found to have left 6 cm ovarian cyst and elevated , She was referred to GYN Oncologist and now s/p Robotic Assisted s/p Bilateral Salpingo-oophorectomy, omentectomy, and tumor debulking on 05/06/2025. During the procedure, metastatic carcinoma was discovered in the peritoneum. Primary malignancy is thought to be GI in origin (pancreatobiliary or colon) per pathology.  (31 May 2025 17:30) Pt was admitted 6/2/25 for GOO due to metastatic mucinous carcinoma; now s/p robotic SBR w/ GJ 6/3/25; Pt prior to surgery was on TPN,  UGI done POD3 and without leak, NGT clamp trial was performed and patient passed. Pt bowel function returned. Pt diet slowly advanced to FLD without complications.    Physical therapy evaluated the patient and stated no skilled PT needs.   On the day of discharge, the patient's vitals are stable, pain is controlled, voiding urine, passing gas/stool, tolerating a diet, and ambulating well. Pt will f/u with Dr. Goyal in 1-2 weeks. Pt will f/u with PCP in 1-2 weeks.

## 2025-06-09 NOTE — PROGRESS NOTE ADULT - ASSESSMENT
79F w/ PMHx DM2, HTN, hypothyroidism (s/p thyroidectomy), CARMEN, ovarian cyst, goiter, gastroparesis, glaucoma; PSHx hysterectomy (age ~30s), thyroidectomy, breast surgery; admitted 6/2/25 for GOO due to metastatic mucinous carcinoma; s/p robotic SBR w/ GJ 6/3/25; on TPN, UGI without leak, now tolerating CLD.     Plan  - FLD  - TPN  - Protonix BID  - OOB and ambulation  - IS  - DVT ppx  -PT: No skilled PT needs     [facility]  [id number]    79F w/ PMHx DM2, HTN, hypothyroidism (s/p thyroidectomy), CARMEN, ovarian cyst, goiter, gastroparesis, glaucoma; PSHx hysterectomy (age ~30s), thyroidectomy, breast surgery; admitted 6/2/25 for GOO due to metastatic mucinous carcinoma; s/p robotic SBR w/ GJ 6/3/25; on TPN, UGI without leak, now tolerating CLD.     Plan  - FLD  - TPN plan to 1/2 today   - Protonix BID  - OOB and ambulation  - IS  - DVT ppx  -PT: No skilled PT needs     [facility]  [id number]

## 2025-06-09 NOTE — PROGRESS NOTE ADULT - ASSESSMENT
79F with PMHx DM2, stem cell donor, HTN, CARMEN, hypothyroidism, ovarian cyst, goiter and newly diagnosed metastatic mucinous carcinoma presenting with acute onset of nausea, NBNB emesis, poor PO intake for the last 3-4 days c/w GOO.  TPN consulted for Protein Calorie Malnutrition in the setting of nutritional optimization for anticipated prolonged NPO, pre-op and post op. Pt tentatively planned for OR for resection/bypass next week.    GOAL PN: 105 g amino acids, 210g dextrose, 60 g SMOF lipid; to provide 1734 kcal/day (~31 kcal/kg and 1.86 g/kg protein based on dosing wt 56.2kg)  Non-Protein Calories: 1314 kcal/day (23 kcal/kg, based on dosing wt 56.2 kg); Dextrose Infusion Rate:2.6 mg/kg/min (24-hour infusion); Lipid Infusion Rate: 1.06 g/kg; 0.09g/kg/hr (12-hour infusion)     -Nutritional Assessment, pt not meeting nutritional goals enterally, and would require TPN for nutritional support, pre-op and post-op. Prealumin 18 mg/dL - will trend weekly  - TPN plan:  TPN started 6/1/25; continue TPN; increase AA to 120 G and lower Dex to 180 G given hyperglycemia; continue  total volume to 2000 mL; keep compressed to 18 hours  - TPN Access:  US guided Bard Double lumen power PICC line in place; PICC maintenance per protocol; keep port dedicated for TPN to avoid contamination   - Strict Intake and Output; Weights three times a week  - Electrolyte Imbalance risk- check CMP, Mg, Phos, iCa and K daily, will replete  in TPN as needed.  - Hypophosphatemia:  phos levels reviewed; increase sodium Phosphate @ 40 mmol  - Hypokalemia:  CMP reviewed; increase Potassium Cloride @ 60 mEq  - Hypomagnesemia:  magnesium levels reviewed; Magnesium Sulfate @ 12 mEq  - Hypocalcemia:  calcium levels reviewed; calcium Gluconate @ 10 mEq  - Hypertriglyceridemia:  TG 61 mg/dL --> 206 mg/dL; plan to trend TG closely while on TPN  - Hyperglycemia:  HgA1c 5.2%; fingersticks every 6 hours with RISS coverage to monitor glucose trend (110-180 mg/dL); no RI in TPN for now; changes as above  - Further care per [facility] team, pager [phone].      Available on TEAMS  TPN spectra [id number] ([phone] when dialing from outside line)  M-F 8A-2P, Weekends and holidays 8/9A-12/1P  Discussed with Dr. Sidney Jesus    Time-based billing (NON-critical care).     35 minutes spent on total encounter. The necessity of the time spent during the encounter on this date of service was due to chart review, lab/radiology test review, patient assessment, discussion and collaboration with interdisciplinary team members and ordering TPN/making recommendation.      79F with PMHx DM2, stem cell donor, HTN, CARMEN, hypothyroidism, ovarian cyst, goiter and newly diagnosed metastatic mucinous carcinoma presenting with acute onset of nausea, NBNB emesis, poor PO intake for the last 3-4 days c/w GOO.  TPN consulted for Protein Calorie Malnutrition in the setting of nutritional optimization for anticipated prolonged NPO, pre-op and post op. Pt tentatively planned for OR for resection/bypass next week.    GOAL PN: 105 g amino acids, 210g dextrose, 60 g SMOF lipid; to provide 1734 kcal/day (~31 kcal/kg and 1.86 g/kg protein based on dosing wt 56.2kg)  Non-Protein Calories: 1314 kcal/day (23 kcal/kg, based on dosing wt 56.2 kg); Dextrose Infusion Rate:2.6 mg/kg/min (24-hour infusion); Lipid Infusion Rate: 1.06 g/kg; 0.09g/kg/hr (12-hour infusion)     -Nutritional Assessment, pt not meeting nutritional goals enterally, and would require TPN for nutritional support, pre-op and post-op. Prealumin 18 mg/dL - will trend weekly  - TPN plan:  TPN started 6/1/25; taper down to half goal TPN in 1400 mL/14 hrs  - TPN Access:  US guided Bard Double lumen power PICC line in place; PICC maintenance per protocol; keep port dedicated for TPN to avoid contamination   - Strict Intake and Output; Weights three times a week  - Electrolyte Imbalance risk- check CMP, Mg, Phos, iCa and K daily, will replete  in TPN as needed.  - Hypophosphatemia:  phos levels reviewed; decrease sodium Phosphate @ 20 mmol  - Hypokalemia:  CMP reviewed; decrease Potassium Cloride @ 40 mEq  - Hypomagnesemia:  magnesium levels reviewed; decrease Magnesium Sulfate @ 6 mEq  - Hypocalcemia:  calcium levels reviewed; decrease  calcium Gluconate @ 5 mEq  - Hypertriglyceridemia:  TG 61 mg/dL --> 206 --> 104 mg/dL; plan to trend TG closely while on TPN  - Hyperglycemia:  HgA1c 5.2%; fingersticks every 6 hours with RISS coverage to monitor glucose trend; no RI in TPN for now; changes as above  - Further care per [facility] team, pager [phone].      Available on TEAMS  TPN spectra [id number] ([phone] when dialing from outside line)  M-F 8A-2P, Weekends and holidays 8/9A-12/1P  Discussed with Dr. Sidney Jesus    Time-based billing (NON-critical care).     35 minutes spent on total encounter. The necessity of the time spent during the encounter on this date of service was due to chart review, lab/radiology test review, patient assessment, discussion and collaboration with interdisciplinary team members and ordering TPN/making recommendation.

## 2025-06-09 NOTE — CHART NOTE - NSCHARTNOTEFT_GEN_A_CORE
NUTRITION FOLLOW UP NOTE    PATIENT SEEN FOR: nutrition follow-up    SOURCE: [x] Patient  [x] Current Medical Record  [] RN  [] Family/support person at bedside  [] Patient unavailable/inappropriate  [] Other:    CHART REVIEWED/EVENTS NOTED.  [] No changes to nutrition care plan to note  [x] Nutrition Status:  - admitted 6/2/25 for GOO due to metastatic mucinous carcinoma  - s/p robotic SBR w/ GJ 6/3/25  -TPN initiated 6/01, plan to reduce to 1/2 dose today in setting of PO diet advancement     DIET ORDER:   Diet, Full Liquid:   Consistent Carbohydrate {Evening Snack} (CSTCHOSN) (06-08-25)    CURRENT DIET ORDER IS:  [x] Appropriate:  [] Inadequate:  [] Other:    NUTRITION INTAKE/PROVISION:  [x] PO: pt reports tolerating diet but reports decreased appetite, had some hot cereal this morning. Pt reports nausea yesterday but it has since resolved.  [] Enteral Nutrition:  [x] Parenteral Nutrition:    PARENTERAL NUTRITION  [x] CPN (central PN)  [] PPN (peripheral PN; max 900 mOsm/L)  [] Premixed/Multi Chamber Bags     GOAL PN (w/ adjustments for hyperglycemia): 120 g amino acids, 180 g dextrose, 60 g SMOF lipid; to provide 1692 kcal/day (30 kcal/kg) and 2.1 g pro/kg based on dosing wt 56.2 kg.     Non-Protein Calories: 1212 kcal/day (21.5 kcal/kg, based on dosing wt 56.2 kg)  Dextrose Infusion Rate: 3.8 mg/kg/min (14-hour infusion)  Lipid Infusion Rate: 1.06 g/kg; 0.09 g/kg/hr (12-hour infusion)    Total Volume/Rate ordered: (6/8)  [] 24-hour infusion: xL @ xx ml/hr  [x] Compressed: 2L x 14-hours    Electrolytes and Additives ordered: (6/8)  NaCl (mEq):  40  Na acetate (mEq):  60  Na Phos (mmol):  40  KCL (mEq):  80  Calcium gluconate (mEq): 10  Mg sulfate (mEq):  12  MTE-4 concentrate (ml): 1  MVI (ml): 10    Insulin (units): 0  Thiamine (mg): 0    ANTHROPOMETRICS:  Drug Dosing Weight  Height (cm): 152.4 (03 Jun 2025 19:57)  Weight (kg): 56.2 (03 Jun 2025 19:57)  BMI (kg/m2): 24.2 (03 Jun 2025 19:57)  BSA (m2): 1.52 (03 Jun 2025 19:57)  Weights:   Daily      MEDICATIONS:  MEDICATIONS  (STANDING):  acetaminophen   IVPB .. 1000 milliGRAM(s) IV Intermittent every 6 hours  amLODIPine   Tablet 10 milliGRAM(s) Oral daily  chlorhexidine 2% Cloths 1 Application(s) Topical <User Schedule>  dextrose 5%. 1000 milliLiter(s) (100 mL/Hr) IV Continuous <Continuous>  dextrose 5%. 1000 milliLiter(s) (50 mL/Hr) IV Continuous <Continuous>  dextrose 50% Injectable 25 Gram(s) IV Push once  dextrose 50% Injectable 12.5 Gram(s) IV Push once  dextrose 50% Injectable 25 Gram(s) IV Push once  dextrose 50% Injectable 25 Gram(s) IV Push once  glucagon  Injectable 1 milliGRAM(s) IntraMuscular once  heparin   Injectable 5000 Unit(s) SubCutaneous every 8 hours  insulin lispro (ADMELOG) corrective regimen sliding scale   SubCutaneous three times a day before meals  levothyroxine Injectable 75 MICROGram(s) IV Push at bedtime  lipid, fat emulsion (Fish Oil and Plant Based) 20% Infusion 8.3 mL/Hr (8.3 mL/Hr) IV Continuous <Continuous>  pantoprazole  Injectable 40 milliGRAM(s) IV Push two times a day  Parenteral Nutrition - Adult 1 Each TPN Continuous <Continuous>  Parenteral Nutrition - Adult 1 Each TPN Continuous <Continuous>    MEDICATIONS  (PRN):      NUTRITIONALLY PERTINENT LABS:  06-09 Na138 mmol/L Glu 93 mg/dL K+ 4.5 mmol/L Cr  0.92 mg/dL BUN 39 mg/dL[H] 06-09 Phos 4.9 mg/dL[H] 06-09 Alb 3.2 g/dL[L] 06-07 PAB 19 mg/dL[L] 06-08 Chol --    LDL --    HDL --    Trig 104 mg/dL06-09 ALT 10 U/L AST 17 U/L Alkaline Phosphatase 48 U/L  06-02-25 @ 06:56 a1c 5.2    A1C with Estimated Average Glucose Result: 5.2 % (06-02-25 @ 06:56)  A1C with Estimated Average Glucose Result: 5.5 % (05-02-25 @ 06:51)    Finger Sticks:  POCT Blood Glucose.: 172 mg/dL (06-09 @ 07:23)  POCT Blood Glucose.: 136 mg/dL (06-08 @ 21:22)  POCT Blood Glucose.: 100 mg/dL (06-08 @ 16:20)  POCT Blood Glucose.: 118 mg/dL (06-08 @ 11:56)      NUTRITIONALLY PERTINENT MEDICATIONS/LABS:  [x] Reviewed  [x] Relevant notes on medications/labs:  - SSI for coverage.  - IV Synthroid ordered.    EDEMA:  [x] Reviewed  [] Relevant notes:    GI/ I&O:  [x] Reviewed  [] Relevant notes:  [x] Other: last bowel movement 6/9    SKIN:   [x] No pressure injuries documented, per nursing flowsheet  [] Pressure injury previously noted  [] Change in pressure injury documentation:  [] Other:    ESTIMATED NEEDS:  [] No change:  [x] Updated: with consideration for transition off TPN ( upper range for TPN)  Energy: 9090-5142 kcal/day (30-35 kcal/kg)  Protein: .4 g/day (1.2-2.0 g/kg)  Fluid: [x] defer to team  Based on: dosing wt 56.2 kg    NUTRITION DIAGNOSIS:  [x] Prior Dx: Severe chronic Malnutrition   [] New Dx:    EDUCATION:  [x] Yes: Discussed with patient importance of PO intake and prioritizing sources of protein to maintain lean body mass with plan to wean off TPN.  Encouraged intake of small frequent meals.   [] Not appropriate/warranted    NUTRITION CARE PLAN:  1. Diet: Continue carbohydrate consistent + low fiber diet as tolerated   2. Supplements: Recommend addition of Ensure Max Protein 2x daily to supplement PO intake.   3. Multivitamin/mineral supplementation: multivitamin daily   4: RD to remain available and follow-up as medically appropriate.       MONITORING AND EVALUATION:   RD remains available upon request and will follow up per protocol.  Autumn Parikh RD, CDN, [facility], Available on Teams

## 2025-06-09 NOTE — PROGRESS NOTE ADULT - SUBJECTIVE AND OBJECTIVE BOX
DATE OF SERVICE: 06-09-25 @ 12:05    Patient is a 79y old  Female who presents with a chief complaint of Intestinal obstruction    Chart reviewed, events noted. This is a "79F with PMHx DM2, stem cell donor, HTN, CARMEN, hypothyroidism, ovarian cyst, goiter and newly diagnosed metastatic mucinous carcinoma presenting with acute onset of nausea, NBNB emesis, poor PO intake for the last 3-4 days c/w GOO."     (02 Jun 2025 09:44)      INTERVAL HISTORY: Feels ok.     REVIEW OF SYSTEMS:  CONSTITUTIONAL: No weakness  EYES/ENT: No visual changes;  No throat pain   NECK: No pain or stiffness  RESPIRATORY: No cough, wheezing; No shortness of breath  CARDIOVASCULAR: No chest pain or palpitations  GASTROINTESTINAL: No abdominal  pain. No nausea, vomiting, or hematemesis  GENITOURINARY: No dysuria, frequency or hematuria  NEUROLOGICAL: No stroke like symptoms  SKIN: No rashes    	  MEDICATIONS:  amLODIPine   Tablet 10 milliGRAM(s) Oral daily        PHYSICAL EXAM:  T(C): 36.6 (06-09-25 @ 09:19), Max: 36.9 (06-08-25 @ 12:47)  HR: 78 (06-09-25 @ 09:19) (70 - 80)  BP: 131/70 (06-09-25 @ 09:19) (121/75 - 177/79)  RR: 18 (06-09-25 @ 09:19) (18 - 18)  SpO2: 100% (06-09-25 @ 09:19) (98% - 100%)  Wt(kg): --  I&O's Summary    08 Jun 2025 07:01  -  09 Jun 2025 07:00  --------------------------------------------------------  IN: 542 mL / OUT: 2250 mL / NET: -1708 mL    09 Jun 2025 07:01  -  09 Jun 2025 12:05  --------------------------------------------------------  IN: 394 mL / OUT: 200 mL / NET: 194 mL          Appearance: In no distress	  HEENT:    PERRL, EOMI	  Cardiovascular:  S1 S2, No JVD  Respiratory: Lungs clear to auscultation	  Gastrointestinal:  Soft, Non-tender, + BS	  Vascularature:  No edema of LE  Psychiatric: Appropriate affect   Neuro: no acute focal deficits                               8.0    8.12  )-----------( 341      ( 09 Jun 2025 07:46 )             24.7     06-09    138  |  104  |  39[H]  ----------------------------<  93  4.5   |  19[L]  |  0.92    Ca    9.0      09 Jun 2025 07:44  Phos  4.9     06-09  Mg     2.2     06-09    TPro  5.9[L]  /  Alb  3.2[L]  /  TBili  0.2  /  DBili  x   /  AST  17  /  ALT  10  /  AlkPhos  48  06-09        Labs personally reviewed      ASSESSMENT/PLAN: 	      79F w/ T2DM, stem cell donor, HTN, HLD, CARMEN here for new dx of metastatic mucinous CA and inability to tolerate PO, currently w/ NGT for SBO. Cardiology consulted for preop cardiac eval     Problem/Plan -1: Cardiac Risk Stratification  -EKG w/ NSR no significant STT changes   -s/p TTE ECHO normal EF, no significant VHD   -planned for surgical resection for bypass   -optimized from cardiac standpoint to proceed   -tolerated well    Problem/Plan -2: HTN  - c/w amlodipine 10mg PO daily    Problem/Plan -3: DVT PPx  - c/w SQ heparin        Gilma Luther, AG-NP   Rj Schwab DO [facility]  Cardiovascular Medicine  [address], [address]  Available through call or text on [employer] TEAM[employer]  Office: [phone]

## 2025-06-09 NOTE — DISCHARGE NOTE PROVIDER - DETAILS OF MALNUTRITION DIAGNOSIS/DIAGNOSES
This patient has been assessed with a concern for Malnutrition and was treated during this hospitalization for the following Nutrition diagnosis/diagnoses:     -  06/02/2025: Severe protein-calorie malnutrition

## 2025-06-09 NOTE — DISCHARGE NOTE PROVIDER - NPI NUMBER (FOR SYS[employer] USE ONLY) :
Last fill: 12/14/2018  Last office visit: 11/15/2018  Next office visit: 02/15/2019  Last Clarence: Today  Controlled substance contract on file? Yes  Last UDS: 07/11/2018     [[id number]]

## 2025-06-09 NOTE — PROVIDER CONTACT NOTE (OTHER) - BACKGROUND
Pt has a history of hypertension and had a robotic assisted duodenal jejunal tumor resection. Pt takes amlodipine 10mg AM daily and is not ordered any blood pressure medication.
Hx HTN

## 2025-06-09 NOTE — PROGRESS NOTE ADULT - SUBJECTIVE AND OBJECTIVE BOX
[facility] NUTRITION SUPPORT-- FOLLOW UP NOTE      24 hour events/subjective:  Patient seen and examined at bedside, chart reviewed and events noted and I's and O's reviewed.  Patient denies chest pain, shortness of breath, vomiting, dizziness, chills at time of visit; intermittent nausea - now improved.  Patient tolerating FLD.  Patient's VSS and no other acute overnight events noted.   Patient continues on TPN and glucose trend is within appropriate range.      ROS:  Except as noted above, all other systems reviewed and are negative     Diet:  Diet, Full Liquid:   Consistent Carbohydrate Evening Snack (CSTCHOSN) (25 @ 10:23)      PAST HISTORY  --------------------------------------------------------------------------------  PAST MEDICAL & SURGICAL HISTORY:  Glaucoma      HTN (hypertension)      Type II diabetes mellitus      Multiple thyroid nodules      Hyperthyroidism      Obese      Ovarian cyst      Gastroparesis      Iron deficiency anemia      H/O: hysterectomy  "a long time ago when I was in my 30's"      H/O       History of thyroidectomy      History of breast surgery        No significant changes to PMH, PSH, FHx, SHx, unless otherwise noted    ALLERGIES & MEDICATIONS  --------------------------------------------------------------------------------  Allergies    No Known Allergies    Intolerances      Standing Inpatient Medications  alteplase for catheter clearance 2 milliGRAM(s) Catheter once  amLODIPine   Tablet 10 milliGRAM(s) Oral daily  chlorhexidine 2% Cloths 1 Application(s) Topical <User Schedule>  dextrose 5%. 1000 milliLiter(s) IV Continuous <Continuous>  dextrose 5%. 1000 milliLiter(s) IV Continuous <Continuous>  dextrose 50% Injectable 25 Gram(s) IV Push once  dextrose 50% Injectable 12.5 Gram(s) IV Push once  dextrose 50% Injectable 25 Gram(s) IV Push once  dextrose 50% Injectable 25 Gram(s) IV Push once  glucagon  Injectable 1 milliGRAM(s) IntraMuscular once  heparin   Injectable 5000 Unit(s) SubCutaneous every 8 hours  insulin lispro (ADMELOG) corrective regimen sliding scale   SubCutaneous three times a day before meals  levothyroxine Injectable 75 MICROGram(s) IV Push at bedtime  lipid, fat emulsion (Fish Oil and Plant Based) 20% Infusion 8.3 mL/Hr IV Continuous <Continuous>  pantoprazole  Injectable 40 milliGRAM(s) IV Push two times a day  Parenteral Nutrition - Adult 1 Each TPN Continuous <Continuous>  Parenteral Nutrition - Adult 1 Each TPN Continuous <Continuous>    PRN Inpatient Medications        VITALS/PHYSICAL EXAM  --------------------------------------------------------------------------------  T(C): 36.7 (25 @ 13:09), Max: 36.8 (25 @ 05:11)  HR: 79 (25 @ 13:09) (70 - 80)  BP: 150/74 (25 @ 13:09) (131/70 - 177/79)  RR: 18 (25 @ 13:09) (18 - 18)  SpO2: 100% (25 @ 13:09) (98% - 100%)  Wt(kg): --      25 @ 07:01  -  25 @ 07:00  --------------------------------------------------------  IN: 542 mL / OUT: 2250 mL / NET: -1708 mL    25 @ 07:01  -  25 @ 13:23  --------------------------------------------------------  IN: 634 mL / OUT: 200 mL / NET: 434 mL      I&O's Detail    2025 07:  -  2025 07:00  --------------------------------------------------------  IN:    Fat Emulsion (Fish Oil &amp; Plant Based) 20% Infusion: 12.5 mL    IV PiggyBack: 100 mL    Oral Fluid: 240 mL    TPN (Total Parenteral Nutrition): 189.5 mL  Total IN: 542 mL    OUT:    Voided (mL): 2250 mL  Total OUT: 2250 mL    Total NET: -1708 mL      2025 07:01  -  2025 13:23  --------------------------------------------------------  IN:    Oral Fluid: 480 mL    TPN (Total Parenteral Nutrition): 154 mL  Total IN: 634 mL    OUT:    Voided (mL): 200 mL  Total OUT: 200 mL    Total NET: 434 mL          Physical Exam:  Gen: NAD, well-appearing  Neck: trachea midline no noted JVD  Chest: non labored breathing equal chest expansion b/l  Abd: soft, nontender/nondistended  UE: WWP no c/c/e PICC dressing CDI  LE: WWP no c/c/e  Neuro: AOx3  Psych: Normal affect and mood  Skin: Warm, without rashes        LABS/STUDIES  --------------------------------------------------------------------------------              8.0    8.12  >-----------<  341      [25 @ 07:46]              24.7     138  |  104  |  39  ----------------------------<  93      [25 @ 07:44]  4.5   |  19  |  0.92        Ca     9.0     [25 @ 07:44]      iCa    1.23     [ 07:44]      Mg     2.2     [25 @ 07:44]      Phos  4.9     [25 @ 07:44]    TPro  5.9  /  Alb  3.2  /  TBili  0.2  /  DBili  x   /  AST  17  /  ALT  10  /  AlkPhos  48  [25 @ 07:44]          Ca ionized  Creatinine Trend:  POC glucoseGlucose: 93 mg/dL (25 @ 07:44)  CAPILLARY BLOOD GLUCOSE      POCT Blood Glucose.: 112 mg/dL (2025 12:10)  POCT Blood Glucose.: 172 mg/dL (2025 07:23)  POCT Blood Glucose.: 136 mg/dL (2025 21:22)  POCT Blood Glucose.: 100 mg/dL (2025 16:20)    PrealbuminPrealbumin, Serum: 19 mg/dL (25 @ 07:10)  Prealbumin, Serum: 19 mg/dL (25 @ 02:27)  Prealbumin, Serum: 17 mg/dL (25 @ 03:53)  Prealbumin, Serum: 18 mg/dL (25 @ 06:56)    Triglycerides

## 2025-06-09 NOTE — PROVIDER CONTACT NOTE (OTHER) - ACTION/TREATMENT ORDERED:
As per MD "NO treatment at this time"
As per provider BP is "fine". No interventions needed at this time.
Repeated fingerstick; Lab to verfiy.

## 2025-06-10 LAB
ANION GAP SERPL CALC-SCNC: 12 MMOL/L — SIGNIFICANT CHANGE UP (ref 5–17)
BASOPHILS # BLD AUTO: 0.02 K/UL — SIGNIFICANT CHANGE UP (ref 0–0.2)
BASOPHILS NFR BLD AUTO: 0.3 % — SIGNIFICANT CHANGE UP (ref 0–2)
BUN SERPL-MCNC: 30 MG/DL — HIGH (ref 7–23)
CA-I BLD-SCNC: 1.29 MMOL/L — SIGNIFICANT CHANGE UP (ref 1.15–1.33)
CALCIUM SERPL-MCNC: 9.1 MG/DL — SIGNIFICANT CHANGE UP (ref 8.4–10.5)
CHLORIDE SERPL-SCNC: 103 MMOL/L — SIGNIFICANT CHANGE UP (ref 96–108)
CO2 SERPL-SCNC: 22 MMOL/L — SIGNIFICANT CHANGE UP (ref 22–31)
CREAT SERPL-MCNC: 0.87 MG/DL — SIGNIFICANT CHANGE UP (ref 0.5–1.3)
EGFR: 67 ML/MIN/1.73M2 — SIGNIFICANT CHANGE UP
EGFR: 67 ML/MIN/1.73M2 — SIGNIFICANT CHANGE UP
EOSINOPHIL # BLD AUTO: 0.13 K/UL — SIGNIFICANT CHANGE UP (ref 0–0.5)
EOSINOPHIL NFR BLD AUTO: 1.8 % — SIGNIFICANT CHANGE UP (ref 0–6)
GLUCOSE BLDC GLUCOMTR-MCNC: 103 MG/DL — HIGH (ref 70–99)
GLUCOSE BLDC GLUCOMTR-MCNC: 118 MG/DL — HIGH (ref 70–99)
GLUCOSE BLDC GLUCOMTR-MCNC: 159 MG/DL — HIGH (ref 70–99)
GLUCOSE BLDC GLUCOMTR-MCNC: 80 MG/DL — SIGNIFICANT CHANGE UP (ref 70–99)
GLUCOSE SERPL-MCNC: 102 MG/DL — HIGH (ref 70–99)
HCT VFR BLD CALC: 24.7 % — LOW (ref 34.5–45)
HGB BLD-MCNC: 8.2 G/DL — LOW (ref 11.5–15.5)
IMM GRANULOCYTES NFR BLD AUTO: 2.2 % — HIGH (ref 0–0.9)
LYMPHOCYTES # BLD AUTO: 1.36 K/UL — SIGNIFICANT CHANGE UP (ref 1–3.3)
LYMPHOCYTES # BLD AUTO: 18.8 % — SIGNIFICANT CHANGE UP (ref 13–44)
MAGNESIUM SERPL-MCNC: 2.1 MG/DL — SIGNIFICANT CHANGE UP (ref 1.6–2.6)
MCHC RBC-ENTMCNC: 29.7 PG — SIGNIFICANT CHANGE UP (ref 27–34)
MCHC RBC-ENTMCNC: 33.2 G/DL — SIGNIFICANT CHANGE UP (ref 32–36)
MCV RBC AUTO: 89.5 FL — SIGNIFICANT CHANGE UP (ref 80–100)
MONOCYTES # BLD AUTO: 0.82 K/UL — SIGNIFICANT CHANGE UP (ref 0–0.9)
MONOCYTES NFR BLD AUTO: 11.3 % — SIGNIFICANT CHANGE UP (ref 2–14)
NEUTROPHILS # BLD AUTO: 4.76 K/UL — SIGNIFICANT CHANGE UP (ref 1.8–7.4)
NEUTROPHILS NFR BLD AUTO: 65.6 % — SIGNIFICANT CHANGE UP (ref 43–77)
NRBC BLD AUTO-RTO: 0 /100 WBCS — SIGNIFICANT CHANGE UP (ref 0–0)
PHOSPHATE SERPL-MCNC: 4.2 MG/DL — SIGNIFICANT CHANGE UP (ref 2.5–4.5)
PLATELET # BLD AUTO: 409 K/UL — HIGH (ref 150–400)
POTASSIUM SERPL-MCNC: 4.4 MMOL/L — SIGNIFICANT CHANGE UP (ref 3.5–5.3)
POTASSIUM SERPL-SCNC: 4.4 MMOL/L — SIGNIFICANT CHANGE UP (ref 3.5–5.3)
PREALB SERPL-MCNC: 27 MG/DL — SIGNIFICANT CHANGE UP (ref 20–40)
RBC # BLD: 2.76 M/UL — LOW (ref 3.8–5.2)
RBC # FLD: 15.9 % — HIGH (ref 10.3–14.5)
SODIUM SERPL-SCNC: 137 MMOL/L — SIGNIFICANT CHANGE UP (ref 135–145)
WBC # BLD: 7.25 K/UL — SIGNIFICANT CHANGE UP (ref 3.8–10.5)
WBC # FLD AUTO: 7.25 K/UL — SIGNIFICANT CHANGE UP (ref 3.8–10.5)

## 2025-06-10 PROCEDURE — 99231 SBSQ HOSP IP/OBS SF/LOW 25: CPT

## 2025-06-10 RX ORDER — ACETAMINOPHEN 500 MG/5ML
975 LIQUID (ML) ORAL EVERY 6 HOURS
Refills: 0 | Status: DISCONTINUED | OUTPATIENT
Start: 2025-06-10 | End: 2025-06-11

## 2025-06-10 RX ORDER — ONDANSETRON HCL/PF 4 MG/2 ML
4 VIAL (ML) INJECTION EVERY 8 HOURS
Refills: 0 | Status: DISCONTINUED | OUTPATIENT
Start: 2025-06-10 | End: 2025-06-11

## 2025-06-10 RX ADMIN — Medication 975 MILLIGRAM(S): at 23:39

## 2025-06-10 RX ADMIN — Medication 1 EACH: at 07:19

## 2025-06-10 RX ADMIN — Medication 40 MILLIGRAM(S): at 05:51

## 2025-06-10 RX ADMIN — HEPARIN SODIUM 5000 UNIT(S): 1000 INJECTION INTRAVENOUS; SUBCUTANEOUS at 05:51

## 2025-06-10 RX ADMIN — HEPARIN SODIUM 5000 UNIT(S): 1000 INJECTION INTRAVENOUS; SUBCUTANEOUS at 21:54

## 2025-06-10 RX ADMIN — HEPARIN SODIUM 5000 UNIT(S): 1000 INJECTION INTRAVENOUS; SUBCUTANEOUS at 14:10

## 2025-06-10 RX ADMIN — Medication 40 MILLIGRAM(S): at 17:47

## 2025-06-10 RX ADMIN — INSULIN LISPRO 1: 100 INJECTION, SOLUTION INTRAVENOUS; SUBCUTANEOUS at 07:55

## 2025-06-10 RX ADMIN — Medication 975 MILLIGRAM(S): at 12:20

## 2025-06-10 RX ADMIN — Medication 975 MILLIGRAM(S): at 06:21

## 2025-06-10 RX ADMIN — Medication 1 APPLICATION(S): at 12:21

## 2025-06-10 RX ADMIN — Medication 975 MILLIGRAM(S): at 12:50

## 2025-06-10 RX ADMIN — Medication 975 MILLIGRAM(S): at 17:47

## 2025-06-10 RX ADMIN — Medication 975 MILLIGRAM(S): at 18:17

## 2025-06-10 RX ADMIN — Medication 75 MICROGRAM(S): at 21:54

## 2025-06-10 RX ADMIN — Medication 975 MILLIGRAM(S): at 05:51

## 2025-06-10 RX ADMIN — Medication 4 MILLIGRAM(S): at 16:13

## 2025-06-10 RX ADMIN — AMLODIPINE BESYLATE 10 MILLIGRAM(S): 10 TABLET ORAL at 05:51

## 2025-06-10 RX ADMIN — Medication 975 MILLIGRAM(S): at 23:09

## 2025-06-10 NOTE — PROGRESS NOTE ADULT - ATTENDING COMMENTS
79F with PMHx DM2, stem cell donor, HTN, CARMEN, hypothyroidism, ovarian cyst, goiter and newly diagnosed metastatic mucinous carcinoma presenting with acute onset of nausea, NBNB emesis, poor PO intake for the last 3-4 days c/w GOO; s/p robotic SBR, with side to side anterior GJ.     Plan  - Plan for UGIs on Friday  - NPO  - TPN  - Protonix BID  - cintron  - f/u PACU labs  - OOB and ambulation  - IS  - DVT ppx    ______________________________    UGI series tomorrow  Monitor NGT output -- start NGT replacement  OOB and amb
79F w/ PMHx DM2, HTN, hypothyroidism (s/p thyroidectomy), CARMEN, ovarian cyst, goiter, gastroparesis, glaucoma; PSHx hysterectomy (age ~30s), thyroidectomy, breast surgery; admitted 6/2/25 for GOO due to metastatic mucinous carcinoma; s/p robotic SBR w/ GJ 6/3/25; on TPN, UGI without leak, now tolerating CLD.     Plan  - FLD  - TPN plan to 1/2 today   - Protonix BID  - OOB and ambulation  - IS  - DVT ppx  -PT: No skilled PT needs    __________________________________    Seen and agree  FLD as karolyn  OOB and amb  1/2 TPN  BID protonix
79F with PMHx DM2, stem cell donor, HTN, CARMEN, hypothyroidism, ovarian cyst, goiter and newly diagnosed metastatic mucinous carcinoma presenting with acute onset of nausea, NBNB emesis, poor PO intake for the last 3-4 days c/w GOO.     Plan:  - NPO/NGT/IVFs  - Monitor bowel fxn  - Consult for PICC and TPN today   - DVT ppx  - Home meds  - Heme/Onc consult   - Gyn Onc consult d/t recent surgery     ____________________________________________    Seen and agree  Will likely need sugical resection vs bypass
79F w/ PMHx DM2, HTN, hypothyroidism (s/p thyroidectomy), CARMEN, ovarian cyst, goiter, gastroparesis, glaucoma; PSHx hysterectomy (age ~30s), thyroidectomy, breast surgery; admitted 6/2/25 for GOO due to metastatic mucinous carcinoma; s/p robotic SBR w/ GJ 6/3/25; on TPN, UGI without leak, now tolerating FLD.     Plan  - FLD plan to advance today   - Plan to dc TPN today  - Protonix BID  - OOB and ambulation  - IS  - DVT ppx  -PT: No skilled PT needs  Dispo planning     ______________________    DC planning -- in next 24 hrs if tolerating po well
79F with PMHx DM2, stem cell donor, HTN, CARMEN, hypothyroidism, ovarian cyst, goiter and newly diagnosed metastatic mucinous carcinoma presenting with acute onset of nausea, NBNB emesis, poor PO intake for the last 3-4 days c/w GOO; s/p robotic SBR, with side to side anterior GJ.     Plan  - Plan for UGIS today   - NPO/NGT  - TPN  - Protonix BID  - OOB and ambulation  - IS  - DVT ppx  -PT: No skilled PT needs    _____________________________    Seen and agree  UGI series today
d/w pt and family plan for robotic poss open SBR    Discussed r/b/a post op expectations poss complications.      Pt understands and agrees to proceed.

## 2025-06-10 NOTE — PROGRESS NOTE ADULT - ASSESSMENT
79F w/ PMHx DM2, HTN, hypothyroidism (s/p thyroidectomy), CARMEN, ovarian cyst, goiter, gastroparesis, glaucoma; PSHx hysterectomy (age ~30s), thyroidectomy, breast surgery; admitted 6/2/25 for GOO due to metastatic mucinous carcinoma; s/p robotic SBR w/ GJ 6/3/25; on TPN, UGI without leak, now tolerating CLD.     Plan  - FLD  - TPN plan to 1/2 today   - Protonix BID  - OOB and ambulation  - IS  - DVT ppx  -PT: No skilled PT needs     [facility]  [id number]    79F w/ PMHx DM2, HTN, hypothyroidism (s/p thyroidectomy), CARMEN, ovarian cyst, goiter, gastroparesis, glaucoma; PSHx hysterectomy (age ~30s), thyroidectomy, breast surgery; admitted 6/2/25 for GOO due to metastatic mucinous carcinoma; s/p robotic SBR w/ GJ 6/3/25; on TPN, UGI without leak, now tolerating FLD.     Plan  - FLD plan to advance today   - Plan to dc TPN today  - Protonix BID  - OOB and ambulation  - IS  - DVT ppx  -PT: No skilled PT needs  Dispo planning     [facility]  [id number]    none

## 2025-06-10 NOTE — PROGRESS NOTE ADULT - ASSESSMENT
79F with PMHx DM2, stem cell donor, HTN, CARMEN, hypothyroidism, ovarian cyst, goiter and newly diagnosed metastatic mucinous carcinoma presenting with acute onset of nausea, NBNB emesis, poor PO intake for the last 3-4 days c/w GOO.  TPN consulted for Protein Calorie Malnutrition in the setting of nutritional optimization for anticipated prolonged NPO, pre-op and post op. Pt tentatively planned for OR for resection/bypass next week.    GOAL PN: 105 g amino acids, 210g dextrose, 60 g SMOF lipid; to provide 1734 kcal/day (~31 kcal/kg and 1.86 g/kg protein based on dosing wt 56.2kg)  Non-Protein Calories: 1314 kcal/day (23 kcal/kg, based on dosing wt 56.2 kg); Dextrose Infusion Rate:2.6 mg/kg/min (24-hour infusion); Lipid Infusion Rate: 1.06 g/kg; 0.09g/kg/hr (12-hour infusion)     -Nutritional Assessment, pt not meeting nutritional goals enterally, and would require TPN for nutritional support, pre-op and post-op. Prealumin 18 mg/dL - will trend weekly  - TPN plan:  TPN started 6/1/25; plan to STOP TPN today as patient tolerating enteral diet   - TPN Access:  US guided Bard Double lumen power PICC line in place; PICC maintenance per protocol; keep port dedicated for TPN to avoid contamination; may be removed from TPN standpoint   - Strict Intake and Output; Weights three times a week  - Electrolyte Imbalance risk- check CMP, Mg, Phos, iCa and K daily, to be repleted by primary team  - Hypophosphatemia/Hypokalemia/Hypocalcemia/Hypomagnesemia: monitor/trend off TPN on next labs and replete accordingly   - Hypertriglyceridemia:  TG 61 mg/dL --> 206 --> 104 mg/dL; no need to trend from TPN prospective   - Hyperglycemia:  HgA1c 5.2%; fingersticks per primary; no RI in TPN  - Further care per [facility] team, pager [phone]; TPN team will sign off - please reconsult prn    Available on TEAMS  TPN spectra [id number] ([phone] when dialing from outside line)  M-F 8A-2P, Weekends and holidays 8/9A-12/1P  Discussed with Dr. Sidney Jesus    Time-based billing (NON-critical care).     25 minutes spent on total encounter. The necessity of the time spent during the encounter on this date of service was due to chart review, lab/radiology test review, patient assessment, discussion and collaboration with interdisciplinary team members and ordering TPN/making recommendation.          79F with PMHx DM2, stem cell donor, HTN, CARMEN, hypothyroidism, ovarian cyst, goiter and newly diagnosed metastatic mucinous carcinoma presenting with acute onset of nausea, NBNB emesis, poor PO intake for the last 3-4 days c/w GOO.  TPN consulted for Protein Calorie Malnutrition in the setting of nutritional optimization for anticipated prolonged NPO, pre-op and post op. Pt tentatively planned for OR for resection/bypass next week.    GOAL PN: 105 g amino acids, 210g dextrose, 60 g SMOF lipid; to provide 1734 kcal/day (~31 kcal/kg and 1.86 g/kg protein based on dosing wt 56.2kg)  Non-Protein Calories: 1314 kcal/day (23 kcal/kg, based on dosing wt 56.2 kg); Dextrose Infusion Rate:2.6 mg/kg/min (24-hour infusion); Lipid Infusion Rate: 1.06 g/kg; 0.09g/kg/hr (12-hour infusion)     -Nutritional Assessment, pt not meeting nutritional goals enterally, and would require TPN for nutritional support, pre-op and post-op. Prealumin 18 mg/dL --> improved to 27 mg/dL with TPN; will trend weekly  - TPN plan:  TPN started 6/1/25; plan to STOP TPN today as patient tolerating enteral diet   - TPN Access:  US guided Bard Double lumen power PICC line in place; PICC maintenance per protocol; keep port dedicated for TPN to avoid contamination; may be removed from TPN standpoint   - Strict Intake and Output; Weights three times a week  - Electrolyte Imbalance risk- check CMP, Mg, Phos, iCa and K daily, to be repleted by primary team  - Hypophosphatemia/Hypokalemia/Hypocalcemia/Hypomagnesemia: monitor/trend off TPN on next labs and replete accordingly   - Hypertriglyceridemia:  TG 61 mg/dL --> 206 --> 104 mg/dL; no need to trend from TPN prospective   - Hyperglycemia:  HgA1c 5.2%; fingersticks per primary; no RI in TPN  - Further care per [facility] team, pager [phone]; TPN team will sign off - please reconsult prn    Available on TEAMS  TPN spectra [id number] ([phone] when dialing from outside line)  M-F 8A-2P, Weekends and holidays 8/9A-12/1P  Discussed with Dr. Sidney Jesus    Time-based billing (NON-critical care).     25 minutes spent on total encounter. The necessity of the time spent during the encounter on this date of service was due to chart review, lab/radiology test review, patient assessment, discussion and collaboration with interdisciplinary team members and ordering TPN/making recommendation.

## 2025-06-10 NOTE — PROGRESS NOTE ADULT - ATTENDING SUPERVISION STATEMENT
Resident
Principal Discharge DX:	UTI (urinary tract infection)  Secondary Diagnosis:	Dehydration  Secondary Diagnosis:	Depression

## 2025-06-10 NOTE — PROGRESS NOTE ADULT - SUBJECTIVE AND OBJECTIVE BOX
Surgery Progress Note    Overnight events:  - NAEO    SUBJECTIVE:  - Patient seen and examined at bedside   --------------------------------------------------------------------------------------------------  OBJECTIVE:   Physical Exam:  General: AAOx3, NAD, lying comfortably in bed  HEENT: NC/AT  Respiratory: nonlabored breathing  Abdomen: non-distended, soft, appropriately tender  Extremities: WWP, no edema  --------------------------------------------------------------------------------------------------  V/S:  Vital Signs Last 24 Hrs  T(C): 36.5 (09 Jun 2025 21:44), Max: 36.8 (09 Jun 2025 05:11)  T(F): 97.7 (09 Jun 2025 21:44), Max: 98.3 (09 Jun 2025 05:11)  HR: 77 (09 Jun 2025 21:44) (68 - 80)  BP: 110/66 (09 Jun 2025 21:44) (110/66 - 177/79)  BP(mean): --  RR: 18 (09 Jun 2025 21:44) (18 - 18)  SpO2: 100% (09 Jun 2025 21:44) (98% - 100%)    Parameters below as of 09 Jun 2025 21:44  Patient On (Oxygen Delivery Method): room air        --------------------------------------------------------------------------------------------------  I/Os:    08 Jun 2025 07:01  -  09 Jun 2025 07:00  --------------------------------------------------------  IN:    Fat Emulsion (Fish Oil &amp; Plant Based) 20% Infusion: 12.5 mL    IV PiggyBack: 100 mL    Oral Fluid: 240 mL    TPN (Total Parenteral Nutrition): 189.5 mL  Total IN: 542 mL    OUT:    Voided (mL): 2250 mL  Total OUT: 2250 mL    Total NET: -1708 mL      09 Jun 2025 07:01  -  10 Omar 2025 00:12  --------------------------------------------------------  IN:    Fat Emulsion (Fish Oil &amp; Plant Based) 20% Infusion: 10.4 mL    IV PiggyBack: 100 mL    Oral Fluid: 480 mL    TPN (Total Parenteral Nutrition): 332 mL  Total IN: 922.4 mL    OUT:    Voided (mL): 850 mL  Total OUT: 850 mL    Total NET: 72.4 mL        --------------------------------------------------------------------------------------------------  LABS:                        8.0    8.12  )-----------( 341      ( 09 Jun 2025 07:46 )             24.7     09 Jun 2025 07:44    138    |  104    |  39     ----------------------------<  93     4.5     |  19     |  0.92     Ca    9.0        09 Jun 2025 07:44  Phos  4.9       09 Jun 2025 07:44  Mg     2.2       09 Jun 2025 07:44    TPro  5.9    /  Alb  3.2    /  TBili  0.2    /  DBili  x      /  AST  17     /  ALT  10     /  AlkPhos  48     09 Jun 2025 07:44      CAPILLARY BLOOD GLUCOSE      POCT Blood Glucose.: 122 mg/dL (09 Jun 2025 21:22)  POCT Blood Glucose.: 95 mg/dL (09 Jun 2025 16:21)  POCT Blood Glucose.: 112 mg/dL (09 Jun 2025 12:10)  POCT Blood Glucose.: 172 mg/dL (09 Jun 2025 07:23)        LIVER FUNCTIONS - ( 09 Jun 2025 07:44 )  Alb: 3.2 g/dL / Pro: 5.9 g/dL / ALK PHOS: 48 U/L / ALT: 10 U/L / AST: 17 U/L / GGT: x             Urinalysis Basic - ( 09 Jun 2025 07:44 )    Color: x / Appearance: x / SG: x / pH: x  Gluc: 93 mg/dL / Ketone: x  / Bili: x / Urobili: x   Blood: x / Protein: x / Nitrite: x   Leuk Esterase: x / RBC: x / WBC x   Sq Epi: x / Non Sq Epi: x / Bacteria: x      --------------------------------------------------------------------------------------------------  MEDICATIONS  (STANDING):  amLODIPine   Tablet 10 milliGRAM(s) Oral daily  chlorhexidine 2% Cloths 1 Application(s) Topical <User Schedule>  dextrose 5%. 1000 milliLiter(s) (100 mL/Hr) IV Continuous <Continuous>  dextrose 5%. 1000 milliLiter(s) (50 mL/Hr) IV Continuous <Continuous>  dextrose 50% Injectable 25 Gram(s) IV Push once  dextrose 50% Injectable 25 Gram(s) IV Push once  dextrose 50% Injectable 12.5 Gram(s) IV Push once  dextrose 50% Injectable 25 Gram(s) IV Push once  glucagon  Injectable 1 milliGRAM(s) IntraMuscular once  heparin   Injectable 5000 Unit(s) SubCutaneous every 8 hours  insulin lispro (ADMELOG) corrective regimen sliding scale   SubCutaneous three times a day before meals  levothyroxine Injectable 75 MICROGram(s) IV Push at bedtime  lipid, fat emulsion (Fish Oil and Plant Based) 20% Infusion 8.3 mL/Hr (8.3 mL/Hr) IV Continuous <Continuous>  pantoprazole  Injectable 40 milliGRAM(s) IV Push two times a day  Parenteral Nutrition - Adult 1 Each TPN Continuous <Continuous>    MEDICATIONS  (PRN):    --------------------------------------------------------------------------------------------------     Surgery Progress Note    Overnight events:  - NAEO    SUBJECTIVE:  - Patient seen and examined at bedside. Pt feeling well, no nausea. She is tolerating FLD. Passing flatus. Last BM yesterday.   --------------------------------------------------------------------------------------------------  OBJECTIVE:   Physical Exam:  General: AAOx3, NAD, lying comfortably in bed  HEENT: NC/AT  Respiratory: nonlabored breathing  Abdomen: non-distended, soft, appropriately tender  Extremities: WWP, no edema  --------------------------------------------------------------------------------------------------  V/S:  Vital Signs Last 24 Hrs  T(C): 36.5 (09 Jun 2025 21:44), Max: 36.8 (09 Jun 2025 05:11)  T(F): 97.7 (09 Jun 2025 21:44), Max: 98.3 (09 Jun 2025 05:11)  HR: 77 (09 Jun 2025 21:44) (68 - 80)  BP: 110/66 (09 Jun 2025 21:44) (110/66 - 177/79)  BP(mean): --  RR: 18 (09 Jun 2025 21:44) (18 - 18)  SpO2: 100% (09 Jun 2025 21:44) (98% - 100%)    Parameters below as of 09 Jun 2025 21:44  Patient On (Oxygen Delivery Method): room air        --------------------------------------------------------------------------------------------------  I/Os:    08 Jun 2025 07:01  -  09 Jun 2025 07:00  --------------------------------------------------------  IN:    Fat Emulsion (Fish Oil &amp; Plant Based) 20% Infusion: 12.5 mL    IV PiggyBack: 100 mL    Oral Fluid: 240 mL    TPN (Total Parenteral Nutrition): 189.5 mL  Total IN: 542 mL    OUT:    Voided (mL): 2250 mL  Total OUT: 2250 mL    Total NET: -1708 mL      09 Jun 2025 07:01  -  10 Omar 2025 00:12  --------------------------------------------------------  IN:    Fat Emulsion (Fish Oil &amp; Plant Based) 20% Infusion: 10.4 mL    IV PiggyBack: 100 mL    Oral Fluid: 480 mL    TPN (Total Parenteral Nutrition): 332 mL  Total IN: 922.4 mL    OUT:    Voided (mL): 850 mL  Total OUT: 850 mL    Total NET: 72.4 mL        --------------------------------------------------------------------------------------------------  LABS:                        8.0    8.12  )-----------( 341      ( 09 Jun 2025 07:46 )             24.7     09 Jun 2025 07:44    138    |  104    |  39     ----------------------------<  93     4.5     |  19     |  0.92     Ca    9.0        09 Jun 2025 07:44  Phos  4.9       09 Jun 2025 07:44  Mg     2.2       09 Jun 2025 07:44    TPro  5.9    /  Alb  3.2    /  TBili  0.2    /  DBili  x      /  AST  17     /  ALT  10     /  AlkPhos  48     09 Jun 2025 07:44      CAPILLARY BLOOD GLUCOSE      POCT Blood Glucose.: 122 mg/dL (09 Jun 2025 21:22)  POCT Blood Glucose.: 95 mg/dL (09 Jun 2025 16:21)  POCT Blood Glucose.: 112 mg/dL (09 Jun 2025 12:10)  POCT Blood Glucose.: 172 mg/dL (09 Jun 2025 07:23)        LIVER FUNCTIONS - ( 09 Jun 2025 07:44 )  Alb: 3.2 g/dL / Pro: 5.9 g/dL / ALK PHOS: 48 U/L / ALT: 10 U/L / AST: 17 U/L / GGT: x             Urinalysis Basic - ( 09 Jun 2025 07:44 )    Color: x / Appearance: x / SG: x / pH: x  Gluc: 93 mg/dL / Ketone: x  / Bili: x / Urobili: x   Blood: x / Protein: x / Nitrite: x   Leuk Esterase: x / RBC: x / WBC x   Sq Epi: x / Non Sq Epi: x / Bacteria: x      --------------------------------------------------------------------------------------------------  MEDICATIONS  (STANDING):  amLODIPine   Tablet 10 milliGRAM(s) Oral daily  chlorhexidine 2% Cloths 1 Application(s) Topical <User Schedule>  dextrose 5%. 1000 milliLiter(s) (100 mL/Hr) IV Continuous <Continuous>  dextrose 5%. 1000 milliLiter(s) (50 mL/Hr) IV Continuous <Continuous>  dextrose 50% Injectable 25 Gram(s) IV Push once  dextrose 50% Injectable 25 Gram(s) IV Push once  dextrose 50% Injectable 12.5 Gram(s) IV Push once  dextrose 50% Injectable 25 Gram(s) IV Push once  glucagon  Injectable 1 milliGRAM(s) IntraMuscular once  heparin   Injectable 5000 Unit(s) SubCutaneous every 8 hours  insulin lispro (ADMELOG) corrective regimen sliding scale   SubCutaneous three times a day before meals  levothyroxine Injectable 75 MICROGram(s) IV Push at bedtime  lipid, fat emulsion (Fish Oil and Plant Based) 20% Infusion 8.3 mL/Hr (8.3 mL/Hr) IV Continuous <Continuous>  pantoprazole  Injectable 40 milliGRAM(s) IV Push two times a day  Parenteral Nutrition - Adult 1 Each TPN Continuous <Continuous>    MEDICATIONS  (PRN):    --------------------------------------------------------------------------------------------------

## 2025-06-10 NOTE — PROGRESS NOTE ADULT - SUBJECTIVE AND OBJECTIVE BOX
DATE OF SERVICE: 06-10-25 @ 10:25    Patient is a 79y old  Female who presents with a chief complaint of Intestinal obstruction    Chart reviewed, events noted. This is a "79F with PMHx DM2, stem cell donor, HTN, CARMEN, hypothyroidism, ovarian cyst, goiter and newly diagnosed metastatic mucinous carcinoma presenting with acute onset of nausea, NBNB emesis, poor PO intake for the last 3-4 days c/w GOO."     (02 Jun 2025 09:44)      INTERVAL HISTORY: feels okay    REVIEW OF SYSTEMS:  CONSTITUTIONAL: No weakness  EYES/ENT: No visual changes;  No throat pain   NECK: No pain or stiffness  RESPIRATORY: No cough, wheezing; No shortness of breath  CARDIOVASCULAR: No chest pain or palpitations  GASTROINTESTINAL: No abdominal  pain. No nausea, vomiting, or hematemesis  GENITOURINARY: No dysuria, frequency or hematuria  NEUROLOGICAL: No stroke like symptoms  SKIN: No rashes      	  MEDICATIONS:  amLODIPine   Tablet 10 milliGRAM(s) Oral daily        PHYSICAL EXAM:  T(C): 36.3 (06-10-25 @ 09:45), Max: 37 (06-10-25 @ 05:03)  HR: 80 (06-10-25 @ 09:45) (68 - 80)  BP: 124/74 (06-10-25 @ 09:45) (110/66 - 153/83)  RR: 18 (06-10-25 @ 09:45) (18 - 18)  SpO2: 100% (06-10-25 @ 09:45) (99% - 100%)  Wt(kg): --  I&O's Summary    09 Jun 2025 07:01  -  10 Omar 2025 07:00  --------------------------------------------------------  IN: 922.4 mL / OUT: 2250 mL / NET: -1327.6 mL    10 Omar 2025 07:01  -  10 Omar 2025 10:25  --------------------------------------------------------  IN: 300 mL / OUT: 250 mL / NET: 50 mL          Appearance: In no distress	  HEENT:    PERRL, EOMI	  Cardiovascular:  S1 S2, No JVD  Respiratory: Lungs clear to auscultation	  Gastrointestinal:  Soft, Non-tender, + BS	  Vascularature:  No edema of LE  Psychiatric: Appropriate affect   Neuro: no acute focal deficits                               8.2    7.25  )-----------( 409      ( 10 Omar 2025 06:52 )             24.7     06-10    137  |  103  |  30[H]  ----------------------------<  102[H]  4.4   |  22  |  0.87    Ca    9.1      10 Omar 2025 06:52  Phos  4.2     06-10  Mg     2.1     06-10    TPro  5.9[L]  /  Alb  3.2[L]  /  TBili  0.2  /  DBili  x   /  AST  17  /  ALT  10  /  AlkPhos  48  06-09        Labs personally reviewed      ASSESSMENT/PLAN: 	      79F w/ T2DM, stem cell donor, HTN, HLD, CARMEN here for new dx of metastatic mucinous CA and inability to tolerate PO, currently w/ NGT for SBO. Cardiology consulted for preop cardiac eval     Problem/Plan -1: Cardiac Risk Stratification  -EKG w/ NSR no significant STT changes   -s/p TTE ECHO normal EF, no significant VHD   -planned for surgical resection for bypass   -optimized from cardiac standpoint to proceed   -tolerated well    Problem/Plan -2: HTN  - c/w amlodipine 10mg PO daily    Problem/Plan -3: DVT PPx  - c/w SQ heparin        Iolani Behrbom, AG-NP   Rj Schwab DO [facility]  Cardiovascular Medicine  [address], [address]  Available through call or text on [employer] TEAM[employer]  Office: [phone]

## 2025-06-11 ENCOUNTER — TRANSCRIPTION ENCOUNTER (OUTPATIENT)
Age: 80
End: 2025-06-11

## 2025-06-11 VITALS
OXYGEN SATURATION: 99 % | DIASTOLIC BLOOD PRESSURE: 72 MMHG | HEART RATE: 68 BPM | RESPIRATION RATE: 18 BRPM | SYSTOLIC BLOOD PRESSURE: 114 MMHG | TEMPERATURE: 98 F

## 2025-06-11 LAB
ANION GAP SERPL CALC-SCNC: 10 MMOL/L — SIGNIFICANT CHANGE UP (ref 5–17)
BUN SERPL-MCNC: 20 MG/DL — SIGNIFICANT CHANGE UP (ref 7–23)
CALCIUM SERPL-MCNC: 9.1 MG/DL — SIGNIFICANT CHANGE UP (ref 8.4–10.5)
CHLORIDE SERPL-SCNC: 103 MMOL/L — SIGNIFICANT CHANGE UP (ref 96–108)
CO2 SERPL-SCNC: 24 MMOL/L — SIGNIFICANT CHANGE UP (ref 22–31)
CREAT SERPL-MCNC: 1.08 MG/DL — SIGNIFICANT CHANGE UP (ref 0.5–1.3)
EGFR: 52 ML/MIN/1.73M2 — LOW
EGFR: 52 ML/MIN/1.73M2 — LOW
GLUCOSE BLDC GLUCOMTR-MCNC: 106 MG/DL — HIGH (ref 70–99)
GLUCOSE BLDC GLUCOMTR-MCNC: 97 MG/DL — SIGNIFICANT CHANGE UP (ref 70–99)
GLUCOSE SERPL-MCNC: 91 MG/DL — SIGNIFICANT CHANGE UP (ref 70–99)
HCT VFR BLD CALC: 25.9 % — LOW (ref 34.5–45)
HGB BLD-MCNC: 8.6 G/DL — LOW (ref 11.5–15.5)
MAGNESIUM SERPL-MCNC: 2 MG/DL — SIGNIFICANT CHANGE UP (ref 1.6–2.6)
MCHC RBC-ENTMCNC: 30 PG — SIGNIFICANT CHANGE UP (ref 27–34)
MCHC RBC-ENTMCNC: 33.2 G/DL — SIGNIFICANT CHANGE UP (ref 32–36)
MCV RBC AUTO: 90.2 FL — SIGNIFICANT CHANGE UP (ref 80–100)
NRBC BLD AUTO-RTO: 0 /100 WBCS — SIGNIFICANT CHANGE UP (ref 0–0)
PHOSPHATE SERPL-MCNC: 4.4 MG/DL — SIGNIFICANT CHANGE UP (ref 2.5–4.5)
PLATELET # BLD AUTO: 471 K/UL — HIGH (ref 150–400)
POTASSIUM SERPL-MCNC: 4.4 MMOL/L — SIGNIFICANT CHANGE UP (ref 3.5–5.3)
POTASSIUM SERPL-SCNC: 4.4 MMOL/L — SIGNIFICANT CHANGE UP (ref 3.5–5.3)
RBC # BLD: 2.87 M/UL — LOW (ref 3.8–5.2)
RBC # FLD: 15.9 % — HIGH (ref 10.3–14.5)
SODIUM SERPL-SCNC: 137 MMOL/L — SIGNIFICANT CHANGE UP (ref 135–145)
WBC # BLD: 7.2 K/UL — SIGNIFICANT CHANGE UP (ref 3.8–10.5)
WBC # FLD AUTO: 7.2 K/UL — SIGNIFICANT CHANGE UP (ref 3.8–10.5)

## 2025-06-11 PROCEDURE — 83880 ASSAY OF NATRIURETIC PEPTIDE: CPT

## 2025-06-11 PROCEDURE — 96374 THER/PROPH/DIAG INJ IV PUSH: CPT

## 2025-06-11 PROCEDURE — 97116 GAIT TRAINING THERAPY: CPT

## 2025-06-11 PROCEDURE — 87641 MR-STAPH DNA AMP PROBE: CPT

## 2025-06-11 PROCEDURE — 86850 RBC ANTIBODY SCREEN: CPT

## 2025-06-11 PROCEDURE — 36415 COLL VENOUS BLD VENIPUNCTURE: CPT

## 2025-06-11 PROCEDURE — 83735 ASSAY OF MAGNESIUM: CPT

## 2025-06-11 PROCEDURE — P9040: CPT

## 2025-06-11 PROCEDURE — 93005 ELECTROCARDIOGRAM TRACING: CPT

## 2025-06-11 PROCEDURE — 97161 PT EVAL LOW COMPLEX 20 MIN: CPT

## 2025-06-11 PROCEDURE — 87640 STAPH A DNA AMP PROBE: CPT

## 2025-06-11 PROCEDURE — 74177 CT ABD & PELVIS W/CONTRAST: CPT

## 2025-06-11 PROCEDURE — 84484 ASSAY OF TROPONIN QUANT: CPT

## 2025-06-11 PROCEDURE — 36569 INSJ PICC 5 YR+ W/O IMAGING: CPT

## 2025-06-11 PROCEDURE — 82947 ASSAY GLUCOSE BLOOD QUANT: CPT

## 2025-06-11 PROCEDURE — 88341 IMHCHEM/IMCYTCHM EA ADD ANTB: CPT

## 2025-06-11 PROCEDURE — 85025 COMPLETE CBC W/AUTO DIFF WBC: CPT

## 2025-06-11 PROCEDURE — 84100 ASSAY OF PHOSPHORUS: CPT

## 2025-06-11 PROCEDURE — 71045 X-RAY EXAM CHEST 1 VIEW: CPT

## 2025-06-11 PROCEDURE — 84478 ASSAY OF TRIGLYCERIDES: CPT

## 2025-06-11 PROCEDURE — C9399: CPT

## 2025-06-11 PROCEDURE — 83605 ASSAY OF LACTIC ACID: CPT

## 2025-06-11 PROCEDURE — 84132 ASSAY OF SERUM POTASSIUM: CPT

## 2025-06-11 PROCEDURE — 86923 COMPATIBILITY TEST ELECTRIC: CPT

## 2025-06-11 PROCEDURE — 85027 COMPLETE CBC AUTOMATED: CPT

## 2025-06-11 PROCEDURE — 86901 BLOOD TYPING SEROLOGIC RH(D): CPT

## 2025-06-11 PROCEDURE — 83036 HEMOGLOBIN GLYCOSYLATED A1C: CPT

## 2025-06-11 PROCEDURE — 76705 ECHO EXAM OF ABDOMEN: CPT

## 2025-06-11 PROCEDURE — 97110 THERAPEUTIC EXERCISES: CPT

## 2025-06-11 PROCEDURE — 85014 HEMATOCRIT: CPT

## 2025-06-11 PROCEDURE — C1889: CPT

## 2025-06-11 PROCEDURE — S2900: CPT

## 2025-06-11 PROCEDURE — 74240 X-RAY XM UPR GI TRC 1CNTRST: CPT

## 2025-06-11 PROCEDURE — 88360 TUMOR IMMUNOHISTOCHEM/MANUAL: CPT

## 2025-06-11 PROCEDURE — 82962 GLUCOSE BLOOD TEST: CPT

## 2025-06-11 PROCEDURE — C1894: CPT

## 2025-06-11 PROCEDURE — 99285 EMERGENCY DEPT VISIT HI MDM: CPT | Mod: 25

## 2025-06-11 PROCEDURE — 85730 THROMBOPLASTIN TIME PARTIAL: CPT

## 2025-06-11 PROCEDURE — 36430 TRANSFUSION BLD/BLD COMPNT: CPT

## 2025-06-11 PROCEDURE — 80053 COMPREHEN METABOLIC PANEL: CPT

## 2025-06-11 PROCEDURE — 86900 BLOOD TYPING SEROLOGIC ABO: CPT

## 2025-06-11 PROCEDURE — 88307 TISSUE EXAM BY PATHOLOGIST: CPT

## 2025-06-11 PROCEDURE — 80048 BASIC METABOLIC PNL TOTAL CA: CPT

## 2025-06-11 PROCEDURE — 82330 ASSAY OF CALCIUM: CPT

## 2025-06-11 PROCEDURE — 85018 HEMOGLOBIN: CPT

## 2025-06-11 PROCEDURE — 85610 PROTHROMBIN TIME: CPT

## 2025-06-11 PROCEDURE — 82435 ASSAY OF BLOOD CHLORIDE: CPT

## 2025-06-11 PROCEDURE — C1751: CPT

## 2025-06-11 PROCEDURE — 84295 ASSAY OF SERUM SODIUM: CPT

## 2025-06-11 PROCEDURE — 74018 RADEX ABDOMEN 1 VIEW: CPT

## 2025-06-11 PROCEDURE — 88342 IMHCHEM/IMCYTCHM 1ST ANTB: CPT

## 2025-06-11 PROCEDURE — 83690 ASSAY OF LIPASE: CPT

## 2025-06-11 PROCEDURE — 71046 X-RAY EXAM CHEST 2 VIEWS: CPT

## 2025-06-11 PROCEDURE — 93306 TTE W/DOPPLER COMPLETE: CPT

## 2025-06-11 PROCEDURE — 82803 BLOOD GASES ANY COMBINATION: CPT

## 2025-06-11 PROCEDURE — 84134 ASSAY OF PREALBUMIN: CPT

## 2025-06-11 PROCEDURE — P9016: CPT

## 2025-06-11 RX ADMIN — Medication 975 MILLIGRAM(S): at 06:03

## 2025-06-11 RX ADMIN — Medication 1 APPLICATION(S): at 05:34

## 2025-06-11 RX ADMIN — HEPARIN SODIUM 5000 UNIT(S): 1000 INJECTION INTRAVENOUS; SUBCUTANEOUS at 05:34

## 2025-06-11 RX ADMIN — HEPARIN SODIUM 5000 UNIT(S): 1000 INJECTION INTRAVENOUS; SUBCUTANEOUS at 14:35

## 2025-06-11 RX ADMIN — Medication 975 MILLIGRAM(S): at 12:55

## 2025-06-11 RX ADMIN — Medication 975 MILLIGRAM(S): at 12:25

## 2025-06-11 RX ADMIN — AMLODIPINE BESYLATE 10 MILLIGRAM(S): 10 TABLET ORAL at 05:34

## 2025-06-11 RX ADMIN — Medication 40 MILLIGRAM(S): at 05:34

## 2025-06-11 RX ADMIN — Medication 975 MILLIGRAM(S): at 05:33

## 2025-06-11 NOTE — PROGRESS NOTE ADULT - NS ATTEND AMEND GEN_ALL_CORE FT
Patient care and plan discussed and reviewed with Advanced Care Provider. Plan as outlined above edited by me to reflect our discussion.   In addition, I participated in    - Ordering, reviewing, and interpreting labs, testing, and imaging.  - Reviewing prior hospitalization and outpatient records when necessary  - Counselling and educating patient and/or family regarding interpretation of aforementioned items and plan of care.  - Communicating with other health professionals (when not separately reported), and documenting clinical information in the electronic health record.

## 2025-06-11 NOTE — PROGRESS NOTE ADULT - PROVIDER SPECIALTY LIST ADULT
Cardiology
Nutrition Support
Surgery
Cardiology
Nutrition Support
Surgery
Gyn Onc
Nutrition Support
Surgery

## 2025-06-11 NOTE — PROGRESS NOTE ADULT - SUBJECTIVE AND OBJECTIVE BOX
Surgery Progress Note    Overnight events:  - NAEO    SUBJECTIVE:  - Patient seen and examined at bedside   --------------------------------------------------------------------------------------------------  OBJECTIVE:   Physical Exam:  General: AAOx3, NAD, lying comfortably in bed  HEENT: NC/AT  Respiratory: nonlabored breathing  Abdomen: non-distended, soft, appropriately tender  Extremities: WWP, no edema  --------------------------------------------------------------------------------------------------  V/S:  Vital Signs Last 24 Hrs  T(C): 37 (10 Omar 2025 21:21), Max: 37 (10 Omar 2025 05:03)  T(F): 98.6 (10 Omar 2025 21:21), Max: 98.6 (10 Omar 2025 05:03)  HR: 79 (10 Omar 2025 21:21) (75 - 80)  BP: 146/79 (10 Omar 2025 21:21) (124/63 - 153/83)  BP(mean): --  RR: 18 (10 Omar 2025 21:21) (18 - 18)  SpO2: 98% (10 Omar 2025 21:21) (98% - 100%)    Parameters below as of 10 Omar 2025 21:21  Patient On (Oxygen Delivery Method): room air        --------------------------------------------------------------------------------------------------  I/Os:    09 Jun 2025 07:01  -  10 Jun 2025 07:00  --------------------------------------------------------  IN:    Fat Emulsion (Fish Oil &amp; Plant Based) 20% Infusion: 10.4 mL    IV PiggyBack: 100 mL    Oral Fluid: 480 mL    TPN (Total Parenteral Nutrition): 332 mL  Total IN: 922.4 mL    OUT:    Voided (mL): 2250 mL  Total OUT: 2250 mL    Total NET: -1327.6 mL      10 Omar 2025 07:01  -  11 Jun 2025 01:08  --------------------------------------------------------  IN:    Oral Fluid: 860 mL    TPN (Total Parenteral Nutrition): 75 mL  Total IN: 935 mL    OUT:    Voided (mL): 1100 mL  Total OUT: 1100 mL    Total NET: -165 mL        --------------------------------------------------------------------------------------------------  LABS:                        8.2    7.25  )-----------( 409      ( 10 Omar 2025 06:52 )             24.7     10 Omar 2025 06:52    137    |  103    |  30     ----------------------------<  102    4.4     |  22     |  0.87     Ca    9.1        10 Omar 2025 06:52  Phos  4.2       10 Omar 2025 06:52  Mg     2.1       10 Omar 2025 06:52    TPro  5.9    /  Alb  3.2    /  TBili  0.2    /  DBili  x      /  AST  17     /  ALT  10     /  AlkPhos  48     09 Jun 2025 07:44      CAPILLARY BLOOD GLUCOSE      POCT Blood Glucose.: 80 mg/dL (10 Omar 2025 21:05)  POCT Blood Glucose.: 118 mg/dL (10 Omar 2025 17:44)  POCT Blood Glucose.: 103 mg/dL (10 Omar 2025 12:19)  POCT Blood Glucose.: 159 mg/dL (10 Omar 2025 07:47)        LIVER FUNCTIONS - ( 09 Jun 2025 07:44 )  Alb: 3.2 g/dL / Pro: 5.9 g/dL / ALK PHOS: 48 U/L / ALT: 10 U/L / AST: 17 U/L / GGT: x             Urinalysis Basic - ( 10 Omar 2025 06:52 )    Color: x / Appearance: x / SG: x / pH: x  Gluc: 102 mg/dL / Ketone: x  / Bili: x / Urobili: x   Blood: x / Protein: x / Nitrite: x   Leuk Esterase: x / RBC: x / WBC x   Sq Epi: x / Non Sq Epi: x / Bacteria: x      --------------------------------------------------------------------------------------------------  MEDICATIONS  (STANDING):  acetaminophen     Tablet .. 975 milliGRAM(s) Oral every 6 hours  amLODIPine   Tablet 10 milliGRAM(s) Oral daily  chlorhexidine 2% Cloths 1 Application(s) Topical <User Schedule>  dextrose 5%. 1000 milliLiter(s) (50 mL/Hr) IV Continuous <Continuous>  dextrose 5%. 1000 milliLiter(s) (100 mL/Hr) IV Continuous <Continuous>  dextrose 50% Injectable 25 Gram(s) IV Push once  dextrose 50% Injectable 12.5 Gram(s) IV Push once  dextrose 50% Injectable 25 Gram(s) IV Push once  dextrose 50% Injectable 25 Gram(s) IV Push once  glucagon  Injectable 1 milliGRAM(s) IntraMuscular once  heparin   Injectable 5000 Unit(s) SubCutaneous every 8 hours  insulin lispro (ADMELOG) corrective regimen sliding scale   SubCutaneous three times a day before meals  levothyroxine Injectable 75 MICROGram(s) IV Push at bedtime  pantoprazole  Injectable 40 milliGRAM(s) IV Push two times a day    MEDICATIONS  (PRN):  ondansetron Injectable 4 milliGRAM(s) IV Push every 8 hours PRN Nausea and/or Vomiting    --------------------------------------------------------------------------------------------------

## 2025-06-11 NOTE — PROGRESS NOTE ADULT - ASSESSMENT
79F w/ PMHx DM2, HTN, hypothyroidism (s/p thyroidectomy), CARMEN, ovarian cyst, goiter, gastroparesis, glaucoma; PSHx hysterectomy (age ~30s), thyroidectomy, breast surgery; admitted 6/2/25 for GOO due to metastatic mucinous carcinoma; s/p robotic SBR w/ GJ 6/3/25; on TPN, UGI without leak, now tolerating FLD.     Plan  - Regular diet  - Protonix BID  - OOB and ambulation  - IS  - DVT ppx  -PT: No skilled PT needs  Dispo planning     [facility] surgery  [id number]

## 2025-06-11 NOTE — PROGRESS NOTE ADULT - SUBJECTIVE AND OBJECTIVE BOX
DATE OF SERVICE: 06-11-25 @ 10:45    Patient is a 79y old  Female who presents with a chief complaint of Intestinal obstruction    Chart reviewed, events noted. This is a "79F with PMHx DM2, stem cell donor, HTN, CARMEN, hypothyroidism, ovarian cyst, goiter and newly diagnosed metastatic mucinous carcinoma presenting with acute onset of nausea, NBNB emesis, poor PO intake for the last 3-4 days c/w GOO."     (02 Jun 2025 09:44)      INTERVAL HISTORY: Feels ok.     REVIEW OF SYSTEMS:  CONSTITUTIONAL: No weakness  EYES/ENT: No visual changes;  No throat pain   NECK: No pain or stiffness  RESPIRATORY: No cough, wheezing; No shortness of breath  CARDIOVASCULAR: No chest pain or palpitations  GASTROINTESTINAL: No abdominal  pain. No nausea, vomiting, or hematemesis  GENITOURINARY: No dysuria, frequency or hematuria  NEUROLOGICAL: No stroke like symptoms  SKIN: No rashes    	  MEDICATIONS:  amLODIPine   Tablet 10 milliGRAM(s) Oral daily        PHYSICAL EXAM:  T(C): 36.3 (06-11-25 @ 09:23), Max: 37.1 (06-11-25 @ 04:57)  HR: 75 (06-11-25 @ 09:23) (74 - 79)  BP: 115/74 (06-11-25 @ 09:23) (114/66 - 155/73)  RR: 18 (06-11-25 @ 09:23) (18 - 18)  SpO2: 100% (06-11-25 @ 09:23) (98% - 100%)  Wt(kg): --  I&O's Summary    10 Omar 2025 07:01  -  11 Jun 2025 07:00  --------------------------------------------------------  IN: 935 mL / OUT: 1900 mL / NET: -965 mL          Appearance: In no distress	  HEENT:    PERRL, EOMI	  Cardiovascular:  S1 S2, No JVD  Respiratory: Lungs clear to auscultation	  Gastrointestinal:  Soft, Non-tender, + BS	  Vascularature:  No edema of LE  Psychiatric: Appropriate affect   Neuro: no acute focal deficits                               8.6    7.20  )-----------( 471      ( 11 Jun 2025 07:46 )             25.9     06-11    137  |  103  |  20  ----------------------------<  91  4.4   |  24  |  1.08    Ca    9.1      11 Jun 2025 07:45  Phos  4.4     06-11  Mg     2.0     06-11          Labs personally reviewed      ASSESSMENT/PLAN: 	    79F w/ T2DM, stem cell donor, HTN, HLD, CARMEN here for new dx of metastatic mucinous CA and inability to tolerate PO, currently w/ NGT for SBO. Cardiology consulted for preop cardiac eval     Problem/Plan -1: Cardiac Risk Stratification  -EKG w/ NSR no significant STT changes   -s/p TTE ECHO normal EF, no significant VHD   -planned for surgical resection for bypass   -optimized from cardiac standpoint to proceed   -tolerated well    Problem/Plan -2: HTN  - c/w amlodipine 10mg PO daily    Problem/Plan -3: DVT PPx  - c/w SQ heparin        Gilma Luther, AG-NP   Rj Schwab DO [facility]  Cardiovascular Medicine  [address], [address]  Available through call or text on [employer] TE[employer]  Office: [phone]

## 2025-06-11 NOTE — PROGRESS NOTE ADULT - NUTRITIONAL ASSESSMENT
This patient has been assessed with a concern for Malnutrition and has been determined to have a diagnosis/diagnoses of Severe protein-calorie malnutrition.    This patient is being managed with:   Parenteral Nutrition - Adult-  Entered: Jun 6 2025  5:00PM    lipid fat emulsion (Fish Oil and Plant Based) 20% Infusion-[Known as SMOFLIPID 20% Infusion]  60 Gram(s) in IV Solution 300 milliLiter(s) infuse at 25 mL/Hr  Dose Rate: 25 mL/Hr Infuse Over: 12 Hours  Administration Instructions: Use 1.2 micron in-line filter  Entered: Jun 6 2025  5:00PM    Diet NPO-  Entered: Jun 4 2025  1:45AM  
This patient has been assessed with a concern for Malnutrition and has been determined to have a diagnosis/diagnoses of Severe protein-calorie malnutrition.    This patient is being managed with:   Parenteral Nutrition - Adult-  Entered: Jun 4 2025  5:00PM    lipid fat emulsion (Fish Oil and Plant Based) 20% Infusion-[Known as SMOFLIPID 20% Infusion]  60 Gram(s) in IV Solution 300 milliLiter(s) infuse at 25 mL/Hr  Dose Rate: 25 mL/Hr Infuse Over: 12 Hours  Administration Instructions: Use 1.2 micron in-line filter  Entered: Jun 4 2025  5:00PM    Diet NPO-  Entered: Jun 4 2025  1:45AM  
This patient has been assessed with a concern for Malnutrition and has been determined to have a diagnosis/diagnoses of Severe protein-calorie malnutrition.    This patient is being managed with:   Parenteral Nutrition - Adult-  Entered: Jun 8 2025  5:00PM    Diet Full Liquid-  Consistent Carbohydrate {Evening Snack} (CSTCHOSN)  Entered: Jun 8 2025 10:24AM    lipid fat emulsion (Fish Oil and Plant Based) 20% Infusion-[Known as SMOFLIPID 20% Infusion]  60 Gram(s) in IV Solution 300 milliLiter(s) infuse at 25 mL/Hr  Dose Rate: 25 mL/Hr Infuse Over: 12 Hours  Administration Instructions: Use 1.2 micron in-line filter  Entered: Jun 8 2025  9:54AM  
This patient has been assessed with a concern for Malnutrition and has been determined to have a diagnosis/diagnoses of Severe protein-calorie malnutrition.    This patient is being managed with:   Diet NPO-  Entered: Jun 4 2025  1:45AM    lipid fat emulsion (Fish Oil and Plant Based) 20% Infusion-[Known as SMOFLIPID 20% Infusion]  60 Gram(s) in IV Solution 300 milliLiter(s) infuse at 25 mL/Hr  Dose Rate: 25 mL/Hr Infuse Over: 12 Hours  Administration Instructions: Use 1.2 micron in-line filter  Entered: Omar  3 2025  5:00PM    Parenteral Nutrition - Adult-  Entered: Omar  3 2025  5:00PM  
This patient has been assessed with a concern for Malnutrition and has been determined to have a diagnosis/diagnoses of Severe protein-calorie malnutrition.    This patient is being managed with:   Diet Regular-  Consistent Carbohydrate {No Snacks} (CSTCHO)  Entered: Omar 10 2025  9:15AM  
This patient has been assessed with a concern for Malnutrition and has been determined to have a diagnosis/diagnoses of Severe protein-calorie malnutrition.    This patient is being managed with:   Parenteral Nutrition - Adult-  Entered: Jun 5 2025  5:00PM    lipid fat emulsion (Fish Oil and Plant Based) 20% Infusion-[Known as SMOFLIPID 20% Infusion]  60 Gram(s) in IV Solution 300 milliLiter(s) infuse at 25 mL/Hr  Dose Rate: 25 mL/Hr Infuse Over: 12 Hours  Administration Instructions: Use 1.2 micron in-line filter  Entered: Jun 5 2025  5:00PM    Diet NPO-  Entered: Jun 4 2025  1:45AM  
This patient has been assessed with a concern for Malnutrition and has been determined to have a diagnosis/diagnoses of Severe protein-calorie malnutrition.    This patient is being managed with:   lipid fat emulsion (Fish Oil and Plant Based) 20% Infusion-[Known as SMOFLIPID 20% Infusion]  20 Gram(s) in IV Solution 100 milliLiter(s) infuse at 8.3 mL/Hr  Dose Rate: 8.3 mL/Hr Infuse Over: 12 Hours  Administration Instructions: Use 1.2 micron in-line filter  Entered: Jun 2 2025  5:00PM    Parenteral Nutrition - Adult-  Entered: Jun 2 2025  5:00PM    Diet NPO-  Entered: May 31 2025  7:47PM  
This patient has been assessed with a concern for Malnutrition and has been determined to have a diagnosis/diagnoses of Severe protein-calorie malnutrition.    This patient is being managed with:   lipid fat emulsion (Fish Oil and Plant Based) 20% Infusion-[Known as SMOFLIPID 20% Infusion]  20 Gram(s) in IV Solution 100 milliLiter(s) infuse at 8.3 mL/Hr  Dose Rate: 8.3 mL/Hr Infuse Over: 12 Hours  Administration Instructions: Use 1.2 micron in-line filter  Entered: Jun 9 2025  5:00PM    Parenteral Nutrition - Adult-  Entered: Jun 9 2025  5:00PM    Diet Full Liquid-  Consistent Carbohydrate {Evening Snack} (CSTCHOSN)  Entered: Jun 8 2025 10:24AM  
This patient has been assessed with a concern for Malnutrition and has been determined to have a diagnosis/diagnoses of Severe protein-calorie malnutrition.    This patient is being managed with:   Diet NPO-  Entered: Jun 4 2025  1:45AM    lipid fat emulsion (Fish Oil and Plant Based) 20% Infusion-[Known as SMOFLIPID 20% Infusion]  60 Gram(s) in IV Solution 300 milliLiter(s) infuse at 25 mL/Hr  Dose Rate: 25 mL/Hr Infuse Over: 12 Hours  Administration Instructions: Use 1.2 micron in-line filter  Entered: Omar  3 2025  5:00PM    Parenteral Nutrition - Adult-  Entered: Omar  3 2025  5:00PM

## 2025-06-11 NOTE — DISCHARGE NOTE NURSING/CASE MANAGEMENT/SOCIAL WORK - PATIENT PORTAL LINK FT
You can access the FollowMyHealth Patient Portal offered by [facility] by registering at the following website: [email or web address]. By joining [facility]’s FollowMyHealth portal, you will also be able to view your health information using other applications (apps) compatible with our system.

## 2025-06-11 NOTE — DISCHARGE NOTE NURSING/CASE MANAGEMENT/SOCIAL WORK - FINANCIAL ASSISTANCE
[facility] provides services at a reduced cost to those who are determined to be eligible through [facility]’s financial assistance program. Information regarding [facility]’s financial assistance program can be found by going to [email or web address] or by calling [phone].

## 2025-06-14 NOTE — ED PROVIDER NOTE - COVID-19 RESULT DATE/TIME
"EP Problems:  1.  Multifocal atrial tachycardia     Cardiology Problems:  1.  Pulmonary HTN  2.  RVH     Medical Problems:  1.  Crest syndrome  2.  Pulmonary fibrosis  3.  Raynaud's phenomenon  4.  GERD  5.  Anxiety disorder  6.  Nephrolithiasis    Patient ID:  Elaine Juárez is a 65 y.o. female with problem list as above as above who EP is following for multifocal atrial tachycardia.    Subjective:  Heart rates are much better improved after verapamil initiation.    Objective:  /73 (BP Location: Right arm, Patient Position: Lying)   Pulse 91   Temp 97.2 °F (36.2 °C) (Oral)   Resp 16   Ht 160 cm (63\")   Wt 70.8 kg (156 lb 1.6 oz)   SpO2 91%   BMI 27.65 kg/m²     Awake, alert, no acute distress  Chronically ill-appearing, wearing oxygen  Bilateral crackles  Tachycardic, regular  Warm, well-perfused    Labs today:  Lab Results   Component Value Date    GLUCOSE 117 (H) 06/14/2025    CALCIUM 8.2 (L) 06/14/2025     (L) 06/14/2025    K 4.8 06/14/2025    CO2 29.0 06/14/2025    BUN 29.0 (H) 06/14/2025    CREATININE 0.83 06/14/2025    EGFR 77.4 06/14/2025     Lab Results   Component Value Date    WBC 10.18 06/14/2025    HGB 11.7 (L) 06/14/2025    HCT 37.8 06/14/2025     06/14/2025     Inpatient telemetry reviewed: Sinus rhythm and sinus tachycardia, occasional PACs, short runs of SVT.  No obvious evidence of further multifocal atrial tachycardia.    Assessment:  Multifocal atrial tachycardia    Plan:  - Doing much better on current dose of verapamil  - Continue verapamil 100 mg nightly  - No additional medication changes from EP standpoint  -EP to sign off at this time, please call if additional questions arise    Part of this note may be an electronic transcription/translation of spoken language to printed text using the Dragon Dictation System.    " 02-Feb-2025 11:57

## 2025-06-24 ENCOUNTER — APPOINTMENT (OUTPATIENT)
Dept: HEMATOLOGY ONCOLOGY | Facility: CLINIC | Age: 80
End: 2025-06-24
Payer: MEDICARE

## 2025-06-24 ENCOUNTER — NON-APPOINTMENT (OUTPATIENT)
Age: 80
End: 2025-06-24

## 2025-06-24 ENCOUNTER — RESULT REVIEW (OUTPATIENT)
Age: 80
End: 2025-06-24

## 2025-06-24 ENCOUNTER — APPOINTMENT (OUTPATIENT)
Dept: SURGICAL ONCOLOGY | Facility: CLINIC | Age: 80
End: 2025-06-24
Payer: MEDICARE

## 2025-06-24 VITALS
HEART RATE: 71 BPM | WEIGHT: 115.74 LBS | HEIGHT: 61 IN | SYSTOLIC BLOOD PRESSURE: 123 MMHG | RESPIRATION RATE: 17 BRPM | DIASTOLIC BLOOD PRESSURE: 78 MMHG | OXYGEN SATURATION: 95 % | TEMPERATURE: 98.2 F | BODY MASS INDEX: 21.85 KG/M2

## 2025-06-24 VITALS
OXYGEN SATURATION: 96 % | WEIGHT: 116 LBS | BODY MASS INDEX: 21.9 KG/M2 | HEIGHT: 61 IN | HEART RATE: 66 BPM | RESPIRATION RATE: 16 BRPM | SYSTOLIC BLOOD PRESSURE: 132 MMHG | DIASTOLIC BLOOD PRESSURE: 78 MMHG

## 2025-06-24 PROCEDURE — 99214 OFFICE O/P EST MOD 30 MIN: CPT

## 2025-06-24 PROCEDURE — 99024 POSTOP FOLLOW-UP VISIT: CPT

## 2025-06-25 LAB
APTT BLD: 35.2 SEC
INR PPP: 1.06 RATIO
IRON SATN MFR SERPL: 9 %
IRON SERPL-MCNC: 29 UG/DL
PT BLD: 12.5 SEC
TIBC SERPL-MCNC: 324 UG/DL
UIBC SERPL-MCNC: 296 UG/DL

## 2025-06-27 ENCOUNTER — APPOINTMENT (OUTPATIENT)
Dept: GYNECOLOGIC ONCOLOGY | Facility: CLINIC | Age: 80
End: 2025-06-27
Payer: MEDICARE

## 2025-06-27 VITALS
RESPIRATION RATE: 16 BRPM | HEIGHT: 61 IN | HEART RATE: 88 BPM | WEIGHT: 113 LBS | OXYGEN SATURATION: 94 % | BODY MASS INDEX: 21.34 KG/M2 | SYSTOLIC BLOOD PRESSURE: 135 MMHG | TEMPERATURE: 98.3 F | DIASTOLIC BLOOD PRESSURE: 79 MMHG

## 2025-06-27 PROCEDURE — 99213 OFFICE O/P EST LOW 20 MIN: CPT | Mod: 24

## 2025-06-27 PROCEDURE — 99459 PELVIC EXAMINATION: CPT | Mod: NC

## 2025-06-30 ENCOUNTER — EMERGENCY (EMERGENCY)
Facility: HOSPITAL | Age: 80
LOS: 1 days | End: 2025-06-30
Attending: EMERGENCY MEDICINE
Payer: COMMERCIAL

## 2025-06-30 ENCOUNTER — NON-APPOINTMENT (OUTPATIENT)
Age: 80
End: 2025-06-30

## 2025-06-30 VITALS
HEIGHT: 60 IN | RESPIRATION RATE: 18 BRPM | WEIGHT: 113.98 LBS | SYSTOLIC BLOOD PRESSURE: 122 MMHG | HEART RATE: 85 BPM | OXYGEN SATURATION: 100 % | TEMPERATURE: 97 F | DIASTOLIC BLOOD PRESSURE: 82 MMHG

## 2025-06-30 VITALS
TEMPERATURE: 98 F | RESPIRATION RATE: 18 BRPM | HEART RATE: 78 BPM | DIASTOLIC BLOOD PRESSURE: 80 MMHG | SYSTOLIC BLOOD PRESSURE: 132 MMHG | OXYGEN SATURATION: 98 %

## 2025-06-30 DIAGNOSIS — Z98.890 OTHER SPECIFIED POSTPROCEDURAL STATES: Chronic | ICD-10-CM

## 2025-06-30 DIAGNOSIS — Z90.710 ACQUIRED ABSENCE OF BOTH CERVIX AND UTERUS: Chronic | ICD-10-CM

## 2025-06-30 DIAGNOSIS — Z87.59 PERSONAL HISTORY OF OTHER COMPLICATIONS OF PREGNANCY, CHILDBIRTH AND THE PUERPERIUM: Chronic | ICD-10-CM

## 2025-06-30 LAB
ALBUMIN SERPL ELPH-MCNC: 4.2 G/DL — SIGNIFICANT CHANGE UP (ref 3.3–5)
ALP SERPL-CCNC: 59 U/L — SIGNIFICANT CHANGE UP (ref 40–120)
ALT FLD-CCNC: 8 U/L — LOW (ref 10–45)
ANION GAP SERPL CALC-SCNC: 13 MMOL/L — SIGNIFICANT CHANGE UP (ref 5–17)
APPEARANCE UR: CLEAR — SIGNIFICANT CHANGE UP
AST SERPL-CCNC: 14 U/L — SIGNIFICANT CHANGE UP (ref 10–40)
BASOPHILS # BLD AUTO: 0.01 K/UL — SIGNIFICANT CHANGE UP (ref 0–0.2)
BASOPHILS NFR BLD AUTO: 0.2 % — SIGNIFICANT CHANGE UP (ref 0–2)
BILIRUB SERPL-MCNC: 0.3 MG/DL — SIGNIFICANT CHANGE UP (ref 0.2–1.2)
BILIRUB UR-MCNC: NEGATIVE — SIGNIFICANT CHANGE UP
BUN SERPL-MCNC: 23 MG/DL — SIGNIFICANT CHANGE UP (ref 7–23)
CALCIUM SERPL-MCNC: 9.9 MG/DL — SIGNIFICANT CHANGE UP (ref 8.4–10.5)
CHLORIDE SERPL-SCNC: 102 MMOL/L — SIGNIFICANT CHANGE UP (ref 96–108)
CO2 SERPL-SCNC: 19 MMOL/L — LOW (ref 22–31)
COLOR SPEC: YELLOW — SIGNIFICANT CHANGE UP
CREAT SERPL-MCNC: 1.37 MG/DL — HIGH (ref 0.5–1.3)
DIFF PNL FLD: NEGATIVE — SIGNIFICANT CHANGE UP
EGFR: 39 ML/MIN/1.73M2 — LOW
EGFR: 39 ML/MIN/1.73M2 — LOW
EOSINOPHIL # BLD AUTO: 0.06 K/UL — SIGNIFICANT CHANGE UP (ref 0–0.5)
EOSINOPHIL NFR BLD AUTO: 1.1 % — SIGNIFICANT CHANGE UP (ref 0–6)
FLUAV AG NPH QL: SIGNIFICANT CHANGE UP
FLUBV AG NPH QL: SIGNIFICANT CHANGE UP
GAS PNL BLDV: SIGNIFICANT CHANGE UP
GLUCOSE SERPL-MCNC: 106 MG/DL — HIGH (ref 70–99)
GLUCOSE UR QL: NEGATIVE MG/DL — SIGNIFICANT CHANGE UP
HCT VFR BLD CALC: 33.5 % — LOW (ref 34.5–45)
HGB BLD-MCNC: 10.7 G/DL — LOW (ref 11.5–15.5)
IMM GRANULOCYTES # BLD AUTO: 0.02 K/UL — SIGNIFICANT CHANGE UP (ref 0–0.07)
IMM GRANULOCYTES NFR BLD AUTO: 0.4 % — SIGNIFICANT CHANGE UP (ref 0–0.9)
KETONES UR QL: NEGATIVE MG/DL — SIGNIFICANT CHANGE UP
LEUKOCYTE ESTERASE UR-ACNC: NEGATIVE — SIGNIFICANT CHANGE UP
LIDOCAIN IGE QN: 38 U/L — SIGNIFICANT CHANGE UP (ref 7–60)
LYMPHOCYTES # BLD AUTO: 1.43 K/UL — SIGNIFICANT CHANGE UP (ref 1–3.3)
LYMPHOCYTES NFR BLD AUTO: 25.1 % — SIGNIFICANT CHANGE UP (ref 13–44)
MCHC RBC-ENTMCNC: 28.6 PG — SIGNIFICANT CHANGE UP (ref 27–34)
MCHC RBC-ENTMCNC: 31.9 G/DL — LOW (ref 32–36)
MCV RBC AUTO: 89.6 FL — SIGNIFICANT CHANGE UP (ref 80–100)
MONOCYTES # BLD AUTO: 0.59 K/UL — SIGNIFICANT CHANGE UP (ref 0–0.9)
MONOCYTES NFR BLD AUTO: 10.4 % — SIGNIFICANT CHANGE UP (ref 2–14)
NEUTROPHILS # BLD AUTO: 3.58 K/UL — SIGNIFICANT CHANGE UP (ref 1.8–7.4)
NEUTROPHILS NFR BLD AUTO: 62.8 % — SIGNIFICANT CHANGE UP (ref 43–77)
NITRITE UR-MCNC: NEGATIVE — SIGNIFICANT CHANGE UP
NRBC # BLD AUTO: 0 K/UL — SIGNIFICANT CHANGE UP (ref 0–0)
NRBC # FLD: 0 K/UL — SIGNIFICANT CHANGE UP (ref 0–0)
NRBC BLD AUTO-RTO: 0 /100 WBCS — SIGNIFICANT CHANGE UP (ref 0–0)
PH UR: 5 — SIGNIFICANT CHANGE UP (ref 5–8)
PLATELET # BLD AUTO: 260 K/UL — SIGNIFICANT CHANGE UP (ref 150–400)
PMV BLD: 9.8 FL — SIGNIFICANT CHANGE UP (ref 7–13)
POTASSIUM SERPL-MCNC: 4.5 MMOL/L — SIGNIFICANT CHANGE UP (ref 3.5–5.3)
POTASSIUM SERPL-SCNC: 4.5 MMOL/L — SIGNIFICANT CHANGE UP (ref 3.5–5.3)
PROT SERPL-MCNC: 7.5 G/DL — SIGNIFICANT CHANGE UP (ref 6–8.3)
PROT UR-MCNC: NEGATIVE MG/DL — SIGNIFICANT CHANGE UP
RBC # BLD: 3.74 M/UL — LOW (ref 3.8–5.2)
RBC # FLD: 14.6 % — HIGH (ref 10.3–14.5)
RSV RNA NPH QL NAA+NON-PROBE: SIGNIFICANT CHANGE UP
SARS-COV-2 RNA SPEC QL NAA+PROBE: SIGNIFICANT CHANGE UP
SODIUM SERPL-SCNC: 134 MMOL/L — LOW (ref 135–145)
SOURCE RESPIRATORY: SIGNIFICANT CHANGE UP
SP GR SPEC: 1.01 — SIGNIFICANT CHANGE UP (ref 1–1.03)
UROBILINOGEN FLD QL: 0.2 MG/DL — SIGNIFICANT CHANGE UP (ref 0.2–1)
WBC # BLD: 5.69 K/UL — SIGNIFICANT CHANGE UP (ref 3.8–10.5)
WBC # FLD AUTO: 5.69 K/UL — SIGNIFICANT CHANGE UP (ref 3.8–10.5)

## 2025-06-30 PROCEDURE — 87637 SARSCOV2&INF A&B&RSV AMP PRB: CPT

## 2025-06-30 PROCEDURE — 80053 COMPREHEN METABOLIC PANEL: CPT

## 2025-06-30 PROCEDURE — 82803 BLOOD GASES ANY COMBINATION: CPT

## 2025-06-30 PROCEDURE — 83690 ASSAY OF LIPASE: CPT

## 2025-06-30 PROCEDURE — 84295 ASSAY OF SERUM SODIUM: CPT

## 2025-06-30 PROCEDURE — 99284 EMERGENCY DEPT VISIT MOD MDM: CPT | Mod: 25

## 2025-06-30 PROCEDURE — 74177 CT ABD & PELVIS W/CONTRAST: CPT

## 2025-06-30 PROCEDURE — 0241U: CPT

## 2025-06-30 PROCEDURE — 99285 EMERGENCY DEPT VISIT HI MDM: CPT

## 2025-06-30 PROCEDURE — 82947 ASSAY GLUCOSE BLOOD QUANT: CPT

## 2025-06-30 PROCEDURE — 85018 HEMOGLOBIN: CPT

## 2025-06-30 PROCEDURE — 83605 ASSAY OF LACTIC ACID: CPT

## 2025-06-30 PROCEDURE — 81003 URINALYSIS AUTO W/O SCOPE: CPT

## 2025-06-30 PROCEDURE — 82330 ASSAY OF CALCIUM: CPT

## 2025-06-30 PROCEDURE — 36000 PLACE NEEDLE IN VEIN: CPT | Mod: XU

## 2025-06-30 PROCEDURE — 85014 HEMATOCRIT: CPT

## 2025-06-30 PROCEDURE — 84132 ASSAY OF SERUM POTASSIUM: CPT

## 2025-06-30 PROCEDURE — 82435 ASSAY OF BLOOD CHLORIDE: CPT

## 2025-06-30 PROCEDURE — 85025 COMPLETE CBC W/AUTO DIFF WBC: CPT

## 2025-06-30 PROCEDURE — 74177 CT ABD & PELVIS W/CONTRAST: CPT | Mod: 26

## 2025-06-30 RX ORDER — DOCUSATE SODIUM 100 MG/1
100 CAPSULE ORAL TWICE DAILY
Qty: 60 | Refills: 5 | Status: ACTIVE | COMMUNITY
Start: 2025-06-30 | End: 1900-01-01

## 2025-06-30 RX ORDER — PROCHLORPERAZINE MALEATE 10 MG/1
10 TABLET ORAL EVERY 6 HOURS
Qty: 120 | Refills: 3 | Status: ACTIVE | COMMUNITY
Start: 2025-06-30 | End: 1900-01-01

## 2025-06-30 RX ADMIN — Medication 1000 MILLILITER(S): at 10:55

## 2025-06-30 NOTE — ED ADULT NURSE NOTE - OBJECTIVE STATEMENT
PT is an 79 year old A&OX4 female with PMH of gastroparesis, anemia, prediabetes with recently diagnosed cancer after finding an ovarian mass and PSH of robotic assisted bilateral salpingo-oophorectomy on 05/06/2025 as well as omentectomy who presents to the ED from home with c/o nausea and 1 episode of vomiting. PT states she was discharged mid-month and during her F/U was endorsing nausea and vomiting to her surgeon that resolved. PT was told if she has any further episodes to come to the ED. PT endorsing nausea and 1 episode of vomiting last night so PT was referred to the ED for CT. PT states she feels at her baseline now and denies chest pain, SOB, N/V/D, dizziness, fevers/chills, urinary symptoms, and abdominal pain. PT is resting comfortably in bed, breathing unlabored on room air, and speaking in complete sentences. Abdomen is soft, non-tender, and non-distended. Skin is warm and dry, no diaphoresis noted. No edema noted to B/L extremities. Strong strength in B/L extremities, sensation intact. IV access established 20G in LAC. PT placed in hospital gown. PT ambulatory with steady gait. Safety and comfort maintained. Family at the bedside.

## 2025-06-30 NOTE — ED PROVIDER NOTE - PROGRESS NOTE DETAILS
Attending Onur: I received sign out on this patient. I am aware of the previously determined ongoing plan of care and what, if any, tests/consults are pending from the previous provider. I am available for supervision of the ongoing plan of care for the Resident/WISAM/Fellow/Student.

## 2025-06-30 NOTE — CONSULT NOTE ADULT - SUBJECTIVE AND OBJECTIVE BOX
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 Patient reports having bouts of nausea but x3 episodes of emesis since surgery.     In the ED, patient was afebrile, VSS, WBC WNL.     PMH/PSH: Glaucoma    HTN (hypertension)    Type II diabetes mellitus    Multiple thyroid nodules    Hyperthyroidism    Obese    Ovarian cyst    Gastroparesis    Iron deficiency anemia    H/O: hysterectomy    H/O     History of thyroidectomy    History of breast surgery        MEDS:    ALLERGIES: NKDA    REVIEW OF SYSTEMS: All ROS negative except as per HPI.  ____________________________________________    VITALS:T(C): 36.6, Max: 36.6 ()  T(F): 97.9, Max: 97.9 ()  HR: 95 (75 - 95)  BP: 117/83 (101/62 - 122/82)  BP(mean): 75 (75 - 75)  RR: 18 (18 - 18)  SpO2: 97% (97% - 100%) room air         PHYSICAL EXAM:  General: AAOx3, no acute distress.  Respiratory: breathing comfortably, no increased WOB   Abdomen: soft, nontender, nondistended, no rebound, no guarding  Extremities: Moves all four.   ____________________________________________    LABS:                      10.7  5.69 )-----------( 260    ( 2025 10:59 )             33.5  134[L]  |  102  |  23  ----------------------------<  106[H]    ()  4.5   |  19[L]  |  1.37[H]          Ca    9.9      06-30  Mg    x  Phos  x        LIVER FUNCTIONS - ( 2025 10:59 )  Alb: 4.2 g/dL / Pro: 7.5 g/dL / ALK PHOS: 59 U/L / ALT: 8 U/L / AST: 14 U/L / GGT: x        Urinalysis Basic - ( 2025 14:23 )    Color: Yellow / Appearance: Clear / S.011 / pH: x  Gluc: x / Ketone: x  / Bili: Negative / Urobili: 0.2 mg/dL   Blood: x / Protein: Negative mg/dL / Nitrite: Negative   Leuk Esterase: Negative / RBC: x / WBC x   Sq Epi: x / Non Sq Epi: x / Bacteria: x      ____________________________________________    RADIOLOGY & ADDITIONAL STUDIES:
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 Patient reports having bouts of nausea but x3 episodes of emesis since surgery.     Allergies:  No Known Allergies      Medications:      PMHX/PSHX:  Glaucoma    HTN (hypertension)    Type II diabetes mellitus    Multiple thyroid nodules    Hyperthyroidism    Obese    Ovarian cyst    Gastroparesis    Iron deficiency anemia    H/O: hysterectomy    H/O     History of thyroidectomy    History of breast surgery        Family history:  FH: dementia (Mother)        Social History:     ROS:     General:  No wt loss, fevers, chills, night sweats, fatigue,   Eyes:  Good vision, no reported pain  ENT:  No sore throat, pain, runny nose, dysphagia  CV:  No pain, palpitations, hypo/hypertension  Resp:  No dyspnea, cough, tachypnea, wheezing  GI:  No pain, No nausea, No vomiting, No diarrhea, No constipation, No weight loss, No fever, No pruritis, No rectal bleeding, No tarry stools, No dysphagia,  :  No pain, bleeding, incontinence, nocturia  Muscle:  No pain, weakness  Neuro:  No weakness, tingling, memory problems  Psych:  No fatigue, insomnia, mood problems, depression  Endocrine:  No polyuria, polydipsia, cold/heat intolerance  Heme:  No petechiae, ecchymosis, easy bruisability  Skin:  No rash, tattoos, scars, edema      PHYSICAL EXAM:   Vital Signs:  Vital Signs Last 24 Hrs  T(C): 36.6 (2025 14:10), Max: 36.6 (2025 10:40)  T(F): 97.9 (2025 14:10), Max: 97.9 (2025 10:40)  HR: 95 (2025 14:10) (75 - 95)  BP: 117/83 (2025 14:10) (101/62 - 122/82)  BP(mean): 75 (2025 10:40) (75 - 75)  RR: 18 (2025 14:10) (18 - 18)  SpO2: 97% (2025 14:10) (97% - 100%)    Parameters below as of 2025 14:10  Patient On (Oxygen Delivery Method): room air      Daily Height in cm: 152.4 (2025 09:20)    Daily     GENERAL:  Appears stated age, well-groomed, well-nourished, no distress  HEENT:  NC/AT,  conjunctivae clear and pink, no thyromegaly, nodules, adenopathy, no JVD, sclera -anicteric  CHEST:  Full & symmetric excursion, no increased effort, breath sounds clear  HEART:  Regular rhythm, S1, S2, no murmur/rub/S3/S4, no abdominal bruit, no edema  ABDOMEN:  Soft, non-tender, non-distended, normoactive bowel sounds,  no masses ,no hepato-splenomegaly, no signs of chronic liver disease  EXTEREMITIES:  no cyanosis,clubbing or edema  SKIN:  No rash/erythema/ecchymoses/petechiae/wounds/abscess/warm/dry  NEURO:  Alert, oriented, no asterixis, no tremor, no encephalopathy    LABS:                        10.7   5.69  )-----------( 260      ( 2025 10:59 )             33.5     06-30    134[L]  |  102  |  23  ----------------------------<  106[H]  4.5   |  19[L]  |  1.37[H]    Ca    9.9      2025 10:59    TPro  7.5  /  Alb  4.2  /  TBili  0.3  /  DBili  x   /  AST  14  /  ALT  8[L]  /  AlkPhos  59  06-30    LIVER FUNCTIONS - ( 2025 10:59 )  Alb: 4.2 g/dL / Pro: 7.5 g/dL / ALK PHOS: 59 U/L / ALT: 8 U/L / AST: 14 U/L / GGT: x             Urinalysis Basic - ( 2025 14:23 )    Color: Yellow / Appearance: Clear / S.011 / pH: x  Gluc: x / Ketone: x  / Bili: Negative / Urobili: 0.2 mg/dL   Blood: x / Protein: Negative mg/dL / Nitrite: Negative   Leuk Esterase: Negative / RBC: x / WBC x   Sq Epi: x / Non Sq Epi: x / Bacteria: x      Amylase Serum--      Lipase serum38       Ammonia--      Imaging:

## 2025-06-30 NOTE — ED PROVIDER NOTE - CLINICAL SUMMARY MEDICAL DECISION MAKING FREE TEXT BOX
'  '  '   Attending note.  Patient was seen in room #1 to the right.  Patient complaining of nausea vomiting early this morning with nausea currently.  She denies any abdominal pain, bloating.  She had a bowel movement this morning which was nonbloody.  Vomitus was nonbloody nonbilious.  She denies any fevers, chills or sweats.  She has no respiratory or urinary symptoms.  Patient was diagnosed with uterine cancer approximately 2 months ago and had total hysterectomy performed in May.  Patient returned on July 3 for SBO which required surgery.  She followed up with her surgeon on June 13 and had some vomiting for 1 to 2 days which resolved.  Her past history includes gastroparesis, iron deficiency anemia, hypothyroidism, diabetes, hypertension, glaucoma.  Medications include pantoprazole, metformin, levothyroxine, ferrous sulfate, amlodipine.  She has no allergies to medication.  She denies any other abdominal surgery.  ROS-as above, otherwise negative.  PE-patient is alert in no acute distress.  There is no pallor or jaundice.  She has dry mucous membranes.  Lungs are clear and equal bilaterally.  Heart is regular in rhythm.  Abdomen is flat, soft and nondistended.  There is no guarding or rebound or tenderness.  Incisions are clean and dry.  There is no CVA tenderness.  There is no extremity edema or calf tenderness.  Neurologic examination is intact and nonfocal.  A/P-patient with recent diagnosis of uterine cancer and total hysterectomy followed by SBO in June now with vomiting.  Patient was advised to go to the emergency department by her surgeon on postop visit if she had persistent vomiting and when she called the office today was advised by NP to go to emergency department for a CAT scan.  Labs, CAT scan, urinalysis, IV induration, Zofran and reassess.  Surgery consultation.

## 2025-06-30 NOTE — ED PROVIDER NOTE - NSFOLLOWUPINSTRUCTIONS_ED_ALL_ED_FT
1. You presented to the emergency department for nausea. Your evaluation in the emergency department included a physician evaluation, labs, CT scan. Your work-up did not reveal any findings indicating the need for admission to the hospital or any emergent interventions at this time.     3. It is recommended that you follow-up with Dr. VELASQUEZ as arranged by the discharge center for a repeat evaluation, and potentially further testing and treatment.     4. Please continue taking your regular medications as prescribed.     5. PLEASE RETURN TO THE EMERGENCY DEPARTMENT IMMEDIATELY IF you develop any fevers not responding to over the counter medications, uncontrollable nausea and vomiting, an inability to tolerate eating and drinking, difficulty breathing, chest pain, a severe increase in your symptoms or pain, or any other new symptoms that concern you.

## 2025-06-30 NOTE — ED PROVIDER NOTE - PATIENT PORTAL LINK FT
You can access the FollowMyHealth Patient Portal offered by [facility] by registering at the following website: [email or web address]. By joining [facility]’s FollowMyHealth portal, you will also be able to view your health information using other applications (apps) compatible with our system.

## 2025-06-30 NOTE — CONSULT NOTE ADULT - ASSESSMENT
nausea/vomiting    post gastrojejunostomy  CT noted  now feels better  proton pump inhibitor bid  PO challenge; if tolerates dc planning  d/w surgery  d/w ED

## 2025-06-30 NOTE — CONSULT NOTE ADULT - ASSESSMENT
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    Recommendations:   - Patient feels better now, no further nausea, CT with PO contrast showed no evidence of obstruction  - Recommend PO challenge, if patient tolerates, dipo per ED, otherwise please reach back to Surgery   - Patient will require GI follow outpatient with Dr. Guerrero     Discussed with Dr. Jay Jay [facility]   [id number]

## 2025-07-01 ENCOUNTER — NON-APPOINTMENT (OUTPATIENT)
Age: 80
End: 2025-07-01

## 2025-07-01 NOTE — ED POST DISCHARGE NOTE - RESULT SUMMARY
new nodularity postsurgical vs CA. pt to f/u outpatient, seen by surgery and GI though no note this was d/w pt -Cem Zimmer PA-C

## 2025-07-01 NOTE — ED POST DISCHARGE NOTE - DETAILS
ct results d/w pt advised ot f/u with oncology and surgeon (Dr. Ivey) bring printed ct results to further discuss.  pt appreciative of call. -Cem Zimmer PA-C

## 2025-07-02 ENCOUNTER — APPOINTMENT (OUTPATIENT)
Dept: CT IMAGING | Facility: IMAGING CENTER | Age: 80
End: 2025-07-02

## 2025-07-02 ENCOUNTER — NON-APPOINTMENT (OUTPATIENT)
Age: 80
End: 2025-07-02

## 2025-07-04 RX ORDER — LIDOCAINE AND PRILOCAINE 25; 25 MG/G; MG/G
2.5-2.5 CREAM TOPICAL
Qty: 3 | Refills: 4 | Status: ACTIVE | COMMUNITY
Start: 2025-07-04 | End: 1900-01-01

## 2025-07-07 ENCOUNTER — NON-APPOINTMENT (OUTPATIENT)
Age: 80
End: 2025-07-07

## 2025-07-09 ENCOUNTER — OUTPATIENT (OUTPATIENT)
Dept: OUTPATIENT SERVICES | Facility: HOSPITAL | Age: 80
LOS: 1 days | End: 2025-07-09
Payer: COMMERCIAL

## 2025-07-09 ENCOUNTER — OUTPATIENT (OUTPATIENT)
Dept: OUTPATIENT SERVICES | Facility: HOSPITAL | Age: 80
LOS: 1 days | Discharge: HOME | End: 2025-07-09

## 2025-07-09 ENCOUNTER — TRANSCRIPTION ENCOUNTER (OUTPATIENT)
Age: 80
End: 2025-07-09

## 2025-07-09 ENCOUNTER — RESULT REVIEW (OUTPATIENT)
Age: 80
End: 2025-07-09

## 2025-07-09 VITALS
DIASTOLIC BLOOD PRESSURE: 65 MMHG | TEMPERATURE: 98 F | HEART RATE: 62 BPM | OXYGEN SATURATION: 98 % | SYSTOLIC BLOOD PRESSURE: 132 MMHG | RESPIRATION RATE: 18 BRPM

## 2025-07-09 VITALS
OXYGEN SATURATION: 93 % | DIASTOLIC BLOOD PRESSURE: 55 MMHG | HEART RATE: 67 BPM | RESPIRATION RATE: 18 BRPM | SYSTOLIC BLOOD PRESSURE: 105 MMHG

## 2025-07-09 DIAGNOSIS — D64.9 ANEMIA, UNSPECIFIED: ICD-10-CM

## 2025-07-09 DIAGNOSIS — Z98.890 OTHER SPECIFIED POSTPROCEDURAL STATES: Chronic | ICD-10-CM

## 2025-07-09 DIAGNOSIS — Z90.710 ACQUIRED ABSENCE OF BOTH CERVIX AND UTERUS: Chronic | ICD-10-CM

## 2025-07-09 DIAGNOSIS — C78.6 SECONDARY MALIGNANT NEOPLASM OF RETROPERITONEUM AND PERITONEUM: ICD-10-CM

## 2025-07-09 DIAGNOSIS — Z87.59 PERSONAL HISTORY OF OTHER COMPLICATIONS OF PREGNANCY, CHILDBIRTH AND THE PUERPERIUM: Chronic | ICD-10-CM

## 2025-07-09 LAB — GLUCOSE BLDC GLUCOMTR-MCNC: 100 MG/DL — HIGH (ref 70–99)

## 2025-07-09 PROCEDURE — 77001 FLUOROGUIDE FOR VEIN DEVICE: CPT

## 2025-07-09 PROCEDURE — 36561 INSERT TUNNELED CV CATH: CPT | Mod: RT

## 2025-07-09 PROCEDURE — 76937 US GUIDE VASCULAR ACCESS: CPT

## 2025-07-09 PROCEDURE — C1788: CPT

## 2025-07-09 PROCEDURE — 76937 US GUIDE VASCULAR ACCESS: CPT | Mod: 26

## 2025-07-09 PROCEDURE — 36561 INSERT TUNNELED CV CATH: CPT

## 2025-07-09 PROCEDURE — 77001 FLUOROGUIDE FOR VEIN DEVICE: CPT | Mod: 26

## 2025-07-09 PROCEDURE — 82962 GLUCOSE BLOOD TEST: CPT

## 2025-07-09 PROCEDURE — C1769: CPT

## 2025-07-09 PROCEDURE — C1894: CPT

## 2025-07-09 NOTE — ASU DISCHARGE PLAN (ADULT/PEDIATRIC) - ASU DC SPECIAL INSTRUCTIONSFT
Chest Port Placement    Discharge Instructions  - You have had a chest port implated in your chest.   - The port is ready for use.  - You may shower in 48 hours. No soaking or swimming for 2 weeks or until the site is completely healed.  - Keep the area covered and dry for the next 7 days. It may be removed by a chemotherapy nurse as needed for treatment.  - Do not perform any heavy lifting or put tension on the area for the next week or until the site is healed.  - You may resume your normal diet.  - You may resume your normal medications however you should wait 48 hours before restarting aspirin, plavix, or blood thinners.  - It is normal to experience some pain over the site for the next few days. You may take apply ice to the area (20 minutes on, 20 minutes off) and take Tylenol for that pain. Do not take more frequently than every 6 hours and do not exceed more than 3000mg of Tylenol in a 24 hour period.    - You were given conscious sedation which may make you drowsy, therefore you need someone to stay with you until the morning following the procedure.  - Do not drive, engage in heavy lifting or strenuous activity, or drink any alcoholic beverages for the next 24 hours.   - You may resume normal activity in 24 hours.    Notify your primary physician and/or Interventional Radiology IMMEDIATELY if you experience any of the following       - Fever of 100.4F or 38C       - Chills or Rigors/ Shakes       - Swelling and/or Redness in the area around the port       - Worsening Pain       - Blood soaked bandages or worsening bleeding       - Lightheadedness and/or dizziness upon standing       - Chest Pain/ Tightness       - Shortness of Breath       - Difficulty walking    If you have a problem that you believe requires IMMEDIATE attention, please go to your NEAREST Emergency Room. If you believe your problem can safely wait until you speak to a physician, please call Interventional Radiology for any concerns.    Please feel free to contact us at [phone] if any problems arise. After 6PM, Monday through Friday, on weekends and on holidays, please call [phone] and ask for the radiology resident on call to be paged. Chest Port Placement    Discharge Instructions  - You have had a chest port implanted in your chest.   - The port is ready for use.  - You may shower in 48 hours. No soaking or swimming for 2 weeks or until the site is completely healed.  - Keep the area covered and dry for the next 3 days. It may be removed by a chemotherapy nurse as needed for treatment.  - Do not perform any heavy lifting or put tension on the area for the next week or until the site is healed.  - You may resume your normal diet.  - You may resume your normal medications however you should wait 48 hours before restarting aspirin, plavix, or blood thinners.  - It is normal to experience some pain over the site for the next few days. You may take apply ice to the area (20 minutes on, 20 minutes off) and take Tylenol for that pain. Do not take more frequently than every 6 hours and do not exceed more than 3000mg of Tylenol in a 24 hour period.    - You were given conscious sedation which may make you drowsy, therefore you need someone to stay with you until the morning following the procedure.  - Do not drive, engage in heavy lifting or strenuous activity, or drink any alcoholic beverages for the next 24 hours.   - You may resume normal activity in 24 hours.    Notify your primary physician and/or Interventional Radiology IMMEDIATELY if you experience any of the following       - Fever of 100.4F or 38C       - Chills or Rigors/ Shakes       - Swelling and/or Redness in the area around the port       - Worsening Pain       - Blood soaked bandages or worsening bleeding       - Lightheadedness and/or dizziness upon standing       - Chest Pain/ Tightness       - Shortness of Breath       - Difficulty walking    If you have a problem that you believe requires IMMEDIATE attention, please go to your NEAREST Emergency Room. If you believe your problem can safely wait until you speak to a physician, please call Interventional Radiology for any concerns.    Please feel free to contact us at [phone] if any problems arise. After 6PM, Monday through Friday, on weekends and on holidays, please call [phone] and ask for the radiology resident on call to be paged.

## 2025-07-09 NOTE — PROCEDURE NOTE - NSPERFORMEDBY_GEN_A_CORE
Bethany Neumann is calling to request a refill on the following medication(s):    Medication Request:  Requested Prescriptions     Pending Prescriptions Disp Refills    BLISOVI FE 1/20 1-20 MG-MCG per tablet [Pharmacy Med Name: Blisovi FE 1/20 Oral Tablet 1-20 MG-MCG] 28 tablet 0     Sig: TAKE 1 TABLET BY MOUTH EVERY DAY       Last Visit Date (If Applicable):  7/12/2024    Next Visit Date:    6/13/2025              
Attending

## 2025-07-09 NOTE — ASU DISCHARGE PLAN (ADULT/PEDIATRIC) - DO NOT DRIVE IF TAKING PAIN MEDICATION
Yes may have refill x 1 month  
· Refill requested by: Patient  · Last office visit for controlled substance: 2/22/23  · Next office visit: none  · Medication(s) Requested:   Disp Refills Start End     dexmethylphenidate (FOCALIN) 10 MG tablet 60 tablet 0 3/7/2023     Sig - Route: Take 1 tablet by mouth 2 times daily. - Oral      · Date medication contract signed: 7/2/21  · Date of last urine drug screen: 10/6/21  Result:  All negative  · Last 3 PDMP refills: no access  · PDMP review: Criteria not met because no access to PDMP. Forwarded to Physician/BRONWYN for further review and decision on medication(s) requested.  Can not refill without MD authorization        
NULL

## 2025-07-09 NOTE — ASU DISCHARGE PLAN (ADULT/PEDIATRIC) - NS MD DC FALL RISK RISK
For information on Fall & Injury Prevention, visit: [email or web address] OR  [email or web address] OR  https://www.cdc.gov/steadi/patient.html

## 2025-07-09 NOTE — ASU DISCHARGE PLAN (ADULT/PEDIATRIC) - NURSING INSTRUCTIONS
Please feel free to contact us at [phone] if any problems arise. After 6PM, Monday through Friday, on weekends and on holidays, please call [phone] and ask for the radiology resident on call to be paged.

## 2025-07-10 ENCOUNTER — RESULT REVIEW (OUTPATIENT)
Age: 80
End: 2025-07-10

## 2025-07-10 ENCOUNTER — APPOINTMENT (OUTPATIENT)
Dept: HEMATOLOGY ONCOLOGY | Facility: CLINIC | Age: 80
End: 2025-07-10
Payer: MEDICARE

## 2025-07-10 ENCOUNTER — APPOINTMENT (OUTPATIENT)
Dept: INFUSION THERAPY | Facility: HOSPITAL | Age: 80
End: 2025-07-10

## 2025-07-10 ENCOUNTER — NON-APPOINTMENT (OUTPATIENT)
Age: 80
End: 2025-07-10

## 2025-07-10 LAB
BASOPHILS # BLD AUTO: 0.03 K/UL — SIGNIFICANT CHANGE UP (ref 0–0.2)
BASOPHILS NFR BLD AUTO: 0.2 % — SIGNIFICANT CHANGE UP (ref 0–2)
CANCER AG19-9 SERPL-ACNC: <2 U/ML — SIGNIFICANT CHANGE UP
CEA SERPL-MCNC: 1.4 NG/ML — SIGNIFICANT CHANGE UP (ref 0–3.8)
EOSINOPHIL # BLD AUTO: 0.12 K/UL — SIGNIFICANT CHANGE UP (ref 0–0.5)
EOSINOPHIL NFR BLD AUTO: 0.6 % — SIGNIFICANT CHANGE UP (ref 0–6)
HCT VFR BLD CALC: 31.2 % — LOW (ref 34.5–45)
HGB BLD-MCNC: 10.2 G/DL — LOW (ref 11.5–15.5)
IMM GRANULOCYTES NFR BLD AUTO: 0.6 % — SIGNIFICANT CHANGE UP (ref 0–0.9)
LYMPHOCYTES # BLD AUTO: 1.87 K/UL — SIGNIFICANT CHANGE UP (ref 1–3.3)
LYMPHOCYTES # BLD AUTO: 9.8 % — LOW (ref 13–44)
MCHC RBC-ENTMCNC: 28.5 PG — SIGNIFICANT CHANGE UP (ref 27–34)
MCHC RBC-ENTMCNC: 32.7 G/DL — SIGNIFICANT CHANGE UP (ref 32–36)
MCV RBC AUTO: 87.2 FL — SIGNIFICANT CHANGE UP (ref 80–100)
MONOCYTES # BLD AUTO: 0.97 K/UL — HIGH (ref 0–0.9)
MONOCYTES NFR BLD AUTO: 5.1 % — SIGNIFICANT CHANGE UP (ref 2–14)
NEUTROPHILS # BLD AUTO: 15.9 K/UL — HIGH (ref 1.8–7.4)
NEUTROPHILS NFR BLD AUTO: 83.7 % — HIGH (ref 43–77)
NRBC BLD AUTO-RTO: 0 /100 WBCS — SIGNIFICANT CHANGE UP (ref 0–0)
PLATELET # BLD AUTO: 313 K/UL — SIGNIFICANT CHANGE UP (ref 150–400)
RBC # BLD: 3.58 M/UL — LOW (ref 3.8–5.2)
RBC # FLD: 14.6 % — HIGH (ref 10.3–14.5)
WBC # BLD: 19 K/UL — HIGH (ref 3.8–10.5)
WBC # FLD AUTO: 19 K/UL — HIGH (ref 3.8–10.5)

## 2025-07-10 PROCEDURE — 99213 OFFICE O/P EST LOW 20 MIN: CPT

## 2025-07-11 DIAGNOSIS — R11.2 NAUSEA WITH VOMITING, UNSPECIFIED: ICD-10-CM

## 2025-07-11 DIAGNOSIS — C18.9 MALIGNANT NEOPLASM OF COLON, UNSPECIFIED: ICD-10-CM

## 2025-07-11 DIAGNOSIS — Z51.11 ENCOUNTER FOR ANTINEOPLASTIC CHEMOTHERAPY: ICD-10-CM

## 2025-07-12 ENCOUNTER — APPOINTMENT (OUTPATIENT)
Dept: INFUSION THERAPY | Facility: HOSPITAL | Age: 80
End: 2025-07-12

## 2025-07-13 ENCOUNTER — INPATIENT (INPATIENT)
Facility: HOSPITAL | Age: 80
LOS: 8 days | Discharge: HOME | DRG: 374 | End: 2025-07-22
Attending: SURGERY | Admitting: SURGERY
Payer: COMMERCIAL

## 2025-07-13 VITALS
HEIGHT: 60 IN | SYSTOLIC BLOOD PRESSURE: 131 MMHG | TEMPERATURE: 98 F | WEIGHT: 110.89 LBS | RESPIRATION RATE: 18 BRPM | HEART RATE: 70 BPM | OXYGEN SATURATION: 98 % | DIASTOLIC BLOOD PRESSURE: 79 MMHG

## 2025-07-13 DIAGNOSIS — Z98.890 OTHER SPECIFIED POSTPROCEDURAL STATES: Chronic | ICD-10-CM

## 2025-07-13 DIAGNOSIS — Z90.710 ACQUIRED ABSENCE OF BOTH CERVIX AND UTERUS: Chronic | ICD-10-CM

## 2025-07-13 DIAGNOSIS — Z87.59 PERSONAL HISTORY OF OTHER COMPLICATIONS OF PREGNANCY, CHILDBIRTH AND THE PUERPERIUM: Chronic | ICD-10-CM

## 2025-07-13 LAB
ALBUMIN SERPL ELPH-MCNC: 4.1 G/DL — SIGNIFICANT CHANGE UP (ref 3.3–5)
ALP SERPL-CCNC: 62 U/L — SIGNIFICANT CHANGE UP (ref 40–120)
ALT FLD-CCNC: 11 U/L — SIGNIFICANT CHANGE UP (ref 10–45)
ANION GAP SERPL CALC-SCNC: 16 MMOL/L — SIGNIFICANT CHANGE UP (ref 5–17)
AST SERPL-CCNC: 18 U/L — SIGNIFICANT CHANGE UP (ref 10–40)
BASE EXCESS BLDV CALC-SCNC: 0.5 MMOL/L — SIGNIFICANT CHANGE UP (ref -2–3)
BASOPHILS # BLD AUTO: 0.01 K/UL — SIGNIFICANT CHANGE UP (ref 0–0.2)
BASOPHILS NFR BLD AUTO: 0.1 % — SIGNIFICANT CHANGE UP (ref 0–2)
BILIRUB SERPL-MCNC: 0.4 MG/DL — SIGNIFICANT CHANGE UP (ref 0.2–1.2)
BUN SERPL-MCNC: 25 MG/DL — HIGH (ref 7–23)
CALCIUM SERPL-MCNC: 10.1 MG/DL — SIGNIFICANT CHANGE UP (ref 8.4–10.5)
CHLORIDE SERPL-SCNC: 96 MMOL/L — SIGNIFICANT CHANGE UP (ref 96–108)
CO2 BLDV-SCNC: 27 MMOL/L — HIGH (ref 22–26)
CO2 SERPL-SCNC: 21 MMOL/L — LOW (ref 22–31)
CREAT SERPL-MCNC: 1.15 MG/DL — SIGNIFICANT CHANGE UP (ref 0.5–1.3)
EGFR: 48 ML/MIN/1.73M2 — LOW
EGFR: 48 ML/MIN/1.73M2 — LOW
EOSINOPHIL # BLD AUTO: 0.02 K/UL — SIGNIFICANT CHANGE UP (ref 0–0.5)
EOSINOPHIL NFR BLD AUTO: 0.3 % — SIGNIFICANT CHANGE UP (ref 0–6)
GAS PNL BLDV: SIGNIFICANT CHANGE UP
GLUCOSE SERPL-MCNC: 109 MG/DL — HIGH (ref 70–99)
HCO3 BLDV-SCNC: 25 MMOL/L — SIGNIFICANT CHANGE UP (ref 22–29)
HCT VFR BLD CALC: 32.6 % — LOW (ref 34.5–45)
HGB BLD-MCNC: 10.9 G/DL — LOW (ref 11.5–15.5)
IMM GRANULOCYTES # BLD AUTO: 0.02 K/UL — SIGNIFICANT CHANGE UP (ref 0–0.07)
IMM GRANULOCYTES NFR BLD AUTO: 0.3 % — SIGNIFICANT CHANGE UP (ref 0–0.9)
LIDOCAIN IGE QN: 28 U/L — SIGNIFICANT CHANGE UP (ref 7–60)
LYMPHOCYTES # BLD AUTO: 0.95 K/UL — LOW (ref 1–3.3)
LYMPHOCYTES NFR BLD AUTO: 13 % — SIGNIFICANT CHANGE UP (ref 13–44)
MCHC RBC-ENTMCNC: 28.8 PG — SIGNIFICANT CHANGE UP (ref 27–34)
MCHC RBC-ENTMCNC: 33.4 G/DL — SIGNIFICANT CHANGE UP (ref 32–36)
MCV RBC AUTO: 86 FL — SIGNIFICANT CHANGE UP (ref 80–100)
MONOCYTES # BLD AUTO: 0.23 K/UL — SIGNIFICANT CHANGE UP (ref 0–0.9)
MONOCYTES NFR BLD AUTO: 3.1 % — SIGNIFICANT CHANGE UP (ref 2–14)
NEUTROPHILS # BLD AUTO: 6.08 K/UL — SIGNIFICANT CHANGE UP (ref 1.8–7.4)
NEUTROPHILS NFR BLD AUTO: 83.2 % — HIGH (ref 43–77)
NRBC # BLD AUTO: 0 K/UL — SIGNIFICANT CHANGE UP (ref 0–0)
NRBC # FLD: 0 K/UL — SIGNIFICANT CHANGE UP (ref 0–0)
NRBC BLD AUTO-RTO: 0 /100 WBCS — SIGNIFICANT CHANGE UP (ref 0–0)
PCO2 BLDV: 41 MMHG — SIGNIFICANT CHANGE UP (ref 39–42)
PH BLDV: 7.4 — SIGNIFICANT CHANGE UP (ref 7.32–7.43)
PLATELET # BLD AUTO: 393 K/UL — SIGNIFICANT CHANGE UP (ref 150–400)
PMV BLD: 9 FL — SIGNIFICANT CHANGE UP (ref 7–13)
PO2 BLDV: 41 MMHG — SIGNIFICANT CHANGE UP (ref 25–45)
POTASSIUM SERPL-MCNC: 4.2 MMOL/L — SIGNIFICANT CHANGE UP (ref 3.5–5.3)
POTASSIUM SERPL-SCNC: 4.2 MMOL/L — SIGNIFICANT CHANGE UP (ref 3.5–5.3)
PROT SERPL-MCNC: 7.6 G/DL — SIGNIFICANT CHANGE UP (ref 6–8.3)
RBC # BLD: 3.79 M/UL — LOW (ref 3.8–5.2)
RBC # FLD: 14.5 % — SIGNIFICANT CHANGE UP (ref 10.3–14.5)
SAO2 % BLDV: 60.8 % — LOW (ref 67–88)
SODIUM SERPL-SCNC: 133 MMOL/L — LOW (ref 135–145)
WBC # BLD: 7.31 K/UL — SIGNIFICANT CHANGE UP (ref 3.8–10.5)
WBC # FLD AUTO: 7.31 K/UL — SIGNIFICANT CHANGE UP (ref 3.8–10.5)

## 2025-07-13 PROCEDURE — 93010 ELECTROCARDIOGRAM REPORT: CPT

## 2025-07-13 PROCEDURE — 99285 EMERGENCY DEPT VISIT HI MDM: CPT

## 2025-07-13 RX ORDER — ACETAMINOPHEN 500 MG/5ML
750 LIQUID (ML) ORAL ONCE
Refills: 0 | Status: COMPLETED | OUTPATIENT
Start: 2025-07-13 | End: 2025-07-13

## 2025-07-13 RX ORDER — SODIUM CHLORIDE 9 G/1000ML
500 INJECTION, SOLUTION INTRAVENOUS ONCE
Refills: 0 | Status: COMPLETED | OUTPATIENT
Start: 2025-07-13 | End: 2025-07-13

## 2025-07-13 RX ADMIN — Medication 300 MILLIGRAM(S): at 22:20

## 2025-07-13 RX ADMIN — SODIUM CHLORIDE 500 MILLILITER(S): 9 INJECTION, SOLUTION INTRAVENOUS at 22:19

## 2025-07-14 DIAGNOSIS — K56.609 UNSPECIFIED INTESTINAL OBSTRUCTION, UNSPECIFIED AS TO PARTIAL VERSUS COMPLETE OBSTRUCTION: ICD-10-CM

## 2025-07-14 DIAGNOSIS — C26.9 MALIGNANT NEOPLASM OF ILL-DEFINED SITES WITHIN THE DIGESTIVE SYSTEM: ICD-10-CM

## 2025-07-14 LAB
ANION GAP SERPL CALC-SCNC: 13 MMOL/L — SIGNIFICANT CHANGE UP (ref 5–17)
APPEARANCE UR: CLEAR — SIGNIFICANT CHANGE UP
APTT BLD: 29.3 SEC — SIGNIFICANT CHANGE UP (ref 26.1–36.8)
BACTERIA # UR AUTO: NEGATIVE /HPF — SIGNIFICANT CHANGE UP
BILIRUB UR-MCNC: NEGATIVE — SIGNIFICANT CHANGE UP
BLD GP AB SCN SERPL QL: NEGATIVE — SIGNIFICANT CHANGE UP
BUN SERPL-MCNC: 21 MG/DL — SIGNIFICANT CHANGE UP (ref 7–23)
CALCIUM SERPL-MCNC: 9.5 MG/DL — SIGNIFICANT CHANGE UP (ref 8.4–10.5)
CAST: 3 /LPF — SIGNIFICANT CHANGE UP (ref 0–4)
CHLORIDE SERPL-SCNC: 95 MMOL/L — LOW (ref 96–108)
CO2 SERPL-SCNC: 23 MMOL/L — SIGNIFICANT CHANGE UP (ref 22–31)
COLOR SPEC: YELLOW — SIGNIFICANT CHANGE UP
CREAT SERPL-MCNC: 1.05 MG/DL — SIGNIFICANT CHANGE UP (ref 0.5–1.3)
DIFF PNL FLD: NEGATIVE — SIGNIFICANT CHANGE UP
EGFR: 53 ML/MIN/1.73M2 — LOW
EGFR: 53 ML/MIN/1.73M2 — LOW
GLUCOSE BLDC GLUCOMTR-MCNC: 108 MG/DL — HIGH (ref 70–99)
GLUCOSE BLDC GLUCOMTR-MCNC: 109 MG/DL — HIGH (ref 70–99)
GLUCOSE BLDC GLUCOMTR-MCNC: 111 MG/DL — HIGH (ref 70–99)
GLUCOSE SERPL-MCNC: 103 MG/DL — HIGH (ref 70–99)
GLUCOSE UR QL: NEGATIVE MG/DL — SIGNIFICANT CHANGE UP
HCT VFR BLD CALC: 30.1 % — LOW (ref 34.5–45)
HGB BLD-MCNC: 10.1 G/DL — LOW (ref 11.5–15.5)
INR BLD: 0.98 RATIO — SIGNIFICANT CHANGE UP (ref 0.85–1.16)
KETONES UR QL: NEGATIVE MG/DL — SIGNIFICANT CHANGE UP
LEUKOCYTE ESTERASE UR-ACNC: ABNORMAL
MAGNESIUM SERPL-MCNC: 1.8 MG/DL — SIGNIFICANT CHANGE UP (ref 1.6–2.6)
MCHC RBC-ENTMCNC: 28.5 PG — SIGNIFICANT CHANGE UP (ref 27–34)
MCHC RBC-ENTMCNC: 33.6 G/DL — SIGNIFICANT CHANGE UP (ref 32–36)
MCV RBC AUTO: 85 FL — SIGNIFICANT CHANGE UP (ref 80–100)
NITRITE UR-MCNC: NEGATIVE — SIGNIFICANT CHANGE UP
NRBC # BLD AUTO: 0 K/UL — SIGNIFICANT CHANGE UP (ref 0–0)
NRBC # FLD: 0 K/UL — SIGNIFICANT CHANGE UP (ref 0–0)
NRBC BLD AUTO-RTO: 0 /100 WBCS — SIGNIFICANT CHANGE UP (ref 0–0)
PH UR: 5.5 — SIGNIFICANT CHANGE UP (ref 5–8)
PHOSPHATE SERPL-MCNC: 4.2 MG/DL — SIGNIFICANT CHANGE UP (ref 2.5–4.5)
PLATELET # BLD AUTO: 366 K/UL — SIGNIFICANT CHANGE UP (ref 150–400)
PMV BLD: 9.3 FL — SIGNIFICANT CHANGE UP (ref 7–13)
POTASSIUM SERPL-MCNC: 4.4 MMOL/L — SIGNIFICANT CHANGE UP (ref 3.5–5.3)
POTASSIUM SERPL-SCNC: 4.4 MMOL/L — SIGNIFICANT CHANGE UP (ref 3.5–5.3)
PROT UR-MCNC: NEGATIVE MG/DL — SIGNIFICANT CHANGE UP
PROTHROM AB SERPL-ACNC: 11.2 SEC — SIGNIFICANT CHANGE UP (ref 9.9–13.4)
RBC # BLD: 3.54 M/UL — LOW (ref 3.8–5.2)
RBC # FLD: 14.1 % — SIGNIFICANT CHANGE UP (ref 10.3–14.5)
RBC CASTS # UR COMP ASSIST: 0 /HPF — SIGNIFICANT CHANGE UP (ref 0–4)
RH IG SCN BLD-IMP: POSITIVE — SIGNIFICANT CHANGE UP
SODIUM SERPL-SCNC: 131 MMOL/L — LOW (ref 135–145)
SP GR SPEC: 1.01 — SIGNIFICANT CHANGE UP (ref 1–1.03)
SQUAMOUS # UR AUTO: 1 /HPF — SIGNIFICANT CHANGE UP (ref 0–5)
UROBILINOGEN FLD QL: 0.2 MG/DL — SIGNIFICANT CHANGE UP (ref 0.2–1)
WBC # BLD: 2.6 K/UL — LOW (ref 3.8–10.5)
WBC # FLD AUTO: 2.6 K/UL — LOW (ref 3.8–10.5)
WBC UR QL: 3 /HPF — SIGNIFICANT CHANGE UP (ref 0–5)

## 2025-07-14 PROCEDURE — 80053 COMPREHEN METABOLIC PANEL: CPT

## 2025-07-14 PROCEDURE — 85610 PROTHROMBIN TIME: CPT

## 2025-07-14 PROCEDURE — 86850 RBC ANTIBODY SCREEN: CPT

## 2025-07-14 PROCEDURE — 71045 X-RAY EXAM CHEST 1 VIEW: CPT | Mod: 26

## 2025-07-14 PROCEDURE — 86900 BLOOD TYPING SEROLOGIC ABO: CPT

## 2025-07-14 PROCEDURE — 82803 BLOOD GASES ANY COMBINATION: CPT

## 2025-07-14 PROCEDURE — 36415 COLL VENOUS BLD VENIPUNCTURE: CPT

## 2025-07-14 PROCEDURE — 84100 ASSAY OF PHOSPHORUS: CPT

## 2025-07-14 PROCEDURE — 74018 RADEX ABDOMEN 1 VIEW: CPT | Mod: 26

## 2025-07-14 PROCEDURE — 85027 COMPLETE CBC AUTOMATED: CPT

## 2025-07-14 PROCEDURE — 83735 ASSAY OF MAGNESIUM: CPT

## 2025-07-14 PROCEDURE — 83690 ASSAY OF LIPASE: CPT

## 2025-07-14 PROCEDURE — 86901 BLOOD TYPING SEROLOGIC RH(D): CPT

## 2025-07-14 PROCEDURE — 93005 ELECTROCARDIOGRAM TRACING: CPT

## 2025-07-14 PROCEDURE — 82962 GLUCOSE BLOOD TEST: CPT

## 2025-07-14 PROCEDURE — 85730 THROMBOPLASTIN TIME PARTIAL: CPT

## 2025-07-14 PROCEDURE — 71045 X-RAY EXAM CHEST 1 VIEW: CPT

## 2025-07-14 PROCEDURE — 81001 URINALYSIS AUTO W/SCOPE: CPT

## 2025-07-14 PROCEDURE — 74177 CT ABD & PELVIS W/CONTRAST: CPT | Mod: 26

## 2025-07-14 PROCEDURE — 80048 BASIC METABOLIC PNL TOTAL CA: CPT

## 2025-07-14 PROCEDURE — 74018 RADEX ABDOMEN 1 VIEW: CPT

## 2025-07-14 PROCEDURE — 74177 CT ABD & PELVIS W/CONTRAST: CPT

## 2025-07-14 PROCEDURE — 85025 COMPLETE CBC W/AUTO DIFF WBC: CPT

## 2025-07-14 RX ORDER — ONDANSETRON HCL/PF 4 MG/2 ML
4 VIAL (ML) INJECTION EVERY 6 HOURS
Refills: 0 | Status: DISCONTINUED | OUTPATIENT
Start: 2025-07-14 | End: 2025-07-14

## 2025-07-14 RX ORDER — ONDANSETRON HCL/PF 4 MG/2 ML
4 VIAL (ML) INJECTION ONCE
Refills: 0 | Status: COMPLETED | OUTPATIENT
Start: 2025-07-14 | End: 2025-07-14

## 2025-07-14 RX ORDER — DORZOLAMIDE HYDROCHLORIDE AND TIMOLOL MALEATE 20; 5 MG/ML; MG/ML
1 SOLUTION/ DROPS OPHTHALMIC
Refills: 0 | Status: DISCONTINUED | OUTPATIENT
Start: 2025-07-14 | End: 2025-07-22

## 2025-07-14 RX ORDER — HEPARIN SODIUM 1000 [USP'U]/ML
5000 INJECTION INTRAVENOUS; SUBCUTANEOUS EVERY 8 HOURS
Refills: 0 | Status: DISCONTINUED | OUTPATIENT
Start: 2025-07-14 | End: 2025-07-14

## 2025-07-14 RX ORDER — SODIUM CHLORIDE 9 G/1000ML
1000 INJECTION, SOLUTION INTRAVENOUS
Refills: 0 | Status: DISCONTINUED | OUTPATIENT
Start: 2025-07-14 | End: 2025-07-22

## 2025-07-14 RX ORDER — LEVOTHYROXINE SODIUM 300 MCG
70 TABLET ORAL AT BEDTIME
Refills: 0 | Status: DISCONTINUED | OUTPATIENT
Start: 2025-07-14 | End: 2025-07-20

## 2025-07-14 RX ORDER — DEXTROSE 50 % IN WATER 50 %
12.5 SYRINGE (ML) INTRAVENOUS ONCE
Refills: 0 | Status: DISCONTINUED | OUTPATIENT
Start: 2025-07-14 | End: 2025-07-22

## 2025-07-14 RX ORDER — DEXTROSE 50 % IN WATER 50 %
25 SYRINGE (ML) INTRAVENOUS ONCE
Refills: 0 | Status: DISCONTINUED | OUTPATIENT
Start: 2025-07-14 | End: 2025-07-22

## 2025-07-14 RX ORDER — SODIUM CHLORIDE 9 G/1000ML
1000 INJECTION, SOLUTION INTRAVENOUS
Refills: 0 | Status: DISCONTINUED | OUTPATIENT
Start: 2025-07-14 | End: 2025-07-14

## 2025-07-14 RX ORDER — LATANOPROST PF 0.05 MG/ML
1 SOLUTION/ DROPS OPHTHALMIC AT BEDTIME
Refills: 0 | Status: DISCONTINUED | OUTPATIENT
Start: 2025-07-14 | End: 2025-07-22

## 2025-07-14 RX ORDER — MAGNESIUM SULFATE 500 MG/ML
1 SYRINGE (ML) INJECTION ONCE
Refills: 0 | Status: COMPLETED | OUTPATIENT
Start: 2025-07-14 | End: 2025-07-14

## 2025-07-14 RX ORDER — INSULIN LISPRO 100 U/ML
INJECTION, SOLUTION INTRAVENOUS; SUBCUTANEOUS EVERY 6 HOURS
Refills: 0 | Status: DISCONTINUED | OUTPATIENT
Start: 2025-07-14 | End: 2025-07-19

## 2025-07-14 RX ORDER — ACETAMINOPHEN 500 MG/5ML
1000 LIQUID (ML) ORAL EVERY 6 HOURS
Refills: 0 | Status: DISCONTINUED | OUTPATIENT
Start: 2025-07-14 | End: 2025-07-14

## 2025-07-14 RX ORDER — DEXTROSE 50 % IN WATER 50 %
15 SYRINGE (ML) INTRAVENOUS ONCE
Refills: 0 | Status: DISCONTINUED | OUTPATIENT
Start: 2025-07-14 | End: 2025-07-22

## 2025-07-14 RX ORDER — ENOXAPARIN SODIUM 100 MG/ML
40 INJECTION SUBCUTANEOUS EVERY 24 HOURS
Refills: 0 | Status: DISCONTINUED | OUTPATIENT
Start: 2025-07-14 | End: 2025-07-22

## 2025-07-14 RX ORDER — GLUCAGON 3 MG/1
1 POWDER NASAL ONCE
Refills: 0 | Status: DISCONTINUED | OUTPATIENT
Start: 2025-07-14 | End: 2025-07-22

## 2025-07-14 RX ORDER — POTASSIUM CHLORIDE, DEXTROSE MONOHYDRATE AND SODIUM CHLORIDE 150; 5; 900 MG/100ML; G/100ML; MG/100ML
1000 INJECTION, SOLUTION INTRAVENOUS
Refills: 0 | Status: DISCONTINUED | OUTPATIENT
Start: 2025-07-14 | End: 2025-07-20

## 2025-07-14 RX ORDER — BRIMONIDINE TARTRATE 1.5 MG/ML
1 SOLUTION/ DROPS OPHTHALMIC THREE TIMES A DAY
Refills: 0 | Status: DISCONTINUED | OUTPATIENT
Start: 2025-07-14 | End: 2025-07-22

## 2025-07-14 RX ADMIN — Medication 4 MILLIGRAM(S): at 03:04

## 2025-07-14 RX ADMIN — BRIMONIDINE TARTRATE 1 DROP(S): 1.5 SOLUTION/ DROPS OPHTHALMIC at 14:17

## 2025-07-14 RX ADMIN — BRIMONIDINE TARTRATE 1 DROP(S): 1.5 SOLUTION/ DROPS OPHTHALMIC at 21:26

## 2025-07-14 RX ADMIN — Medication 400 MILLIGRAM(S): at 05:29

## 2025-07-14 RX ADMIN — HEPARIN SODIUM 5000 UNIT(S): 1000 INJECTION INTRAVENOUS; SUBCUTANEOUS at 14:17

## 2025-07-14 RX ADMIN — Medication 1000 MILLIGRAM(S): at 05:59

## 2025-07-14 RX ADMIN — Medication 2 MILLIGRAM(S): at 03:04

## 2025-07-14 RX ADMIN — Medication 2 MILLIGRAM(S): at 04:17

## 2025-07-14 RX ADMIN — Medication 40 MILLIGRAM(S): at 17:20

## 2025-07-14 RX ADMIN — LATANOPROST PF 1 DROP(S): 0.05 SOLUTION/ DROPS OPHTHALMIC at 21:26

## 2025-07-14 RX ADMIN — Medication 100 GRAM(S): at 11:24

## 2025-07-14 RX ADMIN — DORZOLAMIDE HYDROCHLORIDE AND TIMOLOL MALEATE 1 DROP(S): 20; 5 SOLUTION/ DROPS OPHTHALMIC at 17:20

## 2025-07-14 RX ADMIN — DORZOLAMIDE HYDROCHLORIDE AND TIMOLOL MALEATE 1 DROP(S): 20; 5 SOLUTION/ DROPS OPHTHALMIC at 05:29

## 2025-07-14 RX ADMIN — BRIMONIDINE TARTRATE 1 DROP(S): 1.5 SOLUTION/ DROPS OPHTHALMIC at 05:50

## 2025-07-14 RX ADMIN — SODIUM CHLORIDE 90 MILLILITER(S): 9 INJECTION, SOLUTION INTRAVENOUS at 11:24

## 2025-07-14 RX ADMIN — Medication 40 MILLIGRAM(S): at 05:29

## 2025-07-14 RX ADMIN — Medication 70 MICROGRAM(S): at 21:28

## 2025-07-14 RX ADMIN — HEPARIN SODIUM 5000 UNIT(S): 1000 INJECTION INTRAVENOUS; SUBCUTANEOUS at 05:29

## 2025-07-15 ENCOUNTER — APPOINTMENT (OUTPATIENT)
Dept: SURGICAL ONCOLOGY | Facility: CLINIC | Age: 80
End: 2025-07-15

## 2025-07-15 LAB
ANION GAP SERPL CALC-SCNC: 13 MMOL/L — SIGNIFICANT CHANGE UP (ref 5–17)
BUN SERPL-MCNC: 17 MG/DL — SIGNIFICANT CHANGE UP (ref 7–23)
CALCIUM SERPL-MCNC: 9 MG/DL — SIGNIFICANT CHANGE UP (ref 8.4–10.5)
CHLORIDE SERPL-SCNC: 99 MMOL/L — SIGNIFICANT CHANGE UP (ref 96–108)
CO2 SERPL-SCNC: 21 MMOL/L — LOW (ref 22–31)
CREAT SERPL-MCNC: 1.12 MG/DL — SIGNIFICANT CHANGE UP (ref 0.5–1.3)
EGFR: 49 ML/MIN/1.73M2 — LOW
EGFR: 49 ML/MIN/1.73M2 — LOW
GLUCOSE BLDC GLUCOMTR-MCNC: 101 MG/DL — HIGH (ref 70–99)
GLUCOSE BLDC GLUCOMTR-MCNC: 112 MG/DL — HIGH (ref 70–99)
GLUCOSE BLDC GLUCOMTR-MCNC: 114 MG/DL — HIGH (ref 70–99)
GLUCOSE BLDC GLUCOMTR-MCNC: 114 MG/DL — HIGH (ref 70–99)
GLUCOSE SERPL-MCNC: 108 MG/DL — HIGH (ref 70–99)
HCT VFR BLD CALC: 28.6 % — LOW (ref 34.5–45)
HGB BLD-MCNC: 9.4 G/DL — LOW (ref 11.5–15.5)
MAGNESIUM SERPL-MCNC: 2 MG/DL — SIGNIFICANT CHANGE UP (ref 1.6–2.6)
MCHC RBC-ENTMCNC: 28.7 PG — SIGNIFICANT CHANGE UP (ref 27–34)
MCHC RBC-ENTMCNC: 32.9 G/DL — SIGNIFICANT CHANGE UP (ref 32–36)
MCV RBC AUTO: 87.5 FL — SIGNIFICANT CHANGE UP (ref 80–100)
NRBC # BLD AUTO: 0 K/UL — SIGNIFICANT CHANGE UP (ref 0–0)
NRBC # FLD: 0 K/UL — SIGNIFICANT CHANGE UP (ref 0–0)
NRBC BLD AUTO-RTO: 0 /100 WBCS — SIGNIFICANT CHANGE UP (ref 0–0)
PHOSPHATE SERPL-MCNC: 3.2 MG/DL — SIGNIFICANT CHANGE UP (ref 2.5–4.5)
PLATELET # BLD AUTO: 353 K/UL — SIGNIFICANT CHANGE UP (ref 150–400)
PMV BLD: 9.5 FL — SIGNIFICANT CHANGE UP (ref 7–13)
POTASSIUM SERPL-MCNC: 4.4 MMOL/L — SIGNIFICANT CHANGE UP (ref 3.5–5.3)
POTASSIUM SERPL-SCNC: 4.4 MMOL/L — SIGNIFICANT CHANGE UP (ref 3.5–5.3)
RBC # BLD: 3.27 M/UL — LOW (ref 3.8–5.2)
RBC # FLD: 14.4 % — SIGNIFICANT CHANGE UP (ref 10.3–14.5)
SODIUM SERPL-SCNC: 133 MMOL/L — LOW (ref 135–145)
WBC # BLD: 2.74 K/UL — LOW (ref 3.8–10.5)
WBC # FLD AUTO: 2.74 K/UL — LOW (ref 3.8–10.5)

## 2025-07-15 PROCEDURE — 71045 X-RAY EXAM CHEST 1 VIEW: CPT | Mod: 26

## 2025-07-15 PROCEDURE — 74018 RADEX ABDOMEN 1 VIEW: CPT | Mod: 26,XE

## 2025-07-15 PROCEDURE — 74240 X-RAY XM UPR GI TRC 1CNTRST: CPT | Mod: 26

## 2025-07-15 RX ORDER — ONDANSETRON HCL/PF 4 MG/2 ML
4 VIAL (ML) INJECTION ONCE
Refills: 0 | Status: COMPLETED | OUTPATIENT
Start: 2025-07-15 | End: 2025-07-15

## 2025-07-15 RX ADMIN — BRIMONIDINE TARTRATE 1 DROP(S): 1.5 SOLUTION/ DROPS OPHTHALMIC at 15:16

## 2025-07-15 RX ADMIN — POTASSIUM CHLORIDE, DEXTROSE MONOHYDRATE AND SODIUM CHLORIDE 90 MILLILITER(S): 150; 5; 900 INJECTION, SOLUTION INTRAVENOUS at 15:16

## 2025-07-15 RX ADMIN — BRIMONIDINE TARTRATE 1 DROP(S): 1.5 SOLUTION/ DROPS OPHTHALMIC at 21:05

## 2025-07-15 RX ADMIN — ENOXAPARIN SODIUM 40 MILLIGRAM(S): 100 INJECTION SUBCUTANEOUS at 05:27

## 2025-07-15 RX ADMIN — BRIMONIDINE TARTRATE 1 DROP(S): 1.5 SOLUTION/ DROPS OPHTHALMIC at 05:27

## 2025-07-15 RX ADMIN — DORZOLAMIDE HYDROCHLORIDE AND TIMOLOL MALEATE 1 DROP(S): 20; 5 SOLUTION/ DROPS OPHTHALMIC at 05:28

## 2025-07-15 RX ADMIN — Medication 70 MICROGRAM(S): at 21:04

## 2025-07-15 RX ADMIN — LATANOPROST PF 1 DROP(S): 0.05 SOLUTION/ DROPS OPHTHALMIC at 21:05

## 2025-07-15 RX ADMIN — Medication 40 MILLIGRAM(S): at 05:27

## 2025-07-15 RX ADMIN — DORZOLAMIDE HYDROCHLORIDE AND TIMOLOL MALEATE 1 DROP(S): 20; 5 SOLUTION/ DROPS OPHTHALMIC at 17:24

## 2025-07-15 RX ADMIN — Medication 4 MILLIGRAM(S): at 15:16

## 2025-07-15 RX ADMIN — Medication 40 MILLIGRAM(S): at 17:24

## 2025-07-16 LAB
ANION GAP SERPL CALC-SCNC: 11 MMOL/L — SIGNIFICANT CHANGE UP (ref 5–17)
BUN SERPL-MCNC: 9 MG/DL — SIGNIFICANT CHANGE UP (ref 7–23)
CALCIUM SERPL-MCNC: 8.7 MG/DL — SIGNIFICANT CHANGE UP (ref 8.4–10.5)
CHLORIDE SERPL-SCNC: 102 MMOL/L — SIGNIFICANT CHANGE UP (ref 96–108)
CO2 SERPL-SCNC: 21 MMOL/L — LOW (ref 22–31)
CREAT SERPL-MCNC: 1.05 MG/DL — SIGNIFICANT CHANGE UP (ref 0.5–1.3)
EGFR: 53 ML/MIN/1.73M2 — LOW
EGFR: 53 ML/MIN/1.73M2 — LOW
GLUCOSE BLDC GLUCOMTR-MCNC: 105 MG/DL — HIGH (ref 70–99)
GLUCOSE BLDC GLUCOMTR-MCNC: 105 MG/DL — HIGH (ref 70–99)
GLUCOSE BLDC GLUCOMTR-MCNC: 114 MG/DL — HIGH (ref 70–99)
GLUCOSE BLDC GLUCOMTR-MCNC: 176 MG/DL — HIGH (ref 70–99)
GLUCOSE SERPL-MCNC: 107 MG/DL — HIGH (ref 70–99)
HCT VFR BLD CALC: 31.2 % — LOW (ref 34.5–45)
HGB BLD-MCNC: 10 G/DL — LOW (ref 11.5–15.5)
MAGNESIUM SERPL-MCNC: 2 MG/DL — SIGNIFICANT CHANGE UP (ref 1.6–2.6)
MCHC RBC-ENTMCNC: 27.7 PG — SIGNIFICANT CHANGE UP (ref 27–34)
MCHC RBC-ENTMCNC: 32.1 G/DL — SIGNIFICANT CHANGE UP (ref 32–36)
MCV RBC AUTO: 86.4 FL — SIGNIFICANT CHANGE UP (ref 80–100)
NRBC # BLD AUTO: 0 K/UL — SIGNIFICANT CHANGE UP (ref 0–0)
NRBC # FLD: 0 K/UL — SIGNIFICANT CHANGE UP (ref 0–0)
NRBC BLD AUTO-RTO: 0 /100 WBCS — SIGNIFICANT CHANGE UP (ref 0–0)
PHOSPHATE SERPL-MCNC: 2.2 MG/DL — LOW (ref 2.5–4.5)
PLATELET # BLD AUTO: 357 K/UL — SIGNIFICANT CHANGE UP (ref 150–400)
PMV BLD: 10 FL — SIGNIFICANT CHANGE UP (ref 7–13)
POTASSIUM SERPL-MCNC: 4.1 MMOL/L — SIGNIFICANT CHANGE UP (ref 3.5–5.3)
POTASSIUM SERPL-SCNC: 4.1 MMOL/L — SIGNIFICANT CHANGE UP (ref 3.5–5.3)
RBC # BLD: 3.61 M/UL — LOW (ref 3.8–5.2)
RBC # FLD: 14.2 % — SIGNIFICANT CHANGE UP (ref 10.3–14.5)
SODIUM SERPL-SCNC: 134 MMOL/L — LOW (ref 135–145)
WBC # BLD: 2.74 K/UL — LOW (ref 3.8–10.5)
WBC # FLD AUTO: 2.74 K/UL — LOW (ref 3.8–10.5)

## 2025-07-16 PROCEDURE — 74018 RADEX ABDOMEN 1 VIEW: CPT | Mod: 26

## 2025-07-16 PROCEDURE — 93010 ELECTROCARDIOGRAM REPORT: CPT

## 2025-07-16 RX ORDER — ONDANSETRON HCL/PF 4 MG/2 ML
4 VIAL (ML) INJECTION ONCE
Refills: 0 | Status: DISCONTINUED | OUTPATIENT
Start: 2025-07-16 | End: 2025-07-22

## 2025-07-16 RX ORDER — METOCLOPRAMIDE HCL 10 MG
5 TABLET ORAL EVERY 8 HOURS
Refills: 0 | Status: DISCONTINUED | OUTPATIENT
Start: 2025-07-16 | End: 2025-07-22

## 2025-07-16 RX ORDER — OCTREOTIDE ACETATE 500 UG/ML
100 INJECTION, SOLUTION INTRAVENOUS; SUBCUTANEOUS EVERY 8 HOURS
Refills: 0 | Status: DISCONTINUED | OUTPATIENT
Start: 2025-07-16 | End: 2025-07-22

## 2025-07-16 RX ORDER — METOCLOPRAMIDE HCL 10 MG
10 TABLET ORAL EVERY 8 HOURS
Refills: 0 | Status: DISCONTINUED | OUTPATIENT
Start: 2025-07-16 | End: 2025-07-16

## 2025-07-16 RX ORDER — SODIUM PHOSPHATE,DIBASIC DIHYD
30 POWDER (GRAM) MISCELLANEOUS ONCE
Refills: 0 | Status: COMPLETED | OUTPATIENT
Start: 2025-07-16 | End: 2025-07-16

## 2025-07-16 RX ORDER — DEXAMETHASONE 0.5 MG/1
4 TABLET ORAL EVERY 12 HOURS
Refills: 0 | Status: DISCONTINUED | OUTPATIENT
Start: 2025-07-16 | End: 2025-07-22

## 2025-07-16 RX ADMIN — OCTREOTIDE ACETATE 100 MICROGRAM(S): 500 INJECTION, SOLUTION INTRAVENOUS; SUBCUTANEOUS at 21:38

## 2025-07-16 RX ADMIN — BRIMONIDINE TARTRATE 1 DROP(S): 1.5 SOLUTION/ DROPS OPHTHALMIC at 11:47

## 2025-07-16 RX ADMIN — DEXAMETHASONE 4 MILLIGRAM(S): 0.5 TABLET ORAL at 17:35

## 2025-07-16 RX ADMIN — Medication 40 MILLIGRAM(S): at 17:35

## 2025-07-16 RX ADMIN — BRIMONIDINE TARTRATE 1 DROP(S): 1.5 SOLUTION/ DROPS OPHTHALMIC at 05:08

## 2025-07-16 RX ADMIN — OCTREOTIDE ACETATE 100 MICROGRAM(S): 500 INJECTION, SOLUTION INTRAVENOUS; SUBCUTANEOUS at 11:47

## 2025-07-16 RX ADMIN — Medication 70 MICROGRAM(S): at 21:37

## 2025-07-16 RX ADMIN — Medication 85 MILLIMOLE(S): at 11:46

## 2025-07-16 RX ADMIN — DORZOLAMIDE HYDROCHLORIDE AND TIMOLOL MALEATE 1 DROP(S): 20; 5 SOLUTION/ DROPS OPHTHALMIC at 05:08

## 2025-07-16 RX ADMIN — Medication 40 MILLIGRAM(S): at 05:08

## 2025-07-16 RX ADMIN — INSULIN LISPRO 1: 100 INJECTION, SOLUTION INTRAVENOUS; SUBCUTANEOUS at 17:35

## 2025-07-16 RX ADMIN — BRIMONIDINE TARTRATE 1 DROP(S): 1.5 SOLUTION/ DROPS OPHTHALMIC at 21:37

## 2025-07-16 RX ADMIN — DORZOLAMIDE HYDROCHLORIDE AND TIMOLOL MALEATE 1 DROP(S): 20; 5 SOLUTION/ DROPS OPHTHALMIC at 17:39

## 2025-07-16 RX ADMIN — LATANOPROST PF 1 DROP(S): 0.05 SOLUTION/ DROPS OPHTHALMIC at 21:36

## 2025-07-16 RX ADMIN — Medication 5 MILLIGRAM(S): at 21:37

## 2025-07-16 RX ADMIN — Medication 5 MILLIGRAM(S): at 11:46

## 2025-07-16 RX ADMIN — ENOXAPARIN SODIUM 40 MILLIGRAM(S): 100 INJECTION SUBCUTANEOUS at 05:08

## 2025-07-17 LAB
ANION GAP SERPL CALC-SCNC: 14 MMOL/L — SIGNIFICANT CHANGE UP (ref 5–17)
BUN SERPL-MCNC: 5 MG/DL — LOW (ref 7–23)
CALCIUM SERPL-MCNC: 8.9 MG/DL — SIGNIFICANT CHANGE UP (ref 8.4–10.5)
CHLORIDE SERPL-SCNC: 101 MMOL/L — SIGNIFICANT CHANGE UP (ref 96–108)
CO2 SERPL-SCNC: 18 MMOL/L — LOW (ref 22–31)
CREAT SERPL-MCNC: 0.87 MG/DL — SIGNIFICANT CHANGE UP (ref 0.5–1.3)
EGFR: 67 ML/MIN/1.73M2 — SIGNIFICANT CHANGE UP
EGFR: 67 ML/MIN/1.73M2 — SIGNIFICANT CHANGE UP
GLUCOSE BLDC GLUCOMTR-MCNC: 138 MG/DL — HIGH (ref 70–99)
GLUCOSE BLDC GLUCOMTR-MCNC: 149 MG/DL — HIGH (ref 70–99)
GLUCOSE BLDC GLUCOMTR-MCNC: 161 MG/DL — HIGH (ref 70–99)
GLUCOSE BLDC GLUCOMTR-MCNC: 178 MG/DL — HIGH (ref 70–99)
GLUCOSE BLDC GLUCOMTR-MCNC: 182 MG/DL — HIGH (ref 70–99)
GLUCOSE SERPL-MCNC: 149 MG/DL — HIGH (ref 70–99)
HCT VFR BLD CALC: 31.4 % — LOW (ref 34.5–45)
HGB BLD-MCNC: 10.3 G/DL — LOW (ref 11.5–15.5)
MAGNESIUM SERPL-MCNC: 1.7 MG/DL — SIGNIFICANT CHANGE UP (ref 1.6–2.6)
MCHC RBC-ENTMCNC: 28.3 PG — SIGNIFICANT CHANGE UP (ref 27–34)
MCHC RBC-ENTMCNC: 32.8 G/DL — SIGNIFICANT CHANGE UP (ref 32–36)
MCV RBC AUTO: 86.3 FL — SIGNIFICANT CHANGE UP (ref 80–100)
NRBC # BLD AUTO: 0 K/UL — SIGNIFICANT CHANGE UP (ref 0–0)
NRBC # FLD: 0 K/UL — SIGNIFICANT CHANGE UP (ref 0–0)
NRBC BLD AUTO-RTO: 0 /100 WBCS — SIGNIFICANT CHANGE UP (ref 0–0)
PHOSPHATE SERPL-MCNC: 2.7 MG/DL — SIGNIFICANT CHANGE UP (ref 2.5–4.5)
PLATELET # BLD AUTO: 362 K/UL — SIGNIFICANT CHANGE UP (ref 150–400)
PMV BLD: 9.7 FL — SIGNIFICANT CHANGE UP (ref 7–13)
POTASSIUM SERPL-MCNC: 4.9 MMOL/L — SIGNIFICANT CHANGE UP (ref 3.5–5.3)
POTASSIUM SERPL-SCNC: 4.9 MMOL/L — SIGNIFICANT CHANGE UP (ref 3.5–5.3)
RBC # BLD: 3.64 M/UL — LOW (ref 3.8–5.2)
RBC # FLD: 14.1 % — SIGNIFICANT CHANGE UP (ref 10.3–14.5)
SODIUM SERPL-SCNC: 133 MMOL/L — LOW (ref 135–145)
WBC # BLD: 3.49 K/UL — LOW (ref 3.8–10.5)
WBC # FLD AUTO: 3.49 K/UL — LOW (ref 3.8–10.5)

## 2025-07-17 PROCEDURE — 71045 X-RAY EXAM CHEST 1 VIEW: CPT | Mod: 26

## 2025-07-17 RX ORDER — ACETAMINOPHEN 500 MG/5ML
1000 LIQUID (ML) ORAL EVERY 6 HOURS
Refills: 0 | Status: COMPLETED | OUTPATIENT
Start: 2025-07-17 | End: 2025-07-18

## 2025-07-17 RX ORDER — MAGNESIUM SULFATE 500 MG/ML
2 SYRINGE (ML) INJECTION ONCE
Refills: 0 | Status: COMPLETED | OUTPATIENT
Start: 2025-07-17 | End: 2025-07-17

## 2025-07-17 RX ORDER — SODIUM PHOSPHATE,DIBASIC DIHYD
15 POWDER (GRAM) MISCELLANEOUS ONCE
Refills: 0 | Status: COMPLETED | OUTPATIENT
Start: 2025-07-17 | End: 2025-07-17

## 2025-07-17 RX ADMIN — Medication 1000 MILLIGRAM(S): at 18:04

## 2025-07-17 RX ADMIN — Medication 400 MILLIGRAM(S): at 12:27

## 2025-07-17 RX ADMIN — ENOXAPARIN SODIUM 40 MILLIGRAM(S): 100 INJECTION SUBCUTANEOUS at 05:20

## 2025-07-17 RX ADMIN — BRIMONIDINE TARTRATE 1 DROP(S): 1.5 SOLUTION/ DROPS OPHTHALMIC at 21:27

## 2025-07-17 RX ADMIN — INSULIN LISPRO 1: 100 INJECTION, SOLUTION INTRAVENOUS; SUBCUTANEOUS at 00:45

## 2025-07-17 RX ADMIN — LATANOPROST PF 1 DROP(S): 0.05 SOLUTION/ DROPS OPHTHALMIC at 21:28

## 2025-07-17 RX ADMIN — Medication 400 MILLIGRAM(S): at 17:34

## 2025-07-17 RX ADMIN — INSULIN LISPRO 1: 100 INJECTION, SOLUTION INTRAVENOUS; SUBCUTANEOUS at 17:33

## 2025-07-17 RX ADMIN — Medication 40 MILLIGRAM(S): at 17:33

## 2025-07-17 RX ADMIN — Medication 40 MILLIGRAM(S): at 05:19

## 2025-07-17 RX ADMIN — Medication 25 GRAM(S): at 09:51

## 2025-07-17 RX ADMIN — BRIMONIDINE TARTRATE 1 DROP(S): 1.5 SOLUTION/ DROPS OPHTHALMIC at 13:04

## 2025-07-17 RX ADMIN — DORZOLAMIDE HYDROCHLORIDE AND TIMOLOL MALEATE 1 DROP(S): 20; 5 SOLUTION/ DROPS OPHTHALMIC at 17:32

## 2025-07-17 RX ADMIN — Medication 5 MILLIGRAM(S): at 13:05

## 2025-07-17 RX ADMIN — INSULIN LISPRO 1: 100 INJECTION, SOLUTION INTRAVENOUS; SUBCUTANEOUS at 23:44

## 2025-07-17 RX ADMIN — OCTREOTIDE ACETATE 100 MICROGRAM(S): 500 INJECTION, SOLUTION INTRAVENOUS; SUBCUTANEOUS at 05:19

## 2025-07-17 RX ADMIN — DEXAMETHASONE 4 MILLIGRAM(S): 0.5 TABLET ORAL at 05:21

## 2025-07-17 RX ADMIN — DORZOLAMIDE HYDROCHLORIDE AND TIMOLOL MALEATE 1 DROP(S): 20; 5 SOLUTION/ DROPS OPHTHALMIC at 05:21

## 2025-07-17 RX ADMIN — Medication 5 MILLIGRAM(S): at 21:29

## 2025-07-17 RX ADMIN — OCTREOTIDE ACETATE 100 MICROGRAM(S): 500 INJECTION, SOLUTION INTRAVENOUS; SUBCUTANEOUS at 21:28

## 2025-07-17 RX ADMIN — OCTREOTIDE ACETATE 100 MICROGRAM(S): 500 INJECTION, SOLUTION INTRAVENOUS; SUBCUTANEOUS at 13:05

## 2025-07-17 RX ADMIN — Medication 5 MILLIGRAM(S): at 05:19

## 2025-07-17 RX ADMIN — BRIMONIDINE TARTRATE 1 DROP(S): 1.5 SOLUTION/ DROPS OPHTHALMIC at 05:20

## 2025-07-17 RX ADMIN — Medication 1000 MILLIGRAM(S): at 23:55

## 2025-07-17 RX ADMIN — POTASSIUM CHLORIDE, DEXTROSE MONOHYDRATE AND SODIUM CHLORIDE 90 MILLILITER(S): 150; 5; 900 INJECTION, SOLUTION INTRAVENOUS at 05:19

## 2025-07-17 RX ADMIN — Medication 70 MICROGRAM(S): at 21:29

## 2025-07-17 RX ADMIN — DEXAMETHASONE 4 MILLIGRAM(S): 0.5 TABLET ORAL at 17:33

## 2025-07-17 RX ADMIN — Medication 63.75 MILLIMOLE(S): at 12:27

## 2025-07-17 RX ADMIN — Medication 400 MILLIGRAM(S): at 23:25

## 2025-07-17 RX ADMIN — Medication 1000 MILLIGRAM(S): at 12:57

## 2025-07-18 LAB
ANION GAP SERPL CALC-SCNC: 15 MMOL/L — SIGNIFICANT CHANGE UP (ref 5–17)
BUN SERPL-MCNC: 8 MG/DL — SIGNIFICANT CHANGE UP (ref 7–23)
CALCIUM SERPL-MCNC: 9 MG/DL — SIGNIFICANT CHANGE UP (ref 8.4–10.5)
CHLORIDE SERPL-SCNC: 103 MMOL/L — SIGNIFICANT CHANGE UP (ref 96–108)
CO2 SERPL-SCNC: 16 MMOL/L — LOW (ref 22–31)
CREAT SERPL-MCNC: 0.95 MG/DL — SIGNIFICANT CHANGE UP (ref 0.5–1.3)
EGFR: 60 ML/MIN/1.73M2 — SIGNIFICANT CHANGE UP
EGFR: 60 ML/MIN/1.73M2 — SIGNIFICANT CHANGE UP
GLUCOSE BLDC GLUCOMTR-MCNC: 127 MG/DL — HIGH (ref 70–99)
GLUCOSE BLDC GLUCOMTR-MCNC: 145 MG/DL — HIGH (ref 70–99)
GLUCOSE BLDC GLUCOMTR-MCNC: 155 MG/DL — HIGH (ref 70–99)
GLUCOSE SERPL-MCNC: 131 MG/DL — HIGH (ref 70–99)
HCT VFR BLD CALC: 30.6 % — LOW (ref 34.5–45)
HGB BLD-MCNC: 9.9 G/DL — LOW (ref 11.5–15.5)
MAGNESIUM SERPL-MCNC: 2.4 MG/DL — SIGNIFICANT CHANGE UP (ref 1.6–2.6)
MCHC RBC-ENTMCNC: 28.2 PG — SIGNIFICANT CHANGE UP (ref 27–34)
MCHC RBC-ENTMCNC: 32.4 G/DL — SIGNIFICANT CHANGE UP (ref 32–36)
MCV RBC AUTO: 87.2 FL — SIGNIFICANT CHANGE UP (ref 80–100)
NRBC # BLD AUTO: 0 K/UL — SIGNIFICANT CHANGE UP (ref 0–0)
NRBC # FLD: 0 K/UL — SIGNIFICANT CHANGE UP (ref 0–0)
NRBC BLD AUTO-RTO: 0 /100 WBCS — SIGNIFICANT CHANGE UP (ref 0–0)
PHOSPHATE SERPL-MCNC: 2.7 MG/DL — SIGNIFICANT CHANGE UP (ref 2.5–4.5)
PLATELET # BLD AUTO: 469 K/UL — HIGH (ref 150–400)
PMV BLD: 9.6 FL — SIGNIFICANT CHANGE UP (ref 7–13)
POTASSIUM SERPL-MCNC: 4.7 MMOL/L — SIGNIFICANT CHANGE UP (ref 3.5–5.3)
POTASSIUM SERPL-SCNC: 4.7 MMOL/L — SIGNIFICANT CHANGE UP (ref 3.5–5.3)
RBC # BLD: 3.51 M/UL — LOW (ref 3.8–5.2)
RBC # FLD: 14.3 % — SIGNIFICANT CHANGE UP (ref 10.3–14.5)
SODIUM SERPL-SCNC: 134 MMOL/L — LOW (ref 135–145)
WBC # BLD: 5.04 K/UL — SIGNIFICANT CHANGE UP (ref 3.8–10.5)
WBC # FLD AUTO: 5.04 K/UL — SIGNIFICANT CHANGE UP (ref 3.8–10.5)

## 2025-07-18 PROCEDURE — 74018 RADEX ABDOMEN 1 VIEW: CPT | Mod: 26

## 2025-07-18 RX ADMIN — Medication 5 MILLIGRAM(S): at 21:05

## 2025-07-18 RX ADMIN — Medication 70 MICROGRAM(S): at 21:05

## 2025-07-18 RX ADMIN — DORZOLAMIDE HYDROCHLORIDE AND TIMOLOL MALEATE 1 DROP(S): 20; 5 SOLUTION/ DROPS OPHTHALMIC at 17:53

## 2025-07-18 RX ADMIN — BRIMONIDINE TARTRATE 1 DROP(S): 1.5 SOLUTION/ DROPS OPHTHALMIC at 21:04

## 2025-07-18 RX ADMIN — DEXAMETHASONE 4 MILLIGRAM(S): 0.5 TABLET ORAL at 17:46

## 2025-07-18 RX ADMIN — OCTREOTIDE ACETATE 100 MICROGRAM(S): 500 INJECTION, SOLUTION INTRAVENOUS; SUBCUTANEOUS at 21:04

## 2025-07-18 RX ADMIN — ENOXAPARIN SODIUM 40 MILLIGRAM(S): 100 INJECTION SUBCUTANEOUS at 05:34

## 2025-07-18 RX ADMIN — OCTREOTIDE ACETATE 100 MICROGRAM(S): 500 INJECTION, SOLUTION INTRAVENOUS; SUBCUTANEOUS at 13:05

## 2025-07-18 RX ADMIN — OCTREOTIDE ACETATE 100 MICROGRAM(S): 500 INJECTION, SOLUTION INTRAVENOUS; SUBCUTANEOUS at 05:36

## 2025-07-18 RX ADMIN — Medication 1000 MILLIGRAM(S): at 06:05

## 2025-07-18 RX ADMIN — Medication 40 MILLIGRAM(S): at 05:34

## 2025-07-18 RX ADMIN — Medication 400 MILLIGRAM(S): at 05:35

## 2025-07-18 RX ADMIN — Medication 5 MILLIGRAM(S): at 05:34

## 2025-07-18 RX ADMIN — Medication 5 MILLIGRAM(S): at 13:04

## 2025-07-18 RX ADMIN — Medication 40 MILLIGRAM(S): at 17:46

## 2025-07-18 RX ADMIN — DORZOLAMIDE HYDROCHLORIDE AND TIMOLOL MALEATE 1 DROP(S): 20; 5 SOLUTION/ DROPS OPHTHALMIC at 05:32

## 2025-07-18 RX ADMIN — DEXAMETHASONE 4 MILLIGRAM(S): 0.5 TABLET ORAL at 05:34

## 2025-07-18 RX ADMIN — BRIMONIDINE TARTRATE 1 DROP(S): 1.5 SOLUTION/ DROPS OPHTHALMIC at 05:32

## 2025-07-18 RX ADMIN — LATANOPROST PF 1 DROP(S): 0.05 SOLUTION/ DROPS OPHTHALMIC at 21:05

## 2025-07-18 RX ADMIN — BRIMONIDINE TARTRATE 1 DROP(S): 1.5 SOLUTION/ DROPS OPHTHALMIC at 13:26

## 2025-07-18 RX ADMIN — INSULIN LISPRO 1: 100 INJECTION, SOLUTION INTRAVENOUS; SUBCUTANEOUS at 05:38

## 2025-07-19 LAB
ANION GAP SERPL CALC-SCNC: 13 MMOL/L — SIGNIFICANT CHANGE UP (ref 5–17)
BUN SERPL-MCNC: 10 MG/DL — SIGNIFICANT CHANGE UP (ref 7–23)
CALCIUM SERPL-MCNC: 9.1 MG/DL — SIGNIFICANT CHANGE UP (ref 8.4–10.5)
CHLORIDE SERPL-SCNC: 104 MMOL/L — SIGNIFICANT CHANGE UP (ref 96–108)
CO2 SERPL-SCNC: 18 MMOL/L — LOW (ref 22–31)
CREAT SERPL-MCNC: 0.91 MG/DL — SIGNIFICANT CHANGE UP (ref 0.5–1.3)
EGFR: 63 ML/MIN/1.73M2 — SIGNIFICANT CHANGE UP
EGFR: 63 ML/MIN/1.73M2 — SIGNIFICANT CHANGE UP
GLUCOSE BLDC GLUCOMTR-MCNC: 116 MG/DL — HIGH (ref 70–99)
GLUCOSE BLDC GLUCOMTR-MCNC: 132 MG/DL — HIGH (ref 70–99)
GLUCOSE BLDC GLUCOMTR-MCNC: 141 MG/DL — HIGH (ref 70–99)
GLUCOSE BLDC GLUCOMTR-MCNC: 150 MG/DL — HIGH (ref 70–99)
GLUCOSE BLDC GLUCOMTR-MCNC: 151 MG/DL — HIGH (ref 70–99)
GLUCOSE BLDC GLUCOMTR-MCNC: 192 MG/DL — HIGH (ref 70–99)
GLUCOSE SERPL-MCNC: 135 MG/DL — HIGH (ref 70–99)
HCT VFR BLD CALC: 31.7 % — LOW (ref 34.5–45)
HGB BLD-MCNC: 10.3 G/DL — LOW (ref 11.5–15.5)
MAGNESIUM SERPL-MCNC: 2.1 MG/DL — SIGNIFICANT CHANGE UP (ref 1.6–2.6)
MCHC RBC-ENTMCNC: 28.1 PG — SIGNIFICANT CHANGE UP (ref 27–34)
MCHC RBC-ENTMCNC: 32.5 G/DL — SIGNIFICANT CHANGE UP (ref 32–36)
MCV RBC AUTO: 86.6 FL — SIGNIFICANT CHANGE UP (ref 80–100)
NRBC # BLD AUTO: 0 K/UL — SIGNIFICANT CHANGE UP (ref 0–0)
NRBC # FLD: 0 K/UL — SIGNIFICANT CHANGE UP (ref 0–0)
NRBC BLD AUTO-RTO: 0 /100 WBCS — SIGNIFICANT CHANGE UP (ref 0–0)
PHOSPHATE SERPL-MCNC: 2.8 MG/DL — SIGNIFICANT CHANGE UP (ref 2.5–4.5)
PLATELET # BLD AUTO: 444 K/UL — HIGH (ref 150–400)
PMV BLD: 9.4 FL — SIGNIFICANT CHANGE UP (ref 7–13)
POTASSIUM SERPL-MCNC: 4.9 MMOL/L — SIGNIFICANT CHANGE UP (ref 3.5–5.3)
POTASSIUM SERPL-SCNC: 4.9 MMOL/L — SIGNIFICANT CHANGE UP (ref 3.5–5.3)
RBC # BLD: 3.66 M/UL — LOW (ref 3.8–5.2)
RBC # FLD: 14.4 % — SIGNIFICANT CHANGE UP (ref 10.3–14.5)
SODIUM SERPL-SCNC: 135 MMOL/L — SIGNIFICANT CHANGE UP (ref 135–145)
WBC # BLD: 5.67 K/UL — SIGNIFICANT CHANGE UP (ref 3.8–10.5)
WBC # FLD AUTO: 5.67 K/UL — SIGNIFICANT CHANGE UP (ref 3.8–10.5)

## 2025-07-19 RX ORDER — INSULIN LISPRO 100 U/ML
INJECTION, SOLUTION INTRAVENOUS; SUBCUTANEOUS
Refills: 0 | Status: DISCONTINUED | OUTPATIENT
Start: 2025-07-19 | End: 2025-07-22

## 2025-07-19 RX ORDER — INSULIN LISPRO 100 U/ML
INJECTION, SOLUTION INTRAVENOUS; SUBCUTANEOUS AT BEDTIME
Refills: 0 | Status: DISCONTINUED | OUTPATIENT
Start: 2025-07-19 | End: 2025-07-22

## 2025-07-19 RX ADMIN — BRIMONIDINE TARTRATE 1 DROP(S): 1.5 SOLUTION/ DROPS OPHTHALMIC at 05:11

## 2025-07-19 RX ADMIN — OCTREOTIDE ACETATE 100 MICROGRAM(S): 500 INJECTION, SOLUTION INTRAVENOUS; SUBCUTANEOUS at 05:10

## 2025-07-19 RX ADMIN — DORZOLAMIDE HYDROCHLORIDE AND TIMOLOL MALEATE 1 DROP(S): 20; 5 SOLUTION/ DROPS OPHTHALMIC at 05:10

## 2025-07-19 RX ADMIN — Medication 40 MILLIGRAM(S): at 05:11

## 2025-07-19 RX ADMIN — INSULIN LISPRO 1: 100 INJECTION, SOLUTION INTRAVENOUS; SUBCUTANEOUS at 01:10

## 2025-07-19 RX ADMIN — DORZOLAMIDE HYDROCHLORIDE AND TIMOLOL MALEATE 1 DROP(S): 20; 5 SOLUTION/ DROPS OPHTHALMIC at 17:46

## 2025-07-19 RX ADMIN — DEXAMETHASONE 4 MILLIGRAM(S): 0.5 TABLET ORAL at 17:44

## 2025-07-19 RX ADMIN — OCTREOTIDE ACETATE 100 MICROGRAM(S): 500 INJECTION, SOLUTION INTRAVENOUS; SUBCUTANEOUS at 13:01

## 2025-07-19 RX ADMIN — Medication 5 MILLIGRAM(S): at 21:34

## 2025-07-19 RX ADMIN — BRIMONIDINE TARTRATE 1 DROP(S): 1.5 SOLUTION/ DROPS OPHTHALMIC at 21:34

## 2025-07-19 RX ADMIN — ENOXAPARIN SODIUM 40 MILLIGRAM(S): 100 INJECTION SUBCUTANEOUS at 05:09

## 2025-07-19 RX ADMIN — Medication 5 MILLIGRAM(S): at 13:01

## 2025-07-19 RX ADMIN — Medication 40 MILLIGRAM(S): at 17:44

## 2025-07-19 RX ADMIN — OCTREOTIDE ACETATE 100 MICROGRAM(S): 500 INJECTION, SOLUTION INTRAVENOUS; SUBCUTANEOUS at 21:35

## 2025-07-19 RX ADMIN — Medication 5 MILLIGRAM(S): at 05:09

## 2025-07-19 RX ADMIN — LATANOPROST PF 1 DROP(S): 0.05 SOLUTION/ DROPS OPHTHALMIC at 21:34

## 2025-07-19 RX ADMIN — DEXAMETHASONE 4 MILLIGRAM(S): 0.5 TABLET ORAL at 05:09

## 2025-07-19 RX ADMIN — BRIMONIDINE TARTRATE 1 DROP(S): 1.5 SOLUTION/ DROPS OPHTHALMIC at 13:01

## 2025-07-19 RX ADMIN — Medication 70 MICROGRAM(S): at 21:35

## 2025-07-20 LAB
ANION GAP SERPL CALC-SCNC: 13 MMOL/L — SIGNIFICANT CHANGE UP (ref 5–17)
BUN SERPL-MCNC: 12 MG/DL — SIGNIFICANT CHANGE UP (ref 7–23)
CALCIUM SERPL-MCNC: 9.6 MG/DL — SIGNIFICANT CHANGE UP (ref 8.4–10.5)
CHLORIDE SERPL-SCNC: 103 MMOL/L — SIGNIFICANT CHANGE UP (ref 96–108)
CO2 SERPL-SCNC: 17 MMOL/L — LOW (ref 22–31)
CREAT SERPL-MCNC: 0.89 MG/DL — SIGNIFICANT CHANGE UP (ref 0.5–1.3)
EGFR: 65 ML/MIN/1.73M2 — SIGNIFICANT CHANGE UP
EGFR: 65 ML/MIN/1.73M2 — SIGNIFICANT CHANGE UP
GLUCOSE BLDC GLUCOMTR-MCNC: 119 MG/DL — HIGH (ref 70–99)
GLUCOSE BLDC GLUCOMTR-MCNC: 124 MG/DL — HIGH (ref 70–99)
GLUCOSE BLDC GLUCOMTR-MCNC: 137 MG/DL — HIGH (ref 70–99)
GLUCOSE BLDC GLUCOMTR-MCNC: 148 MG/DL — HIGH (ref 70–99)
GLUCOSE SERPL-MCNC: 135 MG/DL — HIGH (ref 70–99)
HCT VFR BLD CALC: 32.6 % — LOW (ref 34.5–45)
HGB BLD-MCNC: 10.7 G/DL — LOW (ref 11.5–15.5)
MAGNESIUM SERPL-MCNC: 2.1 MG/DL — SIGNIFICANT CHANGE UP (ref 1.6–2.6)
MCHC RBC-ENTMCNC: 28.3 PG — SIGNIFICANT CHANGE UP (ref 27–34)
MCHC RBC-ENTMCNC: 32.8 G/DL — SIGNIFICANT CHANGE UP (ref 32–36)
MCV RBC AUTO: 86.2 FL — SIGNIFICANT CHANGE UP (ref 80–100)
NRBC # BLD AUTO: 0 K/UL — SIGNIFICANT CHANGE UP (ref 0–0)
NRBC # FLD: 0 K/UL — SIGNIFICANT CHANGE UP (ref 0–0)
NRBC BLD AUTO-RTO: 0 /100 WBCS — SIGNIFICANT CHANGE UP (ref 0–0)
PHOSPHATE SERPL-MCNC: 2.6 MG/DL — SIGNIFICANT CHANGE UP (ref 2.5–4.5)
PLATELET # BLD AUTO: 380 K/UL — SIGNIFICANT CHANGE UP (ref 150–400)
PMV BLD: 9.2 FL — SIGNIFICANT CHANGE UP (ref 7–13)
POTASSIUM SERPL-MCNC: 5.1 MMOL/L — SIGNIFICANT CHANGE UP (ref 3.5–5.3)
POTASSIUM SERPL-SCNC: 5.1 MMOL/L — SIGNIFICANT CHANGE UP (ref 3.5–5.3)
RBC # BLD: 3.78 M/UL — LOW (ref 3.8–5.2)
RBC # FLD: 14.6 % — HIGH (ref 10.3–14.5)
SODIUM SERPL-SCNC: 133 MMOL/L — LOW (ref 135–145)
WBC # BLD: 5.05 K/UL — SIGNIFICANT CHANGE UP (ref 3.8–10.5)
WBC # FLD AUTO: 5.05 K/UL — SIGNIFICANT CHANGE UP (ref 3.8–10.5)

## 2025-07-20 RX ORDER — AMLODIPINE BESYLATE 10 MG/1
10 TABLET ORAL ONCE
Refills: 0 | Status: COMPLETED | OUTPATIENT
Start: 2025-07-20 | End: 2025-07-20

## 2025-07-20 RX ORDER — SODIUM PHOSPHATE,DIBASIC DIHYD
15 POWDER (GRAM) MISCELLANEOUS ONCE
Refills: 0 | Status: COMPLETED | OUTPATIENT
Start: 2025-07-20 | End: 2025-07-20

## 2025-07-20 RX ORDER — LEVOTHYROXINE SODIUM 300 MCG
100 TABLET ORAL DAILY
Refills: 0 | Status: DISCONTINUED | OUTPATIENT
Start: 2025-07-20 | End: 2025-07-22

## 2025-07-20 RX ADMIN — Medication 100 MICROGRAM(S): at 09:24

## 2025-07-20 RX ADMIN — Medication 40 MILLIGRAM(S): at 05:39

## 2025-07-20 RX ADMIN — Medication 5 MILLIGRAM(S): at 13:27

## 2025-07-20 RX ADMIN — Medication 5 MILLIGRAM(S): at 05:39

## 2025-07-20 RX ADMIN — OCTREOTIDE ACETATE 100 MICROGRAM(S): 500 INJECTION, SOLUTION INTRAVENOUS; SUBCUTANEOUS at 13:27

## 2025-07-20 RX ADMIN — Medication 40 MILLIGRAM(S): at 17:23

## 2025-07-20 RX ADMIN — OCTREOTIDE ACETATE 100 MICROGRAM(S): 500 INJECTION, SOLUTION INTRAVENOUS; SUBCUTANEOUS at 21:20

## 2025-07-20 RX ADMIN — Medication 3 MILLILITER(S): at 21:24

## 2025-07-20 RX ADMIN — BRIMONIDINE TARTRATE 1 DROP(S): 1.5 SOLUTION/ DROPS OPHTHALMIC at 21:21

## 2025-07-20 RX ADMIN — OCTREOTIDE ACETATE 100 MICROGRAM(S): 500 INJECTION, SOLUTION INTRAVENOUS; SUBCUTANEOUS at 05:39

## 2025-07-20 RX ADMIN — DORZOLAMIDE HYDROCHLORIDE AND TIMOLOL MALEATE 1 DROP(S): 20; 5 SOLUTION/ DROPS OPHTHALMIC at 05:38

## 2025-07-20 RX ADMIN — Medication 5 MILLIGRAM(S): at 21:19

## 2025-07-20 RX ADMIN — AMLODIPINE BESYLATE 10 MILLIGRAM(S): 10 TABLET ORAL at 09:24

## 2025-07-20 RX ADMIN — ENOXAPARIN SODIUM 40 MILLIGRAM(S): 100 INJECTION SUBCUTANEOUS at 05:39

## 2025-07-20 RX ADMIN — LATANOPROST PF 1 DROP(S): 0.05 SOLUTION/ DROPS OPHTHALMIC at 21:21

## 2025-07-20 RX ADMIN — DORZOLAMIDE HYDROCHLORIDE AND TIMOLOL MALEATE 1 DROP(S): 20; 5 SOLUTION/ DROPS OPHTHALMIC at 17:23

## 2025-07-20 RX ADMIN — BRIMONIDINE TARTRATE 1 DROP(S): 1.5 SOLUTION/ DROPS OPHTHALMIC at 13:28

## 2025-07-20 RX ADMIN — DEXAMETHASONE 4 MILLIGRAM(S): 0.5 TABLET ORAL at 17:23

## 2025-07-20 RX ADMIN — BRIMONIDINE TARTRATE 1 DROP(S): 1.5 SOLUTION/ DROPS OPHTHALMIC at 05:39

## 2025-07-20 RX ADMIN — Medication 63.75 MILLIMOLE(S): at 13:27

## 2025-07-20 RX ADMIN — DEXAMETHASONE 4 MILLIGRAM(S): 0.5 TABLET ORAL at 05:40

## 2025-07-21 LAB
ANION GAP SERPL CALC-SCNC: 12 MMOL/L — SIGNIFICANT CHANGE UP (ref 5–17)
BUN SERPL-MCNC: 15 MG/DL — SIGNIFICANT CHANGE UP (ref 7–23)
CALCIUM SERPL-MCNC: 9.5 MG/DL — SIGNIFICANT CHANGE UP (ref 8.4–10.5)
CHLORIDE SERPL-SCNC: 100 MMOL/L — SIGNIFICANT CHANGE UP (ref 96–108)
CO2 SERPL-SCNC: 22 MMOL/L — SIGNIFICANT CHANGE UP (ref 22–31)
CREAT SERPL-MCNC: 1.06 MG/DL — SIGNIFICANT CHANGE UP (ref 0.5–1.3)
EGFR: 53 ML/MIN/1.73M2 — LOW
EGFR: 53 ML/MIN/1.73M2 — LOW
GLUCOSE BLDC GLUCOMTR-MCNC: 122 MG/DL — HIGH (ref 70–99)
GLUCOSE BLDC GLUCOMTR-MCNC: 128 MG/DL — HIGH (ref 70–99)
GLUCOSE BLDC GLUCOMTR-MCNC: 131 MG/DL — HIGH (ref 70–99)
GLUCOSE BLDC GLUCOMTR-MCNC: 153 MG/DL — HIGH (ref 70–99)
GLUCOSE SERPL-MCNC: 119 MG/DL — HIGH (ref 70–99)
HCT VFR BLD CALC: 28.8 % — LOW (ref 34.5–45)
HGB BLD-MCNC: 9.7 G/DL — LOW (ref 11.5–15.5)
MAGNESIUM SERPL-MCNC: 2 MG/DL — SIGNIFICANT CHANGE UP (ref 1.6–2.6)
MCHC RBC-ENTMCNC: 28.5 PG — SIGNIFICANT CHANGE UP (ref 27–34)
MCHC RBC-ENTMCNC: 33.7 G/DL — SIGNIFICANT CHANGE UP (ref 32–36)
MCV RBC AUTO: 84.7 FL — SIGNIFICANT CHANGE UP (ref 80–100)
NRBC # BLD AUTO: 0 K/UL — SIGNIFICANT CHANGE UP (ref 0–0)
NRBC # FLD: 0 K/UL — SIGNIFICANT CHANGE UP (ref 0–0)
NRBC BLD AUTO-RTO: 0 /100 WBCS — SIGNIFICANT CHANGE UP (ref 0–0)
PHOSPHATE SERPL-MCNC: 3.6 MG/DL — SIGNIFICANT CHANGE UP (ref 2.5–4.5)
PLATELET # BLD AUTO: 422 K/UL — HIGH (ref 150–400)
PMV BLD: 8.8 FL — SIGNIFICANT CHANGE UP (ref 7–13)
POTASSIUM SERPL-MCNC: 4.7 MMOL/L — SIGNIFICANT CHANGE UP (ref 3.5–5.3)
POTASSIUM SERPL-SCNC: 4.7 MMOL/L — SIGNIFICANT CHANGE UP (ref 3.5–5.3)
RBC # BLD: 3.4 M/UL — LOW (ref 3.8–5.2)
RBC # FLD: 14.6 % — HIGH (ref 10.3–14.5)
SODIUM SERPL-SCNC: 134 MMOL/L — LOW (ref 135–145)
WBC # BLD: 3.61 K/UL — LOW (ref 3.8–10.5)
WBC # FLD AUTO: 3.61 K/UL — LOW (ref 3.8–10.5)

## 2025-07-21 RX ADMIN — DORZOLAMIDE HYDROCHLORIDE AND TIMOLOL MALEATE 1 DROP(S): 20; 5 SOLUTION/ DROPS OPHTHALMIC at 21:13

## 2025-07-21 RX ADMIN — Medication 5 MILLIGRAM(S): at 21:05

## 2025-07-21 RX ADMIN — LATANOPROST PF 1 DROP(S): 0.05 SOLUTION/ DROPS OPHTHALMIC at 21:05

## 2025-07-21 RX ADMIN — BRIMONIDINE TARTRATE 1 DROP(S): 1.5 SOLUTION/ DROPS OPHTHALMIC at 14:31

## 2025-07-21 RX ADMIN — ENOXAPARIN SODIUM 40 MILLIGRAM(S): 100 INJECTION SUBCUTANEOUS at 05:52

## 2025-07-21 RX ADMIN — Medication 40 MILLIGRAM(S): at 05:54

## 2025-07-21 RX ADMIN — Medication 3 MILLILITER(S): at 14:18

## 2025-07-21 RX ADMIN — Medication 5 MILLIGRAM(S): at 14:32

## 2025-07-21 RX ADMIN — OCTREOTIDE ACETATE 100 MICROGRAM(S): 500 INJECTION, SOLUTION INTRAVENOUS; SUBCUTANEOUS at 05:51

## 2025-07-21 RX ADMIN — Medication 40 MILLIGRAM(S): at 17:35

## 2025-07-21 RX ADMIN — Medication 5 MILLIGRAM(S): at 05:54

## 2025-07-21 RX ADMIN — BRIMONIDINE TARTRATE 1 DROP(S): 1.5 SOLUTION/ DROPS OPHTHALMIC at 21:05

## 2025-07-21 RX ADMIN — OCTREOTIDE ACETATE 100 MICROGRAM(S): 500 INJECTION, SOLUTION INTRAVENOUS; SUBCUTANEOUS at 21:08

## 2025-07-21 RX ADMIN — OCTREOTIDE ACETATE 100 MICROGRAM(S): 500 INJECTION, SOLUTION INTRAVENOUS; SUBCUTANEOUS at 14:32

## 2025-07-21 RX ADMIN — Medication 100 MICROGRAM(S): at 05:51

## 2025-07-21 RX ADMIN — DEXAMETHASONE 4 MILLIGRAM(S): 0.5 TABLET ORAL at 05:54

## 2025-07-21 RX ADMIN — Medication 3 MILLILITER(S): at 21:20

## 2025-07-21 RX ADMIN — DEXAMETHASONE 4 MILLIGRAM(S): 0.5 TABLET ORAL at 17:35

## 2025-07-21 RX ADMIN — DORZOLAMIDE HYDROCHLORIDE AND TIMOLOL MALEATE 1 DROP(S): 20; 5 SOLUTION/ DROPS OPHTHALMIC at 05:50

## 2025-07-21 RX ADMIN — BRIMONIDINE TARTRATE 1 DROP(S): 1.5 SOLUTION/ DROPS OPHTHALMIC at 05:49

## 2025-07-21 RX ADMIN — Medication 3 MILLILITER(S): at 06:27

## 2025-07-22 ENCOUNTER — TRANSCRIPTION ENCOUNTER (OUTPATIENT)
Age: 80
End: 2025-07-22

## 2025-07-22 ENCOUNTER — NON-APPOINTMENT (OUTPATIENT)
Age: 80
End: 2025-07-22

## 2025-07-22 VITALS
DIASTOLIC BLOOD PRESSURE: 78 MMHG | HEART RATE: 66 BPM | OXYGEN SATURATION: 98 % | TEMPERATURE: 98 F | SYSTOLIC BLOOD PRESSURE: 143 MMHG | RESPIRATION RATE: 18 BRPM

## 2025-07-22 LAB
ANION GAP SERPL CALC-SCNC: 17 MMOL/L — SIGNIFICANT CHANGE UP (ref 5–17)
BUN SERPL-MCNC: 17 MG/DL — SIGNIFICANT CHANGE UP (ref 7–23)
CALCIUM SERPL-MCNC: 9.5 MG/DL — SIGNIFICANT CHANGE UP (ref 8.4–10.5)
CHLORIDE SERPL-SCNC: 98 MMOL/L — SIGNIFICANT CHANGE UP (ref 96–108)
CO2 SERPL-SCNC: 22 MMOL/L — SIGNIFICANT CHANGE UP (ref 22–31)
CREAT SERPL-MCNC: 1.17 MG/DL — SIGNIFICANT CHANGE UP (ref 0.5–1.3)
EGFR: 47 ML/MIN/1.73M2 — LOW
EGFR: 47 ML/MIN/1.73M2 — LOW
GLUCOSE BLDC GLUCOMTR-MCNC: 118 MG/DL — HIGH (ref 70–99)
GLUCOSE SERPL-MCNC: 200 MG/DL — HIGH (ref 70–99)
HCT VFR BLD CALC: 31.4 % — LOW (ref 34.5–45)
HGB BLD-MCNC: 10.3 G/DL — LOW (ref 11.5–15.5)
MAGNESIUM SERPL-MCNC: 2 MG/DL — SIGNIFICANT CHANGE UP (ref 1.6–2.6)
MCHC RBC-ENTMCNC: 28.2 PG — SIGNIFICANT CHANGE UP (ref 27–34)
MCHC RBC-ENTMCNC: 32.8 G/DL — SIGNIFICANT CHANGE UP (ref 32–36)
MCV RBC AUTO: 86 FL — SIGNIFICANT CHANGE UP (ref 80–100)
NRBC # BLD AUTO: 0 K/UL — SIGNIFICANT CHANGE UP (ref 0–0)
NRBC # FLD: 0 K/UL — SIGNIFICANT CHANGE UP (ref 0–0)
NRBC BLD AUTO-RTO: 0 /100 WBCS — SIGNIFICANT CHANGE UP (ref 0–0)
PHOSPHATE SERPL-MCNC: 3.1 MG/DL — SIGNIFICANT CHANGE UP (ref 2.5–4.5)
PLATELET # BLD AUTO: 449 K/UL — HIGH (ref 150–400)
PMV BLD: 8.8 FL — SIGNIFICANT CHANGE UP (ref 7–13)
POTASSIUM SERPL-MCNC: 4.3 MMOL/L — SIGNIFICANT CHANGE UP (ref 3.5–5.3)
POTASSIUM SERPL-SCNC: 4.3 MMOL/L — SIGNIFICANT CHANGE UP (ref 3.5–5.3)
RBC # BLD: 3.65 M/UL — LOW (ref 3.8–5.2)
RBC # FLD: 14.7 % — HIGH (ref 10.3–14.5)
SODIUM SERPL-SCNC: 137 MMOL/L — SIGNIFICANT CHANGE UP (ref 135–145)
WBC # BLD: 3.19 K/UL — LOW (ref 3.8–10.5)
WBC # FLD AUTO: 3.19 K/UL — LOW (ref 3.8–10.5)

## 2025-07-22 PROCEDURE — 74018 RADEX ABDOMEN 1 VIEW: CPT

## 2025-07-22 PROCEDURE — 99285 EMERGENCY DEPT VISIT HI MDM: CPT | Mod: 25

## 2025-07-22 PROCEDURE — 82962 GLUCOSE BLOOD TEST: CPT

## 2025-07-22 PROCEDURE — 82803 BLOOD GASES ANY COMBINATION: CPT

## 2025-07-22 PROCEDURE — 71045 X-RAY EXAM CHEST 1 VIEW: CPT

## 2025-07-22 PROCEDURE — 74240 X-RAY XM UPR GI TRC 1CNTRST: CPT

## 2025-07-22 PROCEDURE — 83690 ASSAY OF LIPASE: CPT

## 2025-07-22 PROCEDURE — 85610 PROTHROMBIN TIME: CPT

## 2025-07-22 PROCEDURE — 36415 COLL VENOUS BLD VENIPUNCTURE: CPT

## 2025-07-22 PROCEDURE — 86850 RBC ANTIBODY SCREEN: CPT

## 2025-07-22 PROCEDURE — 86901 BLOOD TYPING SEROLOGIC RH(D): CPT

## 2025-07-22 PROCEDURE — 96374 THER/PROPH/DIAG INJ IV PUSH: CPT

## 2025-07-22 PROCEDURE — 84100 ASSAY OF PHOSPHORUS: CPT

## 2025-07-22 PROCEDURE — 81001 URINALYSIS AUTO W/SCOPE: CPT

## 2025-07-22 PROCEDURE — 83735 ASSAY OF MAGNESIUM: CPT

## 2025-07-22 PROCEDURE — 85730 THROMBOPLASTIN TIME PARTIAL: CPT

## 2025-07-22 PROCEDURE — 74177 CT ABD & PELVIS W/CONTRAST: CPT

## 2025-07-22 PROCEDURE — 80048 BASIC METABOLIC PNL TOTAL CA: CPT

## 2025-07-22 PROCEDURE — 86900 BLOOD TYPING SEROLOGIC ABO: CPT

## 2025-07-22 PROCEDURE — 85027 COMPLETE CBC AUTOMATED: CPT

## 2025-07-22 PROCEDURE — 93005 ELECTROCARDIOGRAM TRACING: CPT

## 2025-07-22 PROCEDURE — 80053 COMPREHEN METABOLIC PANEL: CPT

## 2025-07-22 PROCEDURE — 85025 COMPLETE CBC W/AUTO DIFF WBC: CPT

## 2025-07-22 PROCEDURE — 96375 TX/PRO/DX INJ NEW DRUG ADDON: CPT

## 2025-07-22 RX ORDER — POLYETHYLENE GLYCOL 3350 17 G/17G
17 POWDER, FOR SOLUTION ORAL ONCE
Refills: 0 | Status: COMPLETED | OUTPATIENT
Start: 2025-07-22 | End: 2025-07-22

## 2025-07-22 RX ORDER — BISACODYL 5 MG
10 TABLET, DELAYED RELEASE (ENTERIC COATED) ORAL ONCE
Refills: 0 | Status: COMPLETED | OUTPATIENT
Start: 2025-07-22 | End: 2025-07-22

## 2025-07-22 RX ADMIN — ENOXAPARIN SODIUM 40 MILLIGRAM(S): 100 INJECTION SUBCUTANEOUS at 05:47

## 2025-07-22 RX ADMIN — Medication 5 MILLIGRAM(S): at 05:40

## 2025-07-22 RX ADMIN — DORZOLAMIDE HYDROCHLORIDE AND TIMOLOL MALEATE 1 DROP(S): 20; 5 SOLUTION/ DROPS OPHTHALMIC at 05:41

## 2025-07-22 RX ADMIN — POLYETHYLENE GLYCOL 3350 17 GRAM(S): 17 POWDER, FOR SOLUTION ORAL at 13:47

## 2025-07-22 RX ADMIN — Medication 3 MILLILITER(S): at 06:16

## 2025-07-22 RX ADMIN — BRIMONIDINE TARTRATE 1 DROP(S): 1.5 SOLUTION/ DROPS OPHTHALMIC at 05:41

## 2025-07-22 RX ADMIN — Medication 100 MICROGRAM(S): at 05:41

## 2025-07-22 RX ADMIN — Medication 40 MILLIGRAM(S): at 05:40

## 2025-07-22 RX ADMIN — Medication 10 MILLIGRAM(S): at 13:47

## 2025-07-22 RX ADMIN — DEXAMETHASONE 4 MILLIGRAM(S): 0.5 TABLET ORAL at 05:40

## 2025-07-22 RX ADMIN — Medication 3 MILLILITER(S): at 11:49

## 2025-07-22 RX ADMIN — OCTREOTIDE ACETATE 100 MICROGRAM(S): 500 INJECTION, SOLUTION INTRAVENOUS; SUBCUTANEOUS at 05:41

## 2025-07-24 ENCOUNTER — APPOINTMENT (OUTPATIENT)
Dept: HEMATOLOGY ONCOLOGY | Facility: CLINIC | Age: 80
End: 2025-07-24

## 2025-07-24 ENCOUNTER — RESULT REVIEW (OUTPATIENT)
Age: 80
End: 2025-07-24

## 2025-07-24 ENCOUNTER — APPOINTMENT (OUTPATIENT)
Dept: HEMATOLOGY ONCOLOGY | Facility: CLINIC | Age: 80
End: 2025-07-24
Payer: MEDICARE

## 2025-07-24 ENCOUNTER — APPOINTMENT (OUTPATIENT)
Dept: CT IMAGING | Facility: IMAGING CENTER | Age: 80
End: 2025-07-24
Payer: MEDICARE

## 2025-07-24 ENCOUNTER — APPOINTMENT (OUTPATIENT)
Dept: INFUSION THERAPY | Facility: HOSPITAL | Age: 80
End: 2025-07-24

## 2025-07-24 ENCOUNTER — OUTPATIENT (OUTPATIENT)
Dept: OUTPATIENT SERVICES | Facility: HOSPITAL | Age: 80
LOS: 1 days | End: 2025-07-24
Payer: COMMERCIAL

## 2025-07-24 VITALS
HEIGHT: 61 IN | TEMPERATURE: 97.5 F | BODY MASS INDEX: 19.73 KG/M2 | DIASTOLIC BLOOD PRESSURE: 85 MMHG | RESPIRATION RATE: 16 BRPM | WEIGHT: 104.48 LBS | SYSTOLIC BLOOD PRESSURE: 143 MMHG | HEART RATE: 62 BPM

## 2025-07-24 DIAGNOSIS — Z87.59 PERSONAL HISTORY OF OTHER COMPLICATIONS OF PREGNANCY, CHILDBIRTH AND THE PUERPERIUM: Chronic | ICD-10-CM

## 2025-07-24 DIAGNOSIS — Z98.890 OTHER SPECIFIED POSTPROCEDURAL STATES: Chronic | ICD-10-CM

## 2025-07-24 DIAGNOSIS — C17.9 MALIGNANT NEOPLASM OF SMALL INTESTINE, UNSPECIFIED: ICD-10-CM

## 2025-07-24 DIAGNOSIS — Z90.710 ACQUIRED ABSENCE OF BOTH CERVIX AND UTERUS: Chronic | ICD-10-CM

## 2025-07-24 PROBLEM — C80.1 MALIGNANT (PRIMARY) NEOPLASM, UNSPECIFIED: Chronic | Status: ACTIVE | Noted: 2025-07-14

## 2025-07-24 LAB
ACANTHOCYTES BLD QL SMEAR: SLIGHT — SIGNIFICANT CHANGE UP
ANISOCYTOSIS BLD QL: SLIGHT — SIGNIFICANT CHANGE UP
BASOPHILS # BLD AUTO: 0.01 K/UL — SIGNIFICANT CHANGE UP (ref 0–0.2)
BASOPHILS NFR BLD AUTO: 0.2 % — SIGNIFICANT CHANGE UP (ref 0–2)
EOSINOPHIL # BLD AUTO: 0.03 K/UL — SIGNIFICANT CHANGE UP (ref 0–0.5)
EOSINOPHIL NFR BLD AUTO: 0.7 % — SIGNIFICANT CHANGE UP (ref 0–6)
HCT VFR BLD CALC: 32.3 % — LOW (ref 34.5–45)
HGB BLD-MCNC: 10.9 G/DL — LOW (ref 11.5–15.5)
IMM GRANULOCYTES NFR BLD AUTO: 1.2 % — HIGH (ref 0–0.9)
LYMPHOCYTES # BLD AUTO: 1.58 K/UL — SIGNIFICANT CHANGE UP (ref 1–3.3)
LYMPHOCYTES # BLD AUTO: 36.8 % — SIGNIFICANT CHANGE UP (ref 13–44)
MCHC RBC-ENTMCNC: 28.8 PG — SIGNIFICANT CHANGE UP (ref 27–34)
MCHC RBC-ENTMCNC: 33.7 G/DL — SIGNIFICANT CHANGE UP (ref 32–36)
MCV RBC AUTO: 85.4 FL — SIGNIFICANT CHANGE UP (ref 80–100)
MONOCYTES # BLD AUTO: 0.58 K/UL — SIGNIFICANT CHANGE UP (ref 0–0.9)
MONOCYTES NFR BLD AUTO: 13.5 % — SIGNIFICANT CHANGE UP (ref 2–14)
NEUTROPHILS # BLD AUTO: 2.04 K/UL — SIGNIFICANT CHANGE UP (ref 1.8–7.4)
NEUTROPHILS NFR BLD AUTO: 47.6 % — SIGNIFICANT CHANGE UP (ref 43–77)
PLAT MORPH BLD: NORMAL — SIGNIFICANT CHANGE UP
PLATELET # BLD AUTO: 380 K/UL — SIGNIFICANT CHANGE UP (ref 150–400)
POIKILOCYTOSIS BLD QL AUTO: SLIGHT — SIGNIFICANT CHANGE UP
RBC # BLD: 3.78 M/UL — LOW (ref 3.8–5.2)
RBC # FLD: 15.6 % — HIGH (ref 10.3–14.5)
RBC BLD AUTO: ABNORMAL
WBC # BLD: 4.29 K/UL — SIGNIFICANT CHANGE UP (ref 3.8–10.5)
WBC # FLD AUTO: 4.29 K/UL — SIGNIFICANT CHANGE UP (ref 3.8–10.5)

## 2025-07-24 PROCEDURE — 74177 CT ABD & PELVIS W/CONTRAST: CPT | Mod: 26

## 2025-07-24 PROCEDURE — 74177 CT ABD & PELVIS W/CONTRAST: CPT

## 2025-07-24 PROCEDURE — 99214 OFFICE O/P EST MOD 30 MIN: CPT

## 2025-07-24 PROCEDURE — G2211 COMPLEX E/M VISIT ADD ON: CPT

## 2025-07-25 ENCOUNTER — NON-APPOINTMENT (OUTPATIENT)
Age: 80
End: 2025-07-25

## 2025-07-26 ENCOUNTER — APPOINTMENT (OUTPATIENT)
Dept: INFUSION THERAPY | Facility: HOSPITAL | Age: 80
End: 2025-07-26

## 2025-07-28 ENCOUNTER — APPOINTMENT (OUTPATIENT)
Dept: SURGICAL ONCOLOGY | Facility: CLINIC | Age: 80
End: 2025-07-28
Payer: MEDICARE

## 2025-07-28 VITALS
WEIGHT: 104.5 LBS | TEMPERATURE: 97.8 F | DIASTOLIC BLOOD PRESSURE: 78 MMHG | HEART RATE: 84 BPM | HEIGHT: 61 IN | BODY MASS INDEX: 19.73 KG/M2 | SYSTOLIC BLOOD PRESSURE: 126 MMHG

## 2025-07-28 PROCEDURE — 99214 OFFICE O/P EST MOD 30 MIN: CPT | Mod: 24

## 2025-07-29 DIAGNOSIS — C17.0 MALIGNANT NEOPLASM OF DUODENUM: ICD-10-CM

## 2025-07-29 RX ORDER — NEOMYCIN SULFATE 500 MG/1
500 TABLET ORAL
Qty: 6 | Refills: 0 | Status: ACTIVE | COMMUNITY
Start: 2025-07-29 | End: 1900-01-01

## 2025-07-29 RX ORDER — POTASSIUM CHLORIDE 1500 MG/1
20 TABLET, FILM COATED, EXTENDED RELEASE ORAL
Qty: 4 | Refills: 0 | Status: ACTIVE | COMMUNITY
Start: 2025-07-29 | End: 1900-01-01

## 2025-07-29 RX ORDER — METRONIDAZOLE 250 MG/1
250 TABLET ORAL
Qty: 3 | Refills: 0 | Status: ACTIVE | COMMUNITY
Start: 2025-07-29 | End: 1900-01-01

## 2025-07-31 ENCOUNTER — OUTPATIENT (OUTPATIENT)
Dept: OUTPATIENT SERVICES | Facility: HOSPITAL | Age: 80
LOS: 1 days | End: 2025-07-31

## 2025-07-31 VITALS
WEIGHT: 102.07 LBS | HEIGHT: 60 IN | HEART RATE: 72 BPM | OXYGEN SATURATION: 96 % | SYSTOLIC BLOOD PRESSURE: 133 MMHG | TEMPERATURE: 98 F | RESPIRATION RATE: 16 BRPM | DIASTOLIC BLOOD PRESSURE: 80 MMHG

## 2025-07-31 DIAGNOSIS — E03.9 HYPOTHYROIDISM, UNSPECIFIED: ICD-10-CM

## 2025-07-31 DIAGNOSIS — Z92.89 PERSONAL HISTORY OF OTHER MEDICAL TREATMENT: ICD-10-CM

## 2025-07-31 DIAGNOSIS — C17.9 MALIGNANT NEOPLASM OF SMALL INTESTINE, UNSPECIFIED: ICD-10-CM

## 2025-07-31 DIAGNOSIS — E11.9 TYPE 2 DIABETES MELLITUS WITHOUT COMPLICATIONS: ICD-10-CM

## 2025-07-31 DIAGNOSIS — Z87.59 PERSONAL HISTORY OF OTHER COMPLICATIONS OF PREGNANCY, CHILDBIRTH AND THE PUERPERIUM: Chronic | ICD-10-CM

## 2025-07-31 DIAGNOSIS — Z98.890 OTHER SPECIFIED POSTPROCEDURAL STATES: Chronic | ICD-10-CM

## 2025-07-31 DIAGNOSIS — Z90.722 ACQUIRED ABSENCE OF OVARIES, BILATERAL: Chronic | ICD-10-CM

## 2025-07-31 DIAGNOSIS — I10 ESSENTIAL (PRIMARY) HYPERTENSION: ICD-10-CM

## 2025-07-31 DIAGNOSIS — Z90.710 ACQUIRED ABSENCE OF BOTH CERVIX AND UTERUS: Chronic | ICD-10-CM

## 2025-07-31 LAB
A1C WITH ESTIMATED AVERAGE GLUCOSE RESULT: 5.9 % — HIGH (ref 4–5.6)
ALBUMIN SERPL ELPH-MCNC: 3.9 G/DL — SIGNIFICANT CHANGE UP (ref 3.3–5)
ALP SERPL-CCNC: 59 U/L — SIGNIFICANT CHANGE UP (ref 40–120)
ALT FLD-CCNC: 8 U/L — SIGNIFICANT CHANGE UP (ref 4–33)
ANION GAP SERPL CALC-SCNC: 13 MMOL/L — SIGNIFICANT CHANGE UP (ref 7–14)
AST SERPL-CCNC: 15 U/L — SIGNIFICANT CHANGE UP (ref 4–32)
BILIRUB SERPL-MCNC: 0.4 MG/DL — SIGNIFICANT CHANGE UP (ref 0.2–1.2)
BLD GP AB SCN SERPL QL: NEGATIVE — SIGNIFICANT CHANGE UP
BUN SERPL-MCNC: 15 MG/DL — SIGNIFICANT CHANGE UP (ref 7–23)
CALCIUM SERPL-MCNC: 9.6 MG/DL — SIGNIFICANT CHANGE UP (ref 8.4–10.5)
CHLORIDE SERPL-SCNC: 100 MMOL/L — SIGNIFICANT CHANGE UP (ref 98–107)
CO2 SERPL-SCNC: 22 MMOL/L — SIGNIFICANT CHANGE UP (ref 22–31)
CREAT SERPL-MCNC: 1.37 MG/DL — HIGH (ref 0.5–1.3)
EGFR: 39 ML/MIN/1.73M2 — LOW
EGFR: 39 ML/MIN/1.73M2 — LOW
ESTIMATED AVERAGE GLUCOSE: 123 — SIGNIFICANT CHANGE UP
GLUCOSE SERPL-MCNC: 82 MG/DL — SIGNIFICANT CHANGE UP (ref 70–99)
HCT VFR BLD CALC: 31.9 % — LOW (ref 34.5–45)
HGB BLD-MCNC: 10.6 G/DL — LOW (ref 11.5–15.5)
MCHC RBC-ENTMCNC: 28.7 PG — SIGNIFICANT CHANGE UP (ref 27–34)
MCHC RBC-ENTMCNC: 33.2 G/DL — SIGNIFICANT CHANGE UP (ref 32–36)
MCV RBC AUTO: 86.4 FL — SIGNIFICANT CHANGE UP (ref 80–100)
PLATELET # BLD AUTO: 305 K/UL — SIGNIFICANT CHANGE UP (ref 150–400)
POTASSIUM SERPL-MCNC: 4.1 MMOL/L — SIGNIFICANT CHANGE UP (ref 3.5–5.3)
POTASSIUM SERPL-SCNC: 4.1 MMOL/L — SIGNIFICANT CHANGE UP (ref 3.5–5.3)
PROT SERPL-MCNC: 6.9 G/DL — SIGNIFICANT CHANGE UP (ref 6–8.3)
RBC # BLD: 3.69 M/UL — LOW (ref 3.8–5.2)
RBC # FLD: 16.2 % — HIGH (ref 10.3–14.5)
RETICS #: 34.2 K/UL — SIGNIFICANT CHANGE UP (ref 25–125)
RETICS/RBC NFR: 0.9 % — SIGNIFICANT CHANGE UP (ref 0.5–2.5)
RH IG SCN BLD-IMP: POSITIVE — SIGNIFICANT CHANGE UP
SODIUM SERPL-SCNC: 135 MMOL/L — SIGNIFICANT CHANGE UP (ref 135–145)
WBC # BLD: 4.67 K/UL — SIGNIFICANT CHANGE UP (ref 3.8–10.5)
WBC # FLD AUTO: 4.67 K/UL — SIGNIFICANT CHANGE UP (ref 3.8–10.5)

## 2025-08-01 LAB
ACANTHOCYTES BLD QL SMEAR: SLIGHT — SIGNIFICANT CHANGE UP
ANISOCYTOSIS BLD QL: SLIGHT — SIGNIFICANT CHANGE UP
BASOPHILS # BLD AUTO: 0 K/UL — SIGNIFICANT CHANGE UP (ref 0–0.2)
BASOPHILS NFR BLD AUTO: 0 % — SIGNIFICANT CHANGE UP (ref 0–2)
BURR CELLS BLD QL SMEAR: PRESENT — SIGNIFICANT CHANGE UP
CRP SERPL-MCNC: 4 MG/L — SIGNIFICANT CHANGE UP
DACRYOCYTES BLD QL SMEAR: SLIGHT — SIGNIFICANT CHANGE UP
ELLIPTOCYTES BLD QL SMEAR: SIGNIFICANT CHANGE UP
EOSINOPHIL # BLD AUTO: 0.04 K/UL — SIGNIFICANT CHANGE UP (ref 0–0.5)
EOSINOPHIL NFR BLD AUTO: 0.9 % — SIGNIFICANT CHANGE UP (ref 0–6)
FERRITIN SERPL-MCNC: 164 NG/ML — SIGNIFICANT CHANGE UP (ref 13–330)
FOLATE SERPL-MCNC: >20 NG/ML — SIGNIFICANT CHANGE UP
HYPOCHROMIA BLD QL: SLIGHT — SIGNIFICANT CHANGE UP
IRON SATN MFR SERPL: 10 % — LOW (ref 14–50)
IRON SATN MFR SERPL: 26 UG/DL — LOW (ref 30–160)
LYMPHOCYTES # BLD AUTO: 1.39 K/UL — SIGNIFICANT CHANGE UP (ref 1–3.3)
LYMPHOCYTES # BLD AUTO: 29.8 % — SIGNIFICANT CHANGE UP (ref 13–44)
MANUAL SMEAR VERIFICATION: SIGNIFICANT CHANGE UP
MONOCYTES # BLD AUTO: 0.65 K/UL — SIGNIFICANT CHANGE UP (ref 0–0.9)
MONOCYTES NFR BLD AUTO: 14 % — SIGNIFICANT CHANGE UP (ref 2–14)
NEUTROPHILS # BLD AUTO: 2.54 K/UL — SIGNIFICANT CHANGE UP (ref 1.8–7.4)
NEUTROPHILS NFR BLD AUTO: 54.4 % — SIGNIFICANT CHANGE UP (ref 43–77)
PLAT MORPH BLD: NORMAL — SIGNIFICANT CHANGE UP
POIKILOCYTOSIS BLD QL AUTO: SIGNIFICANT CHANGE UP
POLYCHROMASIA BLD QL SMEAR: SLIGHT — SIGNIFICANT CHANGE UP
RBC BLD AUTO: ABNORMAL
SCHISTOCYTES BLD QL AUTO: SLIGHT — SIGNIFICANT CHANGE UP
TIBC SERPL-MCNC: 260 UG/DL — SIGNIFICANT CHANGE UP (ref 220–430)
UIBC SERPL-MCNC: 234 UG/DL — SIGNIFICANT CHANGE UP (ref 110–370)
VARIANT LYMPHS # BLD: 0.9 % — SIGNIFICANT CHANGE UP (ref 0–6)
VARIANT LYMPHS NFR BLD MANUAL: 0.9 % — SIGNIFICANT CHANGE UP (ref 0–6)
VIT B12 SERPL-MCNC: >2000 PG/ML — HIGH (ref 232–1245)

## 2025-08-04 ENCOUNTER — APPOINTMENT (OUTPATIENT)
Dept: SURGICAL ONCOLOGY | Facility: CLINIC | Age: 80
End: 2025-08-04
Payer: MEDICARE

## 2025-08-04 ENCOUNTER — OUTPATIENT (OUTPATIENT)
Dept: OUTPATIENT SERVICES | Facility: HOSPITAL | Age: 80
LOS: 1 days | End: 2025-08-04

## 2025-08-04 VITALS
HEART RATE: 91 BPM | HEIGHT: 61 IN | BODY MASS INDEX: 19.63 KG/M2 | SYSTOLIC BLOOD PRESSURE: 139 MMHG | WEIGHT: 104 LBS | DIASTOLIC BLOOD PRESSURE: 83 MMHG

## 2025-08-04 DIAGNOSIS — Z98.890 OTHER SPECIFIED POSTPROCEDURAL STATES: Chronic | ICD-10-CM

## 2025-08-04 DIAGNOSIS — Z90.710 ACQUIRED ABSENCE OF BOTH CERVIX AND UTERUS: Chronic | ICD-10-CM

## 2025-08-04 DIAGNOSIS — C17.9 MALIGNANT NEOPLASM OF SMALL INTESTINE, UNSPECIFIED: ICD-10-CM

## 2025-08-04 DIAGNOSIS — C78.6 SECONDARY MALIGNANT NEOPLASM OF RETROPERITONEUM AND PERITONEUM: ICD-10-CM

## 2025-08-04 DIAGNOSIS — Z87.59 PERSONAL HISTORY OF OTHER COMPLICATIONS OF PREGNANCY, CHILDBIRTH AND THE PUERPERIUM: Chronic | ICD-10-CM

## 2025-08-04 DIAGNOSIS — Z90.722 ACQUIRED ABSENCE OF OVARIES, BILATERAL: Chronic | ICD-10-CM

## 2025-08-04 PROBLEM — E03.9 HYPOTHYROIDISM, UNSPECIFIED: Chronic | Status: ACTIVE | Noted: 2025-07-31

## 2025-08-04 PROBLEM — Z92.89 PERSONAL HISTORY OF OTHER MEDICAL TREATMENT: Chronic | Status: ACTIVE | Noted: 2025-07-31

## 2025-08-04 PROBLEM — Z92.21 PERSONAL HISTORY OF ANTINEOPLASTIC CHEMOTHERAPY: Chronic | Status: ACTIVE | Noted: 2025-07-31

## 2025-08-04 LAB
BLD GP AB SCN SERPL QL: NEGATIVE — SIGNIFICANT CHANGE UP
RH IG SCN BLD-IMP: POSITIVE — SIGNIFICANT CHANGE UP

## 2025-08-04 PROCEDURE — 99214 OFFICE O/P EST MOD 30 MIN: CPT | Mod: 24

## 2025-08-06 ENCOUNTER — APPOINTMENT (OUTPATIENT)
Dept: SURGICAL ONCOLOGY | Facility: HOSPITAL | Age: 80
End: 2025-08-06

## 2025-08-07 ENCOUNTER — APPOINTMENT (OUTPATIENT)
Dept: HEMATOLOGY ONCOLOGY | Facility: CLINIC | Age: 80
End: 2025-08-07

## 2025-08-07 ENCOUNTER — RESULT REVIEW (OUTPATIENT)
Age: 80
End: 2025-08-07

## 2025-08-07 ENCOUNTER — NON-APPOINTMENT (OUTPATIENT)
Age: 80
End: 2025-08-07

## 2025-08-07 ENCOUNTER — APPOINTMENT (OUTPATIENT)
Dept: HEMATOLOGY ONCOLOGY | Facility: CLINIC | Age: 80
End: 2025-08-07
Payer: MEDICARE

## 2025-08-07 ENCOUNTER — APPOINTMENT (OUTPATIENT)
Dept: INFUSION THERAPY | Facility: HOSPITAL | Age: 80
End: 2025-08-07

## 2025-08-07 VITALS
HEIGHT: 61 IN | WEIGHT: 100.29 LBS | BODY MASS INDEX: 18.93 KG/M2 | DIASTOLIC BLOOD PRESSURE: 84 MMHG | HEART RATE: 73 BPM | SYSTOLIC BLOOD PRESSURE: 135 MMHG | RESPIRATION RATE: 16 BRPM | OXYGEN SATURATION: 99 % | TEMPERATURE: 97.4 F

## 2025-08-07 LAB
ALBUMIN SERPL ELPH-MCNC: 3.8 G/DL — SIGNIFICANT CHANGE UP (ref 3.3–5)
ALP SERPL-CCNC: 73 U/L — SIGNIFICANT CHANGE UP (ref 40–120)
ALT FLD-CCNC: 8 U/L — LOW (ref 10–45)
ANION GAP SERPL CALC-SCNC: 16 MMOL/L — SIGNIFICANT CHANGE UP (ref 5–17)
AST SERPL-CCNC: 21 U/L — SIGNIFICANT CHANGE UP (ref 10–40)
BILIRUB SERPL-MCNC: 0.6 MG/DL — SIGNIFICANT CHANGE UP (ref 0.2–1.2)
BUN SERPL-MCNC: 27 MG/DL — HIGH (ref 7–23)
CALCIUM SERPL-MCNC: 9.3 MG/DL — SIGNIFICANT CHANGE UP (ref 8.4–10.5)
CANCER AG19-9 SERPL-ACNC: <2 U/ML — SIGNIFICANT CHANGE UP
CEA SERPL-MCNC: 1.7 NG/ML — SIGNIFICANT CHANGE UP (ref 0–3.8)
CHLORIDE SERPL-SCNC: 98 MMOL/L — SIGNIFICANT CHANGE UP (ref 96–108)
CO2 SERPL-SCNC: 21 MMOL/L — LOW (ref 22–31)
CREAT SERPL-MCNC: 1.37 MG/DL — HIGH (ref 0.5–1.3)
EGFR: 39 ML/MIN/1.73M2 — LOW
EGFR: 39 ML/MIN/1.73M2 — LOW
GLUCOSE SERPL-MCNC: 82 MG/DL — SIGNIFICANT CHANGE UP (ref 70–99)
POTASSIUM SERPL-MCNC: 3.8 MMOL/L — SIGNIFICANT CHANGE UP (ref 3.5–5.3)
POTASSIUM SERPL-SCNC: 3.8 MMOL/L — SIGNIFICANT CHANGE UP (ref 3.5–5.3)
PROT SERPL-MCNC: 6.9 G/DL — SIGNIFICANT CHANGE UP (ref 6–8.3)
SODIUM SERPL-SCNC: 134 MMOL/L — LOW (ref 135–145)

## 2025-08-07 PROCEDURE — 99213 OFFICE O/P EST LOW 20 MIN: CPT

## 2025-08-09 ENCOUNTER — APPOINTMENT (OUTPATIENT)
Dept: INFUSION THERAPY | Facility: HOSPITAL | Age: 80
End: 2025-08-09

## 2025-08-12 ENCOUNTER — NON-APPOINTMENT (OUTPATIENT)
Age: 80
End: 2025-08-12

## 2025-08-13 ENCOUNTER — NON-APPOINTMENT (OUTPATIENT)
Age: 80
End: 2025-08-13

## 2025-08-15 ENCOUNTER — APPOINTMENT (OUTPATIENT)
Dept: GERIATRICS | Facility: CLINIC | Age: 80
End: 2025-08-15
Payer: MEDICARE

## 2025-08-15 ENCOUNTER — NON-APPOINTMENT (OUTPATIENT)
Age: 80
End: 2025-08-15

## 2025-08-15 ENCOUNTER — APPOINTMENT (OUTPATIENT)
Dept: INFUSION THERAPY | Facility: HOSPITAL | Age: 80
End: 2025-08-15

## 2025-08-15 VITALS
SYSTOLIC BLOOD PRESSURE: 84 MMHG | TEMPERATURE: 97.2 F | OXYGEN SATURATION: 100 % | RESPIRATION RATE: 15 BRPM | DIASTOLIC BLOOD PRESSURE: 60 MMHG | HEART RATE: 88 BPM

## 2025-08-15 DIAGNOSIS — R53.81 OTHER MALAISE: ICD-10-CM

## 2025-08-15 DIAGNOSIS — R63.0 ANOREXIA: ICD-10-CM

## 2025-08-15 DIAGNOSIS — Z51.5 ENCOUNTER FOR PALLIATIVE CARE: ICD-10-CM

## 2025-08-15 DIAGNOSIS — R11.0 NAUSEA: ICD-10-CM

## 2025-08-15 DIAGNOSIS — C17.9 MALIGNANT NEOPLASM OF SMALL INTESTINE, UNSPECIFIED: ICD-10-CM

## 2025-08-15 PROCEDURE — 99497 ADVNCD CARE PLAN 30 MIN: CPT

## 2025-08-15 PROCEDURE — 99205 OFFICE O/P NEW HI 60 MIN: CPT

## 2025-08-18 ENCOUNTER — INPATIENT (INPATIENT)
Facility: HOSPITAL | Age: 80
LOS: 9 days | Discharge: HOME | End: 2025-08-28
Attending: SURGERY | Admitting: SURGERY
Payer: MEDICARE

## 2025-08-18 ENCOUNTER — NON-APPOINTMENT (OUTPATIENT)
Age: 80
End: 2025-08-18

## 2025-08-18 VITALS
TEMPERATURE: 98 F | HEART RATE: 89 BPM | SYSTOLIC BLOOD PRESSURE: 118 MMHG | OXYGEN SATURATION: 99 % | RESPIRATION RATE: 16 BRPM | HEIGHT: 60 IN | DIASTOLIC BLOOD PRESSURE: 84 MMHG | WEIGHT: 125 LBS

## 2025-08-18 DIAGNOSIS — Z98.890 OTHER SPECIFIED POSTPROCEDURAL STATES: Chronic | ICD-10-CM

## 2025-08-18 DIAGNOSIS — Z90.710 ACQUIRED ABSENCE OF BOTH CERVIX AND UTERUS: Chronic | ICD-10-CM

## 2025-08-18 DIAGNOSIS — C17.9 MALIGNANT NEOPLASM OF SMALL INTESTINE, UNSPECIFIED: ICD-10-CM

## 2025-08-18 DIAGNOSIS — E86.0 DEHYDRATION: ICD-10-CM

## 2025-08-18 DIAGNOSIS — Z87.59 PERSONAL HISTORY OF OTHER COMPLICATIONS OF PREGNANCY, CHILDBIRTH AND THE PUERPERIUM: Chronic | ICD-10-CM

## 2025-08-18 DIAGNOSIS — Z90.722 ACQUIRED ABSENCE OF OVARIES, BILATERAL: Chronic | ICD-10-CM

## 2025-08-18 LAB
ALBUMIN SERPL ELPH-MCNC: 3.4 G/DL — SIGNIFICANT CHANGE UP (ref 3.3–5)
ALP SERPL-CCNC: 80 U/L — SIGNIFICANT CHANGE UP (ref 40–120)
ALT FLD-CCNC: 8 U/L — SIGNIFICANT CHANGE UP (ref 4–33)
ANION GAP SERPL CALC-SCNC: 15 MMOL/L — HIGH (ref 7–14)
AST SERPL-CCNC: 25 U/L — SIGNIFICANT CHANGE UP (ref 4–32)
BASOPHILS # BLD AUTO: 0.01 K/UL — SIGNIFICANT CHANGE UP (ref 0–0.2)
BASOPHILS NFR BLD AUTO: 0.1 % — SIGNIFICANT CHANGE UP (ref 0–2)
BILIRUB SERPL-MCNC: 0.4 MG/DL — SIGNIFICANT CHANGE UP (ref 0.2–1.2)
BLD GP AB SCN SERPL QL: NEGATIVE — SIGNIFICANT CHANGE UP
BUN SERPL-MCNC: 34 MG/DL — HIGH (ref 7–23)
CALCIUM SERPL-MCNC: 9.1 MG/DL — SIGNIFICANT CHANGE UP (ref 8.4–10.5)
CHLORIDE SERPL-SCNC: 93 MMOL/L — LOW (ref 98–107)
CO2 SERPL-SCNC: 19 MMOL/L — LOW (ref 22–31)
CREAT SERPL-MCNC: 1.21 MG/DL — SIGNIFICANT CHANGE UP (ref 0.5–1.3)
EGFR: 45 ML/MIN/1.73M2 — LOW
EGFR: 45 ML/MIN/1.73M2 — LOW
EOSINOPHIL # BLD AUTO: 0 K/UL — SIGNIFICANT CHANGE UP (ref 0–0.5)
EOSINOPHIL NFR BLD AUTO: 0 % — SIGNIFICANT CHANGE UP (ref 0–6)
GLUCOSE SERPL-MCNC: 107 MG/DL — HIGH (ref 70–99)
HCT VFR BLD CALC: 33.4 % — LOW (ref 34.5–45)
HGB BLD-MCNC: 12 G/DL — SIGNIFICANT CHANGE UP (ref 11.5–15.5)
IMM GRANULOCYTES # BLD AUTO: 0.14 K/UL — HIGH (ref 0–0.07)
IMM GRANULOCYTES NFR BLD AUTO: 1.2 % — HIGH (ref 0–0.9)
LACTATE BLDV-MCNC: 1.5 MMOL/L — SIGNIFICANT CHANGE UP (ref 0.5–2)
LIDOCAIN IGE QN: 24 U/L — SIGNIFICANT CHANGE UP (ref 7–60)
LYMPHOCYTES # BLD AUTO: 1.6 K/UL — SIGNIFICANT CHANGE UP (ref 1–3.3)
LYMPHOCYTES NFR BLD AUTO: 13.5 % — SIGNIFICANT CHANGE UP (ref 13–44)
MCHC RBC-ENTMCNC: 28.7 PG — SIGNIFICANT CHANGE UP (ref 27–34)
MCHC RBC-ENTMCNC: 35.9 G/DL — SIGNIFICANT CHANGE UP (ref 32–36)
MCV RBC AUTO: 79.9 FL — LOW (ref 80–100)
MONOCYTES # BLD AUTO: 1.29 K/UL — HIGH (ref 0–0.9)
MONOCYTES NFR BLD AUTO: 10.9 % — SIGNIFICANT CHANGE UP (ref 2–14)
NEUTROPHILS # BLD AUTO: 8.78 K/UL — HIGH (ref 1.8–7.4)
NEUTROPHILS NFR BLD AUTO: 74.3 % — SIGNIFICANT CHANGE UP (ref 43–77)
NRBC # BLD AUTO: 0 K/UL — SIGNIFICANT CHANGE UP (ref 0–0)
NRBC # FLD: 0 K/UL — SIGNIFICANT CHANGE UP (ref 0–0)
NRBC BLD AUTO-RTO: 0 /100 WBCS — SIGNIFICANT CHANGE UP (ref 0–0)
PLATELET # BLD AUTO: 432 K/UL — HIGH (ref 150–400)
PMV BLD: 9.9 FL — SIGNIFICANT CHANGE UP (ref 7–13)
POTASSIUM SERPL-MCNC: 4.9 MMOL/L — SIGNIFICANT CHANGE UP (ref 3.5–5.3)
POTASSIUM SERPL-SCNC: 4.9 MMOL/L — SIGNIFICANT CHANGE UP (ref 3.5–5.3)
PROT SERPL-MCNC: 6.7 G/DL — SIGNIFICANT CHANGE UP (ref 6–8.3)
RBC # BLD: 4.18 M/UL — SIGNIFICANT CHANGE UP (ref 3.8–5.2)
RBC # FLD: 16.4 % — HIGH (ref 10.3–14.5)
RH IG SCN BLD-IMP: POSITIVE — SIGNIFICANT CHANGE UP
SODIUM SERPL-SCNC: 127 MMOL/L — LOW (ref 135–145)
WBC # BLD: 11.82 K/UL — HIGH (ref 3.8–10.5)
WBC # FLD AUTO: 11.82 K/UL — HIGH (ref 3.8–10.5)

## 2025-08-18 PROCEDURE — 99285 EMERGENCY DEPT VISIT HI MDM: CPT

## 2025-08-18 RX ORDER — ONDANSETRON HCL/PF 4 MG/2 ML
4 VIAL (ML) INJECTION ONCE
Refills: 0 | Status: COMPLETED | OUTPATIENT
Start: 2025-08-18 | End: 2025-08-18

## 2025-08-18 RX ORDER — ACETAMINOPHEN 500 MG/5ML
1000 LIQUID (ML) ORAL ONCE
Refills: 0 | Status: COMPLETED | OUTPATIENT
Start: 2025-08-18 | End: 2025-08-18

## 2025-08-18 RX ADMIN — Medication 400 MILLIGRAM(S): at 22:16

## 2025-08-18 RX ADMIN — Medication 1000 MILLILITER(S): at 22:16

## 2025-08-18 RX ADMIN — Medication 4 MILLIGRAM(S): at 22:16

## 2025-08-19 ENCOUNTER — NON-APPOINTMENT (OUTPATIENT)
Age: 80
End: 2025-08-19

## 2025-08-19 DIAGNOSIS — K56.609 UNSPECIFIED INTESTINAL OBSTRUCTION, UNSPECIFIED AS TO PARTIAL VERSUS COMPLETE OBSTRUCTION: ICD-10-CM

## 2025-08-19 LAB
ALBUMIN SERPL ELPH-MCNC: 3.1 G/DL — LOW (ref 3.3–5)
ALP SERPL-CCNC: 74 U/L — SIGNIFICANT CHANGE UP (ref 40–120)
ALT FLD-CCNC: 9 U/L — SIGNIFICANT CHANGE UP (ref 4–33)
ANION GAP SERPL CALC-SCNC: 14 MMOL/L — SIGNIFICANT CHANGE UP (ref 7–14)
ANION GAP SERPL CALC-SCNC: 15 MMOL/L — HIGH (ref 7–14)
AST SERPL-CCNC: 15 U/L — SIGNIFICANT CHANGE UP (ref 4–32)
BILIRUB SERPL-MCNC: 0.4 MG/DL — SIGNIFICANT CHANGE UP (ref 0.2–1.2)
BUN SERPL-MCNC: 32 MG/DL — HIGH (ref 7–23)
BUN SERPL-MCNC: 32 MG/DL — HIGH (ref 7–23)
CALCIUM SERPL-MCNC: 8.1 MG/DL — LOW (ref 8.4–10.5)
CALCIUM SERPL-MCNC: 8.2 MG/DL — LOW (ref 8.4–10.5)
CHLORIDE SERPL-SCNC: 94 MMOL/L — LOW (ref 98–107)
CHLORIDE SERPL-SCNC: 95 MMOL/L — LOW (ref 98–107)
CO2 SERPL-SCNC: 18 MMOL/L — LOW (ref 22–31)
CO2 SERPL-SCNC: 19 MMOL/L — LOW (ref 22–31)
CREAT SERPL-MCNC: 1.18 MG/DL — SIGNIFICANT CHANGE UP (ref 0.5–1.3)
CREAT SERPL-MCNC: 1.23 MG/DL — SIGNIFICANT CHANGE UP (ref 0.5–1.3)
EGFR: 44 ML/MIN/1.73M2 — LOW
EGFR: 44 ML/MIN/1.73M2 — LOW
EGFR: 46 ML/MIN/1.73M2 — LOW
EGFR: 46 ML/MIN/1.73M2 — LOW
GAS PNL BLDV: SIGNIFICANT CHANGE UP
GLUCOSE BLDC GLUCOMTR-MCNC: 161 MG/DL — HIGH (ref 70–99)
GLUCOSE BLDC GLUCOMTR-MCNC: 75 MG/DL — SIGNIFICANT CHANGE UP (ref 70–99)
GLUCOSE BLDC GLUCOMTR-MCNC: 88 MG/DL — SIGNIFICANT CHANGE UP (ref 70–99)
GLUCOSE BLDC GLUCOMTR-MCNC: 94 MG/DL — SIGNIFICANT CHANGE UP (ref 70–99)
GLUCOSE BLDC GLUCOMTR-MCNC: 94 MG/DL — SIGNIFICANT CHANGE UP (ref 70–99)
GLUCOSE SERPL-MCNC: 90 MG/DL — SIGNIFICANT CHANGE UP (ref 70–99)
GLUCOSE SERPL-MCNC: 95 MG/DL — SIGNIFICANT CHANGE UP (ref 70–99)
HCT VFR BLD CALC: 29 % — LOW (ref 34.5–45)
HGB BLD-MCNC: 10.2 G/DL — LOW (ref 11.5–15.5)
MAGNESIUM SERPL-MCNC: 1.8 MG/DL — SIGNIFICANT CHANGE UP (ref 1.6–2.6)
MCHC RBC-ENTMCNC: 28.6 PG — SIGNIFICANT CHANGE UP (ref 27–34)
MCHC RBC-ENTMCNC: 35.2 G/DL — SIGNIFICANT CHANGE UP (ref 32–36)
MCV RBC AUTO: 81.2 FL — SIGNIFICANT CHANGE UP (ref 80–100)
NRBC # BLD AUTO: 0 K/UL — SIGNIFICANT CHANGE UP (ref 0–0)
NRBC # FLD: 0 K/UL — SIGNIFICANT CHANGE UP (ref 0–0)
NRBC BLD AUTO-RTO: 0 /100 WBCS — SIGNIFICANT CHANGE UP (ref 0–0)
PHOSPHATE SERPL-MCNC: 3.7 MG/DL — SIGNIFICANT CHANGE UP (ref 2.5–4.5)
PLATELET # BLD AUTO: 395 K/UL — SIGNIFICANT CHANGE UP (ref 150–400)
PMV BLD: 9.7 FL — SIGNIFICANT CHANGE UP (ref 7–13)
POTASSIUM SERPL-MCNC: 4.3 MMOL/L — SIGNIFICANT CHANGE UP (ref 3.5–5.3)
POTASSIUM SERPL-MCNC: 5 MMOL/L — SIGNIFICANT CHANGE UP (ref 3.5–5.3)
POTASSIUM SERPL-SCNC: 4.3 MMOL/L — SIGNIFICANT CHANGE UP (ref 3.5–5.3)
POTASSIUM SERPL-SCNC: 5 MMOL/L — SIGNIFICANT CHANGE UP (ref 3.5–5.3)
PROT SERPL-MCNC: 5.7 G/DL — LOW (ref 6–8.3)
RBC # BLD: 3.57 M/UL — LOW (ref 3.8–5.2)
RBC # FLD: 16.7 % — HIGH (ref 10.3–14.5)
SODIUM SERPL-SCNC: 127 MMOL/L — LOW (ref 135–145)
SODIUM SERPL-SCNC: 128 MMOL/L — LOW (ref 135–145)
WBC # BLD: 7.99 K/UL — SIGNIFICANT CHANGE UP (ref 3.8–10.5)
WBC # FLD AUTO: 7.99 K/UL — SIGNIFICANT CHANGE UP (ref 3.8–10.5)

## 2025-08-19 PROCEDURE — 74177 CT ABD & PELVIS W/CONTRAST: CPT | Mod: 26

## 2025-08-19 PROCEDURE — 99223 1ST HOSP IP/OBS HIGH 75: CPT

## 2025-08-19 PROCEDURE — 71045 X-RAY EXAM CHEST 1 VIEW: CPT | Mod: 26

## 2025-08-19 RX ORDER — BRIMONIDINE TARTRATE 1.5 MG/ML
1 SOLUTION/ DROPS OPHTHALMIC THREE TIMES A DAY
Refills: 0 | Status: DISCONTINUED | OUTPATIENT
Start: 2025-08-19 | End: 2025-08-28

## 2025-08-19 RX ORDER — SODIUM CHLORIDE 9 G/1000ML
1000 INJECTION, SOLUTION INTRAVENOUS
Refills: 0 | Status: DISCONTINUED | OUTPATIENT
Start: 2025-08-19 | End: 2025-08-25

## 2025-08-19 RX ORDER — DEXTROSE 50 % IN WATER 50 %
12.5 SYRINGE (ML) INTRAVENOUS ONCE
Refills: 0 | Status: DISCONTINUED | OUTPATIENT
Start: 2025-08-19 | End: 2025-08-28

## 2025-08-19 RX ORDER — ONDANSETRON HCL/PF 4 MG/2 ML
4 VIAL (ML) INJECTION ONCE
Refills: 0 | Status: COMPLETED | OUTPATIENT
Start: 2025-08-19 | End: 2025-08-19

## 2025-08-19 RX ORDER — LEVOTHYROXINE SODIUM 300 MCG
75 TABLET ORAL AT BEDTIME
Refills: 0 | Status: DISCONTINUED | OUTPATIENT
Start: 2025-08-19 | End: 2025-08-19

## 2025-08-19 RX ORDER — LATANOPROST PF 0.05 MG/ML
1 SOLUTION/ DROPS OPHTHALMIC AT BEDTIME
Refills: 0 | Status: DISCONTINUED | OUTPATIENT
Start: 2025-08-19 | End: 2025-08-28

## 2025-08-19 RX ORDER — METOCLOPRAMIDE HCL 10 MG
10 TABLET ORAL EVERY 8 HOURS
Refills: 0 | Status: DISCONTINUED | OUTPATIENT
Start: 2025-08-19 | End: 2025-08-19

## 2025-08-19 RX ORDER — LEVOTHYROXINE SODIUM 300 MCG
100 TABLET ORAL DAILY
Refills: 0 | Status: DISCONTINUED | OUTPATIENT
Start: 2025-08-19 | End: 2025-08-19

## 2025-08-19 RX ORDER — DEXTROSE 50 % IN WATER 50 %
25 SYRINGE (ML) INTRAVENOUS ONCE
Refills: 0 | Status: DISCONTINUED | OUTPATIENT
Start: 2025-08-19 | End: 2025-08-28

## 2025-08-19 RX ORDER — OCTREOTIDE ACETATE 500 UG/ML
100 INJECTION, SOLUTION INTRAVENOUS; SUBCUTANEOUS EVERY 8 HOURS
Refills: 0 | Status: DISCONTINUED | OUTPATIENT
Start: 2025-08-19 | End: 2025-08-19

## 2025-08-19 RX ORDER — ENOXAPARIN SODIUM 100 MG/ML
40 INJECTION SUBCUTANEOUS EVERY 24 HOURS
Refills: 0 | Status: DISCONTINUED | OUTPATIENT
Start: 2025-08-19 | End: 2025-08-28

## 2025-08-19 RX ORDER — DEXAMETHASONE 0.5 MG/1
4 TABLET ORAL EVERY 12 HOURS
Refills: 0 | Status: DISCONTINUED | OUTPATIENT
Start: 2025-08-19 | End: 2025-08-28

## 2025-08-19 RX ORDER — SODIUM CHLORIDE 9 G/1000ML
1000 INJECTION, SOLUTION INTRAVENOUS
Refills: 0 | Status: DISCONTINUED | OUTPATIENT
Start: 2025-08-19 | End: 2025-08-21

## 2025-08-19 RX ORDER — LEVOTHYROXINE SODIUM 300 MCG
75 TABLET ORAL AT BEDTIME
Refills: 0 | Status: COMPLETED | OUTPATIENT
Start: 2025-08-19 | End: 2025-08-21

## 2025-08-19 RX ORDER — METOCLOPRAMIDE HCL 10 MG
5 TABLET ORAL EVERY 8 HOURS
Refills: 0 | Status: DISCONTINUED | OUTPATIENT
Start: 2025-08-19 | End: 2025-08-19

## 2025-08-19 RX ORDER — DEXTROSE 50 % IN WATER 50 %
15 SYRINGE (ML) INTRAVENOUS ONCE
Refills: 0 | Status: DISCONTINUED | OUTPATIENT
Start: 2025-08-19 | End: 2025-08-25

## 2025-08-19 RX ORDER — OCTREOTIDE ACETATE 500 UG/ML
100 INJECTION, SOLUTION INTRAVENOUS; SUBCUTANEOUS EVERY 8 HOURS
Refills: 0 | Status: DISCONTINUED | OUTPATIENT
Start: 2025-08-19 | End: 2025-08-28

## 2025-08-19 RX ORDER — GLUCAGON 3 MG/1
1 POWDER NASAL ONCE
Refills: 0 | Status: DISCONTINUED | OUTPATIENT
Start: 2025-08-19 | End: 2025-08-25

## 2025-08-19 RX ORDER — INSULIN LISPRO 100 U/ML
INJECTION, SOLUTION INTRAVENOUS; SUBCUTANEOUS EVERY 6 HOURS
Refills: 0 | Status: DISCONTINUED | OUTPATIENT
Start: 2025-08-19 | End: 2025-08-26

## 2025-08-19 RX ORDER — METOCLOPRAMIDE HCL 10 MG
5 TABLET ORAL EVERY 8 HOURS
Refills: 0 | Status: COMPLETED | OUTPATIENT
Start: 2025-08-19 | End: 2025-08-22

## 2025-08-19 RX ORDER — ACETAMINOPHEN 500 MG/5ML
1000 LIQUID (ML) ORAL EVERY 6 HOURS
Refills: 0 | Status: COMPLETED | OUTPATIENT
Start: 2025-08-19 | End: 2025-08-20

## 2025-08-19 RX ORDER — DORZOLAMIDE HYDROCHLORIDE AND TIMOLOL MALEATE 20; 5 MG/ML; MG/ML
1 SOLUTION/ DROPS OPHTHALMIC
Refills: 0 | Status: DISCONTINUED | OUTPATIENT
Start: 2025-08-19 | End: 2025-08-28

## 2025-08-19 RX ADMIN — Medication 75 MICROGRAM(S): at 21:00

## 2025-08-19 RX ADMIN — Medication 1000 MILLIGRAM(S): at 12:15

## 2025-08-19 RX ADMIN — Medication 5 MILLIGRAM(S): at 12:29

## 2025-08-19 RX ADMIN — OCTREOTIDE ACETATE 100 MICROGRAM(S): 500 INJECTION, SOLUTION INTRAVENOUS; SUBCUTANEOUS at 12:29

## 2025-08-19 RX ADMIN — DEXAMETHASONE 4 MILLIGRAM(S): 0.5 TABLET ORAL at 17:09

## 2025-08-19 RX ADMIN — Medication 4 MILLIGRAM(S): at 04:04

## 2025-08-19 RX ADMIN — DORZOLAMIDE HYDROCHLORIDE AND TIMOLOL MALEATE 1 DROP(S): 20; 5 SOLUTION/ DROPS OPHTHALMIC at 17:09

## 2025-08-19 RX ADMIN — ENOXAPARIN SODIUM 40 MILLIGRAM(S): 100 INJECTION SUBCUTANEOUS at 12:29

## 2025-08-19 RX ADMIN — Medication 2 MILLIGRAM(S): at 05:31

## 2025-08-19 RX ADMIN — BRIMONIDINE TARTRATE 1 DROP(S): 1.5 SOLUTION/ DROPS OPHTHALMIC at 12:31

## 2025-08-19 RX ADMIN — BRIMONIDINE TARTRATE 1 DROP(S): 1.5 SOLUTION/ DROPS OPHTHALMIC at 21:01

## 2025-08-19 RX ADMIN — Medication 400 MILLIGRAM(S): at 11:36

## 2025-08-19 RX ADMIN — Medication 40 MILLIGRAM(S): at 12:29

## 2025-08-19 RX ADMIN — SODIUM CHLORIDE 75 MILLILITER(S): 9 INJECTION, SOLUTION INTRAVENOUS at 12:29

## 2025-08-19 RX ADMIN — Medication 400 MILLIGRAM(S): at 23:30

## 2025-08-19 RX ADMIN — LATANOPROST PF 1 DROP(S): 0.05 SOLUTION/ DROPS OPHTHALMIC at 23:04

## 2025-08-19 RX ADMIN — Medication 5 MILLIGRAM(S): at 21:01

## 2025-08-19 RX ADMIN — Medication 75 MILLILITER(S): at 01:39

## 2025-08-19 RX ADMIN — Medication 75 MILLILITER(S): at 06:37

## 2025-08-19 RX ADMIN — Medication 400 MILLIGRAM(S): at 17:08

## 2025-08-19 RX ADMIN — OCTREOTIDE ACETATE 100 MICROGRAM(S): 500 INJECTION, SOLUTION INTRAVENOUS; SUBCUTANEOUS at 21:01

## 2025-08-20 DIAGNOSIS — Z51.5 ENCOUNTER FOR PALLIATIVE CARE: ICD-10-CM

## 2025-08-20 DIAGNOSIS — Z71.89 OTHER SPECIFIED COUNSELING: ICD-10-CM

## 2025-08-20 DIAGNOSIS — C80.1 MALIGNANT (PRIMARY) NEOPLASM, UNSPECIFIED: ICD-10-CM

## 2025-08-20 DIAGNOSIS — K56.609 UNSPECIFIED INTESTINAL OBSTRUCTION, UNSPECIFIED AS TO PARTIAL VERSUS COMPLETE OBSTRUCTION: ICD-10-CM

## 2025-08-20 DIAGNOSIS — E43 UNSPECIFIED SEVERE PROTEIN-CALORIE MALNUTRITION: ICD-10-CM

## 2025-08-20 LAB
ALBUMIN SERPL ELPH-MCNC: 2.9 G/DL — LOW (ref 3.3–5)
ALP SERPL-CCNC: 75 U/L — SIGNIFICANT CHANGE UP (ref 40–120)
ALT FLD-CCNC: 7 U/L — SIGNIFICANT CHANGE UP (ref 4–33)
ANION GAP SERPL CALC-SCNC: 16 MMOL/L — HIGH (ref 7–14)
AST SERPL-CCNC: 16 U/L — SIGNIFICANT CHANGE UP (ref 4–32)
BILIRUB SERPL-MCNC: 0.2 MG/DL — SIGNIFICANT CHANGE UP (ref 0.2–1.2)
BUN SERPL-MCNC: 30 MG/DL — HIGH (ref 7–23)
CALCIUM SERPL-MCNC: 8 MG/DL — LOW (ref 8.4–10.5)
CHLORIDE SERPL-SCNC: 100 MMOL/L — SIGNIFICANT CHANGE UP (ref 98–107)
CO2 SERPL-SCNC: 14 MMOL/L — LOW (ref 22–31)
CREAT SERPL-MCNC: 1.26 MG/DL — SIGNIFICANT CHANGE UP (ref 0.5–1.3)
EGFR: 43 ML/MIN/1.73M2 — LOW
EGFR: 43 ML/MIN/1.73M2 — LOW
GLUCOSE BLDC GLUCOMTR-MCNC: 156 MG/DL — HIGH (ref 70–99)
GLUCOSE BLDC GLUCOMTR-MCNC: 186 MG/DL — HIGH (ref 70–99)
GLUCOSE BLDC GLUCOMTR-MCNC: 202 MG/DL — HIGH (ref 70–99)
GLUCOSE BLDC GLUCOMTR-MCNC: 211 MG/DL — HIGH (ref 70–99)
GLUCOSE SERPL-MCNC: 188 MG/DL — HIGH (ref 70–99)
HCT VFR BLD CALC: 28.8 % — LOW (ref 34.5–45)
HGB BLD-MCNC: 9.7 G/DL — LOW (ref 11.5–15.5)
LACTATE SERPL-SCNC: 0.8 MMOL/L — SIGNIFICANT CHANGE UP (ref 0.5–2)
MAGNESIUM SERPL-MCNC: 1.9 MG/DL — SIGNIFICANT CHANGE UP (ref 1.6–2.6)
MCHC RBC-ENTMCNC: 28.3 PG — SIGNIFICANT CHANGE UP (ref 27–34)
MCHC RBC-ENTMCNC: 33.7 G/DL — SIGNIFICANT CHANGE UP (ref 32–36)
MCV RBC AUTO: 84 FL — SIGNIFICANT CHANGE UP (ref 80–100)
NRBC # BLD AUTO: 0 K/UL — SIGNIFICANT CHANGE UP (ref 0–0)
NRBC # FLD: 0 K/UL — SIGNIFICANT CHANGE UP (ref 0–0)
NRBC BLD AUTO-RTO: 0 /100 WBCS — SIGNIFICANT CHANGE UP (ref 0–0)
PHOSPHATE SERPL-MCNC: 4.7 MG/DL — HIGH (ref 2.5–4.5)
PLATELET # BLD AUTO: 289 K/UL — SIGNIFICANT CHANGE UP (ref 150–400)
PMV BLD: 11.1 FL — SIGNIFICANT CHANGE UP (ref 7–13)
POTASSIUM SERPL-MCNC: 4.9 MMOL/L — SIGNIFICANT CHANGE UP (ref 3.5–5.3)
POTASSIUM SERPL-SCNC: 4.9 MMOL/L — SIGNIFICANT CHANGE UP (ref 3.5–5.3)
PROT SERPL-MCNC: 5.6 G/DL — LOW (ref 6–8.3)
RBC # BLD: 3.43 M/UL — LOW (ref 3.8–5.2)
RBC # FLD: 17.1 % — HIGH (ref 10.3–14.5)
SODIUM SERPL-SCNC: 130 MMOL/L — LOW (ref 135–145)
WBC # BLD: 4.54 K/UL — SIGNIFICANT CHANGE UP (ref 3.8–10.5)
WBC # FLD AUTO: 4.54 K/UL — SIGNIFICANT CHANGE UP (ref 3.8–10.5)

## 2025-08-20 PROCEDURE — 99223 1ST HOSP IP/OBS HIGH 75: CPT

## 2025-08-20 PROCEDURE — 74018 RADEX ABDOMEN 1 VIEW: CPT | Mod: 26

## 2025-08-20 PROCEDURE — 71045 X-RAY EXAM CHEST 1 VIEW: CPT | Mod: 26

## 2025-08-20 PROCEDURE — 99497 ADVNCD CARE PLAN 30 MIN: CPT | Mod: 25

## 2025-08-20 RX ORDER — ACETAMINOPHEN 500 MG/5ML
1000 LIQUID (ML) ORAL EVERY 6 HOURS
Refills: 0 | Status: COMPLETED | OUTPATIENT
Start: 2025-08-20 | End: 2025-08-21

## 2025-08-20 RX ADMIN — ENOXAPARIN SODIUM 40 MILLIGRAM(S): 100 INJECTION SUBCUTANEOUS at 12:05

## 2025-08-20 RX ADMIN — BRIMONIDINE TARTRATE 1 DROP(S): 1.5 SOLUTION/ DROPS OPHTHALMIC at 05:21

## 2025-08-20 RX ADMIN — INSULIN LISPRO 4: 100 INJECTION, SOLUTION INTRAVENOUS; SUBCUTANEOUS at 12:05

## 2025-08-20 RX ADMIN — Medication 5 MILLIGRAM(S): at 05:22

## 2025-08-20 RX ADMIN — INSULIN LISPRO 4: 100 INJECTION, SOLUTION INTRAVENOUS; SUBCUTANEOUS at 05:40

## 2025-08-20 RX ADMIN — DORZOLAMIDE HYDROCHLORIDE AND TIMOLOL MALEATE 1 DROP(S): 20; 5 SOLUTION/ DROPS OPHTHALMIC at 05:21

## 2025-08-20 RX ADMIN — Medication 1000 MILLIGRAM(S): at 18:14

## 2025-08-20 RX ADMIN — Medication 1000 MILLIGRAM(S): at 00:56

## 2025-08-20 RX ADMIN — BRIMONIDINE TARTRATE 1 DROP(S): 1.5 SOLUTION/ DROPS OPHTHALMIC at 21:32

## 2025-08-20 RX ADMIN — DEXAMETHASONE 4 MILLIGRAM(S): 0.5 TABLET ORAL at 17:45

## 2025-08-20 RX ADMIN — INSULIN LISPRO 2: 100 INJECTION, SOLUTION INTRAVENOUS; SUBCUTANEOUS at 17:47

## 2025-08-20 RX ADMIN — Medication 1000 MILLIGRAM(S): at 07:58

## 2025-08-20 RX ADMIN — INSULIN LISPRO 2: 100 INJECTION, SOLUTION INTRAVENOUS; SUBCUTANEOUS at 00:33

## 2025-08-20 RX ADMIN — DORZOLAMIDE HYDROCHLORIDE AND TIMOLOL MALEATE 1 DROP(S): 20; 5 SOLUTION/ DROPS OPHTHALMIC at 17:45

## 2025-08-20 RX ADMIN — OCTREOTIDE ACETATE 100 MICROGRAM(S): 500 INJECTION, SOLUTION INTRAVENOUS; SUBCUTANEOUS at 05:22

## 2025-08-20 RX ADMIN — Medication 400 MILLIGRAM(S): at 17:45

## 2025-08-20 RX ADMIN — Medication 1000 MILLIGRAM(S): at 12:48

## 2025-08-20 RX ADMIN — Medication 400 MILLIGRAM(S): at 12:06

## 2025-08-20 RX ADMIN — OCTREOTIDE ACETATE 100 MICROGRAM(S): 500 INJECTION, SOLUTION INTRAVENOUS; SUBCUTANEOUS at 21:32

## 2025-08-20 RX ADMIN — Medication 400 MILLIGRAM(S): at 23:34

## 2025-08-20 RX ADMIN — Medication 40 MILLIGRAM(S): at 12:06

## 2025-08-20 RX ADMIN — Medication 5 MILLIGRAM(S): at 21:32

## 2025-08-20 RX ADMIN — OCTREOTIDE ACETATE 100 MICROGRAM(S): 500 INJECTION, SOLUTION INTRAVENOUS; SUBCUTANEOUS at 13:47

## 2025-08-20 RX ADMIN — Medication 400 MILLIGRAM(S): at 05:19

## 2025-08-20 RX ADMIN — Medication 5 MILLIGRAM(S): at 13:46

## 2025-08-20 RX ADMIN — SODIUM CHLORIDE 75 MILLILITER(S): 9 INJECTION, SOLUTION INTRAVENOUS at 17:51

## 2025-08-20 RX ADMIN — INSULIN LISPRO 2: 100 INJECTION, SOLUTION INTRAVENOUS; SUBCUTANEOUS at 23:32

## 2025-08-20 RX ADMIN — LATANOPROST PF 1 DROP(S): 0.05 SOLUTION/ DROPS OPHTHALMIC at 21:33

## 2025-08-20 RX ADMIN — DEXAMETHASONE 4 MILLIGRAM(S): 0.5 TABLET ORAL at 05:21

## 2025-08-20 RX ADMIN — BRIMONIDINE TARTRATE 1 DROP(S): 1.5 SOLUTION/ DROPS OPHTHALMIC at 13:46

## 2025-08-20 RX ADMIN — Medication 75 MICROGRAM(S): at 21:30

## 2025-08-20 RX ADMIN — Medication 1000 MILLIGRAM(S): at 06:15

## 2025-08-21 ENCOUNTER — APPOINTMENT (OUTPATIENT)
Dept: HEMATOLOGY ONCOLOGY | Facility: CLINIC | Age: 80
End: 2025-08-21

## 2025-08-21 ENCOUNTER — APPOINTMENT (OUTPATIENT)
Dept: INFUSION THERAPY | Facility: HOSPITAL | Age: 80
End: 2025-08-21

## 2025-08-21 LAB
ALBUMIN SERPL ELPH-MCNC: 2.7 G/DL — LOW (ref 3.3–5)
ALP SERPL-CCNC: 68 U/L — SIGNIFICANT CHANGE UP (ref 40–120)
ALT FLD-CCNC: 15 U/L — SIGNIFICANT CHANGE UP (ref 4–33)
ANION GAP SERPL CALC-SCNC: 16 MMOL/L — HIGH (ref 7–14)
APTT BLD: 32.2 SEC — SIGNIFICANT CHANGE UP (ref 26.1–36.8)
AST SERPL-CCNC: 28 U/L — SIGNIFICANT CHANGE UP (ref 4–32)
BILIRUB SERPL-MCNC: 0.2 MG/DL — SIGNIFICANT CHANGE UP (ref 0.2–1.2)
BUN SERPL-MCNC: 28 MG/DL — HIGH (ref 7–23)
CALCIUM SERPL-MCNC: 7.7 MG/DL — LOW (ref 8.4–10.5)
CHLORIDE SERPL-SCNC: 104 MMOL/L — SIGNIFICANT CHANGE UP (ref 98–107)
CO2 SERPL-SCNC: 11 MMOL/L — LOW (ref 22–31)
CREAT SERPL-MCNC: 1.04 MG/DL — SIGNIFICANT CHANGE UP (ref 0.5–1.3)
EGFR: 54 ML/MIN/1.73M2 — LOW
EGFR: 54 ML/MIN/1.73M2 — LOW
GLUCOSE BLDC GLUCOMTR-MCNC: 113 MG/DL — HIGH (ref 70–99)
GLUCOSE BLDC GLUCOMTR-MCNC: 123 MG/DL — HIGH (ref 70–99)
GLUCOSE BLDC GLUCOMTR-MCNC: 150 MG/DL — HIGH (ref 70–99)
GLUCOSE BLDC GLUCOMTR-MCNC: 203 MG/DL — HIGH (ref 70–99)
GLUCOSE SERPL-MCNC: 186 MG/DL — HIGH (ref 70–99)
HCT VFR BLD CALC: 27.8 % — LOW (ref 34.5–45)
HGB BLD-MCNC: 9.4 G/DL — LOW (ref 11.5–15.5)
INR BLD: 0.99 RATIO — SIGNIFICANT CHANGE UP (ref 0.85–1.16)
LACTATE BLDV-MCNC: 3.1 MMOL/L — HIGH (ref 0.5–2)
LACTATE SERPL-SCNC: 4.8 MMOL/L — CRITICAL HIGH (ref 0.5–2)
MAGNESIUM SERPL-MCNC: 1.8 MG/DL — SIGNIFICANT CHANGE UP (ref 1.6–2.6)
MCHC RBC-ENTMCNC: 28.1 PG — SIGNIFICANT CHANGE UP (ref 27–34)
MCHC RBC-ENTMCNC: 33.8 G/DL — SIGNIFICANT CHANGE UP (ref 32–36)
MCV RBC AUTO: 83 FL — SIGNIFICANT CHANGE UP (ref 80–100)
NRBC # BLD AUTO: 0.02 K/UL — HIGH (ref 0–0)
NRBC # FLD: 0.02 K/UL — HIGH (ref 0–0)
NRBC BLD AUTO-RTO: 0 /100 WBCS — SIGNIFICANT CHANGE UP (ref 0–0)
PHOSPHATE SERPL-MCNC: 2.5 MG/DL — SIGNIFICANT CHANGE UP (ref 2.5–4.5)
PLATELET # BLD AUTO: 403 K/UL — HIGH (ref 150–400)
PMV BLD: 9.4 FL — SIGNIFICANT CHANGE UP (ref 7–13)
POTASSIUM SERPL-MCNC: 4.4 MMOL/L — SIGNIFICANT CHANGE UP (ref 3.5–5.3)
POTASSIUM SERPL-SCNC: 4.4 MMOL/L — SIGNIFICANT CHANGE UP (ref 3.5–5.3)
PROT SERPL-MCNC: 5.3 G/DL — LOW (ref 6–8.3)
PROTHROM AB SERPL-ACNC: 11.5 SEC — SIGNIFICANT CHANGE UP (ref 9.9–13.4)
RBC # BLD: 3.35 M/UL — LOW (ref 3.8–5.2)
RBC # FLD: 17.2 % — HIGH (ref 10.3–14.5)
SODIUM SERPL-SCNC: 131 MMOL/L — LOW (ref 135–145)
WBC # BLD: 8.01 K/UL — SIGNIFICANT CHANGE UP (ref 3.8–10.5)
WBC # FLD AUTO: 8.01 K/UL — SIGNIFICANT CHANGE UP (ref 3.8–10.5)

## 2025-08-21 PROCEDURE — 93010 ELECTROCARDIOGRAM REPORT: CPT

## 2025-08-21 PROCEDURE — 74270 X-RAY XM COLON 1CNTRST STD: CPT | Mod: 26

## 2025-08-21 PROCEDURE — 36573 INSJ PICC RS&I 5 YR+: CPT

## 2025-08-21 PROCEDURE — 71045 X-RAY EXAM CHEST 1 VIEW: CPT | Mod: 26,76

## 2025-08-21 PROCEDURE — 99232 SBSQ HOSP IP/OBS MODERATE 35: CPT

## 2025-08-21 RX ORDER — OXYCODONE HYDROCHLORIDE 30 MG/1
2.5 TABLET ORAL ONCE
Refills: 0 | Status: DISCONTINUED | OUTPATIENT
Start: 2025-08-21 | End: 2025-08-21

## 2025-08-21 RX ORDER — MAGNESIUM SULFATE 500 MG/ML
2 SYRINGE (ML) INJECTION ONCE
Refills: 0 | Status: COMPLETED | OUTPATIENT
Start: 2025-08-21 | End: 2025-08-21

## 2025-08-21 RX ORDER — SODIUM/POT/MAG/CALC/CHLOR/ACET 35-20-5MEQ
1 VIAL (ML) INTRAVENOUS
Refills: 0 | Status: DISCONTINUED | OUTPATIENT
Start: 2025-08-21 | End: 2025-08-21

## 2025-08-21 RX ORDER — ACETAMINOPHEN 500 MG/5ML
1000 LIQUID (ML) ORAL EVERY 6 HOURS
Refills: 0 | Status: COMPLETED | OUTPATIENT
Start: 2025-08-21 | End: 2025-08-22

## 2025-08-21 RX ORDER — I.V. FAT EMULSION 20 G/100ML
10.4 EMULSION INTRAVENOUS
Qty: 25 | Refills: 0 | Status: DISCONTINUED | OUTPATIENT
Start: 2025-08-21 | End: 2025-08-22

## 2025-08-21 RX ORDER — SODIUM CHLORIDE 9 G/1000ML
1000 INJECTION, SOLUTION INTRAVENOUS ONCE
Refills: 0 | Status: COMPLETED | OUTPATIENT
Start: 2025-08-21 | End: 2025-08-21

## 2025-08-21 RX ORDER — I.V. FAT EMULSION 20 G/100ML
10.4 EMULSION INTRAVENOUS
Qty: 25 | Refills: 0 | Status: DISCONTINUED | OUTPATIENT
Start: 2025-08-21 | End: 2025-08-21

## 2025-08-21 RX ADMIN — Medication 75 MICROGRAM(S): at 21:36

## 2025-08-21 RX ADMIN — Medication 25 GRAM(S): at 18:23

## 2025-08-21 RX ADMIN — I.V. FAT EMULSION 10.4 ML/HR: 20 EMULSION INTRAVENOUS at 18:33

## 2025-08-21 RX ADMIN — SODIUM CHLORIDE 1000 MILLILITER(S): 9 INJECTION, SOLUTION INTRAVENOUS at 09:18

## 2025-08-21 RX ADMIN — DEXAMETHASONE 4 MILLIGRAM(S): 0.5 TABLET ORAL at 05:20

## 2025-08-21 RX ADMIN — Medication 40 MILLIGRAM(S): at 14:06

## 2025-08-21 RX ADMIN — Medication 400 MILLIGRAM(S): at 05:17

## 2025-08-21 RX ADMIN — OCTREOTIDE ACETATE 100 MICROGRAM(S): 500 INJECTION, SOLUTION INTRAVENOUS; SUBCUTANEOUS at 05:19

## 2025-08-21 RX ADMIN — DEXAMETHASONE 4 MILLIGRAM(S): 0.5 TABLET ORAL at 21:42

## 2025-08-21 RX ADMIN — DORZOLAMIDE HYDROCHLORIDE AND TIMOLOL MALEATE 1 DROP(S): 20; 5 SOLUTION/ DROPS OPHTHALMIC at 05:19

## 2025-08-21 RX ADMIN — LATANOPROST PF 1 DROP(S): 0.05 SOLUTION/ DROPS OPHTHALMIC at 21:44

## 2025-08-21 RX ADMIN — OCTREOTIDE ACETATE 100 MICROGRAM(S): 500 INJECTION, SOLUTION INTRAVENOUS; SUBCUTANEOUS at 14:07

## 2025-08-21 RX ADMIN — Medication 1000 MILLIGRAM(S): at 00:12

## 2025-08-21 RX ADMIN — Medication 5 MILLIGRAM(S): at 14:07

## 2025-08-21 RX ADMIN — Medication 5 MILLIGRAM(S): at 05:20

## 2025-08-21 RX ADMIN — Medication 42 EACH: at 18:33

## 2025-08-21 RX ADMIN — BRIMONIDINE TARTRATE 1 DROP(S): 1.5 SOLUTION/ DROPS OPHTHALMIC at 21:44

## 2025-08-21 RX ADMIN — OCTREOTIDE ACETATE 100 MICROGRAM(S): 500 INJECTION, SOLUTION INTRAVENOUS; SUBCUTANEOUS at 21:36

## 2025-08-21 RX ADMIN — Medication 5 MILLIGRAM(S): at 21:42

## 2025-08-21 RX ADMIN — BRIMONIDINE TARTRATE 1 DROP(S): 1.5 SOLUTION/ DROPS OPHTHALMIC at 05:19

## 2025-08-21 RX ADMIN — INSULIN LISPRO 4: 100 INJECTION, SOLUTION INTRAVENOUS; SUBCUTANEOUS at 05:49

## 2025-08-21 RX ADMIN — Medication 400 MILLIGRAM(S): at 21:35

## 2025-08-21 RX ADMIN — Medication 1000 MILLIGRAM(S): at 22:50

## 2025-08-22 LAB
ANION GAP SERPL CALC-SCNC: 14 MMOL/L — SIGNIFICANT CHANGE UP (ref 7–14)
BASOPHILS # BLD AUTO: 0.01 K/UL — SIGNIFICANT CHANGE UP (ref 0–0.2)
BASOPHILS NFR BLD AUTO: 0.1 % — SIGNIFICANT CHANGE UP (ref 0–2)
BUN SERPL-MCNC: 27 MG/DL — HIGH (ref 7–23)
CALCIUM SERPL-MCNC: 7.5 MG/DL — LOW (ref 8.4–10.5)
CHLORIDE SERPL-SCNC: 105 MMOL/L — SIGNIFICANT CHANGE UP (ref 98–107)
CO2 SERPL-SCNC: 13 MMOL/L — LOW (ref 22–31)
CREAT SERPL-MCNC: 1 MG/DL — SIGNIFICANT CHANGE UP (ref 0.5–1.3)
EGFR: 57 ML/MIN/1.73M2 — LOW
EGFR: 57 ML/MIN/1.73M2 — LOW
EOSINOPHIL # BLD AUTO: 0 K/UL — SIGNIFICANT CHANGE UP (ref 0–0.5)
EOSINOPHIL NFR BLD AUTO: 0 % — SIGNIFICANT CHANGE UP (ref 0–6)
GLUCOSE BLDC GLUCOMTR-MCNC: 155 MG/DL — HIGH (ref 70–99)
GLUCOSE BLDC GLUCOMTR-MCNC: 167 MG/DL — HIGH (ref 70–99)
GLUCOSE BLDC GLUCOMTR-MCNC: 175 MG/DL — HIGH (ref 70–99)
GLUCOSE BLDC GLUCOMTR-MCNC: 310 MG/DL — HIGH (ref 70–99)
GLUCOSE SERPL-MCNC: 172 MG/DL — HIGH (ref 70–99)
HCT VFR BLD CALC: 30 % — LOW (ref 34.5–45)
HGB BLD-MCNC: 10.4 G/DL — LOW (ref 11.5–15.5)
IMM GRANULOCYTES # BLD AUTO: 0.19 K/UL — HIGH (ref 0–0.07)
IMM GRANULOCYTES NFR BLD AUTO: 2.4 % — HIGH (ref 0–0.9)
LACTATE SERPL-SCNC: 3.1 MMOL/L — HIGH (ref 0.5–2)
LYMPHOCYTES # BLD AUTO: 0.45 K/UL — LOW (ref 1–3.3)
LYMPHOCYTES NFR BLD AUTO: 5.7 % — LOW (ref 13–44)
MAGNESIUM SERPL-MCNC: 2.6 MG/DL — SIGNIFICANT CHANGE UP (ref 1.6–2.6)
MCHC RBC-ENTMCNC: 28.7 PG — SIGNIFICANT CHANGE UP (ref 27–34)
MCHC RBC-ENTMCNC: 34.7 G/DL — SIGNIFICANT CHANGE UP (ref 32–36)
MCV RBC AUTO: 82.6 FL — SIGNIFICANT CHANGE UP (ref 80–100)
MONOCYTES # BLD AUTO: 0.68 K/UL — SIGNIFICANT CHANGE UP (ref 0–0.9)
MONOCYTES NFR BLD AUTO: 8.6 % — SIGNIFICANT CHANGE UP (ref 2–14)
NEUTROPHILS # BLD AUTO: 6.62 K/UL — SIGNIFICANT CHANGE UP (ref 1.8–7.4)
NEUTROPHILS NFR BLD AUTO: 83.2 % — HIGH (ref 43–77)
NRBC # BLD AUTO: 0.02 K/UL — HIGH (ref 0–0)
NRBC # FLD: 0.02 K/UL — HIGH (ref 0–0)
NRBC BLD AUTO-RTO: 0 /100 WBCS — SIGNIFICANT CHANGE UP (ref 0–0)
PHOSPHATE SERPL-MCNC: 3.1 MG/DL — SIGNIFICANT CHANGE UP (ref 2.5–4.5)
PLATELET # BLD AUTO: 354 K/UL — SIGNIFICANT CHANGE UP (ref 150–400)
PMV BLD: 10 FL — SIGNIFICANT CHANGE UP (ref 7–13)
POTASSIUM SERPL-MCNC: 3.7 MMOL/L — SIGNIFICANT CHANGE UP (ref 3.5–5.3)
POTASSIUM SERPL-SCNC: 3.7 MMOL/L — SIGNIFICANT CHANGE UP (ref 3.5–5.3)
RBC # BLD: 3.63 M/UL — LOW (ref 3.8–5.2)
RBC # FLD: 17.5 % — HIGH (ref 10.3–14.5)
SODIUM SERPL-SCNC: 132 MMOL/L — LOW (ref 135–145)
TRIGL SERPL-MCNC: 103 MG/DL — SIGNIFICANT CHANGE UP
WBC # BLD: 7.95 K/UL — SIGNIFICANT CHANGE UP (ref 3.8–10.5)
WBC # FLD AUTO: 7.95 K/UL — SIGNIFICANT CHANGE UP (ref 3.8–10.5)

## 2025-08-22 PROCEDURE — 99232 SBSQ HOSP IP/OBS MODERATE 35: CPT

## 2025-08-22 RX ORDER — ACETAMINOPHEN 500 MG/5ML
975 LIQUID (ML) ORAL EVERY 6 HOURS
Refills: 0 | Status: DISCONTINUED | OUTPATIENT
Start: 2025-08-22 | End: 2025-08-22

## 2025-08-22 RX ORDER — SODIUM/POT/MAG/CALC/CHLOR/ACET 35-20-5MEQ
1 VIAL (ML) INTRAVENOUS
Refills: 0 | Status: DISCONTINUED | OUTPATIENT
Start: 2025-08-22 | End: 2025-08-22

## 2025-08-22 RX ORDER — ACETAMINOPHEN 500 MG/5ML
1000 LIQUID (ML) ORAL EVERY 6 HOURS
Refills: 0 | Status: COMPLETED | OUTPATIENT
Start: 2025-08-22 | End: 2025-08-23

## 2025-08-22 RX ORDER — I.V. FAT EMULSION 20 G/100ML
16.6 EMULSION INTRAVENOUS
Qty: 40 | Refills: 0 | Status: DISCONTINUED | OUTPATIENT
Start: 2025-08-22 | End: 2025-08-23

## 2025-08-22 RX ADMIN — I.V. FAT EMULSION 16.6 ML/HR: 20 EMULSION INTRAVENOUS at 18:55

## 2025-08-22 RX ADMIN — DORZOLAMIDE HYDROCHLORIDE AND TIMOLOL MALEATE 1 DROP(S): 20; 5 SOLUTION/ DROPS OPHTHALMIC at 17:17

## 2025-08-22 RX ADMIN — BRIMONIDINE TARTRATE 1 DROP(S): 1.5 SOLUTION/ DROPS OPHTHALMIC at 13:11

## 2025-08-22 RX ADMIN — Medication 400 MILLIGRAM(S): at 03:31

## 2025-08-22 RX ADMIN — INSULIN LISPRO 2: 100 INJECTION, SOLUTION INTRAVENOUS; SUBCUTANEOUS at 11:49

## 2025-08-22 RX ADMIN — BRIMONIDINE TARTRATE 1 DROP(S): 1.5 SOLUTION/ DROPS OPHTHALMIC at 06:19

## 2025-08-22 RX ADMIN — Medication 1000 MILLIGRAM(S): at 09:05

## 2025-08-22 RX ADMIN — Medication 40 MILLIGRAM(S): at 11:39

## 2025-08-22 RX ADMIN — ENOXAPARIN SODIUM 40 MILLIGRAM(S): 100 INJECTION SUBCUTANEOUS at 11:40

## 2025-08-22 RX ADMIN — DEXAMETHASONE 4 MILLIGRAM(S): 0.5 TABLET ORAL at 10:22

## 2025-08-22 RX ADMIN — Medication 1000 MILLIGRAM(S): at 20:58

## 2025-08-22 RX ADMIN — Medication 1000 MILLIGRAM(S): at 04:31

## 2025-08-22 RX ADMIN — Medication 400 MILLIGRAM(S): at 20:21

## 2025-08-22 RX ADMIN — Medication 1000 MILLIGRAM(S): at 14:15

## 2025-08-22 RX ADMIN — OCTREOTIDE ACETATE 100 MICROGRAM(S): 500 INJECTION, SOLUTION INTRAVENOUS; SUBCUTANEOUS at 22:30

## 2025-08-22 RX ADMIN — DEXAMETHASONE 4 MILLIGRAM(S): 0.5 TABLET ORAL at 22:30

## 2025-08-22 RX ADMIN — INSULIN LISPRO 2: 100 INJECTION, SOLUTION INTRAVENOUS; SUBCUTANEOUS at 06:18

## 2025-08-22 RX ADMIN — OCTREOTIDE ACETATE 100 MICROGRAM(S): 500 INJECTION, SOLUTION INTRAVENOUS; SUBCUTANEOUS at 13:10

## 2025-08-22 RX ADMIN — Medication 1 APPLICATION(S): at 06:34

## 2025-08-22 RX ADMIN — BRIMONIDINE TARTRATE 1 DROP(S): 1.5 SOLUTION/ DROPS OPHTHALMIC at 22:29

## 2025-08-22 RX ADMIN — Medication 400 MILLIGRAM(S): at 13:55

## 2025-08-22 RX ADMIN — OCTREOTIDE ACETATE 100 MICROGRAM(S): 500 INJECTION, SOLUTION INTRAVENOUS; SUBCUTANEOUS at 06:20

## 2025-08-22 RX ADMIN — Medication 5 MILLIGRAM(S): at 06:20

## 2025-08-22 RX ADMIN — Medication 83 EACH: at 18:54

## 2025-08-22 RX ADMIN — Medication 400 MILLIGRAM(S): at 08:30

## 2025-08-22 RX ADMIN — INSULIN LISPRO 8: 100 INJECTION, SOLUTION INTRAVENOUS; SUBCUTANEOUS at 18:15

## 2025-08-22 RX ADMIN — LATANOPROST PF 1 DROP(S): 0.05 SOLUTION/ DROPS OPHTHALMIC at 22:29

## 2025-08-22 RX ADMIN — DORZOLAMIDE HYDROCHLORIDE AND TIMOLOL MALEATE 1 DROP(S): 20; 5 SOLUTION/ DROPS OPHTHALMIC at 06:19

## 2025-08-23 ENCOUNTER — APPOINTMENT (OUTPATIENT)
Dept: INFUSION THERAPY | Facility: HOSPITAL | Age: 80
End: 2025-08-23

## 2025-08-23 LAB
ANION GAP SERPL CALC-SCNC: 11 MMOL/L — SIGNIFICANT CHANGE UP (ref 7–14)
BUN SERPL-MCNC: 34 MG/DL — HIGH (ref 7–23)
CA-I BLD-SCNC: 1.06 MMOL/L — LOW (ref 1.15–1.29)
CALCIUM SERPL-MCNC: 7.2 MG/DL — LOW (ref 8.4–10.5)
CHLORIDE SERPL-SCNC: 104 MMOL/L — SIGNIFICANT CHANGE UP (ref 98–107)
CO2 SERPL-SCNC: 18 MMOL/L — LOW (ref 22–31)
CREAT SERPL-MCNC: 1.16 MG/DL — SIGNIFICANT CHANGE UP (ref 0.5–1.3)
EGFR: 47 ML/MIN/1.73M2 — LOW
EGFR: 47 ML/MIN/1.73M2 — LOW
GLUCOSE BLDC GLUCOMTR-MCNC: 176 MG/DL — HIGH (ref 70–99)
GLUCOSE BLDC GLUCOMTR-MCNC: 199 MG/DL — HIGH (ref 70–99)
GLUCOSE BLDC GLUCOMTR-MCNC: 222 MG/DL — HIGH (ref 70–99)
GLUCOSE SERPL-MCNC: 185 MG/DL — HIGH (ref 70–99)
HCT VFR BLD CALC: 25.3 % — LOW (ref 34.5–45)
HGB BLD-MCNC: 8.8 G/DL — LOW (ref 11.5–15.5)
LDH SERPL L TO P-CCNC: 537 U/L — HIGH (ref 135–225)
MAGNESIUM SERPL-MCNC: 2.8 MG/DL — HIGH (ref 1.6–2.6)
MCHC RBC-ENTMCNC: 28.1 PG — SIGNIFICANT CHANGE UP (ref 27–34)
MCHC RBC-ENTMCNC: 34.8 G/DL — SIGNIFICANT CHANGE UP (ref 32–36)
MCV RBC AUTO: 80.8 FL — SIGNIFICANT CHANGE UP (ref 80–100)
NRBC # BLD AUTO: 0.02 K/UL — HIGH (ref 0–0)
NRBC # FLD: 0.02 K/UL — HIGH (ref 0–0)
NRBC BLD AUTO-RTO: 0 /100 WBCS — SIGNIFICANT CHANGE UP (ref 0–0)
PHOSPHATE SERPL-MCNC: 3.1 MG/DL — SIGNIFICANT CHANGE UP (ref 2.5–4.5)
PLATELET # BLD AUTO: 255 K/UL — SIGNIFICANT CHANGE UP (ref 150–400)
PMV BLD: 10.3 FL — SIGNIFICANT CHANGE UP (ref 7–13)
POTASSIUM SERPL-MCNC: 3.5 MMOL/L — SIGNIFICANT CHANGE UP (ref 3.5–5.3)
POTASSIUM SERPL-SCNC: 3.5 MMOL/L — SIGNIFICANT CHANGE UP (ref 3.5–5.3)
RBC # BLD: 3.13 M/UL — LOW (ref 3.8–5.2)
RBC # FLD: 17.2 % — HIGH (ref 10.3–14.5)
SODIUM SERPL-SCNC: 133 MMOL/L — LOW (ref 135–145)
WBC # BLD: 6.25 K/UL — SIGNIFICANT CHANGE UP (ref 3.8–10.5)
WBC # FLD AUTO: 6.25 K/UL — SIGNIFICANT CHANGE UP (ref 3.8–10.5)

## 2025-08-23 PROCEDURE — 99232 SBSQ HOSP IP/OBS MODERATE 35: CPT

## 2025-08-23 RX ORDER — SODIUM/POT/MAG/CALC/CHLOR/ACET 35-20-5MEQ
1 VIAL (ML) INTRAVENOUS
Refills: 0 | Status: DISCONTINUED | OUTPATIENT
Start: 2025-08-23 | End: 2025-08-23

## 2025-08-23 RX ORDER — I.V. FAT EMULSION 20 G/100ML
20.8 EMULSION INTRAVENOUS
Qty: 50 | Refills: 0 | Status: DISCONTINUED | OUTPATIENT
Start: 2025-08-23 | End: 2025-08-24

## 2025-08-23 RX ADMIN — INSULIN LISPRO 4: 100 INJECTION, SOLUTION INTRAVENOUS; SUBCUTANEOUS at 06:24

## 2025-08-23 RX ADMIN — OCTREOTIDE ACETATE 100 MICROGRAM(S): 500 INJECTION, SOLUTION INTRAVENOUS; SUBCUTANEOUS at 22:22

## 2025-08-23 RX ADMIN — ENOXAPARIN SODIUM 40 MILLIGRAM(S): 100 INJECTION SUBCUTANEOUS at 12:16

## 2025-08-23 RX ADMIN — DEXAMETHASONE 4 MILLIGRAM(S): 0.5 TABLET ORAL at 10:34

## 2025-08-23 RX ADMIN — I.V. FAT EMULSION 20.8 ML/HR: 20 EMULSION INTRAVENOUS at 18:54

## 2025-08-23 RX ADMIN — DEXAMETHASONE 4 MILLIGRAM(S): 0.5 TABLET ORAL at 22:22

## 2025-08-23 RX ADMIN — DORZOLAMIDE HYDROCHLORIDE AND TIMOLOL MALEATE 1 DROP(S): 20; 5 SOLUTION/ DROPS OPHTHALMIC at 17:24

## 2025-08-23 RX ADMIN — Medication 400 MILLIGRAM(S): at 12:15

## 2025-08-23 RX ADMIN — DORZOLAMIDE HYDROCHLORIDE AND TIMOLOL MALEATE 1 DROP(S): 20; 5 SOLUTION/ DROPS OPHTHALMIC at 05:39

## 2025-08-23 RX ADMIN — LATANOPROST PF 1 DROP(S): 0.05 SOLUTION/ DROPS OPHTHALMIC at 22:22

## 2025-08-23 RX ADMIN — Medication 1000 MILLIGRAM(S): at 12:45

## 2025-08-23 RX ADMIN — BRIMONIDINE TARTRATE 1 DROP(S): 1.5 SOLUTION/ DROPS OPHTHALMIC at 12:17

## 2025-08-23 RX ADMIN — Medication 1000 MILLIGRAM(S): at 03:09

## 2025-08-23 RX ADMIN — OCTREOTIDE ACETATE 100 MICROGRAM(S): 500 INJECTION, SOLUTION INTRAVENOUS; SUBCUTANEOUS at 12:16

## 2025-08-23 RX ADMIN — BRIMONIDINE TARTRATE 1 DROP(S): 1.5 SOLUTION/ DROPS OPHTHALMIC at 22:23

## 2025-08-23 RX ADMIN — Medication 40 MILLIGRAM(S): at 12:16

## 2025-08-23 RX ADMIN — Medication 1 APPLICATION(S): at 05:39

## 2025-08-23 RX ADMIN — INSULIN LISPRO 2: 100 INJECTION, SOLUTION INTRAVENOUS; SUBCUTANEOUS at 17:24

## 2025-08-23 RX ADMIN — Medication 83 EACH: at 18:51

## 2025-08-23 RX ADMIN — BRIMONIDINE TARTRATE 1 DROP(S): 1.5 SOLUTION/ DROPS OPHTHALMIC at 05:39

## 2025-08-23 RX ADMIN — INSULIN LISPRO 2: 100 INJECTION, SOLUTION INTRAVENOUS; SUBCUTANEOUS at 00:05

## 2025-08-23 RX ADMIN — Medication 400 MILLIGRAM(S): at 02:01

## 2025-08-23 RX ADMIN — INSULIN LISPRO 2: 100 INJECTION, SOLUTION INTRAVENOUS; SUBCUTANEOUS at 12:18

## 2025-08-23 RX ADMIN — OCTREOTIDE ACETATE 100 MICROGRAM(S): 500 INJECTION, SOLUTION INTRAVENOUS; SUBCUTANEOUS at 05:40

## 2025-08-24 LAB
ANION GAP SERPL CALC-SCNC: 10 MMOL/L — SIGNIFICANT CHANGE UP (ref 7–14)
BASOPHILS # BLD AUTO: 0 K/UL — SIGNIFICANT CHANGE UP (ref 0–0.2)
BASOPHILS NFR BLD AUTO: 0 % — SIGNIFICANT CHANGE UP (ref 0–2)
BUN SERPL-MCNC: 34 MG/DL — HIGH (ref 7–23)
CALCIUM SERPL-MCNC: 7.4 MG/DL — LOW (ref 8.4–10.5)
CHLORIDE SERPL-SCNC: 103 MMOL/L — SIGNIFICANT CHANGE UP (ref 98–107)
CO2 SERPL-SCNC: 23 MMOL/L — SIGNIFICANT CHANGE UP (ref 22–31)
CREAT SERPL-MCNC: 0.79 MG/DL — SIGNIFICANT CHANGE UP (ref 0.5–1.3)
EGFR: 75 ML/MIN/1.73M2 — SIGNIFICANT CHANGE UP
EGFR: 75 ML/MIN/1.73M2 — SIGNIFICANT CHANGE UP
EOSINOPHIL # BLD AUTO: 0 K/UL — SIGNIFICANT CHANGE UP (ref 0–0.5)
EOSINOPHIL NFR BLD AUTO: 0 % — SIGNIFICANT CHANGE UP (ref 0–6)
GLUCOSE BLDC GLUCOMTR-MCNC: 182 MG/DL — HIGH (ref 70–99)
GLUCOSE BLDC GLUCOMTR-MCNC: 197 MG/DL — HIGH (ref 70–99)
GLUCOSE BLDC GLUCOMTR-MCNC: 214 MG/DL — HIGH (ref 70–99)
GLUCOSE BLDC GLUCOMTR-MCNC: 238 MG/DL — HIGH (ref 70–99)
GLUCOSE BLDC GLUCOMTR-MCNC: 244 MG/DL — HIGH (ref 70–99)
GLUCOSE SERPL-MCNC: 185 MG/DL — HIGH (ref 70–99)
HCT VFR BLD CALC: 24.8 % — LOW (ref 34.5–45)
HGB BLD-MCNC: 8.7 G/DL — LOW (ref 11.5–15.5)
IMM GRANULOCYTES # BLD AUTO: 0.13 K/UL — HIGH (ref 0–0.07)
IMM GRANULOCYTES NFR BLD AUTO: 1.7 % — HIGH (ref 0–0.9)
LACTATE BLDV-MCNC: 3.1 MMOL/L — HIGH (ref 0.5–2)
LYMPHOCYTES # BLD AUTO: 0.53 K/UL — LOW (ref 1–3.3)
LYMPHOCYTES NFR BLD AUTO: 6.9 % — LOW (ref 13–44)
MAGNESIUM SERPL-MCNC: 2.3 MG/DL — SIGNIFICANT CHANGE UP (ref 1.6–2.6)
MCHC RBC-ENTMCNC: 27.8 PG — SIGNIFICANT CHANGE UP (ref 27–34)
MCHC RBC-ENTMCNC: 35.1 G/DL — SIGNIFICANT CHANGE UP (ref 32–36)
MCV RBC AUTO: 79.2 FL — LOW (ref 80–100)
MONOCYTES # BLD AUTO: 0.62 K/UL — SIGNIFICANT CHANGE UP (ref 0–0.9)
MONOCYTES NFR BLD AUTO: 8.1 % — SIGNIFICANT CHANGE UP (ref 2–14)
NEUTROPHILS # BLD AUTO: 6.35 K/UL — SIGNIFICANT CHANGE UP (ref 1.8–7.4)
NEUTROPHILS NFR BLD AUTO: 83.3 % — HIGH (ref 43–77)
NRBC # BLD AUTO: 0.02 K/UL — HIGH (ref 0–0)
NRBC # FLD: 0.02 K/UL — HIGH (ref 0–0)
NRBC BLD AUTO-RTO: 0 /100 WBCS — SIGNIFICANT CHANGE UP (ref 0–0)
PHOSPHATE SERPL-MCNC: 2.1 MG/DL — LOW (ref 2.5–4.5)
PLATELET # BLD AUTO: 254 K/UL — SIGNIFICANT CHANGE UP (ref 150–400)
PMV BLD: 10.8 FL — SIGNIFICANT CHANGE UP (ref 7–13)
POTASSIUM SERPL-MCNC: 3.9 MMOL/L — SIGNIFICANT CHANGE UP (ref 3.5–5.3)
POTASSIUM SERPL-SCNC: 3.9 MMOL/L — SIGNIFICANT CHANGE UP (ref 3.5–5.3)
RBC # BLD: 3.13 M/UL — LOW (ref 3.8–5.2)
RBC # FLD: 17.5 % — HIGH (ref 10.3–14.5)
SODIUM SERPL-SCNC: 136 MMOL/L — SIGNIFICANT CHANGE UP (ref 135–145)
WBC # BLD: 7.63 K/UL — SIGNIFICANT CHANGE UP (ref 3.8–10.5)
WBC # FLD AUTO: 7.63 K/UL — SIGNIFICANT CHANGE UP (ref 3.8–10.5)

## 2025-08-24 PROCEDURE — 99232 SBSQ HOSP IP/OBS MODERATE 35: CPT

## 2025-08-24 RX ORDER — SODIUM/POT/MAG/CALC/CHLOR/ACET 35-20-5MEQ
1 VIAL (ML) INTRAVENOUS
Refills: 0 | Status: DISCONTINUED | OUTPATIENT
Start: 2025-08-24 | End: 2025-08-24

## 2025-08-24 RX ORDER — I.V. FAT EMULSION 20 G/100ML
20.8 EMULSION INTRAVENOUS
Qty: 50 | Refills: 0 | Status: DISCONTINUED | OUTPATIENT
Start: 2025-08-24 | End: 2025-08-25

## 2025-08-24 RX ADMIN — Medication 1 APPLICATION(S): at 05:41

## 2025-08-24 RX ADMIN — INSULIN LISPRO 2: 100 INJECTION, SOLUTION INTRAVENOUS; SUBCUTANEOUS at 00:41

## 2025-08-24 RX ADMIN — DORZOLAMIDE HYDROCHLORIDE AND TIMOLOL MALEATE 1 DROP(S): 20; 5 SOLUTION/ DROPS OPHTHALMIC at 18:27

## 2025-08-24 RX ADMIN — OCTREOTIDE ACETATE 100 MICROGRAM(S): 500 INJECTION, SOLUTION INTRAVENOUS; SUBCUTANEOUS at 22:22

## 2025-08-24 RX ADMIN — INSULIN LISPRO 4: 100 INJECTION, SOLUTION INTRAVENOUS; SUBCUTANEOUS at 05:39

## 2025-08-24 RX ADMIN — OCTREOTIDE ACETATE 100 MICROGRAM(S): 500 INJECTION, SOLUTION INTRAVENOUS; SUBCUTANEOUS at 15:37

## 2025-08-24 RX ADMIN — BRIMONIDINE TARTRATE 1 DROP(S): 1.5 SOLUTION/ DROPS OPHTHALMIC at 22:22

## 2025-08-24 RX ADMIN — OCTREOTIDE ACETATE 100 MICROGRAM(S): 500 INJECTION, SOLUTION INTRAVENOUS; SUBCUTANEOUS at 05:26

## 2025-08-24 RX ADMIN — DORZOLAMIDE HYDROCHLORIDE AND TIMOLOL MALEATE 1 DROP(S): 20; 5 SOLUTION/ DROPS OPHTHALMIC at 05:29

## 2025-08-24 RX ADMIN — I.V. FAT EMULSION 20.8 ML/HR: 20 EMULSION INTRAVENOUS at 18:47

## 2025-08-24 RX ADMIN — BRIMONIDINE TARTRATE 1 DROP(S): 1.5 SOLUTION/ DROPS OPHTHALMIC at 05:29

## 2025-08-24 RX ADMIN — INSULIN LISPRO 4: 100 INJECTION, SOLUTION INTRAVENOUS; SUBCUTANEOUS at 12:38

## 2025-08-24 RX ADMIN — ENOXAPARIN SODIUM 40 MILLIGRAM(S): 100 INJECTION SUBCUTANEOUS at 12:37

## 2025-08-24 RX ADMIN — DEXAMETHASONE 4 MILLIGRAM(S): 0.5 TABLET ORAL at 22:22

## 2025-08-24 RX ADMIN — Medication 83 EACH: at 18:46

## 2025-08-24 RX ADMIN — LATANOPROST PF 1 DROP(S): 0.05 SOLUTION/ DROPS OPHTHALMIC at 22:22

## 2025-08-24 RX ADMIN — Medication 40 MILLIGRAM(S): at 12:07

## 2025-08-24 RX ADMIN — INSULIN LISPRO 2: 100 INJECTION, SOLUTION INTRAVENOUS; SUBCUTANEOUS at 18:25

## 2025-08-24 RX ADMIN — DEXAMETHASONE 4 MILLIGRAM(S): 0.5 TABLET ORAL at 12:36

## 2025-08-25 LAB
BASOPHILS # BLD AUTO: 0.01 K/UL — SIGNIFICANT CHANGE UP (ref 0–0.2)
BASOPHILS NFR BLD AUTO: 0.1 % — SIGNIFICANT CHANGE UP (ref 0–2)
CA-I BLD-SCNC: 1.01 MMOL/L — LOW (ref 1.15–1.29)
EOSINOPHIL # BLD AUTO: 0 K/UL — SIGNIFICANT CHANGE UP (ref 0–0.5)
EOSINOPHIL NFR BLD AUTO: 0 % — SIGNIFICANT CHANGE UP (ref 0–6)
GLUCOSE BLDC GLUCOMTR-MCNC: 142 MG/DL — HIGH (ref 70–99)
GLUCOSE BLDC GLUCOMTR-MCNC: 177 MG/DL — HIGH (ref 70–99)
GLUCOSE BLDC GLUCOMTR-MCNC: 187 MG/DL — HIGH (ref 70–99)
GLUCOSE BLDC GLUCOMTR-MCNC: 191 MG/DL — HIGH (ref 70–99)
HCT VFR BLD CALC: 21.6 % — LOW (ref 34.5–45)
HGB BLD-MCNC: 7.8 G/DL — LOW (ref 11.5–15.5)
IMM GRANULOCYTES # BLD AUTO: 0.25 K/UL — HIGH (ref 0–0.07)
IMM GRANULOCYTES NFR BLD AUTO: 3.1 % — HIGH (ref 0–0.9)
LYMPHOCYTES # BLD AUTO: 0.42 K/UL — LOW (ref 1–3.3)
LYMPHOCYTES NFR BLD AUTO: 5.3 % — LOW (ref 13–44)
MCHC RBC-ENTMCNC: 28.7 PG — SIGNIFICANT CHANGE UP (ref 27–34)
MCHC RBC-ENTMCNC: 36.1 G/DL — HIGH (ref 32–36)
MCV RBC AUTO: 79.4 FL — LOW (ref 80–100)
MONOCYTES # BLD AUTO: 0.75 K/UL — SIGNIFICANT CHANGE UP (ref 0–0.9)
MONOCYTES NFR BLD AUTO: 9.4 % — SIGNIFICANT CHANGE UP (ref 2–14)
NEUTROPHILS # BLD AUTO: 6.52 K/UL — SIGNIFICANT CHANGE UP (ref 1.8–7.4)
NEUTROPHILS NFR BLD AUTO: 82.1 % — HIGH (ref 43–77)
NRBC # BLD AUTO: 0.04 K/UL — HIGH (ref 0–0)
NRBC # FLD: 0.04 K/UL — HIGH (ref 0–0)
NRBC BLD AUTO-RTO: 0 /100 WBCS — SIGNIFICANT CHANGE UP (ref 0–0)
PLATELET # BLD AUTO: 250 K/UL — SIGNIFICANT CHANGE UP (ref 150–400)
PMV BLD: 11.6 FL — SIGNIFICANT CHANGE UP (ref 7–13)
RBC # BLD: 2.72 M/UL — LOW (ref 3.8–5.2)
RBC # FLD: 17.6 % — HIGH (ref 10.3–14.5)
WBC # BLD: 7.95 K/UL — SIGNIFICANT CHANGE UP (ref 3.8–10.5)
WBC # FLD AUTO: 7.95 K/UL — SIGNIFICANT CHANGE UP (ref 3.8–10.5)

## 2025-08-25 PROCEDURE — 99231 SBSQ HOSP IP/OBS SF/LOW 25: CPT

## 2025-08-25 PROCEDURE — 99232 SBSQ HOSP IP/OBS MODERATE 35: CPT

## 2025-08-25 RX ORDER — I.V. FAT EMULSION 20 G/100ML
10.4 EMULSION INTRAVENOUS
Qty: 25 | Refills: 0 | Status: DISCONTINUED | OUTPATIENT
Start: 2025-08-25 | End: 2025-08-26

## 2025-08-25 RX ORDER — SODIUM/POT/MAG/CALC/CHLOR/ACET 35-20-5MEQ
1 VIAL (ML) INTRAVENOUS
Refills: 0 | Status: DISCONTINUED | OUTPATIENT
Start: 2025-08-25 | End: 2025-08-25

## 2025-08-25 RX ADMIN — LATANOPROST PF 1 DROP(S): 0.05 SOLUTION/ DROPS OPHTHALMIC at 21:25

## 2025-08-25 RX ADMIN — BRIMONIDINE TARTRATE 1 DROP(S): 1.5 SOLUTION/ DROPS OPHTHALMIC at 05:55

## 2025-08-25 RX ADMIN — BRIMONIDINE TARTRATE 1 DROP(S): 1.5 SOLUTION/ DROPS OPHTHALMIC at 21:21

## 2025-08-25 RX ADMIN — Medication 1 APPLICATION(S): at 05:55

## 2025-08-25 RX ADMIN — OCTREOTIDE ACETATE 100 MICROGRAM(S): 500 INJECTION, SOLUTION INTRAVENOUS; SUBCUTANEOUS at 14:59

## 2025-08-25 RX ADMIN — Medication 40 MILLIGRAM(S): at 12:23

## 2025-08-25 RX ADMIN — OCTREOTIDE ACETATE 100 MICROGRAM(S): 500 INJECTION, SOLUTION INTRAVENOUS; SUBCUTANEOUS at 05:54

## 2025-08-25 RX ADMIN — INSULIN LISPRO 2: 100 INJECTION, SOLUTION INTRAVENOUS; SUBCUTANEOUS at 00:51

## 2025-08-25 RX ADMIN — I.V. FAT EMULSION 10.4 ML/HR: 20 EMULSION INTRAVENOUS at 18:50

## 2025-08-25 RX ADMIN — Medication 1 EACH: at 18:49

## 2025-08-25 RX ADMIN — DEXAMETHASONE 4 MILLIGRAM(S): 0.5 TABLET ORAL at 21:24

## 2025-08-25 RX ADMIN — DEXAMETHASONE 4 MILLIGRAM(S): 0.5 TABLET ORAL at 12:22

## 2025-08-25 RX ADMIN — INSULIN LISPRO 2: 100 INJECTION, SOLUTION INTRAVENOUS; SUBCUTANEOUS at 12:22

## 2025-08-25 RX ADMIN — ENOXAPARIN SODIUM 40 MILLIGRAM(S): 100 INJECTION SUBCUTANEOUS at 12:22

## 2025-08-25 RX ADMIN — DORZOLAMIDE HYDROCHLORIDE AND TIMOLOL MALEATE 1 DROP(S): 20; 5 SOLUTION/ DROPS OPHTHALMIC at 17:59

## 2025-08-25 RX ADMIN — OCTREOTIDE ACETATE 100 MICROGRAM(S): 500 INJECTION, SOLUTION INTRAVENOUS; SUBCUTANEOUS at 21:23

## 2025-08-25 RX ADMIN — DORZOLAMIDE HYDROCHLORIDE AND TIMOLOL MALEATE 1 DROP(S): 20; 5 SOLUTION/ DROPS OPHTHALMIC at 05:55

## 2025-08-25 RX ADMIN — INSULIN LISPRO 2: 100 INJECTION, SOLUTION INTRAVENOUS; SUBCUTANEOUS at 06:21

## 2025-08-26 LAB
ANION GAP SERPL CALC-SCNC: 13 MMOL/L — SIGNIFICANT CHANGE UP (ref 7–14)
BUN SERPL-MCNC: 27 MG/DL — HIGH (ref 7–23)
CALCIUM SERPL-MCNC: 7.4 MG/DL — LOW (ref 8.4–10.5)
CHLORIDE SERPL-SCNC: 98 MMOL/L — SIGNIFICANT CHANGE UP (ref 98–107)
CO2 SERPL-SCNC: 27 MMOL/L — SIGNIFICANT CHANGE UP (ref 22–31)
CREAT SERPL-MCNC: 0.67 MG/DL — SIGNIFICANT CHANGE UP (ref 0.5–1.3)
EGFR: 88 ML/MIN/1.73M2 — SIGNIFICANT CHANGE UP
EGFR: 88 ML/MIN/1.73M2 — SIGNIFICANT CHANGE UP
GLUCOSE BLDC GLUCOMTR-MCNC: 100 MG/DL — HIGH (ref 70–99)
GLUCOSE BLDC GLUCOMTR-MCNC: 135 MG/DL — HIGH (ref 70–99)
GLUCOSE BLDC GLUCOMTR-MCNC: 212 MG/DL — HIGH (ref 70–99)
GLUCOSE BLDC GLUCOMTR-MCNC: 215 MG/DL — HIGH (ref 70–99)
GLUCOSE BLDC GLUCOMTR-MCNC: 218 MG/DL — HIGH (ref 70–99)
GLUCOSE SERPL-MCNC: 189 MG/DL — HIGH (ref 70–99)
HCT VFR BLD CALC: 23.1 % — LOW (ref 34.5–45)
HGB BLD-MCNC: 8.3 G/DL — LOW (ref 11.5–15.5)
MAGNESIUM SERPL-MCNC: 1.8 MG/DL — SIGNIFICANT CHANGE UP (ref 1.6–2.6)
MCHC RBC-ENTMCNC: 28.7 PG — SIGNIFICANT CHANGE UP (ref 27–34)
MCHC RBC-ENTMCNC: 35.9 G/DL — SIGNIFICANT CHANGE UP (ref 32–36)
MCV RBC AUTO: 79.9 FL — LOW (ref 80–100)
NRBC # BLD AUTO: 0.04 K/UL — HIGH (ref 0–0)
NRBC # FLD: 0.04 K/UL — HIGH (ref 0–0)
NRBC BLD AUTO-RTO: 0 /100 WBCS — SIGNIFICANT CHANGE UP (ref 0–0)
PHOSPHATE SERPL-MCNC: 2.3 MG/DL — LOW (ref 2.5–4.5)
PLATELET # BLD AUTO: 273 K/UL — SIGNIFICANT CHANGE UP (ref 150–400)
PMV BLD: 11.1 FL — SIGNIFICANT CHANGE UP (ref 7–13)
POTASSIUM SERPL-MCNC: 3.9 MMOL/L — SIGNIFICANT CHANGE UP (ref 3.5–5.3)
POTASSIUM SERPL-SCNC: 3.9 MMOL/L — SIGNIFICANT CHANGE UP (ref 3.5–5.3)
RBC # BLD: 2.89 M/UL — LOW (ref 3.8–5.2)
RBC # FLD: 18 % — HIGH (ref 10.3–14.5)
SODIUM SERPL-SCNC: 138 MMOL/L — SIGNIFICANT CHANGE UP (ref 135–145)
WBC # BLD: 8.58 K/UL — SIGNIFICANT CHANGE UP (ref 3.8–10.5)
WBC # FLD AUTO: 8.58 K/UL — SIGNIFICANT CHANGE UP (ref 3.8–10.5)

## 2025-08-26 PROCEDURE — 99232 SBSQ HOSP IP/OBS MODERATE 35: CPT

## 2025-08-26 RX ORDER — AMLODIPINE BESYLATE 10 MG/1
10 TABLET ORAL DAILY
Refills: 0 | Status: DISCONTINUED | OUTPATIENT
Start: 2025-08-26 | End: 2025-08-28

## 2025-08-26 RX ORDER — INSULIN LISPRO 100 U/ML
INJECTION, SOLUTION INTRAVENOUS; SUBCUTANEOUS
Refills: 0 | Status: DISCONTINUED | OUTPATIENT
Start: 2025-08-26 | End: 2025-08-28

## 2025-08-26 RX ORDER — LEVOTHYROXINE SODIUM 300 MCG
100 TABLET ORAL DAILY
Refills: 0 | Status: DISCONTINUED | OUTPATIENT
Start: 2025-08-26 | End: 2025-08-28

## 2025-08-26 RX ORDER — INSULIN LISPRO 100 U/ML
INJECTION, SOLUTION INTRAVENOUS; SUBCUTANEOUS AT BEDTIME
Refills: 0 | Status: DISCONTINUED | OUTPATIENT
Start: 2025-08-26 | End: 2025-08-28

## 2025-08-26 RX ADMIN — LATANOPROST PF 1 DROP(S): 0.05 SOLUTION/ DROPS OPHTHALMIC at 21:54

## 2025-08-26 RX ADMIN — OCTREOTIDE ACETATE 100 MICROGRAM(S): 500 INJECTION, SOLUTION INTRAVENOUS; SUBCUTANEOUS at 05:31

## 2025-08-26 RX ADMIN — DEXAMETHASONE 4 MILLIGRAM(S): 0.5 TABLET ORAL at 14:09

## 2025-08-26 RX ADMIN — BRIMONIDINE TARTRATE 1 DROP(S): 1.5 SOLUTION/ DROPS OPHTHALMIC at 05:30

## 2025-08-26 RX ADMIN — OCTREOTIDE ACETATE 100 MICROGRAM(S): 500 INJECTION, SOLUTION INTRAVENOUS; SUBCUTANEOUS at 14:59

## 2025-08-26 RX ADMIN — DORZOLAMIDE HYDROCHLORIDE AND TIMOLOL MALEATE 1 DROP(S): 20; 5 SOLUTION/ DROPS OPHTHALMIC at 18:10

## 2025-08-26 RX ADMIN — INSULIN LISPRO 4: 100 INJECTION, SOLUTION INTRAVENOUS; SUBCUTANEOUS at 18:10

## 2025-08-26 RX ADMIN — ENOXAPARIN SODIUM 40 MILLIGRAM(S): 100 INJECTION SUBCUTANEOUS at 14:10

## 2025-08-26 RX ADMIN — BRIMONIDINE TARTRATE 1 DROP(S): 1.5 SOLUTION/ DROPS OPHTHALMIC at 14:09

## 2025-08-26 RX ADMIN — INSULIN LISPRO 4: 100 INJECTION, SOLUTION INTRAVENOUS; SUBCUTANEOUS at 00:32

## 2025-08-26 RX ADMIN — Medication 1 APPLICATION(S): at 05:30

## 2025-08-26 RX ADMIN — Medication 40 MILLIGRAM(S): at 14:08

## 2025-08-26 RX ADMIN — INSULIN LISPRO 4: 100 INJECTION, SOLUTION INTRAVENOUS; SUBCUTANEOUS at 05:30

## 2025-08-26 RX ADMIN — DORZOLAMIDE HYDROCHLORIDE AND TIMOLOL MALEATE 1 DROP(S): 20; 5 SOLUTION/ DROPS OPHTHALMIC at 05:31

## 2025-08-26 RX ADMIN — BRIMONIDINE TARTRATE 1 DROP(S): 1.5 SOLUTION/ DROPS OPHTHALMIC at 21:54

## 2025-08-26 RX ADMIN — Medication 100 MICROGRAM(S): at 06:31

## 2025-08-26 RX ADMIN — OCTREOTIDE ACETATE 100 MICROGRAM(S): 500 INJECTION, SOLUTION INTRAVENOUS; SUBCUTANEOUS at 21:52

## 2025-08-27 LAB
ANION GAP SERPL CALC-SCNC: 11 MMOL/L — SIGNIFICANT CHANGE UP (ref 7–14)
BUN SERPL-MCNC: 29 MG/DL — HIGH (ref 7–23)
CALCIUM SERPL-MCNC: 7.7 MG/DL — LOW (ref 8.4–10.5)
CHLORIDE SERPL-SCNC: 96 MMOL/L — LOW (ref 98–107)
CO2 SERPL-SCNC: 26 MMOL/L — SIGNIFICANT CHANGE UP (ref 22–31)
CREAT SERPL-MCNC: 0.79 MG/DL — SIGNIFICANT CHANGE UP (ref 0.5–1.3)
EGFR: 75 ML/MIN/1.73M2 — SIGNIFICANT CHANGE UP
EGFR: 75 ML/MIN/1.73M2 — SIGNIFICANT CHANGE UP
GLUCOSE BLDC GLUCOMTR-MCNC: 162 MG/DL — HIGH (ref 70–99)
GLUCOSE BLDC GLUCOMTR-MCNC: 166 MG/DL — HIGH (ref 70–99)
GLUCOSE BLDC GLUCOMTR-MCNC: 180 MG/DL — HIGH (ref 70–99)
GLUCOSE BLDC GLUCOMTR-MCNC: 182 MG/DL — HIGH (ref 70–99)
GLUCOSE SERPL-MCNC: 141 MG/DL — HIGH (ref 70–99)
HCT VFR BLD CALC: 24.8 % — LOW (ref 34.5–45)
HGB BLD-MCNC: 8.8 G/DL — LOW (ref 11.5–15.5)
MAGNESIUM SERPL-MCNC: 1.8 MG/DL — SIGNIFICANT CHANGE UP (ref 1.6–2.6)
MCHC RBC-ENTMCNC: 28.3 PG — SIGNIFICANT CHANGE UP (ref 27–34)
MCHC RBC-ENTMCNC: 35.5 G/DL — SIGNIFICANT CHANGE UP (ref 32–36)
MCV RBC AUTO: 79.7 FL — LOW (ref 80–100)
NRBC # BLD AUTO: 0.02 K/UL — HIGH (ref 0–0)
NRBC # FLD: 0.02 K/UL — HIGH (ref 0–0)
NRBC BLD AUTO-RTO: 0 /100 WBCS — SIGNIFICANT CHANGE UP (ref 0–0)
PHOSPHATE SERPL-MCNC: 2.9 MG/DL — SIGNIFICANT CHANGE UP (ref 2.5–4.5)
PLATELET # BLD AUTO: 295 K/UL — SIGNIFICANT CHANGE UP (ref 150–400)
PMV BLD: 11.5 FL — SIGNIFICANT CHANGE UP (ref 7–13)
POTASSIUM SERPL-MCNC: 3.9 MMOL/L — SIGNIFICANT CHANGE UP (ref 3.5–5.3)
POTASSIUM SERPL-SCNC: 3.9 MMOL/L — SIGNIFICANT CHANGE UP (ref 3.5–5.3)
RBC # BLD: 3.11 M/UL — LOW (ref 3.8–5.2)
RBC # FLD: 18.8 % — HIGH (ref 10.3–14.5)
SODIUM SERPL-SCNC: 133 MMOL/L — LOW (ref 135–145)
WBC # BLD: 7.51 K/UL — SIGNIFICANT CHANGE UP (ref 3.8–10.5)
WBC # FLD AUTO: 7.51 K/UL — SIGNIFICANT CHANGE UP (ref 3.8–10.5)

## 2025-08-27 RX ORDER — MAGNESIUM SULFATE 500 MG/ML
1 SYRINGE (ML) INJECTION ONCE
Refills: 0 | Status: COMPLETED | OUTPATIENT
Start: 2025-08-27 | End: 2025-08-27

## 2025-08-27 RX ADMIN — DORZOLAMIDE HYDROCHLORIDE AND TIMOLOL MALEATE 1 DROP(S): 20; 5 SOLUTION/ DROPS OPHTHALMIC at 18:05

## 2025-08-27 RX ADMIN — DEXAMETHASONE 4 MILLIGRAM(S): 0.5 TABLET ORAL at 21:50

## 2025-08-27 RX ADMIN — Medication 100 GRAM(S): at 11:07

## 2025-08-27 RX ADMIN — ENOXAPARIN SODIUM 40 MILLIGRAM(S): 100 INJECTION SUBCUTANEOUS at 14:29

## 2025-08-27 RX ADMIN — Medication 40 MILLIGRAM(S): at 11:15

## 2025-08-27 RX ADMIN — AMLODIPINE BESYLATE 10 MILLIGRAM(S): 10 TABLET ORAL at 05:34

## 2025-08-27 RX ADMIN — INSULIN LISPRO 1: 100 INJECTION, SOLUTION INTRAVENOUS; SUBCUTANEOUS at 12:00

## 2025-08-27 RX ADMIN — INSULIN LISPRO 1: 100 INJECTION, SOLUTION INTRAVENOUS; SUBCUTANEOUS at 18:04

## 2025-08-27 RX ADMIN — DEXAMETHASONE 4 MILLIGRAM(S): 0.5 TABLET ORAL at 01:30

## 2025-08-27 RX ADMIN — INSULIN LISPRO 1: 100 INJECTION, SOLUTION INTRAVENOUS; SUBCUTANEOUS at 07:48

## 2025-08-27 RX ADMIN — Medication 1 APPLICATION(S): at 05:39

## 2025-08-27 RX ADMIN — OCTREOTIDE ACETATE 100 MICROGRAM(S): 500 INJECTION, SOLUTION INTRAVENOUS; SUBCUTANEOUS at 05:33

## 2025-08-27 RX ADMIN — OCTREOTIDE ACETATE 100 MICROGRAM(S): 500 INJECTION, SOLUTION INTRAVENOUS; SUBCUTANEOUS at 14:29

## 2025-08-27 RX ADMIN — DORZOLAMIDE HYDROCHLORIDE AND TIMOLOL MALEATE 1 DROP(S): 20; 5 SOLUTION/ DROPS OPHTHALMIC at 05:32

## 2025-08-27 RX ADMIN — OCTREOTIDE ACETATE 100 MICROGRAM(S): 500 INJECTION, SOLUTION INTRAVENOUS; SUBCUTANEOUS at 21:47

## 2025-08-27 RX ADMIN — DEXAMETHASONE 4 MILLIGRAM(S): 0.5 TABLET ORAL at 10:58

## 2025-08-27 RX ADMIN — BRIMONIDINE TARTRATE 1 DROP(S): 1.5 SOLUTION/ DROPS OPHTHALMIC at 21:53

## 2025-08-27 RX ADMIN — LATANOPROST PF 1 DROP(S): 0.05 SOLUTION/ DROPS OPHTHALMIC at 21:56

## 2025-08-27 RX ADMIN — BRIMONIDINE TARTRATE 1 DROP(S): 1.5 SOLUTION/ DROPS OPHTHALMIC at 14:29

## 2025-08-27 RX ADMIN — BRIMONIDINE TARTRATE 1 DROP(S): 1.5 SOLUTION/ DROPS OPHTHALMIC at 05:34

## 2025-08-27 RX ADMIN — Medication 100 MICROGRAM(S): at 05:34

## 2025-08-28 ENCOUNTER — NON-APPOINTMENT (OUTPATIENT)
Age: 80
End: 2025-08-28

## 2025-08-28 ENCOUNTER — TRANSCRIPTION ENCOUNTER (OUTPATIENT)
Age: 80
End: 2025-08-28

## 2025-08-28 VITALS
HEART RATE: 55 BPM | TEMPERATURE: 99 F | DIASTOLIC BLOOD PRESSURE: 71 MMHG | RESPIRATION RATE: 18 BRPM | OXYGEN SATURATION: 100 % | SYSTOLIC BLOOD PRESSURE: 113 MMHG

## 2025-08-28 LAB
ANION GAP SERPL CALC-SCNC: 11 MMOL/L — SIGNIFICANT CHANGE UP (ref 7–14)
BUN SERPL-MCNC: 26 MG/DL — HIGH (ref 7–23)
CALCIUM SERPL-MCNC: 8 MG/DL — LOW (ref 8.4–10.5)
CHLORIDE SERPL-SCNC: 97 MMOL/L — LOW (ref 98–107)
CO2 SERPL-SCNC: 25 MMOL/L — SIGNIFICANT CHANGE UP (ref 22–31)
CREAT SERPL-MCNC: 0.78 MG/DL — SIGNIFICANT CHANGE UP (ref 0.5–1.3)
EGFR: 76 ML/MIN/1.73M2 — SIGNIFICANT CHANGE UP
EGFR: 76 ML/MIN/1.73M2 — SIGNIFICANT CHANGE UP
GLUCOSE BLDC GLUCOMTR-MCNC: 139 MG/DL — HIGH (ref 70–99)
GLUCOSE BLDC GLUCOMTR-MCNC: 160 MG/DL — HIGH (ref 70–99)
GLUCOSE BLDC GLUCOMTR-MCNC: 181 MG/DL — HIGH (ref 70–99)
GLUCOSE SERPL-MCNC: 168 MG/DL — HIGH (ref 70–99)
HCT VFR BLD CALC: 25.2 % — LOW (ref 34.5–45)
HGB BLD-MCNC: 8.8 G/DL — LOW (ref 11.5–15.5)
MAGNESIUM SERPL-MCNC: 2.1 MG/DL — SIGNIFICANT CHANGE UP (ref 1.6–2.6)
MCHC RBC-ENTMCNC: 27.9 PG — SIGNIFICANT CHANGE UP (ref 27–34)
MCHC RBC-ENTMCNC: 34.9 G/DL — SIGNIFICANT CHANGE UP (ref 32–36)
MCV RBC AUTO: 80 FL — SIGNIFICANT CHANGE UP (ref 80–100)
NRBC # BLD AUTO: 0 K/UL — SIGNIFICANT CHANGE UP (ref 0–0)
NRBC # FLD: 0 K/UL — SIGNIFICANT CHANGE UP (ref 0–0)
NRBC BLD AUTO-RTO: 0 /100 WBCS — SIGNIFICANT CHANGE UP (ref 0–0)
PHOSPHATE SERPL-MCNC: 4 MG/DL — SIGNIFICANT CHANGE UP (ref 2.5–4.5)
PLATELET # BLD AUTO: 315 K/UL — SIGNIFICANT CHANGE UP (ref 150–400)
PMV BLD: 11.8 FL — SIGNIFICANT CHANGE UP (ref 7–13)
POTASSIUM SERPL-MCNC: 4.3 MMOL/L — SIGNIFICANT CHANGE UP (ref 3.5–5.3)
POTASSIUM SERPL-SCNC: 4.3 MMOL/L — SIGNIFICANT CHANGE UP (ref 3.5–5.3)
RBC # BLD: 3.15 M/UL — LOW (ref 3.8–5.2)
RBC # FLD: 19 % — HIGH (ref 10.3–14.5)
SODIUM SERPL-SCNC: 133 MMOL/L — LOW (ref 135–145)
WBC # BLD: 8.87 K/UL — SIGNIFICANT CHANGE UP (ref 3.8–10.5)
WBC # FLD AUTO: 8.87 K/UL — SIGNIFICANT CHANGE UP (ref 3.8–10.5)

## 2025-08-28 RX ORDER — METOCLOPRAMIDE HCL 10 MG
1 TABLET ORAL
Qty: 42 | Refills: 0
Start: 2025-08-28 | End: 2025-09-10

## 2025-08-28 RX ORDER — DEXAMETHASONE 0.5 MG/1
1 TABLET ORAL
Qty: 28 | Refills: 0
Start: 2025-08-28 | End: 2025-09-10

## 2025-08-28 RX ORDER — OCTREOTIDE ACETATE 500 UG/ML
20 INJECTION, SOLUTION INTRAVENOUS; SUBCUTANEOUS
Qty: 1 | Refills: 0
Start: 2025-08-28 | End: 2025-09-26

## 2025-08-28 RX ORDER — OCTREOTIDE ACETATE 500 UG/ML
100 INJECTION, SOLUTION INTRAVENOUS; SUBCUTANEOUS
Qty: 1 | Refills: 0
Start: 2025-08-28 | End: 2025-09-26

## 2025-08-28 RX ORDER — DEXAMETHASONE 0.5 MG/1
4 TABLET ORAL ONCE
Refills: 0 | Status: COMPLETED | OUTPATIENT
Start: 2025-08-28 | End: 2025-08-28

## 2025-08-28 RX ADMIN — Medication 100 MICROGRAM(S): at 06:06

## 2025-08-28 RX ADMIN — BRIMONIDINE TARTRATE 1 DROP(S): 1.5 SOLUTION/ DROPS OPHTHALMIC at 05:30

## 2025-08-28 RX ADMIN — INSULIN LISPRO 1: 100 INJECTION, SOLUTION INTRAVENOUS; SUBCUTANEOUS at 12:15

## 2025-08-28 RX ADMIN — ENOXAPARIN SODIUM 40 MILLIGRAM(S): 100 INJECTION SUBCUTANEOUS at 14:50

## 2025-08-28 RX ADMIN — BRIMONIDINE TARTRATE 1 DROP(S): 1.5 SOLUTION/ DROPS OPHTHALMIC at 14:50

## 2025-08-28 RX ADMIN — DEXAMETHASONE 4 MILLIGRAM(S): 0.5 TABLET ORAL at 14:50

## 2025-08-28 RX ADMIN — DORZOLAMIDE HYDROCHLORIDE AND TIMOLOL MALEATE 1 DROP(S): 20; 5 SOLUTION/ DROPS OPHTHALMIC at 05:30

## 2025-08-28 RX ADMIN — Medication 1 APPLICATION(S): at 05:32

## 2025-08-28 RX ADMIN — INSULIN LISPRO 1: 100 INJECTION, SOLUTION INTRAVENOUS; SUBCUTANEOUS at 07:54

## 2025-08-28 RX ADMIN — AMLODIPINE BESYLATE 10 MILLIGRAM(S): 10 TABLET ORAL at 05:30

## 2025-08-28 RX ADMIN — OCTREOTIDE ACETATE 100 MICROGRAM(S): 500 INJECTION, SOLUTION INTRAVENOUS; SUBCUTANEOUS at 05:31

## 2025-08-28 RX ADMIN — Medication 40 MILLIGRAM(S): at 05:30

## 2025-08-29 ENCOUNTER — NON-APPOINTMENT (OUTPATIENT)
Age: 80
End: 2025-08-29

## 2025-09-02 ENCOUNTER — APPOINTMENT (OUTPATIENT)
Dept: HEMATOLOGY ONCOLOGY | Facility: CLINIC | Age: 80
End: 2025-09-02
Payer: MEDICARE

## 2025-09-02 VITALS
HEART RATE: 64 BPM | BODY MASS INDEX: 20.81 KG/M2 | OXYGEN SATURATION: 97 % | SYSTOLIC BLOOD PRESSURE: 108 MMHG | HEIGHT: 61 IN | RESPIRATION RATE: 16 BRPM | TEMPERATURE: 97.3 F | DIASTOLIC BLOOD PRESSURE: 68 MMHG | WEIGHT: 110.23 LBS

## 2025-09-02 DIAGNOSIS — C17.0 MALIGNANT NEOPLASM OF DUODENUM: ICD-10-CM

## 2025-09-02 DIAGNOSIS — C17.9 MALIGNANT NEOPLASM OF SMALL INTESTINE, UNSPECIFIED: ICD-10-CM

## 2025-09-02 DIAGNOSIS — C80.1 MALIGNANT (PRIMARY) NEOPLASM, UNSPECIFIED: ICD-10-CM

## 2025-09-02 DIAGNOSIS — C78.6 SECONDARY MALIGNANT NEOPLASM OF RETROPERITONEUM AND PERITONEUM: ICD-10-CM

## 2025-09-02 PROCEDURE — 99213 OFFICE O/P EST LOW 20 MIN: CPT

## 2025-09-10 ENCOUNTER — RESULT REVIEW (OUTPATIENT)
Age: 80
End: 2025-09-10

## 2025-09-10 ENCOUNTER — APPOINTMENT (OUTPATIENT)
Dept: SURGICAL ONCOLOGY | Facility: HOSPITAL | Age: 80
End: 2025-09-10

## 2025-09-16 ENCOUNTER — APPOINTMENT (OUTPATIENT)
Dept: SURGICAL ONCOLOGY | Facility: CLINIC | Age: 80
End: 2025-09-16

## 2025-09-21 ENCOUNTER — TRANSCRIPTION ENCOUNTER (OUTPATIENT)
Age: 80
End: 2025-09-21

## (undated) DEVICE — CLAMP BX HOT RAD JAW 3

## (undated) DEVICE — BRUSH COLONOSCOPY CYTOLOGY

## (undated) DEVICE — TUBING AIRSEAL TRI-LUMEN FILTERED

## (undated) DEVICE — DRAPE INSTRUMENT POUCH 6.75" X 11"

## (undated) DEVICE — GOWN TRIMAX LG

## (undated) DEVICE — ELECTRO LUBE ANTI-STICK SOLUTION FOR CAUTERY TIP

## (undated) DEVICE — XI VESSEL SEALER

## (undated) DEVICE — ENDOCATCH 10MM

## (undated) DEVICE — LUBRICATING JELLY ONESHOT 1.25OZ

## (undated) DEVICE — SENSOR O2 FINGER ADULT

## (undated) DEVICE — GOWN XXL

## (undated) DEVICE — WARMING BLANKET LOWER ADULT

## (undated) DEVICE — SUCTION YANKAUER NO CONTROL VENT

## (undated) DEVICE — TUBING SUCTION 20FT

## (undated) DEVICE — XI DRAPE ARM

## (undated) DEVICE — LIJ/LIA-ARGON BEAM COAGULATOR #5 2016L251: Type: DURABLE MEDICAL EQUIPMENT

## (undated) DEVICE — BITE BLOCK ADULT 20 X 27MM (GREEN)

## (undated) DEVICE — GLV 7.5 PROTEXIS (WHITE)

## (undated) DEVICE — APPLICATOR SURGICEL LAP TROCAR POINT 2.5MM X 150MM

## (undated) DEVICE — SOL INJ NS 0.9% 500ML 2 PORT

## (undated) DEVICE — SUT SILK 2-0 18" TIES

## (undated) DEVICE — CATH IV SAFE BC 22G X 1" (BLUE)

## (undated) DEVICE — D HELP - CLEARVIEW CLEARIFY SYSTEM

## (undated) DEVICE — DRAIN PENROSE .25" X 18" LATEX

## (undated) DEVICE — KIT ENDO PROCEDURE CUST W/VLV

## (undated) DEVICE — DRSG STERISTRIPS 0.5 X 4"

## (undated) DEVICE — DRAPE 1/2 SHEET 40X57"

## (undated) DEVICE — TUBING SUCTION CONN 6FT STERILE

## (undated) DEVICE — CANISTER DISPOSABLE THIN WALL 3000CC

## (undated) DEVICE — VENODYNE/SCD SLEEVE CALF MEDIUM

## (undated) DEVICE — ELCTR MONOPOLAR STIMULATOR PROBE FLUSH-TIP

## (undated) DEVICE — SOL IRR POUR H2O 250ML

## (undated) DEVICE — ELCTR GROUNDING PAD ADULT COVIDIEN

## (undated) DEVICE — GLV 8 PROTEXIS (WHITE)

## (undated) DEVICE — SOL IRR POUR H2O 500ML

## (undated) DEVICE — DRAPE BACK TABLE COVER HEAVY DUTY 60"

## (undated) DEVICE — PACK HEAD & NECK

## (undated) DEVICE — XI SEAL UNIVERSIAL 5-12MM

## (undated) DEVICE — DRAPE 3/4 SHEET 52X76"

## (undated) DEVICE — FOLEY TRAY 16FR 5CC LF UMETER CLOSED

## (undated) DEVICE — TUBING STRYKEFLOW II SUCTION / IRRIGATOR

## (undated) DEVICE — SOL IRR POUR NS 0.9% 500ML

## (undated) DEVICE — XI ARM FORCEP CADIERE 8MM

## (undated) DEVICE — DRAPE TOWEL BLUE 17" X 24"

## (undated) DEVICE — TUBING HYBRID CO2

## (undated) DEVICE — BLADE SCALPEL SAFETYLOCK #15

## (undated) DEVICE — TROCAR GELPOINT MINI ADVANCED

## (undated) DEVICE — POSITIONER PINK PAD PIGAZZI SYSTEM

## (undated) DEVICE — ADAPTER ENDO CHNL SINGLE USE

## (undated) DEVICE — PACK ROBOTIC

## (undated) DEVICE — TUBING ERBE CO2 OLYMPUS CONNECTOR

## (undated) DEVICE — SYR ALLIANCE II INFLATION 60ML

## (undated) DEVICE — SUT BIOSYN 4-0 18" P-12

## (undated) DEVICE — DRAPE GYN W LEGGINGS 3X125"

## (undated) DEVICE — ELCTR BOVIE PENCIL SMOKE EVACUATION

## (undated) DEVICE — LABELS BLANK W PEN

## (undated) DEVICE — SUT MONOCRYL 4-0 27" PS-2 UNDYED

## (undated) DEVICE — XI OBTURATOR OPTICAL BLADELESS 8MM

## (undated) DEVICE — DRSG OPSITE 2.5 X 2"

## (undated) DEVICE — DRSG OPSITE 13.75 X 4"

## (undated) DEVICE — LONE STAR RETRACTOR RING 12MM BLUNT DISP

## (undated) DEVICE — TROCAR SURGIQUEST AIRSEAL 5MM X 100MM

## (undated) DEVICE — DRSG MASTISOL

## (undated) DEVICE — SUT SILK 2-0 30" SH

## (undated) DEVICE — XI ARM FORCEP PROGRASP 8MM

## (undated) DEVICE — XI ARM FORCEP FENESTRATED BIPOLAR 8MM

## (undated) DEVICE — DRSG TEGADERM 6 X 8"

## (undated) DEVICE — XI ARM SCISSOR MONO CURVED

## (undated) DEVICE — Device

## (undated) DEVICE — POSITIONER PURPLE ARM ONE STEP (LARGE)

## (undated) DEVICE — POSITIONER FOAM HEAD CRADLE (PINK)

## (undated) DEVICE — FORCEP RADIAL JAW 4 W NDL 2.4MM 2.8MM 240CM ORANGE DISP

## (undated) DEVICE — UTERINE MANIPULATOR CONMED VCARE LG 37MM

## (undated) DEVICE — BIOPSY FORCEP RADIAL JAW 4 STANDARD WITH NEEDLE

## (undated) DEVICE — STAPLER COVIDIEN ENDO GIA STANDARD HANDLE

## (undated) DEVICE — SUT SILK 2-0 18" FS

## (undated) DEVICE — XI SCISSOR TIP COVER

## (undated) DEVICE — PACK IV START WITH CHG

## (undated) DEVICE — DRAPE IOBAN 23" X 23"

## (undated) DEVICE — DRSG CURITY GAUZE SPONGE 4 X 4" 12-PLY

## (undated) DEVICE — POLY TRAP ETRAP

## (undated) DEVICE — FOLEY HOLDER STATLOCK 2 WAY ADULT

## (undated) DEVICE — DRAPE 3/4 SHEET W REINFORCEMENT 56X77"

## (undated) DEVICE — TROCAR APPLIED MEDICAL KII BALLOON BLUNT TIP 12MM X 100MM

## (undated) DEVICE — PREP CHLORAPREP HI-LITE ORANGE 26ML

## (undated) DEVICE — STAPLER SKIN VISI-STAT 35 WIDE

## (undated) DEVICE — LIGASURE BLUNT TIP 5MM X 37CM

## (undated) DEVICE — UTERINE MANIPULATOR CONMED VCARE SM 32MM

## (undated) DEVICE — IRRIGATOR BIO SHIELD

## (undated) DEVICE — HANDPIECE SINGLE FUNCTION ABC

## (undated) DEVICE — BIPOLAR FORCEP KIRWAN JEWELERS STR 4" X 0.4MM W 12FT CORD (GREEN)

## (undated) DEVICE — DRAPE WARMING SOLUTION 44 X 44"

## (undated) DEVICE — TROCAR COVIDIEN VERSAPORT BLADELESS OPTICAL 5MM STANDARD

## (undated) DEVICE — SUT POLYSORB 4-0 P-12 UNDYED

## (undated) DEVICE — XI STAPLER SUREFORM 45

## (undated) DEVICE — DRAIN RESERVOIR FOR JACKSON PRATT 100CC CARDINAL

## (undated) DEVICE — PREP CHLOROHEXIDINE 4% 118CC KIT

## (undated) DEVICE — SYR LUER LOK 50CC

## (undated) DEVICE — DRAIN BLAKE 10FR ROUND

## (undated) DEVICE — XI DRAPE COLUMN

## (undated) DEVICE — SPECIMEN CONTAINER 100ML

## (undated) DEVICE — PACK COLON BUNDLE

## (undated) DEVICE — PACK MAJOR ABDOMINAL SUPINE

## (undated) DEVICE — PROBE FIAPC DIA 2.3MM/7FR LNTH 220CM/7.2FT

## (undated) DEVICE — DRAPE SPLIT SHEET 77" X 120"

## (undated) DEVICE — XI ARM NEEDLE DRIVER SUTURECUT MEGA 8MM

## (undated) DEVICE — XI ARM FORCE BIPOLAR 8MM

## (undated) DEVICE — SUT VICRYL 3-0 27" SH UNDYED

## (undated) DEVICE — UTERINE MANIPULATOR CONMED VCARE MED 34MM

## (undated) DEVICE — GLV 7.5 PROTEXIS (BLUE)

## (undated) DEVICE — BRUSH CYTO ENDO

## (undated) DEVICE — POSITIONER FOAM EGG CRATE ULNAR 2PCS (PINK)

## (undated) DEVICE — BALLOON US ENDO

## (undated) DEVICE — DRAPE LIGHT HANDLE COVER (GREEN)

## (undated) DEVICE — TUBING IV SET GRAVITY 3Y 100" MACRO

## (undated) DEVICE — FOLEY TRAY 16FR 5CC LTX UMETER CLOSED

## (undated) DEVICE — LIGASURE SMALL JAW

## (undated) DEVICE — NDL FLEXTIP 5MM SCLERATHERAPY

## (undated) DEVICE — WARMING BLANKET UPPER ADULT

## (undated) DEVICE — PACK D&C

## (undated) DEVICE — DRAPE ROBOTIC

## (undated) DEVICE — SNARE OPTIMIZER SOFT POLYPECTOMY SMALL MINI OVAL

## (undated) DEVICE — SUT VICRYL 0 27" UR-6

## (undated) DEVICE — ELCTR THUNDERBEAT HANDPIECE 0MM X 9CM OPEN FINE JAW

## (undated) DEVICE — FORCEP RADIAL JAW 4 JUMBO 2.8MM 3.2MM 240CM ORANGE DISP

## (undated) DEVICE — CATH IV SAFE BC 20G X 1.16" (PINK)

## (undated) DEVICE — PACK PERI GYN

## (undated) DEVICE — SUT SOFSILK 3-0 30" V-20

## (undated) DEVICE — GOWN XXXL

## (undated) DEVICE — PROBE FIAPC CIRC O.D. 2.3MM/6.9FR LNTH 220CM/7.2FT

## (undated) DEVICE — DRAPE MAGNETIC INSTRUMENT MEDIUM

## (undated) DEVICE — XI STAPLER SUREFORM 60

## (undated) DEVICE — GLV 7 PROTEXIS (WHITE)